# Patient Record
Sex: MALE | Race: BLACK OR AFRICAN AMERICAN | NOT HISPANIC OR LATINO | URBAN - METROPOLITAN AREA
[De-identification: names, ages, dates, MRNs, and addresses within clinical notes are randomized per-mention and may not be internally consistent; named-entity substitution may affect disease eponyms.]

---

## 2017-03-13 ENCOUNTER — EMERGENCY (EMERGENCY)
Facility: HOSPITAL | Age: 32
LOS: 1 days | Discharge: HOME | End: 2017-03-13
Attending: EMERGENCY MEDICINE | Admitting: EMERGENCY MEDICINE
Payer: MEDICARE

## 2017-03-13 VITALS
HEART RATE: 69 BPM | TEMPERATURE: 98 F | SYSTOLIC BLOOD PRESSURE: 134 MMHG | DIASTOLIC BLOOD PRESSURE: 83 MMHG | RESPIRATION RATE: 18 BRPM | OXYGEN SATURATION: 100 %

## 2017-03-13 DIAGNOSIS — I77.0 ARTERIOVENOUS FISTULA, ACQUIRED: Chronic | ICD-10-CM

## 2017-03-13 DIAGNOSIS — Z41.9 ENCOUNTER FOR PROCEDURE FOR PURPOSES OTHER THAN REMEDYING HEALTH STATE, UNSPECIFIED: Chronic | ICD-10-CM

## 2017-03-13 PROCEDURE — 99283 EMERGENCY DEPT VISIT LOW MDM: CPT | Mod: GC

## 2017-03-13 NOTE — ED PROVIDER NOTE - PMH
ESRD (end stage renal disease) on dialysis    ESRD on hemodialysis  Tue Thu Sat  HTN (hypertension)    HTN (hypertension)

## 2017-03-13 NOTE — ED PROVIDER NOTE - MEDICAL DECISION MAKING DETAILS
33 y/o male c/o left sided facial droop  -probable bells palsy  -steroids, artificial tears, outpt follow up

## 2017-03-13 NOTE — ED PROVIDER NOTE - CRANIAL NERVE AND PUPILLARY EXAM
+ left sided facial droop affecting mouht as well as forehead. unable to fully smile or wrinkle left forehead with eyeborw raising./extra-ocular movements intact

## 2017-03-13 NOTE — ED PROVIDER NOTE - ATTENDING CONTRIBUTION TO CARE
Avinash agree with PA note  32 yr old male with hx of HTN, ESRD presents with 3 days of left sided facial droop.  Denies focal weakness, sensory disturbance, gait disturbance, visual changes.    PE: well appearing; 7th nerve palsy indicated by forehead involvement on left side of face; Neuro: 5/5 motor UE and LE; sensation intact; gait intact    Imp: bells palsy steroids, eye drops, eye patch,

## 2017-03-13 NOTE — ED PROVIDER NOTE - OBJECTIVE STATEMENT
31 y/o male hx HTN, ESRD (on home dialysis daily) presents to ED c/o facial droop x 3 days. Pt. states Saturday morning he woke up and noticed left sided facial droop. Pt. states symptoms are slightly better today but still persistent - went to dentist today because he thought his symptoms were dental in origin and was sent to ED for further evaluation. Pt. denies any recent sickness or illness, denies cold sore or lesions anywehre. Denies fever chills weakness dizziness vision changes weakness dizziness nausea vomit.

## 2017-03-13 NOTE — ED ADULT TRIAGE NOTE - CHIEF COMPLAINT QUOTE
pt BIBA from dentist.  pt c/o facial droop x 3 days.  pt went to dentist today because he thought he had a facial droop due to tooth problem.

## 2017-06-16 ENCOUNTER — APPOINTMENT (OUTPATIENT)
Dept: NEUROLOGY | Facility: CLINIC | Age: 32
End: 2017-06-16

## 2017-08-02 ENCOUNTER — APPOINTMENT (OUTPATIENT)
Dept: NEUROLOGY | Facility: CLINIC | Age: 32
End: 2017-08-02

## 2017-08-22 ENCOUNTER — APPOINTMENT (OUTPATIENT)
Dept: NEUROLOGY | Facility: CLINIC | Age: 32
End: 2017-08-22

## 2017-09-22 ENCOUNTER — APPOINTMENT (OUTPATIENT)
Dept: PSYCHIATRY | Facility: CLINIC | Age: 32
End: 2017-09-22

## 2018-04-03 ENCOUNTER — APPOINTMENT (OUTPATIENT)
Dept: TRANSPLANT | Facility: CLINIC | Age: 33
End: 2018-04-03
Payer: COMMERCIAL

## 2018-04-03 VITALS
TEMPERATURE: 98.3 F | HEART RATE: 88 BPM | DIASTOLIC BLOOD PRESSURE: 91 MMHG | RESPIRATION RATE: 12 BRPM | BODY MASS INDEX: 30.35 KG/M2 | WEIGHT: 212 LBS | HEIGHT: 70.2 IN | SYSTOLIC BLOOD PRESSURE: 154 MMHG

## 2018-04-03 PROCEDURE — 99215 OFFICE O/P EST HI 40 MIN: CPT

## 2018-05-16 ENCOUNTER — APPOINTMENT (OUTPATIENT)
Dept: CV DIAGNOSITCS | Facility: HOSPITAL | Age: 33
End: 2018-05-16

## 2018-12-05 ENCOUNTER — APPOINTMENT (OUTPATIENT)
Dept: CARDIOLOGY | Facility: CLINIC | Age: 33
End: 2018-12-05

## 2019-01-23 ENCOUNTER — APPOINTMENT (OUTPATIENT)
Dept: CARDIOLOGY | Facility: CLINIC | Age: 34
End: 2019-01-23

## 2019-02-07 ENCOUNTER — APPOINTMENT (OUTPATIENT)
Dept: CARDIOLOGY | Facility: CLINIC | Age: 34
End: 2019-02-07

## 2019-07-24 ENCOUNTER — APPOINTMENT (OUTPATIENT)
Dept: NEPHROLOGY | Facility: CLINIC | Age: 34
End: 2019-07-24
Payer: COMMERCIAL

## 2019-07-24 VITALS
OXYGEN SATURATION: 95 % | RESPIRATION RATE: 12 BRPM | HEART RATE: 100 BPM | WEIGHT: 228 LBS | SYSTOLIC BLOOD PRESSURE: 156 MMHG | TEMPERATURE: 98.3 F | BODY MASS INDEX: 32.64 KG/M2 | HEIGHT: 70 IN | DIASTOLIC BLOOD PRESSURE: 93 MMHG

## 2019-07-24 PROCEDURE — 99215 OFFICE O/P EST HI 40 MIN: CPT

## 2019-07-24 RX ORDER — METHIMAZOLE 5 MG/1
5 TABLET ORAL
Refills: 0 | Status: DISCONTINUED | COMMUNITY
Start: 2018-04-03 | End: 2019-07-24

## 2019-07-24 NOTE — PHYSICAL EXAM
[General Appearance - Alert] : alert [General Appearance - In No Acute Distress] : in no acute distress [Extraocular Movements] : extraocular movements were intact [Sclera] : the sclera and conjunctiva were normal [Outer Ear] : the ears and nose were normal in appearance [Jugular Venous Distention Increased] : there was no jugular-venous distention [Auscultation Breath Sounds / Voice Sounds] : lungs were clear to auscultation bilaterally [Heart Sounds Gallop] : no gallops [Heart Sounds Pericardial Friction Rub] : no pericardial rub [Abdomen Soft] : soft [Edema] : there was no peripheral edema [Abdomen Tenderness] : non-tender [Abdomen Mass (___ Cm)] : no abdominal mass palpated [Cervical Lymph Nodes Enlarged Anterior Bilaterally] : anterior cervical [Cervical Lymph Nodes Enlarged Posterior Bilaterally] : posterior cervical [Supraclavicular Lymph Nodes Enlarged Bilaterally] : supraclavicular [Axillary Lymph Nodes Enlarged Bilaterally] : axillary [Abnormal Walk] : normal gait [___ (cm) Fistula] : [unfilled] (cm) fistula [] : no rash [No Focal Deficits] : no focal deficits [Oriented To Time, Place, And Person] : oriented to person, place, and time [Impaired Insight] : insight and judgment were intact [Affect] : the affect was normal [Mood] : the mood was normal [Full Pulse] : the pedal pulses are present

## 2019-07-24 NOTE — CONSULT LETTER
[Courtesy Letter:] : I had the pleasure of seeing your patient, [unfilled], in my office today. [Dear  ___] : Dear  [unfilled], [Please see my note below.] : Please see my note below. [Consult Closing:] : Thank you very much for allowing me to participate in the care of this patient.  If you have any questions, please do not hesitate to contact me. [FreeTextEntry3] : Maximo Colindres, DO [Sincerely,] : Sincerely,

## 2019-07-24 NOTE — CONSULT LETTER
[Courtesy Letter:] : I had the pleasure of seeing your patient, [unfilled], in my office today. [Dear  ___] : Dear  [unfilled], [Consult Closing:] : Thank you very much for allowing me to participate in the care of this patient.  If you have any questions, please do not hesitate to contact me. [Please see my note below.] : Please see my note below. [FreeTextEntry3] : Maximo Colindres, DO [Sincerely,] : Sincerely,

## 2019-07-24 NOTE — END OF VISIT
[Time Spent: ___ minutes] : I have spent [unfilled] minutes of face to face time with the patient [FreeTextEntry1] : JOYCELYN Krause

## 2019-07-24 NOTE — ASSESSMENT
[FreeTextEntry1] : 1.  ESRD on dialysis - Pt is an excellent candidate for kidney transplantation.  Unclear cause of ESRD.  Unfortunately he has no living donors.  Discussed benefit of living donation and strategies to find donors.  \par 2.  HTN - stable.  Unclear why on imdur\par 3.  CV - cardiac testing wnl in 2016.  Will repeat\par 4.  Screening - needs CXR and ab sonogram\par 5.  GI - will obtain records of colonoscopy and endoscopy.  Per pt occasional rectal bleeding and stomach upset on famotidine.  \par \par I have personally discussed the risks and benefits of transplantation where the following was disclosed:\par  \par Reviewed factors affecting survival and morbidity while on dialysis, the transplant wait list and reviewed sheyla-operative and long-term risk factors affecting outcome in kidney transplantation.  \par  \par One year SRTR outcomes for national and [facility] were discussed in regards to patient survival and graft survival after transplantation.  \par  \par Details of transplant surgery, including complications were discussed.\par Immunosuppression and complications including infection including life threatening sepsis and opportunistic infections, malignancy and new onset diabetes were discussed.  \par  \par Benefits of live donor transplantation as well as variability in wait times across regions and multiple listing were discussed. \par KDPI >85% and PHS high risk criteria donors were discussed. \par HCV kidney transplantation was discussed.\par  \par I spent > 50% of this 40 minute visit discussing the risks and benefits of kidney transplantation with the patient as outlined above.

## 2019-07-24 NOTE — HISTORY OF PRESENT ILLNESS
[FreeTextEntry1] : 34 years old AA male, ESRD 2015, diagnosed kidney disease prior to start of dialysis. He is followed by Dr. Betito Dodson, nephrologist. He has known HTN since age 17.   Once hospitalized in 2017 for anemia requiring blood transfusion.  \par \par Has had a slight rectal bleeding - has had 2 colonoscopies.  Pt also had an endoscopy. \par  \par Testing:\par EK16, normal sinus rhythm, nonspecific ST abnormality \par Stress Test: 16 (stress echo), 9 METS, 91% MPHR, 08:00 min, no evidence of inducible ischemia \par Echo: 16, mild MR, normal LA, mild concentric LVH, normal LV systolic function, normal RV size and function, RVSP 37 mmHg \par \par \par Past surgeries: Left forearm AVF.\par Former smoker, 2-3 cig /day for 1 year, quit 9 months ago.  No alcohol. Not working currently.  Stays with Mom and aunt.  \par Mother in 70 's both diabetic and hypertensive.  Father  from pneumonia and had DM and HTN.  \par He is single, no children. One sister in [facility] but not close\par No family h/o kidney disease.\par

## 2019-07-24 NOTE — PHYSICAL EXAM
[General Appearance - Alert] : alert [General Appearance - In No Acute Distress] : in no acute distress [Extraocular Movements] : extraocular movements were intact [Sclera] : the sclera and conjunctiva were normal [Outer Ear] : the ears and nose were normal in appearance [Jugular Venous Distention Increased] : there was no jugular-venous distention [Heart Sounds Gallop] : no gallops [Auscultation Breath Sounds / Voice Sounds] : lungs were clear to auscultation bilaterally [Heart Sounds Pericardial Friction Rub] : no pericardial rub [Edema] : there was no peripheral edema [Abdomen Soft] : soft [Abdomen Tenderness] : non-tender [Abdomen Mass (___ Cm)] : no abdominal mass palpated [Cervical Lymph Nodes Enlarged Anterior Bilaterally] : anterior cervical [Cervical Lymph Nodes Enlarged Posterior Bilaterally] : posterior cervical [Supraclavicular Lymph Nodes Enlarged Bilaterally] : supraclavicular [Axillary Lymph Nodes Enlarged Bilaterally] : axillary [Abnormal Walk] : normal gait [___ (cm) Fistula] : [unfilled] (cm) fistula [] : no rash [No Focal Deficits] : no focal deficits [Oriented To Time, Place, And Person] : oriented to person, place, and time [Impaired Insight] : insight and judgment were intact [Affect] : the affect was normal [Mood] : the mood was normal [Full Pulse] : the pedal pulses are present

## 2019-07-24 NOTE — HISTORY OF PRESENT ILLNESS
[FreeTextEntry1] : 34 years old AA male, ESRD 2015, diagnosed kidney disease prior to start of dialysis. He is followed by Dr. Betito Dodson, nephrologist. He has known HTN since age 17.   Once hospitalized in 2017 for anemia requiring blood transfusion.  \par \par Has had a slight rectal bleeding - has had 2 colonoscopies.  Pt also had an endoscopy. \par  \par Testing:\par EK16, normal sinus rhythm, nonspecific ST abnormality \par Stress Test: 16 (stress echo), 9 METS, 91% MPHR, 08:00 min, no evidence of inducible ischemia \par Echo: 16, mild MR, normal LA, mild concentric LVH, normal LV systolic function, normal RV size and function, RVSP 37 mmHg \par \par \par Past surgeries: Left forearm AVF.\par Former smoker, 2-3 cig /day for 1 year, quit 9 months ago.  No alcohol. Not working currently.  Stays with Mom and aunt.  \par Mother in 70 's both diabetic and hypertensive.  Father  from pneumonia and had DM and HTN.  \par He is single, no children. One sister in [facility] but not close\par No family h/o kidney disease.\par

## 2019-07-24 NOTE — ASSESSMENT
[FreeTextEntry1] : 1.  ESRD on dialysis - Pt is an excellent candidate for kidney transplantation.  Unclear cause of ESRD.  Unfortunately he has no living donors.  Discussed benefit of living donation and strategies to find donors.  \par 2.  HTN - stable.  Unclear why on imdur\par 3.  CV - cardiac testing wnl in 2016.  Will repeat\par 4.  Screening - needs CXR and ab sonogram\par 5.  GI - will obtain records of colonoscopy and endoscopy.  Per pt occasional rectal bleeding and stomach upset on famotidine.  \par \par I have personally discussed the risks and benefits of transplantation where the following was disclosed:\par  \par Reviewed factors affecting survival and morbidity while on dialysis, the transplant wait list and reviewed sheyla-operative and long-term risk factors affecting outcome in kidney transplantation.  \par  \par One year SRTR outcomes for national and [facility] were discussed in regards to patient survival and graft survival after transplantation.  \par  \par Details of transplant surgery, including complications were discussed.\par Immunosuppression and complications including infection including life threatening sepsis and opportunistic infections, malignancy and new onset diabetes were discussed.  \par  \par Benefits of live donor transplantation as well as variability in wait times across regions and multiple listing were discussed. \par KDPI >85% and PHS high risk criteria donors were discussed. \par HCV kidney transplantation was discussed.\par  \par I spent > 50% of this 40 minute visit discussing the risks and benefits of kidney transplantation with the patient as outlined above.

## 2019-07-25 LAB
ABO + RH PNL BLD: NORMAL
ALBUMIN SERPL ELPH-MCNC: 4.9 G/DL
ALP BLD-CCNC: 59 U/L
ALT SERPL-CCNC: 45 U/L
ANION GAP SERPL CALC-SCNC: 18 MMOL/L
APPEARANCE: CLEAR
AST SERPL-CCNC: 24 U/L
BACTERIA: NEGATIVE
BASOPHILS # BLD AUTO: 0.07 K/UL
BASOPHILS NFR BLD AUTO: 1 %
BILIRUB SERPL-MCNC: 0.2 MG/DL
BILIRUBIN URINE: NEGATIVE
BLOOD URINE: ABNORMAL
BUN SERPL-MCNC: 33 MG/DL
C PEPTIDE SERPL-MCNC: 9.3 NG/ML
CALCIUM SERPL-MCNC: 10.4 MG/DL
CHLORIDE SERPL-SCNC: 98 MMOL/L
CMV IGG SERPL QL: <0.2 U/ML
CMV IGG SERPL-IMP: NEGATIVE
CO2 SERPL-SCNC: 23 MMOL/L
COLOR: NORMAL
CREAT SERPL-MCNC: 14.67 MG/DL
CREAT SPEC-SCNC: 287 MG/DL
CREAT/PROT UR: 0.5 RATIO
EBV DNA SERPL NAA+PROBE-ACNC: NOT DETECTED IU/ML
EBV EA AB SER IA-ACNC: <5 U/ML
EBV EA AB TITR SER IF: POSITIVE
EBV EA IGG SER QL IA: >600 U/ML
EBV EA IGG SER-ACNC: NEGATIVE
EBV EA IGM SER IA-ACNC: NEGATIVE
EBV PATRN SPEC IB-IMP: NORMAL
EBV VCA IGG SER IA-ACNC: 62.5 U/ML
EBV VCA IGM SER QL IA: <10 U/ML
EBVPCR LOG: NOT DETECTED LOGIU/ML
EOSINOPHIL # BLD AUTO: 0.2 K/UL
EOSINOPHIL NFR BLD AUTO: 2.8 %
EPSTEIN-BARR VIRUS CAPSID ANTIGEN IGG: POSITIVE
ESTIMATED AVERAGE GLUCOSE: 97 MG/DL
GLUCOSE QUALITATIVE U: NEGATIVE
GLUCOSE SERPL-MCNC: 94 MG/DL
HAV IGM SER QL: NONREACTIVE
HBA1C MFR BLD HPLC: 5 %
HBV CORE IGG+IGM SER QL: NONREACTIVE
HBV SURFACE AB SER QL: REACTIVE
HBV SURFACE AG SER QL: NONREACTIVE
HCT VFR BLD CALC: 33.6 %
HCV AB SER QL: NONREACTIVE
HCV S/CO RATIO: 0.16 S/CO
HGB BLD-MCNC: 10 G/DL
HIV1+2 AB SPEC QL IA.RAPID: NONREACTIVE
HSV 1+2 IGG SER IA-IMP: NEGATIVE
HSV 1+2 IGG SER IA-IMP: POSITIVE
HSV1 IGG SER QL: 62.1 INDEX
HSV2 IGG SER QL: 0.16 INDEX
HYALINE CASTS: 2 /LPF
IMM GRANULOCYTES NFR BLD AUTO: 0.7 %
KETONES URINE: NORMAL
LEUKOCYTE ESTERASE URINE: ABNORMAL
LYMPHOCYTES # BLD AUTO: 1.73 K/UL
LYMPHOCYTES NFR BLD AUTO: 24 %
MAGNESIUM SERPL-MCNC: 2.6 MG/DL
MAN DIFF?: NORMAL
MCHC RBC-ENTMCNC: 26.4 PG
MCHC RBC-ENTMCNC: 29.8 GM/DL
MCV RBC AUTO: 88.7 FL
MICROSCOPIC-UA: NORMAL
MONOCYTES # BLD AUTO: 0.95 K/UL
MONOCYTES NFR BLD AUTO: 13.2 %
NEUTROPHILS # BLD AUTO: 4.22 K/UL
NEUTROPHILS NFR BLD AUTO: 58.3 %
NITRITE URINE: NEGATIVE
PH URINE: 6
PHOSPHATE SERPL-MCNC: 4 MG/DL
PLATELET # BLD AUTO: 291 K/UL
POTASSIUM SERPL-SCNC: 4.1 MMOL/L
PROT SERPL-MCNC: 7.4 G/DL
PROT UR-MCNC: 144 MG/DL
PROTEIN URINE: ABNORMAL
PSA SERPL-MCNC: 0.63 NG/ML
RBC # BLD: 3.79 M/UL
RBC # FLD: 17 %
RED BLOOD CELLS URINE: 10 /HPF
RUBV IGG FLD-ACNC: 3.9 INDEX
RUBV IGG SER-IMP: POSITIVE
SODIUM SERPL-SCNC: 139 MMOL/L
SPECIFIC GRAVITY URINE: 1.02
SQUAMOUS EPITHELIAL CELLS: 1 /HPF
T GONDII AB SER-IMP: NEGATIVE
T GONDII IGG SER QL: <3 IU/ML
T PALLIDUM AB SER QL IA: NEGATIVE
URATE SERPL-MCNC: 4.3 MG/DL
UROBILINOGEN URINE: NORMAL
VZV AB TITR SER: POSITIVE
VZV IGG SER IF-ACNC: 1890 INDEX
WBC # FLD AUTO: 7.22 K/UL
WHITE BLOOD CELLS URINE: 91 /HPF

## 2019-07-26 LAB
M TB IFN-G BLD-IMP: NEGATIVE
QUANTIFERON TB PLUS MITOGEN MINUS NIL: 9.61 IU/ML
QUANTIFERON TB PLUS NIL: 0.02 IU/ML
QUANTIFERON TB PLUS TB1 MINUS NIL: -0.01 IU/ML
QUANTIFERON TB PLUS TB2 MINUS NIL: 0 IU/ML

## 2019-09-04 ENCOUNTER — APPOINTMENT (OUTPATIENT)
Dept: RADIOLOGY | Facility: IMAGING CENTER | Age: 34
End: 2019-09-04

## 2019-09-04 ENCOUNTER — APPOINTMENT (OUTPATIENT)
Dept: ULTRASOUND IMAGING | Facility: IMAGING CENTER | Age: 34
End: 2019-09-04

## 2019-09-11 ENCOUNTER — APPOINTMENT (OUTPATIENT)
Dept: CARDIOLOGY | Facility: CLINIC | Age: 34
End: 2019-09-11

## 2019-10-25 ENCOUNTER — APPOINTMENT (OUTPATIENT)
Dept: ULTRASOUND IMAGING | Facility: IMAGING CENTER | Age: 34
End: 2019-10-25
Payer: COMMERCIAL

## 2019-10-25 ENCOUNTER — OUTPATIENT (OUTPATIENT)
Dept: OUTPATIENT SERVICES | Facility: HOSPITAL | Age: 34
LOS: 1 days | End: 2019-10-25
Payer: COMMERCIAL

## 2019-10-25 ENCOUNTER — APPOINTMENT (OUTPATIENT)
Dept: RADIOLOGY | Facility: IMAGING CENTER | Age: 34
End: 2019-10-25
Payer: COMMERCIAL

## 2019-10-25 DIAGNOSIS — I77.0 ARTERIOVENOUS FISTULA, ACQUIRED: Chronic | ICD-10-CM

## 2019-10-25 DIAGNOSIS — Z41.9 ENCOUNTER FOR PROCEDURE FOR PURPOSES OTHER THAN REMEDYING HEALTH STATE, UNSPECIFIED: Chronic | ICD-10-CM

## 2019-10-25 DIAGNOSIS — Z01.818 ENCOUNTER FOR OTHER PREPROCEDURAL EXAMINATION: ICD-10-CM

## 2019-10-25 PROCEDURE — 76700 US EXAM ABDOM COMPLETE: CPT | Mod: 26

## 2019-10-25 PROCEDURE — 71046 X-RAY EXAM CHEST 2 VIEWS: CPT | Mod: 26

## 2019-10-25 PROCEDURE — 71046 X-RAY EXAM CHEST 2 VIEWS: CPT

## 2019-10-25 PROCEDURE — 93979 VASCULAR STUDY: CPT

## 2019-10-25 PROCEDURE — 76700 US EXAM ABDOM COMPLETE: CPT

## 2019-11-06 ENCOUNTER — APPOINTMENT (OUTPATIENT)
Dept: CARDIOLOGY | Facility: CLINIC | Age: 34
End: 2019-11-06
Payer: MEDICARE

## 2019-11-06 ENCOUNTER — NON-APPOINTMENT (OUTPATIENT)
Age: 34
End: 2019-11-06

## 2019-11-06 VITALS
HEIGHT: 70 IN | RESPIRATION RATE: 14 BRPM | OXYGEN SATURATION: 97 % | SYSTOLIC BLOOD PRESSURE: 167 MMHG | DIASTOLIC BLOOD PRESSURE: 101 MMHG | BODY MASS INDEX: 31.5 KG/M2 | WEIGHT: 220 LBS | HEART RATE: 70 BPM

## 2019-11-06 PROCEDURE — 93000 ELECTROCARDIOGRAM COMPLETE: CPT

## 2019-11-06 PROCEDURE — 99203 OFFICE O/P NEW LOW 30 MIN: CPT

## 2019-11-13 NOTE — HISTORY OF PRESENT ILLNESS
[FreeTextEntry1] : Patient with HTN and ESRD. Conditions have been stable. Currently doing well. Denies chest pain, shortness of breath or palpitations.

## 2019-11-13 NOTE — PHYSICAL EXAM
[General Appearance - Well Developed] : well developed [Normal Appearance] : normal appearance [Well Groomed] : well groomed [General Appearance - Well Nourished] : well nourished [No Deformities] : no deformities [General Appearance - In No Acute Distress] : no acute distress [Normal Conjunctiva] : the conjunctiva exhibited no abnormalities [Eyelids - No Xanthelasma] : the eyelids demonstrated no xanthelasmas [Normal Oral Mucosa] : normal oral mucosa [No Oral Pallor] : no oral pallor [No Oral Cyanosis] : no oral cyanosis [Heart Rate And Rhythm] : heart rate and rhythm were normal [Heart Sounds] : normal S1 and S2 [Murmurs] : no murmurs present [Edema] : no peripheral edema present [Respiration, Rhythm And Depth] : normal respiratory rhythm and effort [Exaggerated Use Of Accessory Muscles For Inspiration] : no accessory muscle use [Auscultation Breath Sounds / Voice Sounds] : lungs were clear to auscultation bilaterally [Abdomen Soft] : soft [Abdomen Tenderness] : non-tender [Abnormal Walk] : normal gait [Gait - Sufficient For Exercise Testing] : the gait was sufficient for exercise testing [Cyanosis, Localized] : no localized cyanosis [Skin Color & Pigmentation] : normal skin color and pigmentation [] : no rash [No Venous Stasis] : no venous stasis [Oriented To Time, Place, And Person] : oriented to person, place, and time [Affect] : the affect was normal [Mood] : the mood was normal [No Anxiety] : not feeling anxious [FreeTextEntry1] : left forearm fistula

## 2019-11-13 NOTE — DISCUSSION/SUMMARY
[Hypertension] : hypertension [FreeTextEntry1] : \par Currently stable from a cardiovascular standpoint. Hypertensive today. Euvolemic. Asymptomatic. Continue current medications. ECG completed today and reviewed. Will schedule a stress echo to rule out any significant CAD and to assess his cardiac structures and function. Pending the test result, I will make further recommendations. At this time, patient is considered an acceptable risk from a cardiac standpoint for renal transplant. Follow up annually pre-transplant.

## 2019-12-01 ENCOUNTER — OUTPATIENT (OUTPATIENT)
Dept: OUTPATIENT SERVICES | Facility: HOSPITAL | Age: 34
LOS: 1 days | End: 2019-12-01
Payer: MEDICARE

## 2019-12-01 DIAGNOSIS — Z41.9 ENCOUNTER FOR PROCEDURE FOR PURPOSES OTHER THAN REMEDYING HEALTH STATE, UNSPECIFIED: Chronic | ICD-10-CM

## 2019-12-01 DIAGNOSIS — I77.0 ARTERIOVENOUS FISTULA, ACQUIRED: Chronic | ICD-10-CM

## 2019-12-01 PROCEDURE — G9001: CPT

## 2019-12-10 ENCOUNTER — OUTPATIENT (OUTPATIENT)
Dept: OUTPATIENT SERVICES | Facility: HOSPITAL | Age: 34
LOS: 1 days | End: 2019-12-10

## 2019-12-10 ENCOUNTER — APPOINTMENT (OUTPATIENT)
Dept: CV DIAGNOSITCS | Facility: HOSPITAL | Age: 34
End: 2019-12-10
Payer: COMMERCIAL

## 2019-12-10 DIAGNOSIS — Z41.9 ENCOUNTER FOR PROCEDURE FOR PURPOSES OTHER THAN REMEDYING HEALTH STATE, UNSPECIFIED: Chronic | ICD-10-CM

## 2019-12-10 DIAGNOSIS — N18.6 END STAGE RENAL DISEASE: ICD-10-CM

## 2019-12-10 DIAGNOSIS — I77.0 ARTERIOVENOUS FISTULA, ACQUIRED: Chronic | ICD-10-CM

## 2019-12-10 PROCEDURE — 93350 STRESS TTE ONLY: CPT | Mod: 26

## 2019-12-10 PROCEDURE — 93325 DOPPLER ECHO COLOR FLOW MAPG: CPT | Mod: 26,GC

## 2019-12-10 PROCEDURE — 93320 DOPPLER ECHO COMPLETE: CPT | Mod: 26,GC

## 2019-12-26 ENCOUNTER — EMERGENCY (EMERGENCY)
Facility: HOSPITAL | Age: 34
LOS: 1 days | Discharge: HOME | End: 2019-12-26
Attending: EMERGENCY MEDICINE | Admitting: EMERGENCY MEDICINE
Payer: MEDICARE

## 2019-12-26 VITALS
TEMPERATURE: 98 F | RESPIRATION RATE: 18 BRPM | HEART RATE: 75 BPM | OXYGEN SATURATION: 100 % | SYSTOLIC BLOOD PRESSURE: 173 MMHG | DIASTOLIC BLOOD PRESSURE: 112 MMHG

## 2019-12-26 VITALS
SYSTOLIC BLOOD PRESSURE: 168 MMHG | DIASTOLIC BLOOD PRESSURE: 100 MMHG | RESPIRATION RATE: 18 BRPM | TEMPERATURE: 98 F | HEART RATE: 76 BPM | OXYGEN SATURATION: 100 %

## 2019-12-26 DIAGNOSIS — I77.0 ARTERIOVENOUS FISTULA, ACQUIRED: Chronic | ICD-10-CM

## 2019-12-26 DIAGNOSIS — Z41.9 ENCOUNTER FOR PROCEDURE FOR PURPOSES OTHER THAN REMEDYING HEALTH STATE, UNSPECIFIED: Chronic | ICD-10-CM

## 2019-12-26 LAB
ALBUMIN SERPL ELPH-MCNC: 4.4 G/DL — SIGNIFICANT CHANGE UP (ref 3.3–5)
ALP SERPL-CCNC: 41 U/L — SIGNIFICANT CHANGE UP (ref 40–120)
ALT FLD-CCNC: SIGNIFICANT CHANGE UP U/L (ref 4–41)
ANION GAP SERPL CALC-SCNC: 15 MMO/L — HIGH (ref 7–14)
ANION GAP SERPL CALC-SCNC: 15 MMO/L — HIGH (ref 7–14)
APPEARANCE UR: CLEAR — SIGNIFICANT CHANGE UP
APTT BLD: 25.9 SEC — LOW (ref 27.5–36.3)
AST SERPL-CCNC: SIGNIFICANT CHANGE UP U/L (ref 4–40)
BACTERIA # UR AUTO: SIGNIFICANT CHANGE UP
BASOPHILS # BLD AUTO: 0.05 K/UL — SIGNIFICANT CHANGE UP (ref 0–0.2)
BASOPHILS NFR BLD AUTO: 1.2 % — SIGNIFICANT CHANGE UP (ref 0–2)
BILIRUB SERPL-MCNC: 0.4 MG/DL — SIGNIFICANT CHANGE UP (ref 0.2–1.2)
BILIRUB UR-MCNC: NEGATIVE — SIGNIFICANT CHANGE UP
BLD GP AB SCN SERPL QL: NEGATIVE — SIGNIFICANT CHANGE UP
BLOOD UR QL VISUAL: HIGH
BUN SERPL-MCNC: 55 MG/DL — HIGH (ref 7–23)
BUN SERPL-MCNC: 57 MG/DL — HIGH (ref 7–23)
CALCIUM SERPL-MCNC: 9.5 MG/DL — SIGNIFICANT CHANGE UP (ref 8.4–10.5)
CALCIUM SERPL-MCNC: 9.6 MG/DL — SIGNIFICANT CHANGE UP (ref 8.4–10.5)
CHLORIDE SERPL-SCNC: 94 MMOL/L — LOW (ref 98–107)
CHLORIDE SERPL-SCNC: 97 MMOL/L — LOW (ref 98–107)
CO2 SERPL-SCNC: 24 MMOL/L — SIGNIFICANT CHANGE UP (ref 22–31)
CO2 SERPL-SCNC: 25 MMOL/L — SIGNIFICANT CHANGE UP (ref 22–31)
COLOR SPEC: COLORLESS — SIGNIFICANT CHANGE UP
CREAT SERPL-MCNC: 19.69 MG/DL — HIGH (ref 0.5–1.3)
CREAT SERPL-MCNC: 20.2 MG/DL — HIGH (ref 0.5–1.3)
EOSINOPHIL # BLD AUTO: 0.26 K/UL — SIGNIFICANT CHANGE UP (ref 0–0.5)
EOSINOPHIL NFR BLD AUTO: 6.1 % — HIGH (ref 0–6)
GLUCOSE SERPL-MCNC: 80 MG/DL — SIGNIFICANT CHANGE UP (ref 70–99)
GLUCOSE SERPL-MCNC: 81 MG/DL — SIGNIFICANT CHANGE UP (ref 70–99)
GLUCOSE UR-MCNC: NEGATIVE — SIGNIFICANT CHANGE UP
HCT VFR BLD CALC: 29.4 % — LOW (ref 39–50)
HGB BLD-MCNC: 8.7 G/DL — LOW (ref 13–17)
HYALINE CASTS # UR AUTO: NEGATIVE — SIGNIFICANT CHANGE UP
IMM GRANULOCYTES NFR BLD AUTO: 0.2 % — SIGNIFICANT CHANGE UP (ref 0–1.5)
INR BLD: 0.9 — SIGNIFICANT CHANGE UP (ref 0.88–1.17)
KETONES UR-MCNC: NEGATIVE — SIGNIFICANT CHANGE UP
LEUKOCYTE ESTERASE UR-ACNC: SIGNIFICANT CHANGE UP
LYMPHOCYTES # BLD AUTO: 1.11 K/UL — SIGNIFICANT CHANGE UP (ref 1–3.3)
LYMPHOCYTES # BLD AUTO: 26.1 % — SIGNIFICANT CHANGE UP (ref 13–44)
MAGNESIUM SERPL-MCNC: 2.5 MG/DL — SIGNIFICANT CHANGE UP (ref 1.6–2.6)
MCHC RBC-ENTMCNC: 25.8 PG — LOW (ref 27–34)
MCHC RBC-ENTMCNC: 29.6 % — LOW (ref 32–36)
MCV RBC AUTO: 87.2 FL — SIGNIFICANT CHANGE UP (ref 80–100)
MONOCYTES # BLD AUTO: 0.69 K/UL — SIGNIFICANT CHANGE UP (ref 0–0.9)
MONOCYTES NFR BLD AUTO: 16.2 % — HIGH (ref 2–14)
NEUTROPHILS # BLD AUTO: 2.13 K/UL — SIGNIFICANT CHANGE UP (ref 1.8–7.4)
NEUTROPHILS NFR BLD AUTO: 50.2 % — SIGNIFICANT CHANGE UP (ref 43–77)
NITRITE UR-MCNC: NEGATIVE — SIGNIFICANT CHANGE UP
NRBC # FLD: 0 K/UL — SIGNIFICANT CHANGE UP (ref 0–0)
PH UR: 8.5 — HIGH (ref 5–8)
PHOSPHATE SERPL-MCNC: 5.9 MG/DL — HIGH (ref 2.5–4.5)
PLATELET # BLD AUTO: 251 K/UL — SIGNIFICANT CHANGE UP (ref 150–400)
PMV BLD: 10.1 FL — SIGNIFICANT CHANGE UP (ref 7–13)
POTASSIUM SERPL-MCNC: 5.4 MMOL/L — HIGH (ref 3.5–5.3)
POTASSIUM SERPL-MCNC: SIGNIFICANT CHANGE UP MMOL/L (ref 3.5–5.3)
POTASSIUM SERPL-SCNC: 5.4 MMOL/L — HIGH (ref 3.5–5.3)
POTASSIUM SERPL-SCNC: SIGNIFICANT CHANGE UP MMOL/L (ref 3.5–5.3)
PROT SERPL-MCNC: 7.3 G/DL — SIGNIFICANT CHANGE UP (ref 6–8.3)
PROT UR-MCNC: 100 — HIGH
PROTHROM AB SERPL-ACNC: 10.2 SEC — SIGNIFICANT CHANGE UP (ref 9.8–13.1)
RBC # BLD: 3.37 M/UL — LOW (ref 4.2–5.8)
RBC # FLD: 16.4 % — HIGH (ref 10.3–14.5)
RBC CASTS # UR COMP ASSIST: HIGH (ref 0–?)
RH IG SCN BLD-IMP: POSITIVE — SIGNIFICANT CHANGE UP
SODIUM SERPL-SCNC: 134 MMOL/L — LOW (ref 135–145)
SODIUM SERPL-SCNC: 136 MMOL/L — SIGNIFICANT CHANGE UP (ref 135–145)
SP GR SPEC: 1.01 — SIGNIFICANT CHANGE UP (ref 1–1.04)
SQUAMOUS # UR AUTO: SIGNIFICANT CHANGE UP
UROBILINOGEN FLD QL: NORMAL — SIGNIFICANT CHANGE UP
WBC # BLD: 4.25 K/UL — SIGNIFICANT CHANGE UP (ref 3.8–10.5)
WBC # FLD AUTO: 4.25 K/UL — SIGNIFICANT CHANGE UP (ref 3.8–10.5)
WBC UR QL: SIGNIFICANT CHANGE UP (ref 0–?)

## 2019-12-26 PROCEDURE — 99283 EMERGENCY DEPT VISIT LOW MDM: CPT | Mod: GC

## 2019-12-26 NOTE — ED PROVIDER NOTE - CLINICAL SUMMARY MEDICAL DECISION MAKING FREE TEXT BOX
34 year-old male with history of ESRD secondary to HTN on home HD 5/7 days (last 2 days ago), hemorrhoids presents to the Emergency Department for BIS; hematuria.  Plan for CBC to eval. crit; UA to rule-out UTI.  Reassess.

## 2019-12-26 NOTE — ED PROVIDER NOTE - PHYSICAL EXAMINATION
GEN - NAD; well appearing; A+O x3; non-toxic appearing  CARD -s1s2, RRR, no M,G,R;   PULM - CTA b/l, symmetric breath sounds;   ABD -  +BS, ND, NT, soft, no guarding, no rebound, no masses;   Rectal - no gross blood  : No discharge, no testicular pain or tenderness or masses  BACK - no CVA tenderness, Normal  spine;   EXT - symmetric pulses, 2+ dp, capillary refill < 2 seconds, no clubbing, no cyanosis, no edema; avm on L forearm.  NEURO - no focal neuro deficits, no slurred speech

## 2019-12-26 NOTE — ED ADULT NURSE REASSESSMENT NOTE - NS ED NURSE REASSESS COMMENT FT1
Pt resting comfortably in bed at this time. Breathing even and unlabored. Denies chest pain, SOB, headache, dizziness, or weakness at this time. VS as noted. Will continue to monitor.

## 2019-12-26 NOTE — ED PROVIDER NOTE - ATTENDING CONTRIBUTION TO CARE
Preethi: I have seen and examined the patient face to face, have reviewed and addended the HPI, PE and a/p as necessary.    35 yo M with HTN and ESRD on home HD daily for 5/7 days, arrived with hematuria and bright red blood per rectum xmonths.  HD RN told patient to follow up with pmd. Reports urine is blood tinged/orange.  Currently clear. Pt reported that he was taking heparin with dialysis and is not currently on AC 2/2 complaints.  Pt noted to have colonoscopy years ago noted here to have AVMs.  Discussed with Dr. Hernandez, of nephrology. Noted to have baseline Hgb 7.5.  Denies chest pain, shortness of breath, nausea and vomiting, dyspnea, dysuria.      GEN - NAD; well appearing; A+O x3; non-toxic appearing  CARD -s1s2, RRR, no M,G,R;   PULM - CTA b/l, symmetric breath sounds;   ABD -  +BS, ND, NT, soft, no guarding, no rebound, no masses;   BACK - no CVA tenderness, Normal  spine;   EXT - symmetric pulses, 2+ dp, capillary refill < 2 seconds, no clubbing, no cyanosis, no edema; avm on L forearm.  NEURO - no focal neuro deficits, no slurred speech    35 yo M HTN ESRD, concern for possible GI bleed, vs hematuria.  Check urinalysis, CBC Cmp, ekg wnl no st/t wave changes.  Reassess.  If hbg stable then will further evaluate as outpatient.

## 2019-12-26 NOTE — ED PROVIDER NOTE - PSH
AV fistula  L forearm  Elective surgical procedure  RACW permacath for HD  No significant past surgical history

## 2019-12-26 NOTE — ED PROVIDER NOTE - PROGRESS NOTE DETAILS
Preethi: Discussed with Nephrology Dr. Hernandez, baseline is 7.4 on 12/24.  Pending repeat K given hemolyzed.  If K and UA within normal limits will d/c home with GI and urology follow up

## 2019-12-26 NOTE — ED ADULT NURSE NOTE - OBJECTIVE STATEMENT
Pt is a 34yr old male, A&OX4 and ambulatory, complaining of rectal bleeding and hematuria for the last few weeks, however more constant this week. Last BM was yesterday, pt reports stool was dark. Pt reports urine is "light red". PMH of ESRD and HTN- left AV fistula noted, +thrill, "self-dialyzes 5 days a week", last dialyzed yesterday 12/25. Pt also reports right sided flank pain that radiates to the groin area x1 week. Abdomen soft, nontender, and nondistended. Pt denies chest pain, SOB, headache, dizziness, lightheaded, weakness, N/V, fever/chills, and cough. VS as noted. 20 gauge IV in the right hand. Labs sent. Will continue to monitor.

## 2019-12-26 NOTE — ED PROVIDER NOTE - OBJECTIVE STATEMENT
34 year-old male with history of ESRD secondary to HTN on home HD 5/7 days (last 2 days ago), hemorrhoids presents to the Emergency Department for BIS and hematuria ongoing for the past few months; worsening over the past week.  No fevers, chills, nausea, vomiting, chest pain, shortness of breath, dysuria.  Occasional left sided abdominal pain.  Patient's last colonoscopy was approx. a year ago which showed "scarring".  Reports being chronically constipated and frequently straining.  Patient on heparin with dialysis; but not on AC otherwise.

## 2019-12-26 NOTE — ED ADULT NURSE NOTE - NSIMPLEMENTINTERV_GEN_ALL_ED
Implemented All Universal Safety Interventions:  [place] to call system. Call bell, personal items and telephone within reach. Instruct patient to call for assistance. Room bathroom lighting operational. Non-slip footwear when patient is off stretcher. Physically safe environment: no spills, clutter or unnecessary equipment. Stretcher in lowest position, wheels locked, appropriate side rails in place.

## 2019-12-26 NOTE — ED PROVIDER NOTE - PATIENT PORTAL LINK FT
You can access the FollowMyHealth Patient Portal offered by [facility] by registering at the following website: [email or web address]. By joining [facility]’s FollowMyHealth portal, you will also be able to view your health information using other applications (apps) compatible with our system.

## 2020-01-10 ENCOUNTER — APPOINTMENT (OUTPATIENT)
Dept: UROLOGY | Facility: CLINIC | Age: 35
End: 2020-01-10

## 2020-01-10 ENCOUNTER — OUTPATIENT (OUTPATIENT)
Dept: OUTPATIENT SERVICES | Facility: HOSPITAL | Age: 35
LOS: 1 days | End: 2020-01-10
Payer: MEDICARE

## 2020-01-10 DIAGNOSIS — R31.29 OTHER MICROSCOPIC HEMATURIA: ICD-10-CM

## 2020-01-10 DIAGNOSIS — Z41.9 ENCOUNTER FOR PROCEDURE FOR PURPOSES OTHER THAN REMEDYING HEALTH STATE, UNSPECIFIED: Chronic | ICD-10-CM

## 2020-01-10 DIAGNOSIS — R39.15 URGENCY OF URINATION: ICD-10-CM

## 2020-01-10 DIAGNOSIS — R35.0 FREQUENCY OF MICTURITION: ICD-10-CM

## 2020-01-10 DIAGNOSIS — I77.0 ARTERIOVENOUS FISTULA, ACQUIRED: Chronic | ICD-10-CM

## 2020-01-10 PROCEDURE — G0463: CPT

## 2020-01-10 NOTE — PHYSICAL EXAM
[General Appearance - Well Developed] : well developed [Normal Appearance] : normal appearance [General Appearance - In No Acute Distress] : no acute distress [Well Groomed] : well groomed [Heart Rate And Rhythm] : Heart rate and rhythm were normal [] : no respiratory distress [Abdomen Soft] : soft [Abdomen Tenderness] : non-tender [Costovertebral Angle Tenderness] : no ~M costovertebral angle tenderness [Urethral Meatus] : meatus normal [Penis Abnormality] : normal circumcised penis [Testes Tenderness] : no tenderness of the testes [Scrotum] : the scrotum was normal [Skin Color & Pigmentation] : normal skin color and pigmentation [Testes Mass (___cm)] : there were no testicular masses [Normal Station and Gait] : the gait and station were normal for the patient's age [Mood] : the mood was normal [Affect] : the affect was normal

## 2020-01-13 NOTE — HISTORY OF PRESENT ILLNESS
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 Pt states over the past month he has been experiencing intermittent, dull, left flank pain with radiation to the groin duration of 30 min to 1 hour accompanied by hematuria that began 8 months ago. Pt states there is increase in intermittency, urgency, straining, and decrease in stream.  Pt visualizes stones and blood clots in his urine and experiences dysuria only with passage of stones. Over the past three months pt had 10 to 15 lb wt loss, increased fatigue and decrease in appetite.  Pt denies fever, nausea, vomiting, or diarrhea.  \par \par Of note, he is awaiting renal transplant and undergoing testing for pre-transplant eval.\par \par He has no history of  malignancy or kidney stones.\par

## 2020-01-13 NOTE — REVIEW OF SYSTEMS
[Feeling Tired] : feeling tired [Feeling Poorly] : feeling poorly [Recent Weight Loss (___ Lbs)] : recent [unfilled] ~Ulb weight loss [Eyesight Problems] : eyesight problems [Dysuria] : dysuria [Heartburn] : heartburn [Incontinence] : incontinence [Hesitancy] : urinary hesitancy [Nocturia] : nocturia [Anxiety] : anxiety [Muscle Weakness] : muscle weakness [Feelings Of Weakness] : feelings of weakness [Abdominal Pain] : abdominal pain [Fever] : no fever [Earache] : no earache [Chills] : no chills [Palpitations] : no palpitations [Sore Throat] : no sore throat [Chest Pain] : no chest pain [Shortness Of Breath] : no shortness of breath [Cough] : no cough [Wheezing] : no wheezing [Vomiting] : no vomiting [Diarrhea] : no diarrhea [Genital Lesion] : no genital lesions [Testicular Pain] : no testicular pain [Joint Pain] : no joint pain [Joint Swelling] : no joint swelling [Skin Lesions] : no skin lesions [Limb Swelling] : no limb swelling [Itching] : no itching [Skin Wound] : no skin wound [Depression] : no depression [Hot Flashes] : no hot flashes [Easy Bruising] : no tendency for easy bruising [Swollen Glands] : no swollen glands [Easy Bleeding] : no tendency for easy bleeding

## 2020-01-15 DIAGNOSIS — R30.0 DYSURIA: ICD-10-CM

## 2020-01-15 DIAGNOSIS — R31.9 HEMATURIA, UNSPECIFIED: ICD-10-CM

## 2020-01-15 DIAGNOSIS — N18.6 END STAGE RENAL DISEASE: ICD-10-CM

## 2020-01-15 DIAGNOSIS — Z87.442 PERSONAL HISTORY OF URINARY CALCULI: ICD-10-CM

## 2020-01-21 ENCOUNTER — APPOINTMENT (OUTPATIENT)
Dept: GASTROENTEROLOGY | Facility: CLINIC | Age: 35
End: 2020-01-21
Payer: MEDICARE

## 2020-01-21 VITALS
RESPIRATION RATE: 16 BRPM | HEIGHT: 71 IN | OXYGEN SATURATION: 100 % | TEMPERATURE: 98.2 F | SYSTOLIC BLOOD PRESSURE: 133 MMHG | DIASTOLIC BLOOD PRESSURE: 81 MMHG | WEIGHT: 228 LBS | HEART RATE: 82 BPM | BODY MASS INDEX: 31.92 KG/M2

## 2020-01-21 PROCEDURE — 99204 OFFICE O/P NEW MOD 45 MIN: CPT

## 2020-01-27 NOTE — HISTORY OF PRESENT ILLNESS
[FreeTextEntry1] : Patient referred for anemia.  \par Has ESRD, is on home dialysis which he generally uses 5 times per week.\par \par Has intermittent rectal bleeding, scant amount.  Per discharge summary in [employer], he had colonoscopy as inpatient in 2016 for workup of bleeding, hemorrhoids were found.  Does not recall an upper endoscopy.\par \par Also moderate abdominal pain, sharp and burning in quality, for the past 10 months, lasting up to 30 minutes at a time.  Moving around may worsen the pain.  Eating does not worsen the pain.\par Has constipation.\par \par No fevers, chills, sweats, heartburn, dysphagia, nausea, vomiting, diarrhea, melena, jaundice or weight loss.\par \par Most recent Hb 10 in [employer].\par

## 2020-01-27 NOTE — CONSULT LETTER
[Please see my note below.] : Please see my note below. [Dear  ___] : Dear  [unfilled], [Consult Closing:] : Thank you very much for allowing me to participate in the care of this patient.  If you have any questions, please do not hesitate to contact me. [Sincerely,] : Sincerely, [FreeTextEntry3] : Robert Mccann MD, MPH, FASGE\par Chief of Clinical Quality in Gastroenterology, [facility]\par Director of Endoscopic Ultrasound, [facility]\par [address], [address]\par [place], [address]\par Weekdays 8 AM-4PM [phone] - [place]\par 24 hours [phone]\par \par

## 2020-01-27 NOTE — ASSESSMENT
[FreeTextEntry1] : Impression:\par \par #1.  Intermittent scant rectal bleeding\par \par #2.  Anemia, most likely due to ESRD\par \par #3.  Abdominal pain, subacute\par \par #4.  ESRD on home dialysis, undergoing evaluation for renal transplant.\par \par Recommendations:\par \par #1.  EGD / colonoscopy for workup of GI bleed and anemia.\par \par #2.  Golytely ordered.\par \par #3. Presurgical testing appointment given ESRD.

## 2020-01-27 NOTE — PHYSICAL EXAM
[General Appearance - In No Acute Distress] : in no acute distress [Sclera] : the sclera and conjunctiva were normal [General Appearance - Alert] : alert [Oropharynx] : the oropharynx was normal [Auscultation Breath Sounds / Voice Sounds] : lungs were clear to auscultation bilaterally [Heart Rate And Rhythm] : heart rate was normal and rhythm regular [Bowel Sounds] : normal bowel sounds [Abdomen Soft] : soft [] : no hepato-splenomegaly [Abdomen Tenderness] : non-tender [Abdomen Mass (___ Cm)] : no abdominal mass palpated [Cervical Lymph Nodes Enlarged Anterior Bilaterally] : anterior cervical [Supraclavicular Lymph Nodes Enlarged Bilaterally] : supraclavicular [No CVA Tenderness] : no ~M costovertebral angle tenderness [Abnormal Walk] : normal gait [Affect] : the affect was normal [FreeTextEntry1] : No confusion

## 2020-02-05 ENCOUNTER — APPOINTMENT (OUTPATIENT)
Dept: ENDOCRINOLOGY | Facility: CLINIC | Age: 35
End: 2020-02-05

## 2020-02-20 ENCOUNTER — APPOINTMENT (OUTPATIENT)
Dept: GASTROENTEROLOGY | Facility: CLINIC | Age: 35
End: 2020-02-20

## 2020-03-04 LAB — BACTERIA UR CULT: NORMAL

## 2020-03-06 ENCOUNTER — APPOINTMENT (OUTPATIENT)
Dept: UROLOGY | Facility: CLINIC | Age: 35
End: 2020-03-06

## 2020-03-20 ENCOUNTER — APPOINTMENT (OUTPATIENT)
Dept: UROLOGY | Facility: CLINIC | Age: 35
End: 2020-03-20

## 2020-04-06 ENCOUNTER — APPOINTMENT (OUTPATIENT)
Dept: GASTROENTEROLOGY | Facility: HOSPITAL | Age: 35
End: 2020-04-06

## 2020-06-09 ENCOUNTER — APPOINTMENT (OUTPATIENT)
Dept: GASTROENTEROLOGY | Facility: CLINIC | Age: 35
End: 2020-06-09
Payer: MEDICARE

## 2020-06-09 DIAGNOSIS — Z86.2 PERSONAL HISTORY OF DISEASES OF THE BLOOD AND BLOOD-FORMING ORGANS AND CERTAIN DISORDERS INVOLVING THE IMMUNE MECHANISM: ICD-10-CM

## 2020-06-09 PROCEDURE — 99441: CPT | Mod: 95

## 2020-06-09 NOTE — PLAN
[FreeTextEntry1] : Impression:\par \par #1. Intermittent scant rectal bleeding\par \par #2. Anemia, most likely due to ESRD\par \par #3. Abdominal pain, subacute\par \par #4. ESRD on home dialysis, undergoing evaluation for renal transplant.\par \par Recommendations:\par \par #1. EGD / colonoscopy for workup of GI bleed and anemia.  Originally scheduled April 2020, postponed due to COVID pandemic, will reschedule within 1 month.\par \par #2. GoLYTELY ordered.\par \par #3. Medical clearance appointment given ESRD.

## 2020-06-09 NOTE — HISTORY OF PRESENT ILLNESS
[Home] : at home, [unfilled] , at the time of the visit. [Medical Office: ([facility])___] : at the medical office located in  [Verbal consent obtained from patient] : the patient, [unfilled] [Time Spent: ___ minutes] : I have spent [unfilled] minutes with the patient on the telephone [FreeTextEntry1] : Patient follows up for anemia.  \par Has ESRD, is on home dialysis which he generally uses 5 times per week.\par \par Has intermittent rectal bleeding, scant amount.  Per discharge summary in [employer], he had colonoscopy as inpatient in 2016 for workup of bleeding, hemorrhoids were found.  Does not recall an upper endoscopy.\par \par No abdominal pain currently..\par Has constipation, uses Miralax, Senna, prep H.\par \par No fevers, heartburn, nausea, vomiting, diarrhea, melena, or weight loss.\par \par Most recent Hb 8.7 in [place] (12/26/19)\par

## 2020-08-12 ENCOUNTER — APPOINTMENT (OUTPATIENT)
Dept: TRANSPLANT | Facility: CLINIC | Age: 35
End: 2020-08-12

## 2020-08-12 ENCOUNTER — APPOINTMENT (OUTPATIENT)
Dept: NEPHROLOGY | Facility: CLINIC | Age: 35
End: 2020-08-12
Payer: COMMERCIAL

## 2020-08-12 PROCEDURE — 99215 OFFICE O/P EST HI 40 MIN: CPT | Mod: 95

## 2020-08-12 NOTE — REASON FOR VISIT
[Initial Evaluation] : an initial evaluation [Follow-Up] : a follow-up visit for [Kidney Transplant Evaluation] : kidney transplant evaluation [Home] : at home, [unfilled] , at the time of the visit. [Medical Office: ([facility])___] : at the medical office located in  [Verbal consent obtained from patient] : the patient, [unfilled] [FreeTextEntry1] : pre kidney transplant evaluation

## 2020-08-12 NOTE — CONSULT LETTER
[Courtesy Letter:] : I had the pleasure of seeing your patient, [unfilled], in my office today. [Dear  ___] : Dear  [unfilled], [Please see my note below.] : Please see my note below. [Sincerely,] : Sincerely, [Consult Closing:] : Thank you very much for allowing me to participate in the care of this patient.  If you have any questions, please do not hesitate to contact me. [FreeTextEntry3] : Maximo Colindres, DO

## 2020-08-12 NOTE — HISTORY OF PRESENT ILLNESS
[FreeTextEntry1] : 35 years old AA male, ESRD 2015, diagnosed kidney disease prior to start of dialysis. He is followed by Dr. Betito Dodson, nephrologist. He has known HTN since age 17.   Once hospitalized in 2017 for anemia requiring blood transfusion.  \par \par has had no recent hospitalizations \par c/o occasional blood in stool and has been seen by GI and needs a colonoscopy. \par Past surgeries: Left forearm AVF.\par Former smoker, 2-3 cig /day for 1 year, quit 2 years ago  .  No alcohol. Not working currently.  Stays with Mom and aunt.  \par Mother in 70 's both diabetic and hypertensive.  Father  from pneumonia and had DM and HTN.  \par He is single, no children. One sister in [facility] but not close\par No family h/o kidney disease.\par \par Last Seen 19\par Listed 2016\par Dialysis 4/9/15\par ABO O\par PRA 0%\par \par Most recent testing\par Cardiac Dr Mccann Cleared 19\par EK19, normal sinus rhythm, nonspecific ST abnormality \par ECHO/Stress 12/10/19 EF 63%, 87% MPHR, 8 METS, moderate mitral regurg, normal LV systolic function, normal RV size and function, no evidence of inducible ischemia. \par \par Radiology\par Abd US Doppler 10/25/2019 Atrophic kidneys no hydro bilaterally. Vasculature patent. \par Chest Xray 10/25/2019 clear lungs.\par \par Cancer Screening\par PSA 2019 0.63\par Colonoscopy/Endoscopy - pt needs to reschedule as per Dr Robert Mccann for rectal bleeding.

## 2020-08-12 NOTE — ASSESSMENT
[FreeTextEntry1] : 1.  ESRD on dialysis - Pt is an excellent candidate for kidney transplantation.  Unclear cause of ESRD.  Unfortunately he has no living donors.  Discussed benefit of living donation and strategies to find donors.  \par 2.  HTN - stable.  Unclear why on imdur\par 3.  CV - cardiac testing wnl in 2019  Will refer to cardiology for updated clearance. \par 4.  Screening - needs CXR and abd sonogram\par 5.  GI - Pt will have to reschedule the Endo and colonoscopy in 30 days in order to remain on the transplant list. \par per patient , occasional rectal bleeding and has stomach pain for which he takes famotidine \par \par Reported off to: Jose Antonio NP and Starr ZACARIAS\par 	\par Patient must complete: cardiac clearance, Abd imaging, chest imaging, update serologies including COVID Ab here at the office, Complete COVID PCR testing, Send consents and COVID order with locations to the patient to sign and return. Pt must be scheduled for annual appointment. \par Donor coordinator contact information given to patient	\par KDPI >85%:NO\par CDC high risk: TBD\par Hep C Kidney: TBD\par A2B NA\par \par \par \par I have personally discussed the risks and benefits of transplantation where the following was disclosed:\par  \par Reviewed factors affecting survival and morbidity while on dialysis, the transplant wait list and reviewed sheyla-operative and long-term risk factors affecting outcome in kidney transplantation.  \par  \par One year SRTR outcomes for national and [facility] were discussed in regards to patient survival and graft survival after transplantation.  \par  \par Details of transplant surgery, including complications were discussed.\par Immunosuppression and complications including infection including life threatening sepsis and opportunistic infections, malignancy and new onset diabetes were discussed.  \par  \par Benefits of live donor transplantation as well as variability in wait times across regions and multiple listing were discussed. \par KDPI >85% and PHS high risk criteria donors were discussed. \par HCV kidney transplantation was discussed.\par  \par I spent > 50% of this 40 minute visit discussing the risks and benefits of kidney transplantation with the patient as outlined above.

## 2020-08-28 RX ORDER — SODIUM CHLORIDE 9 MG/ML
1000 INJECTION, SOLUTION INTRAVENOUS
Refills: 0 | Status: DISCONTINUED | OUTPATIENT
Start: 2020-09-23 | End: 2020-10-07

## 2020-08-30 ENCOUNTER — APPOINTMENT (OUTPATIENT)
Dept: DISASTER EMERGENCY | Facility: CLINIC | Age: 35
End: 2020-08-30

## 2020-08-30 DIAGNOSIS — Z01.818 ENCOUNTER FOR OTHER PREPROCEDURAL EXAMINATION: ICD-10-CM

## 2020-08-31 LAB — SARS-COV-2 N GENE NPH QL NAA+PROBE: NOT DETECTED

## 2020-09-06 ENCOUNTER — APPOINTMENT (OUTPATIENT)
Dept: DISASTER EMERGENCY | Facility: CLINIC | Age: 35
End: 2020-09-06

## 2020-09-09 ENCOUNTER — APPOINTMENT (OUTPATIENT)
Dept: DISASTER EMERGENCY | Facility: CLINIC | Age: 35
End: 2020-09-09

## 2020-09-21 ENCOUNTER — APPOINTMENT (OUTPATIENT)
Dept: DISASTER EMERGENCY | Facility: CLINIC | Age: 35
End: 2020-09-21

## 2020-09-22 LAB — SARS-COV-2 N GENE NPH QL NAA+PROBE: NOT DETECTED

## 2020-09-23 ENCOUNTER — APPOINTMENT (OUTPATIENT)
Dept: GASTROENTEROLOGY | Facility: HOSPITAL | Age: 35
End: 2020-09-23

## 2020-09-23 ENCOUNTER — OUTPATIENT (OUTPATIENT)
Dept: OUTPATIENT SERVICES | Facility: HOSPITAL | Age: 35
LOS: 1 days | End: 2020-09-23
Payer: MEDICARE

## 2020-09-23 ENCOUNTER — RESULT REVIEW (OUTPATIENT)
Age: 35
End: 2020-09-23

## 2020-09-23 VITALS
DIASTOLIC BLOOD PRESSURE: 96 MMHG | TEMPERATURE: 97 F | OXYGEN SATURATION: 100 % | HEART RATE: 88 BPM | RESPIRATION RATE: 20 BRPM | HEIGHT: 71 IN | SYSTOLIC BLOOD PRESSURE: 151 MMHG | WEIGHT: 225.09 LBS

## 2020-09-23 VITALS
RESPIRATION RATE: 16 BRPM | OXYGEN SATURATION: 100 % | SYSTOLIC BLOOD PRESSURE: 161 MMHG | DIASTOLIC BLOOD PRESSURE: 88 MMHG

## 2020-09-23 DIAGNOSIS — Z41.9 ENCOUNTER FOR PROCEDURE FOR PURPOSES OTHER THAN REMEDYING HEALTH STATE, UNSPECIFIED: Chronic | ICD-10-CM

## 2020-09-23 DIAGNOSIS — K92.2 GASTROINTESTINAL HEMORRHAGE, UNSPECIFIED: ICD-10-CM

## 2020-09-23 DIAGNOSIS — I77.0 ARTERIOVENOUS FISTULA, ACQUIRED: Chronic | ICD-10-CM

## 2020-09-23 PROCEDURE — 45385 COLONOSCOPY W/LESION REMOVAL: CPT

## 2020-09-23 PROCEDURE — C1889: CPT

## 2020-09-23 PROCEDURE — 88305 TISSUE EXAM BY PATHOLOGIST: CPT | Mod: 26

## 2020-09-23 PROCEDURE — 88312 SPECIAL STAINS GROUP 1: CPT | Mod: 26

## 2020-09-23 PROCEDURE — 88312 SPECIAL STAINS GROUP 1: CPT

## 2020-09-23 PROCEDURE — 88305 TISSUE EXAM BY PATHOLOGIST: CPT

## 2020-09-23 PROCEDURE — 43239 EGD BIOPSY SINGLE/MULTIPLE: CPT | Mod: GC,59

## 2020-09-23 PROCEDURE — 43239 EGD BIOPSY SINGLE/MULTIPLE: CPT

## 2020-09-23 PROCEDURE — 45385 COLONOSCOPY W/LESION REMOVAL: CPT | Mod: GC

## 2020-09-23 RX ORDER — SODIUM CHLORIDE 9 MG/ML
1000 INJECTION INTRAMUSCULAR; INTRAVENOUS; SUBCUTANEOUS
Refills: 0 | Status: DISCONTINUED | OUTPATIENT
Start: 2020-09-23 | End: 2020-10-07

## 2020-09-23 NOTE — PRE PROCEDURE NOTE - PRE PROCEDURE EVALUATION
Retinal tear and detachment warning symptoms reviewed and patient instructed to call immediately if increasing floaters, flashes, or decreasing peripheral vision. Attending Physician:            Ramy                Procedure: EGD/colon    Indication for Procedure: rectal bleeding  ________________________________________________________  PAST MEDICAL & SURGICAL HISTORY:  HTN (hypertension)    ESRD (end stage renal disease) on dialysis    HTN (hypertension)    ESRD on hemodialysis  Tue Thu Sat    No significant past surgical history    Elective surgical procedure  RACW permacath for HD    AV fistula  L forearm      ALLERGIES:  No Known Drug Allergies  topical cleanser for dialysis access.   Exsept (Rash)    HOME MEDICATIONS:  aspirin 81 mg oral tablet: 1 tab(s) orally once a day  hydrALAZINE 25 mg oral tablet: 1 tab(s) orally 3 times a day  Imdur 60 mg oral tablet, extended release: 1 tab(s) orally once a day (in the morning)  labetalol 300 mg oral tablet: 1 tab(s) orally 3 times a day  Lasix 80 mg oral tablet: 1 tab(s) orally once a day  Pepcid 20 mg oral tablet: 1 tab(s) OD  Renvela 800 mg oral tablet: 1 tab(s) orally 3 times a day  Triphrocaps oral capsule: 1 cap(s) orally once a day    AICD/PPM: [ ] yes   [ ] no    PERTINENT LAB DATA:                      PHYSICAL EXAMINATION:    Height (cm): 180.3  Weight (kg): 102.1  BMI (kg/m2): 31.4  BSA (m2): 2.22T(C): --  HR: --  BP: --  RR: --  SpO2: --    Constitutional: NAD  HEENT: PERRLA, EOMI,    Neck:  No JVD  Respiratory: CTAB/L  Cardiovascular: S1 and S2  Gastrointestinal: BS+, soft, NT/ND  Extremities: No peripheral edema  Neurological: A/O x 3, no focal deficits  Psychiatric: Normal mood, normal affect  Skin: No rashes    ASA Class: I [ ]  II [ ]  III [x ]  IV [ ]    COMMENTS:    The patient is a suitable candidate for the planned procedure unless box checked [ ]  No, explain:

## 2020-09-25 ENCOUNTER — APPOINTMENT (OUTPATIENT)
Dept: CT IMAGING | Facility: IMAGING CENTER | Age: 35
End: 2020-09-25
Payer: COMMERCIAL

## 2020-09-25 ENCOUNTER — RESULT REVIEW (OUTPATIENT)
Age: 35
End: 2020-09-25

## 2020-09-25 ENCOUNTER — OUTPATIENT (OUTPATIENT)
Dept: OUTPATIENT SERVICES | Facility: HOSPITAL | Age: 35
LOS: 1 days | End: 2020-09-25
Payer: COMMERCIAL

## 2020-09-25 DIAGNOSIS — Z00.8 ENCOUNTER FOR OTHER GENERAL EXAMINATION: ICD-10-CM

## 2020-09-25 DIAGNOSIS — Z41.9 ENCOUNTER FOR PROCEDURE FOR PURPOSES OTHER THAN REMEDYING HEALTH STATE, UNSPECIFIED: Chronic | ICD-10-CM

## 2020-09-25 DIAGNOSIS — R31.0 GROSS HEMATURIA: ICD-10-CM

## 2020-09-25 DIAGNOSIS — I77.0 ARTERIOVENOUS FISTULA, ACQUIRED: Chronic | ICD-10-CM

## 2020-09-25 PROCEDURE — 74176 CT ABD & PELVIS W/O CONTRAST: CPT | Mod: 26

## 2020-09-25 PROCEDURE — 74176 CT ABD & PELVIS W/O CONTRAST: CPT

## 2020-09-28 ENCOUNTER — LABORATORY RESULT (OUTPATIENT)
Age: 35
End: 2020-09-28

## 2020-09-28 ENCOUNTER — APPOINTMENT (OUTPATIENT)
Dept: TRANSPLANT | Facility: CLINIC | Age: 35
End: 2020-09-28

## 2020-09-28 LAB — SURGICAL PATHOLOGY STUDY: SIGNIFICANT CHANGE UP

## 2020-09-30 LAB
ABO + RH PNL BLD: NORMAL
ALBUMIN SERPL ELPH-MCNC: 4.5 G/DL
ALP BLD-CCNC: 69 U/L
ALT SERPL-CCNC: 8 U/L
ANION GAP SERPL CALC-SCNC: 20 MMOL/L
APPEARANCE: CLEAR
AST SERPL-CCNC: 16 U/L
BASOPHILS # BLD AUTO: 0.05 K/UL
BASOPHILS NFR BLD AUTO: 0.9 %
BILIRUB SERPL-MCNC: 0.3 MG/DL
BILIRUBIN URINE: NEGATIVE
BLOOD URINE: ABNORMAL
BUN SERPL-MCNC: 40 MG/DL
C PEPTIDE SERPL-MCNC: 4.8 NG/ML
CALCIUM SERPL-MCNC: 9 MG/DL
CHLORIDE SERPL-SCNC: 99 MMOL/L
CHOLEST SERPL-MCNC: 139 MG/DL
CHOLEST/HDLC SERPL: 1.9 RATIO
CK SERPL-CCNC: 475 U/L
CMV DNA SPEC QL NAA+PROBE: NOT DETECTED IU/ML
CMV IGG SERPL QL: <0.2 U/ML
CMV IGG SERPL-IMP: NEGATIVE
CMV IGM SERPL QL: <8 AU/ML
CMV IGM SERPL QL: NEGATIVE
CO2 SERPL-SCNC: 23 MMOL/L
COLOR: NORMAL
CREAT SERPL-MCNC: 14.33 MG/DL
CREAT SPEC-SCNC: 146 MG/DL
CREAT/PROT UR: 1.3 RATIO
CRP SERPL-MCNC: <0.1 MG/DL
EBV EA AB SER IA-ACNC: <5 U/ML
EBV EA AB TITR SER IF: POSITIVE
EBV EA IGG SER QL IA: >600 U/ML
EBV EA IGG SER-ACNC: NEGATIVE
EBV EA IGM SER IA-ACNC: NEGATIVE
EBV PATRN SPEC IB-IMP: NORMAL
EBV VCA IGG SER IA-ACNC: 65.7 U/ML
EBV VCA IGM SER QL IA: <10 U/ML
EOSINOPHIL # BLD AUTO: 0.14 K/UL
EOSINOPHIL NFR BLD AUTO: 2.6 %
EPSTEIN-BARR VIRUS CAPSID ANTIGEN IGG: POSITIVE
ERYTHROCYTE [SEDIMENTATION RATE] IN BLOOD BY WESTERGREN METHOD: 12 MM/HR
ESTIMATED AVERAGE GLUCOSE: 91 MG/DL
GLUCOSE QUALITATIVE U: NEGATIVE
GLUCOSE SERPL-MCNC: 103 MG/DL
HAV IGM SER QL: NONREACTIVE
HBA1C MFR BLD HPLC: 4.8 %
HBV CORE IGG+IGM SER QL: NONREACTIVE
HBV SURFACE AB SER QL: REACTIVE
HBV SURFACE AB SERPL IA-ACNC: >1000 MIU/ML
HBV SURFACE AG SER QL: NONREACTIVE
HCT VFR BLD CALC: 32 %
HCV AB SER QL: NONREACTIVE
HCV S/CO RATIO: 0.14 S/CO
HDLC SERPL-MCNC: 74 MG/DL
HEPATITIS A IGG ANTIBODY: NONREACTIVE
HGB BLD-MCNC: 9.7 G/DL
HIV1+2 AB SPEC QL IA.RAPID: NONREACTIVE
HSV 1+2 IGG SER IA-IMP: NEGATIVE
HSV 1+2 IGG SER IA-IMP: POSITIVE
HSV 1+2 IGG SER IA-IMP: POSITIVE
HSV1 IGG SER QL: 60.3 INDEX
HSV1 IGG SER QL: 60.3 INDEX
HSV1 IGM SER QL: NORMAL TITER
HSV2 AB FLD-ACNC: NORMAL TITER
HSV2 IGG SER QL: 0.18 INDEX
IMM GRANULOCYTES NFR BLD AUTO: 0.2 %
KETONES URINE: NEGATIVE
LDLC SERPL CALC-MCNC: 52 MG/DL
LEUKOCYTE ESTERASE URINE: NEGATIVE
LYMPHOCYTES # BLD AUTO: 0.9 K/UL
LYMPHOCYTES NFR BLD AUTO: 16.6 %
M TB IFN-G BLD-IMP: NEGATIVE
MAGNESIUM SERPL-MCNC: 2.1 MG/DL
MAN DIFF?: NORMAL
MCHC RBC-ENTMCNC: 26.8 PG
MCHC RBC-ENTMCNC: 30.3 GM/DL
MCV RBC AUTO: 88.4 FL
MONOCYTES # BLD AUTO: 0.68 K/UL
MONOCYTES NFR BLD AUTO: 12.5 %
NEUTROPHILS # BLD AUTO: 3.64 K/UL
NEUTROPHILS NFR BLD AUTO: 67.2 %
NITRITE URINE: NEGATIVE
PH URINE: 7
PHOSPHATE SERPL-MCNC: 6.5 MG/DL
PLATELET # BLD AUTO: 220 K/UL
POTASSIUM SERPL-SCNC: 4.1 MMOL/L
PROT SERPL-MCNC: 6.5 G/DL
PROT UR-MCNC: 185 MG/DL
PROTEIN URINE: ABNORMAL
PSA SERPL-MCNC: 0.49 NG/ML
QUANTIFERON TB PLUS MITOGEN MINUS NIL: 8.22 IU/ML
QUANTIFERON TB PLUS NIL: 0.01 IU/ML
QUANTIFERON TB PLUS TB1 MINUS NIL: 0 IU/ML
QUANTIFERON TB PLUS TB2 MINUS NIL: 0 IU/ML
RBC # BLD: 3.62 M/UL
RBC # FLD: 16.4 %
RUBV IGG FLD-ACNC: 4.1 INDEX
RUBV IGG SER-IMP: POSITIVE
SARS-COV-2 IGG SERPL IA-ACNC: 0.11 INDEX
SARS-COV-2 IGG SERPL QL IA: NEGATIVE
SODIUM SERPL-SCNC: 141 MMOL/L
SPECIFIC GRAVITY URINE: 1.02
T GONDII AB SER-IMP: NEGATIVE
T GONDII IGG SER QL: <3 IU/ML
T PALLIDUM AB SER QL IA: NEGATIVE
T3 SERPL-MCNC: 70 NG/DL
T4 FREE SERPL-MCNC: 1 NG/DL
TRIGL SERPL-MCNC: 69 MG/DL
TSH SERPL-ACNC: 1.05 UIU/ML
URATE SERPL-MCNC: 4.3 MG/DL
UROBILINOGEN URINE: NORMAL
VZV AB TITR SER: POSITIVE
VZV IGG SER IF-ACNC: 1698 INDEX
WBC # FLD AUTO: 5.42 K/UL

## 2020-10-21 ENCOUNTER — APPOINTMENT (OUTPATIENT)
Dept: CARDIOLOGY | Facility: CLINIC | Age: 35
End: 2020-10-21

## 2020-12-16 ENCOUNTER — NON-APPOINTMENT (OUTPATIENT)
Age: 35
End: 2020-12-16

## 2020-12-16 ENCOUNTER — APPOINTMENT (OUTPATIENT)
Dept: CARDIOLOGY | Facility: CLINIC | Age: 35
End: 2020-12-16
Payer: COMMERCIAL

## 2020-12-16 VITALS
OXYGEN SATURATION: 100 % | HEART RATE: 79 BPM | HEIGHT: 71 IN | TEMPERATURE: 98.2 F | SYSTOLIC BLOOD PRESSURE: 151 MMHG | DIASTOLIC BLOOD PRESSURE: 92 MMHG | BODY MASS INDEX: 28.7 KG/M2 | WEIGHT: 205 LBS

## 2020-12-16 DIAGNOSIS — Z87.19 PERSONAL HISTORY OF OTHER DISEASES OF THE DIGESTIVE SYSTEM: ICD-10-CM

## 2020-12-16 DIAGNOSIS — Z87.898 PERSONAL HISTORY OF OTHER SPECIFIED CONDITIONS: ICD-10-CM

## 2020-12-16 DIAGNOSIS — N18.6 END STAGE RENAL DISEASE: ICD-10-CM

## 2020-12-16 DIAGNOSIS — Z87.448 PERSONAL HISTORY OF OTHER DISEASES OF URINARY SYSTEM: ICD-10-CM

## 2020-12-16 PROCEDURE — 99213 OFFICE O/P EST LOW 20 MIN: CPT

## 2020-12-16 PROCEDURE — 99072 ADDL SUPL MATRL&STAF TM PHE: CPT

## 2020-12-16 PROCEDURE — 93000 ELECTROCARDIOGRAM COMPLETE: CPT

## 2020-12-18 ENCOUNTER — APPOINTMENT (OUTPATIENT)
Dept: UROLOGY | Facility: CLINIC | Age: 35
End: 2020-12-18

## 2020-12-18 ENCOUNTER — OUTPATIENT (OUTPATIENT)
Dept: OUTPATIENT SERVICES | Facility: HOSPITAL | Age: 35
LOS: 1 days | End: 2020-12-18
Payer: MEDICARE

## 2020-12-18 VITALS — SYSTOLIC BLOOD PRESSURE: 139 MMHG | HEART RATE: 80 BPM | DIASTOLIC BLOOD PRESSURE: 91 MMHG

## 2020-12-18 VITALS — DIASTOLIC BLOOD PRESSURE: 91 MMHG | SYSTOLIC BLOOD PRESSURE: 139 MMHG | HEART RATE: 80 BPM

## 2020-12-18 DIAGNOSIS — R35.0 FREQUENCY OF MICTURITION: ICD-10-CM

## 2020-12-18 DIAGNOSIS — I77.0 ARTERIOVENOUS FISTULA, ACQUIRED: Chronic | ICD-10-CM

## 2020-12-18 DIAGNOSIS — Z41.9 ENCOUNTER FOR PROCEDURE FOR PURPOSES OTHER THAN REMEDYING HEALTH STATE, UNSPECIFIED: Chronic | ICD-10-CM

## 2020-12-18 PROCEDURE — 52000 CYSTOURETHROSCOPY: CPT

## 2020-12-21 NOTE — PHYSICAL EXAM
[General Appearance - Well Developed] : well developed [Normal Appearance] : normal appearance [Well Groomed] : well groomed [General Appearance - Well Nourished] : well nourished [No Deformities] : no deformities [General Appearance - In No Acute Distress] : no acute distress [Normal Conjunctiva] : the conjunctiva exhibited no abnormalities [Eyelids - No Xanthelasma] : the eyelids demonstrated no xanthelasmas [Normal Oral Mucosa] : normal oral mucosa [No Oral Pallor] : no oral pallor [No Oral Cyanosis] : no oral cyanosis [Respiration, Rhythm And Depth] : normal respiratory rhythm and effort [Exaggerated Use Of Accessory Muscles For Inspiration] : no accessory muscle use [Auscultation Breath Sounds / Voice Sounds] : lungs were clear to auscultation bilaterally [Heart Rate And Rhythm] : heart rate and rhythm were normal [Heart Sounds] : normal S1 and S2 [Murmurs] : no murmurs present [Edema] : no peripheral edema present [Abdomen Soft] : soft [Abdomen Tenderness] : non-tender [Abnormal Walk] : normal gait [Gait - Sufficient For Exercise Testing] : the gait was sufficient for exercise testing [Cyanosis, Localized] : no localized cyanosis [Skin Color & Pigmentation] : normal skin color and pigmentation [] : no rash [No Venous Stasis] : no venous stasis [Oriented To Time, Place, And Person] : oriented to person, place, and time [Affect] : the affect was normal [Mood] : the mood was normal [No Anxiety] : not feeling anxious [FreeTextEntry1] : left forearm fistula

## 2020-12-21 NOTE — DISCUSSION/SUMMARY
[Hypertension] : hypertension [FreeTextEntry1] : \par Currently stable from a cardiovascular standpoint. Elevated diastolic blood pressure. Euvolemic. Asymptomatic. Continue current medications. ECG completed today and reviewed. At this time, patient is considered an acceptable risk from a cardiac standpoint for renal transplant. No cardiac testing necessary at this time. Follow up annually pre-transplant.

## 2020-12-28 DIAGNOSIS — R31.9 HEMATURIA, UNSPECIFIED: ICD-10-CM

## 2021-01-08 ENCOUNTER — APPOINTMENT (OUTPATIENT)
Dept: UROLOGY | Facility: CLINIC | Age: 36
End: 2021-01-08

## 2021-05-04 ENCOUNTER — APPOINTMENT (OUTPATIENT)
Dept: COLORECTAL SURGERY | Facility: CLINIC | Age: 36
End: 2021-05-04
Payer: MEDICARE

## 2021-05-04 VITALS
WEIGHT: 228 LBS | HEART RATE: 77 BPM | DIASTOLIC BLOOD PRESSURE: 93 MMHG | HEIGHT: 71 IN | OXYGEN SATURATION: 98 % | TEMPERATURE: 98.6 F | BODY MASS INDEX: 31.92 KG/M2 | RESPIRATION RATE: 15 BRPM | SYSTOLIC BLOOD PRESSURE: 151 MMHG

## 2021-05-04 DIAGNOSIS — Z78.9 OTHER SPECIFIED HEALTH STATUS: ICD-10-CM

## 2021-05-04 PROCEDURE — 46600 DIAGNOSTIC ANOSCOPY SPX: CPT

## 2021-05-04 PROCEDURE — 99204 OFFICE O/P NEW MOD 45 MIN: CPT | Mod: 25

## 2021-05-04 RX ORDER — POLYETHYLENE GLYCOL 3350 AND ELECTROLYTES WITH LEMON FLAVOR 236; 22.74; 6.74; 5.86; 2.97 G/4L; G/4L; G/4L; G/4L; G/4L
236 POWDER, FOR SOLUTION ORAL
Qty: 1 | Refills: 0 | Status: DISCONTINUED | COMMUNITY
Start: 2020-08-19 | End: 2021-05-04

## 2021-05-04 RX ORDER — POLYETHYLENE GLYCOL 3350 AND ELECTROLYTES WITH LEMON FLAVOR 236; 22.74; 6.74; 5.86; 2.97 G/4L; G/4L; G/4L; G/4L; G/4L
236 POWDER, FOR SOLUTION ORAL
Qty: 1 | Refills: 0 | Status: DISCONTINUED | COMMUNITY
Start: 2020-01-21 | End: 2021-05-04

## 2021-05-04 NOTE — PHYSICAL EXAM
[Normal Breath Sounds] : Normal breath sounds [Normal Heart Sounds] : normal heart sounds [No Rash or Lesion] : No rash or lesion [Alert] : alert [Oriented to Person] : oriented to person [Oriented to Place] : oriented to place [Oriented to Time] : oriented to time [Calm] : calm [de-identified] : round, NT/ND, +BS [de-identified] : Normal male [de-identified] : NC/AT [de-identified] : LINO/+ROM [de-identified] : Intact

## 2021-05-04 NOTE — HISTORY OF PRESENT ILLNESS
[FreeTextEntry1] : Patient is a 35 yo male ESRD on dialysis awaiting transplant here with rectal bleeding and hemorrhoids.  He states that he passes stool daily but with difficulty.  The bleeding is intermittent. Patient reports self reduction of hemorrhoids. Daily bowel movements note, pain no fevers or chills no nausea or vomiting no history of colonoscopy no family history of colon cancer or inflammatory bowel disease

## 2021-05-04 NOTE — REVIEW OF SYSTEMS
[Constipation] : constipation [Joint Pain] : joint pain [Anxiety] : anxiety [Depression] : depression [Negative] : Heme/Lymph [FreeTextEntry5] : HTN [FreeTextEntry7] : Daily BM [FreeTextEntry8] : Dialysis [FreeTextEntry9] : neck and back pain

## 2021-05-04 NOTE — CONSULT LETTER
[Dear  ___] : Dear  [unfilled], [Consult Letter:] : I had the pleasure of evaluating your patient, [unfilled]. [Please see my note below.] : Please see my note below. [Consult Closing:] : Thank you very much for allowing me to participate in the care of this patient.  If you have any questions, please do not hesitate to contact me. [Sincerely,] : Sincerely, [FreeTextEntry2] : Oliver Pate [FreeTextEntry3] : Ad Ontiveros MD FACS\par Chief Colon and Rectal Surgery\par [facility]

## 2021-05-04 NOTE — ASSESSMENT
[FreeTextEntry1] : Large bleeding internal hemorrhoids and large external component\par -Given the Size of the patient's internal hemorrhoids with signs of chronic prolapse in addition to his large external hemorrhoids, I recommended he undergo excisional hemorrhoidectomy. Risks and benefits of the procedure were discussed at length including bleeding, infection, risk of injury to the sphincter complex and most importantly, postoperative pain.\par -All questions were answered\par -Patient wishes to proceed and we'll schedule at his earliest convenience

## 2021-06-03 ENCOUNTER — APPOINTMENT (OUTPATIENT)
Dept: GASTROENTEROLOGY | Facility: CLINIC | Age: 36
End: 2021-06-03

## 2021-06-18 ENCOUNTER — OUTPATIENT (OUTPATIENT)
Dept: OUTPATIENT SERVICES | Facility: HOSPITAL | Age: 36
LOS: 1 days | End: 2021-06-18
Payer: MEDICARE

## 2021-06-18 VITALS
HEART RATE: 82 BPM | RESPIRATION RATE: 18 BRPM | HEIGHT: 71 IN | SYSTOLIC BLOOD PRESSURE: 160 MMHG | OXYGEN SATURATION: 99 % | WEIGHT: 201.94 LBS | DIASTOLIC BLOOD PRESSURE: 90 MMHG | TEMPERATURE: 97 F

## 2021-06-18 DIAGNOSIS — K64.9 UNSPECIFIED HEMORRHOIDS: ICD-10-CM

## 2021-06-18 DIAGNOSIS — I77.0 ARTERIOVENOUS FISTULA, ACQUIRED: Chronic | ICD-10-CM

## 2021-06-18 DIAGNOSIS — Z41.9 ENCOUNTER FOR PROCEDURE FOR PURPOSES OTHER THAN REMEDYING HEALTH STATE, UNSPECIFIED: Chronic | ICD-10-CM

## 2021-06-18 LAB
ANION GAP SERPL CALC-SCNC: 15 MMOL/L — HIGH (ref 7–14)
BUN SERPL-MCNC: 38 MG/DL — HIGH (ref 7–23)
CALCIUM SERPL-MCNC: 9.1 MG/DL — SIGNIFICANT CHANGE UP (ref 8.4–10.5)
CHLORIDE SERPL-SCNC: 98 MMOL/L — SIGNIFICANT CHANGE UP (ref 98–107)
CO2 SERPL-SCNC: 25 MMOL/L — SIGNIFICANT CHANGE UP (ref 22–31)
CREAT SERPL-MCNC: 15.39 MG/DL — HIGH (ref 0.5–1.3)
GLUCOSE SERPL-MCNC: 81 MG/DL — SIGNIFICANT CHANGE UP (ref 70–99)
HCT VFR BLD CALC: 25.2 % — LOW (ref 39–50)
HGB BLD-MCNC: 7.8 G/DL — LOW (ref 13–17)
MCHC RBC-ENTMCNC: 25.8 PG — LOW (ref 27–34)
MCHC RBC-ENTMCNC: 31 GM/DL — LOW (ref 32–36)
MCV RBC AUTO: 83.4 FL — SIGNIFICANT CHANGE UP (ref 80–100)
NRBC # BLD: 0 /100 WBCS — SIGNIFICANT CHANGE UP
NRBC # FLD: 0 K/UL — SIGNIFICANT CHANGE UP
PLATELET # BLD AUTO: 213 K/UL — SIGNIFICANT CHANGE UP (ref 150–400)
POTASSIUM SERPL-MCNC: 4.5 MMOL/L — SIGNIFICANT CHANGE UP (ref 3.5–5.3)
POTASSIUM SERPL-SCNC: 4.5 MMOL/L — SIGNIFICANT CHANGE UP (ref 3.5–5.3)
RBC # BLD: 3.02 M/UL — LOW (ref 4.2–5.8)
RBC # FLD: 15.9 % — HIGH (ref 10.3–14.5)
SODIUM SERPL-SCNC: 138 MMOL/L — SIGNIFICANT CHANGE UP (ref 135–145)
WBC # BLD: 4.21 K/UL — SIGNIFICANT CHANGE UP (ref 3.8–10.5)
WBC # FLD AUTO: 4.21 K/UL — SIGNIFICANT CHANGE UP (ref 3.8–10.5)

## 2021-06-18 PROCEDURE — 93010 ELECTROCARDIOGRAM REPORT: CPT

## 2021-06-18 NOTE — H&P PST ADULT - NEGATIVE GASTROINTESTINAL SYMPTOMS
no nausea/no vomiting/no diarrhea/no constipation/no melena/no hematochezia/no jaundice/no hiccoughs

## 2021-06-18 NOTE — H&P PST ADULT - MS EXT PE MLT D E PC
no clubbing/no cyanosis/no pedal edema left arm AV fistula + bruit, + thrill/no clubbing/no cyanosis/no pedal edema

## 2021-06-18 NOTE — H&P PST ADULT - RS GEN PE MLT RESP DETAILS PC
airway patent/breath sounds equal/good air movement/respirations non-labored/clear to auscultation bilaterally/no chest wall tenderness/no rales/no rhonchi/no wheezes

## 2021-06-18 NOTE — H&P PST ADULT - HISTORY OF PRESENT ILLNESS
37 y/o M pt w/ PMHx of HTN and renal disease on at home dialysis 5 days a week via left AVF. For past two and a half years, patient received dialysis at dialysis center 3x/week. In the past month, patient has switched to at home dialysis 5x/week.     intermittent bleeding x 2 years  37 y/o M pt w/ PMHx of HTN, Bell's palsy, Hyperthyroidism (currently on no meds), ESRD on at home dialysis 5 days a week via left AVF, pt presents to PST for pre op evaluation with h/o intermittent bleeding x 2 years. Pre op diagnosis: unspecified hemorrhoids. Now scheduled for hemorrhoidectomy on 06/25/2021

## 2021-06-18 NOTE — H&P PST ADULT - NSICDXPASTSURGICALHX_GEN_ALL_CORE_FT
PAST SURGICAL HISTORY:  AV fistula L forearm    Elective surgical procedure RACW permacath for HD, removed 5 years ago     What Type Of Note Output Would You Prefer (Optional)?: Bullet Format How Severe Are Your Spot(S)?: moderate Have Your Spot(S) Been Treated In The Past?: has not been treated Hpi Title: Evaluation of Skin Lesions

## 2021-06-18 NOTE — H&P PST ADULT - NSICDXPASTMEDICALHX_GEN_ALL_CORE_FT
PAST MEDICAL HISTORY:  ESRD (end stage renal disease) on dialysis     ESRD on hemodialysis at home 5 x week    HTN (hypertension)     HTN (hypertension)

## 2021-06-18 NOTE — H&P PST ADULT - NSICDXPROBLEM_GEN_ALL_CORE_FT
PROBLEM DIAGNOSES  Problem: Unspecified hemorrhoids  Assessment and Plan: Scheduled for hemorrhoidectomy on 06/25/2021. Pre op instructions given and explained. Pt verbalized understanding.

## 2021-06-18 NOTE — H&P PST ADULT - NEGATIVE ENMT SYMPTOMS
no hearing difficulty/no ear pain/no tinnitus/no vertigo/no sinus symptoms/no nasal congestion/no nasal obstruction/no post-nasal discharge/no nose bleeds/no gum bleeding/no dry mouth/no throat pain/no dysphagia

## 2021-06-18 NOTE — H&P PST ADULT - NEGATIVE GENERAL GENITOURINARY SYMPTOMS
no hematuria/no renal colic/no flank pain L/no flank pain R/no bladder infections/no incontinence/no dysuria/no nocturia

## 2021-06-18 NOTE — H&P PST ADULT - MUSCULOSKELETAL
details… No joint pain, swelling or deformity; no limitation of movement no joint swelling/no joint erythema/no joint warmth/no calf tenderness detailed exam

## 2021-06-21 ENCOUNTER — APPOINTMENT (OUTPATIENT)
Dept: TRANSPLANT | Facility: CLINIC | Age: 36
End: 2021-06-21
Payer: MEDICARE

## 2021-06-21 PROCEDURE — 86832 HLA CLASS I HIGH DEFIN QUAL: CPT

## 2021-06-21 PROCEDURE — 86833 HLA CLASS II HIGH DEFIN QUAL: CPT

## 2021-06-21 PROCEDURE — 99001T: CUSTOM

## 2021-06-22 ENCOUNTER — APPOINTMENT (OUTPATIENT)
Dept: DISASTER EMERGENCY | Facility: CLINIC | Age: 36
End: 2021-06-22

## 2021-06-24 ENCOUNTER — TRANSCRIPTION ENCOUNTER (OUTPATIENT)
Age: 36
End: 2021-06-24

## 2021-06-25 ENCOUNTER — APPOINTMENT (OUTPATIENT)
Dept: COLORECTAL SURGERY | Facility: HOSPITAL | Age: 36
End: 2021-06-25
Payer: MEDICARE

## 2021-06-25 ENCOUNTER — OUTPATIENT (OUTPATIENT)
Dept: OUTPATIENT SERVICES | Facility: HOSPITAL | Age: 36
LOS: 1 days | Discharge: HOME | End: 2021-06-25
Payer: MEDICARE

## 2021-06-25 VITALS
SYSTOLIC BLOOD PRESSURE: 140 MMHG | OXYGEN SATURATION: 99 % | RESPIRATION RATE: 20 BRPM | TEMPERATURE: 98 F | HEART RATE: 78 BPM | DIASTOLIC BLOOD PRESSURE: 83 MMHG

## 2021-06-25 VITALS
WEIGHT: 201.94 LBS | DIASTOLIC BLOOD PRESSURE: 104 MMHG | RESPIRATION RATE: 18 BRPM | HEIGHT: 71 IN | OXYGEN SATURATION: 100 % | SYSTOLIC BLOOD PRESSURE: 179 MMHG | HEART RATE: 82 BPM | TEMPERATURE: 98 F

## 2021-06-25 DIAGNOSIS — I77.0 ARTERIOVENOUS FISTULA, ACQUIRED: Chronic | ICD-10-CM

## 2021-06-25 DIAGNOSIS — Z41.9 ENCOUNTER FOR PROCEDURE FOR PURPOSES OTHER THAN REMEDYING HEALTH STATE, UNSPECIFIED: Chronic | ICD-10-CM

## 2021-06-25 DIAGNOSIS — K64.9 UNSPECIFIED HEMORRHOIDS: ICD-10-CM

## 2021-06-25 LAB
POTASSIUM SERPL-MCNC: 4.1 MMOL/L — SIGNIFICANT CHANGE UP (ref 3.5–5.3)
POTASSIUM SERPL-SCNC: 4.1 MMOL/L — SIGNIFICANT CHANGE UP (ref 3.5–5.3)

## 2021-06-25 PROCEDURE — 46260 REMOVE IN/EX HEM GROUPS 2+: CPT

## 2021-06-25 RX ORDER — OXYCODONE HYDROCHLORIDE 5 MG/1
1 TABLET ORAL
Qty: 20 | Refills: 0
Start: 2021-06-25

## 2021-06-25 RX ORDER — SODIUM CHLORIDE 9 MG/ML
1000 INJECTION, SOLUTION INTRAVENOUS
Refills: 0 | Status: DISCONTINUED | OUTPATIENT
Start: 2021-06-25 | End: 2021-07-09

## 2021-06-25 NOTE — CHART NOTE - NSCHARTNOTEFT_GEN_A_CORE
Per Dr. Ontiveros and Anesthesia, Dr. Tesfaye - send Clonidine 0.1mg PO now to [facility] for patient who is currently hypertensive in [facility] PACU.

## 2021-06-25 NOTE — ASU DISCHARGE PLAN (ADULT/PEDIATRIC) - NURSING INSTRUCTIONS
No fried, spicy or greasy foods. Increase fluids as tolerated  If your doctor prescribed narcotic pain medications after your procedure to help prevent and reduce constipation, increase fluid intake and fiber intake in your diet. You may take OTC Stool Softeners such as Colace to help reduce constipation.  Narcotic pain medication may cause nausea or constipation. Take medication with food. Increase fluids and fiber intake.

## 2021-06-25 NOTE — PACU DISCHARGE NOTE - COMMENTS
pt hypertensive, nephrology consulted on management,subs.   ABLE TO CONTROL BP WITH HYDRALAZINE, PATIENT WILL continue to take his BP meds and will monitor his BP over the next few days, recommend follow up with his nephrologist next week to adjust /increase ? bp medication

## 2021-06-25 NOTE — ASU DISCHARGE PLAN (ADULT/PEDIATRIC) - CALL YOUR DOCTOR IF YOU HAVE ANY OF THE FOLLOWING:
Bleeding that does not stop/Swelling that gets worse Bleeding that does not stop/Swelling that gets worse/Pain not relieved by Medications/Fever greater than (need to indicate Fahrenheit or Celsius)/Wound/Surgical Site with redness, or foul smelling discharge or pus/Numbness, tingling, color or temperature change to extremity/Nausea and vomiting that does not stop/Increased irritability or sluggishness

## 2021-06-25 NOTE — CHART NOTE - NSCHARTNOTEFT_GEN_A_CORE
Clonidine 0.1mg PO 1 tablet was not given to the patient. Hydralazine was given prior to prescribing Clonidine. Once the Clonidine was at [facility] from the main [facility] pharmacy, his blood pressure was stable at 146/83mmHg. The Clonidine will be returned to pharmacy / wasted. Clonidine 0.1mg PO 1 tablet was not given to the patient. Hydralazine was given prior to prescribing Clonidine. Once the Clonidine was at [facility] from the main [facility] pharmacy, his blood pressure was stable at 146/83mmHg and discharged. Clonidine 0.1mg 1 tablet was wasted back into the [facility] at [facility] and witnessed by Nurse Courtney Davis.

## 2021-06-25 NOTE — ASU DISCHARGE PLAN (ADULT/PEDIATRIC) - ASU DC SPECIAL INSTRUCTIONSFT
Please take tylenol every 6 hours, and stagger with ibuprofen every 6 hours. This will allow you to alternate medications for more consistent pain control. For example: take a dose of tylenol at 8 am, then take a dose of ibuprofen at 11 am, then take a dose of tylenol at 2pm, and continue as needed.     Please check with your nephrologist about the safety of using ibuprofen short term for perioperative pain control.

## 2021-06-25 NOTE — BRIEF OPERATIVE NOTE - NSICDXBRIEFPROCEDURE_GEN_ALL_CORE_FT
PROCEDURES:  Hemorrhoidectomy, internal and external, involving 2 or more anal columns 25-Jun-2021 13:47:09  Angella Winkler

## 2021-06-25 NOTE — BRIEF OPERATIVE NOTE - OPERATION/FINDINGS
Enlarged and engorged hemorrhoids with significant internal component, all three columns excised and ligated with vicryl sutures.

## 2021-06-26 ENCOUNTER — RESULT REVIEW (OUTPATIENT)
Age: 36
End: 2021-06-26

## 2021-06-26 PROCEDURE — 88304 TISSUE EXAM BY PATHOLOGIST: CPT | Mod: 26

## 2021-06-30 LAB — SURGICAL PATHOLOGY STUDY: SIGNIFICANT CHANGE UP

## 2021-07-01 ENCOUNTER — INPATIENT (INPATIENT)
Facility: HOSPITAL | Age: 36
LOS: 1 days | Discharge: HOME | End: 2021-07-03
Attending: STUDENT IN AN ORGANIZED HEALTH CARE EDUCATION/TRAINING PROGRAM | Admitting: STUDENT IN AN ORGANIZED HEALTH CARE EDUCATION/TRAINING PROGRAM
Payer: MEDICARE

## 2021-07-01 VITALS
OXYGEN SATURATION: 96 % | RESPIRATION RATE: 20 BRPM | SYSTOLIC BLOOD PRESSURE: 171 MMHG | HEIGHT: 71 IN | TEMPERATURE: 98 F | DIASTOLIC BLOOD PRESSURE: 121 MMHG | HEART RATE: 94 BPM

## 2021-07-01 DIAGNOSIS — N18.6 END STAGE RENAL DISEASE: ICD-10-CM

## 2021-07-01 DIAGNOSIS — Z41.9 ENCOUNTER FOR PROCEDURE FOR PURPOSES OTHER THAN REMEDYING HEALTH STATE, UNSPECIFIED: Chronic | ICD-10-CM

## 2021-07-01 DIAGNOSIS — D64.9 ANEMIA, UNSPECIFIED: ICD-10-CM

## 2021-07-01 DIAGNOSIS — K92.2 GASTROINTESTINAL HEMORRHAGE, UNSPECIFIED: ICD-10-CM

## 2021-07-01 DIAGNOSIS — I10 ESSENTIAL (PRIMARY) HYPERTENSION: ICD-10-CM

## 2021-07-01 DIAGNOSIS — Z29.9 ENCOUNTER FOR PROPHYLACTIC MEASURES, UNSPECIFIED: ICD-10-CM

## 2021-07-01 DIAGNOSIS — K62.5 HEMORRHAGE OF ANUS AND RECTUM: ICD-10-CM

## 2021-07-01 DIAGNOSIS — R63.8 OTHER SYMPTOMS AND SIGNS CONCERNING FOOD AND FLUID INTAKE: ICD-10-CM

## 2021-07-01 DIAGNOSIS — I77.0 ARTERIOVENOUS FISTULA, ACQUIRED: Chronic | ICD-10-CM

## 2021-07-01 DIAGNOSIS — E05.90 THYROTOXICOSIS, UNSPECIFIED WITHOUT THYROTOXIC CRISIS OR STORM: ICD-10-CM

## 2021-07-01 PROBLEM — G51.0 BELL'S PALSY: Chronic | Status: ACTIVE | Noted: 2021-06-18

## 2021-07-01 LAB
ALBUMIN SERPL ELPH-MCNC: 3.8 G/DL — SIGNIFICANT CHANGE UP (ref 3.3–5)
ALP SERPL-CCNC: 59 U/L — SIGNIFICANT CHANGE UP (ref 40–120)
ALT FLD-CCNC: 14 U/L — SIGNIFICANT CHANGE UP (ref 4–41)
ANION GAP SERPL CALC-SCNC: 23 MMOL/L — HIGH (ref 7–14)
APTT BLD: 34.8 SEC — SIGNIFICANT CHANGE UP (ref 27–36.3)
AST SERPL-CCNC: 17 U/L — SIGNIFICANT CHANGE UP (ref 4–40)
B PERT DNA SPEC QL NAA+PROBE: SIGNIFICANT CHANGE UP
BASOPHILS # BLD AUTO: 0.04 K/UL — SIGNIFICANT CHANGE UP (ref 0–0.2)
BASOPHILS # BLD AUTO: 0.06 K/UL — SIGNIFICANT CHANGE UP (ref 0–0.2)
BASOPHILS NFR BLD AUTO: 0.5 % — SIGNIFICANT CHANGE UP (ref 0–2)
BASOPHILS NFR BLD AUTO: 0.7 % — SIGNIFICANT CHANGE UP (ref 0–2)
BILIRUB SERPL-MCNC: 0.2 MG/DL — SIGNIFICANT CHANGE UP (ref 0.2–1.2)
BLD GP AB SCN SERPL QL: NEGATIVE — SIGNIFICANT CHANGE UP
BLD GP AB SCN SERPL QL: NEGATIVE — SIGNIFICANT CHANGE UP
BLOOD GAS VENOUS COMPREHENSIVE RESULT: SIGNIFICANT CHANGE UP
BUN SERPL-MCNC: 63 MG/DL — HIGH (ref 7–23)
C PNEUM DNA SPEC QL NAA+PROBE: SIGNIFICANT CHANGE UP
CALCIUM SERPL-MCNC: 9.2 MG/DL — SIGNIFICANT CHANGE UP (ref 8.4–10.5)
CHLORIDE SERPL-SCNC: 97 MMOL/L — LOW (ref 98–107)
CO2 SERPL-SCNC: 18 MMOL/L — LOW (ref 22–31)
CREAT SERPL-MCNC: 22.47 MG/DL — HIGH (ref 0.5–1.3)
EOSINOPHIL # BLD AUTO: 0.07 K/UL — SIGNIFICANT CHANGE UP (ref 0–0.5)
EOSINOPHIL # BLD AUTO: 0.26 K/UL — SIGNIFICANT CHANGE UP (ref 0–0.5)
EOSINOPHIL NFR BLD AUTO: 0.8 % — SIGNIFICANT CHANGE UP (ref 0–6)
EOSINOPHIL NFR BLD AUTO: 3.3 % — SIGNIFICANT CHANGE UP (ref 0–6)
FLUAV SUBTYP SPEC NAA+PROBE: SIGNIFICANT CHANGE UP
FLUBV RNA SPEC QL NAA+PROBE: SIGNIFICANT CHANGE UP
GLUCOSE SERPL-MCNC: 83 MG/DL — SIGNIFICANT CHANGE UP (ref 70–99)
HADV DNA SPEC QL NAA+PROBE: SIGNIFICANT CHANGE UP
HCOV 229E RNA SPEC QL NAA+PROBE: SIGNIFICANT CHANGE UP
HCOV HKU1 RNA SPEC QL NAA+PROBE: SIGNIFICANT CHANGE UP
HCOV NL63 RNA SPEC QL NAA+PROBE: SIGNIFICANT CHANGE UP
HCOV OC43 RNA SPEC QL NAA+PROBE: SIGNIFICANT CHANGE UP
HCT VFR BLD CALC: 19.3 % — CRITICAL LOW (ref 39–50)
HCT VFR BLD CALC: 20.8 % — CRITICAL LOW (ref 39–50)
HCT VFR BLD CALC: 25.6 % — LOW (ref 39–50)
HGB BLD-MCNC: 6.2 G/DL — CRITICAL LOW (ref 13–17)
HGB BLD-MCNC: 6.6 G/DL — CRITICAL LOW (ref 13–17)
HGB BLD-MCNC: 8.5 G/DL — LOW (ref 13–17)
HMPV RNA SPEC QL NAA+PROBE: SIGNIFICANT CHANGE UP
HPIV1 RNA SPEC QL NAA+PROBE: SIGNIFICANT CHANGE UP
HPIV2 RNA SPEC QL NAA+PROBE: SIGNIFICANT CHANGE UP
HPIV3 RNA SPEC QL NAA+PROBE: SIGNIFICANT CHANGE UP
HPIV4 RNA SPEC QL NAA+PROBE: SIGNIFICANT CHANGE UP
IANC: 5.73 K/UL — SIGNIFICANT CHANGE UP (ref 1.5–8.5)
IANC: 7.01 K/UL — SIGNIFICANT CHANGE UP (ref 1.5–8.5)
IMM GRANULOCYTES NFR BLD AUTO: 0.8 % — SIGNIFICANT CHANGE UP (ref 0–1.5)
IMM GRANULOCYTES NFR BLD AUTO: 0.9 % — SIGNIFICANT CHANGE UP (ref 0–1.5)
INR BLD: 1.13 RATIO — SIGNIFICANT CHANGE UP (ref 0.88–1.16)
LYMPHOCYTES # BLD AUTO: 0.7 K/UL — LOW (ref 1–3.3)
LYMPHOCYTES # BLD AUTO: 0.77 K/UL — LOW (ref 1–3.3)
LYMPHOCYTES # BLD AUTO: 7.8 % — LOW (ref 13–44)
LYMPHOCYTES # BLD AUTO: 9.7 % — LOW (ref 13–44)
MCHC RBC-ENTMCNC: 25 PG — LOW (ref 27–34)
MCHC RBC-ENTMCNC: 25.9 PG — LOW (ref 27–34)
MCHC RBC-ENTMCNC: 27.1 PG — SIGNIFICANT CHANGE UP (ref 27–34)
MCHC RBC-ENTMCNC: 31.7 GM/DL — LOW (ref 32–36)
MCHC RBC-ENTMCNC: 32.1 GM/DL — SIGNIFICANT CHANGE UP (ref 32–36)
MCHC RBC-ENTMCNC: 33.2 GM/DL — SIGNIFICANT CHANGE UP (ref 32–36)
MCV RBC AUTO: 78.8 FL — LOW (ref 80–100)
MCV RBC AUTO: 80.8 FL — SIGNIFICANT CHANGE UP (ref 80–100)
MCV RBC AUTO: 81.5 FL — SIGNIFICANT CHANGE UP (ref 80–100)
MONOCYTES # BLD AUTO: 1 K/UL — HIGH (ref 0–0.9)
MONOCYTES # BLD AUTO: 1.06 K/UL — HIGH (ref 0–0.9)
MONOCYTES NFR BLD AUTO: 11.2 % — SIGNIFICANT CHANGE UP (ref 2–14)
MONOCYTES NFR BLD AUTO: 13.4 % — SIGNIFICANT CHANGE UP (ref 2–14)
NEUTROPHILS # BLD AUTO: 5.73 K/UL — SIGNIFICANT CHANGE UP (ref 1.8–7.4)
NEUTROPHILS # BLD AUTO: 7.01 K/UL — SIGNIFICANT CHANGE UP (ref 1.8–7.4)
NEUTROPHILS NFR BLD AUTO: 72.3 % — SIGNIFICANT CHANGE UP (ref 43–77)
NEUTROPHILS NFR BLD AUTO: 78.6 % — HIGH (ref 43–77)
NRBC # BLD: 0 /100 WBCS — SIGNIFICANT CHANGE UP
NRBC # FLD: 0 K/UL — SIGNIFICANT CHANGE UP
NRBC # FLD: 0.03 K/UL — HIGH
NRBC # FLD: 0.03 K/UL — HIGH
PLATELET # BLD AUTO: 242 K/UL — SIGNIFICANT CHANGE UP (ref 150–400)
PLATELET # BLD AUTO: 289 K/UL — SIGNIFICANT CHANGE UP (ref 150–400)
PLATELET # BLD AUTO: 326 K/UL — SIGNIFICANT CHANGE UP (ref 150–400)
POTASSIUM SERPL-MCNC: 4.7 MMOL/L — SIGNIFICANT CHANGE UP (ref 3.5–5.3)
POTASSIUM SERPL-SCNC: 4.7 MMOL/L — SIGNIFICANT CHANGE UP (ref 3.5–5.3)
PROT SERPL-MCNC: 6.3 G/DL — SIGNIFICANT CHANGE UP (ref 6–8.3)
PROTHROM AB SERPL-ACNC: 12.8 SEC — SIGNIFICANT CHANGE UP (ref 10.6–13.6)
RAPID RVP RESULT: SIGNIFICANT CHANGE UP
RBC # BLD: 2.39 M/UL — LOW (ref 4.2–5.8)
RBC # BLD: 2.64 M/UL — LOW (ref 4.2–5.8)
RBC # BLD: 3.14 M/UL — LOW (ref 4.2–5.8)
RBC # FLD: 15.2 % — HIGH (ref 10.3–14.5)
RBC # FLD: 16.6 % — HIGH (ref 10.3–14.5)
RBC # FLD: 16.8 % — HIGH (ref 10.3–14.5)
RH IG SCN BLD-IMP: POSITIVE — SIGNIFICANT CHANGE UP
RH IG SCN BLD-IMP: POSITIVE — SIGNIFICANT CHANGE UP
RSV RNA SPEC QL NAA+PROBE: SIGNIFICANT CHANGE UP
RV+EV RNA SPEC QL NAA+PROBE: SIGNIFICANT CHANGE UP
SARS-COV-2 RNA SPEC QL NAA+PROBE: SIGNIFICANT CHANGE UP
SODIUM SERPL-SCNC: 138 MMOL/L — SIGNIFICANT CHANGE UP (ref 135–145)
TSH SERPL-MCNC: 2.23 UIU/ML — SIGNIFICANT CHANGE UP (ref 0.27–4.2)
WBC # BLD: 16.29 K/UL — HIGH (ref 3.8–10.5)
WBC # BLD: 7.92 K/UL — SIGNIFICANT CHANGE UP (ref 3.8–10.5)
WBC # BLD: 8.92 K/UL — SIGNIFICANT CHANGE UP (ref 3.8–10.5)
WBC # FLD AUTO: 16.29 K/UL — HIGH (ref 3.8–10.5)
WBC # FLD AUTO: 7.92 K/UL — SIGNIFICANT CHANGE UP (ref 3.8–10.5)
WBC # FLD AUTO: 8.92 K/UL — SIGNIFICANT CHANGE UP (ref 3.8–10.5)

## 2021-07-01 PROCEDURE — 93010 ELECTROCARDIOGRAM REPORT: CPT

## 2021-07-01 PROCEDURE — 99291 CRITICAL CARE FIRST HOUR: CPT | Mod: 25

## 2021-07-01 PROCEDURE — 99223 1ST HOSP IP/OBS HIGH 75: CPT

## 2021-07-01 PROCEDURE — 99053 MED SERV 10PM-8AM 24 HR FAC: CPT

## 2021-07-01 RX ORDER — LABETALOL HCL 100 MG
450 TABLET ORAL EVERY 12 HOURS
Refills: 0 | Status: DISCONTINUED | OUTPATIENT
Start: 2021-07-01 | End: 2021-07-03

## 2021-07-01 RX ORDER — ISOSORBIDE MONONITRATE 60 MG/1
60 TABLET, EXTENDED RELEASE ORAL DAILY
Refills: 0 | Status: DISCONTINUED | OUTPATIENT
Start: 2021-07-01 | End: 2021-07-03

## 2021-07-01 RX ORDER — HYDROMORPHONE HYDROCHLORIDE 2 MG/ML
1 INJECTION INTRAMUSCULAR; INTRAVENOUS; SUBCUTANEOUS ONCE
Refills: 0 | Status: DISCONTINUED | OUTPATIENT
Start: 2021-07-01 | End: 2021-07-01

## 2021-07-01 RX ORDER — ACETAMINOPHEN 500 MG
1000 TABLET ORAL ONCE
Refills: 0 | Status: COMPLETED | OUTPATIENT
Start: 2021-07-01 | End: 2021-07-01

## 2021-07-01 RX ORDER — HYDRALAZINE HCL 50 MG
10 TABLET ORAL ONCE
Refills: 0 | Status: COMPLETED | OUTPATIENT
Start: 2021-07-01 | End: 2021-07-01

## 2021-07-01 RX ORDER — SEVELAMER CARBONATE 2400 MG/1
3200 POWDER, FOR SUSPENSION ORAL
Refills: 0 | Status: DISCONTINUED | OUTPATIENT
Start: 2021-07-01 | End: 2021-07-03

## 2021-07-01 RX ORDER — CALCITRIOL 0.5 UG/1
0.25 CAPSULE ORAL DAILY
Refills: 0 | Status: DISCONTINUED | OUTPATIENT
Start: 2021-07-01 | End: 2021-07-03

## 2021-07-01 RX ORDER — CHLORHEXIDINE GLUCONATE 213 G/1000ML
1 SOLUTION TOPICAL DAILY
Refills: 0 | Status: DISCONTINUED | OUTPATIENT
Start: 2021-07-01 | End: 2021-07-03

## 2021-07-01 RX ORDER — ERYTHROPOIETIN 10000 [IU]/ML
10000 INJECTION, SOLUTION INTRAVENOUS; SUBCUTANEOUS
Refills: 0 | Status: DISCONTINUED | OUTPATIENT
Start: 2021-07-01 | End: 2021-07-02

## 2021-07-01 RX ORDER — CINACALCET 30 MG/1
30 TABLET, FILM COATED ORAL DAILY
Refills: 0 | Status: DISCONTINUED | OUTPATIENT
Start: 2021-07-01 | End: 2021-07-03

## 2021-07-01 RX ADMIN — Medication 10 MILLIGRAM(S): at 18:31

## 2021-07-01 RX ADMIN — HYDROMORPHONE HYDROCHLORIDE 1 MILLIGRAM(S): 2 INJECTION INTRAMUSCULAR; INTRAVENOUS; SUBCUTANEOUS at 19:24

## 2021-07-01 RX ADMIN — Medication 400 MILLIGRAM(S): at 08:55

## 2021-07-01 RX ADMIN — Medication 1000 MILLIGRAM(S): at 09:42

## 2021-07-01 NOTE — ED PROVIDER NOTE - CRITICAL CARE ATTENDING CONTRIBUTION TO CARE
Upon my evaluation, this patient had a high probability of imminent or life-threatening deterioration due to Massive Gastrointestinal hemorrhage which required my direct attention, intervention, and personal management.  The patient has a  medical condition that impairs one or more vital organ systems.  Frequent personal assessment and adjustment of medical interventions was performed.      I have personally provided 36 minutes of critical care time exclusive of time spent on separately billable procedures. Time includes review of laboratory data, radiology results, discussion with consultants, patient and family; monitoring for potential decompensation, as well as time spent retrieving data and reviewing the chart and documenting the visit. Interventions were performed as documented above.     ***I, Dr Andrea Camarillo wrote the initial note in its entirety and the PA only contributed to the progress note and disposition with which I agree.

## 2021-07-01 NOTE — H&P ADULT - HISTORY OF PRESENT ILLNESS
35 y/o M with PMH HTN, ESRD on dialysis, hyperthyroidism (no meds), recent hemorrhoidectomy who presents for BRBPR. He reports that he woke up at 1am to urinate and was straining and did notice some blood come out of his rectum. He went to sleep and then woke up again around 3am and felt some fluid but did not think anything of it. He then woke up in the morning with blood soaking his bed. He says that he felt lightheaded and dizzy but he denies chest pain, SOB, HA. He has pain in his rectum.  35 y/o M with PMH HTN, ESRD on dialysis, hyperthyroidism (no meds), recent hemorrhoidectomy who presents for BRBPR. He reports that he woke up at 1am to urinate and was straining and did notice some blood come out of his rectum. He went to sleep and then woke up again around 3am and felt some fluid but did not think anything of it. He then woke up in the morning with blood soaking his bed. He says that he felt lightheaded and dizzy but he denies chest pain, SOB, HA. He has pain in his rectum. No abdominal pain. All other ROS negative.

## 2021-07-01 NOTE — ED ADULT NURSE NOTE - NSIMPLEMENTINTERV_GEN_ALL_ED
Implemented All Fall with Harm Risk Interventions:  [place] to call system. Call bell, personal items and telephone within reach. Instruct patient to call for assistance. Room bathroom lighting operational. Non-slip footwear when patient is off stretcher. Physically safe environment: no spills, clutter or unnecessary equipment. Stretcher in lowest position, wheels locked, appropriate side rails in place. Provide visual cue, wrist band, yellow gown, etc. Monitor gait and stability. Monitor for mental status changes and reorient to person, place, and time. Review medications for side effects contributing to fall risk. Reinforce activity limits and safety measures with patient and family. Provide visual clues: red socks.

## 2021-07-01 NOTE — ED ADULT NURSE REASSESSMENT NOTE - NS ED NURSE REASSESS COMMENT FT1
Assumed care. Bedside report received. Pt actively bleeding. Surgery team at bedside to control bleeding. Critical care RN Sean at bedside. pRBC infusing. No signs/symptoms of transfusion rxn noted or reported.

## 2021-07-01 NOTE — CONSULT NOTE ADULT - ATTENDING COMMENTS
episode of BRBPR, hemorrhoidectomy 1 week ago.  -No active bleeding noted on exam  -Hg decreased, persistent significantly elevated BP without tachycardia  -Transfuse  -monitor for active bleeding  -pt to likely need HD and evaluation of HTN

## 2021-07-01 NOTE — ED ADULT NURSE REASSESSMENT NOTE - NS ED NURSE REASSESS COMMENT FT1
Patient appears comfortable. Bleeding controlled. Reports weakness. MD Florian ([id number]) to  process admission to surgery dept. Cardiac monitor in progress. Will monitor

## 2021-07-01 NOTE — ED PROVIDER NOTE - OBJECTIVE STATEMENT
35 y/o M with Past Medical History of HTN, Bell's palsy, Hyperthyroidism (currently on no meds), ESRD on at home dialysis 5 days a week via left AVF that is brought in by EMS for GIB. Per pt and EMS, pt had a hemorrhoidectomy on June 25 here at [facility] and had some small GIB since but this morning woke up and had soaked bed with Bright red blood, feels lightheaded and SOB as well as minimal lower abd pain.

## 2021-07-01 NOTE — H&P ADULT - PROBLEM SELECTOR PLAN 4
Hx of HTN. BPs elevated to 170-180s. ED ordered hydralazine 10mg IVPx1. On labetalol and imdur at home.   - Recheck BP after hydralazine IV is administered.   - Plan to restart home medications - [id number] BID and imdur 60mg qd.   - F/u renal recommendations.

## 2021-07-01 NOTE — H&P ADULT - NSHPPHYSICALEXAM_GEN_ALL_CORE
.  VITAL SIGNS:  T(C): 37 (07-01-21 @ 17:18), Max: 37 (07-01-21 @ 17:18)  T(F): 98.6 (07-01-21 @ 17:18), Max: 98.6 (07-01-21 @ 17:18)  HR: 77 (07-01-21 @ 17:18) (77 - 94)  BP: 178/110 (07-01-21 @ 17:18) (148/106 - 180/108)  BP(mean): --  RR: 19 (07-01-21 @ 17:18) (14 - 20)  SpO2: 100% (07-01-21 @ 17:18) (96% - 100%)  Wt(kg): --    PHYSICAL EXAM:    Constitutional: WDWN resting comfortably in bed; NAD  Head: NC/AT  Eyes: PERRL, EOMI, anicteric sclera  ENT: no nasal discharge; uvula midline, no oropharyngeal erythema or exudates; MMM  Neck: supple; no JVD or thyromegaly  Respiratory: CTA B/L; no W/R/R, no retractions  Cardiac: +S1/S2; RRR; no M/R/G; PMI non-displaced  Gastrointestinal: abdomen soft, NT/ND; no rebound or guarding; +BSx4  Back: spine midline, no bony tenderness or step-offs; no CVAT B/L  Extremities: WWP, no clubbing or cyanosis; no peripheral edema  Musculoskeletal: NROM x4; no joint swelling, tenderness or erythema  Vascular: 2+ radial, femoral, DP/PT pulses B/L  Dermatologic: skin warm, dry and intact; no rashes, wounds, or scars  Lymphatic: no submandibular or cervical LAD  Neurologic: AAOx3; CNII-XII grossly intact; no focal deficits  Psychiatric: affect and characteristics of appearance, verbalizations, behaviors are appropriate

## 2021-07-01 NOTE — ED PROVIDER NOTE - PMH
Bell's palsy    ESRD (end stage renal disease) on dialysis    ESRD on hemodialysis  at home 5 x week  HTN (hypertension)    HTN (hypertension)    Hyperthyroidism

## 2021-07-01 NOTE — CHART NOTE - NSCHARTNOTEFT_GEN_A_CORE
37 yo M POD#6 s/p three column excisional hemorrhoidectomy, presenting with bleeding per rectum. Patient's bleeding improved after packing, but had repeat episode of bleeding this evening. Being transfused by the ED.    Called to evaluate the patient and attempt bedside exam. Patient willing to undergo exam, and possible suture placement. Able to visualize suture lines, with no overt evidence of bleeding. Patient passed one loose stool with mixed blood. Observed for one hour with no further bleeding. Patient unwilling to attempt repeat examination, but agreed to repacking the rectum, with a mix of surgicel and kerlix gauze.    /84  HR 86  RR 21  O2 100% on RA  Gen uncomfortable, weary, alert  Resp unlabored  HR regular  Abdomen soft, nontender  Rectal packed, without active bleeding    Hct 20.8 --> transfused 2u PRBC --> 19.3 --> 1u PRBC transfusing now.    Will continue to monitor, may require exam under anesthesia if bleeding continues.  --ANY Winkler, colorectal surgery fellow

## 2021-07-01 NOTE — H&P ADULT - PROBLEM SELECTOR PLAN 2
Found to be anemic to 6.6 --> 6.2 after 2U PRBC 2/2 GIB. Now s/p packing and bleeding has stopped.   - Plan to give an additional 1U PRBC now and then another 1U PRBC with dialysis later this evening, plan discussed with nephrologist Dr. Hernandez.   - F/u repeat CBC after administration

## 2021-07-01 NOTE — CONSULT NOTE ADULT - SUBJECTIVE AND OBJECTIVE BOX
[facility] NEPHROLOGY- CONSULTATION NOTE    36y Male with history of below presents with BRBPR. Nephrology consulted for ESRD status.    Patient well known to our practice as follows with Dr. Dodson as an outpatient at [facility] for h/o ESRD on home HD 5X/week via LUE AVF. ESRD etiology thought to be due to HTN. Patient presents with anemia s/p PRBC transfusion.    REVIEW OF SYSTEMS:  Gen: no changes in weight  HEENT: no rhinorrhea  Neck: no sore throat  Cards: no chest pain  Resp: no dyspnea  GI: no nausea or vomiting or diarrhea  : no dysuria or hematuria  Vascular: no LE edema  Derm: no rashes  Neuro: no numbness/tingling    No Known Drug Allergies  topical cleanser for dialysis access.   Exsept (Rash)      Home Medications Reviewed  Hospital Medications:   MEDICATIONS  (STANDING):      PAST MEDICAL & SURGICAL HISTORY:  ESRD on hemodialysis  at home 5 x week    HTN (hypertension)    ESRD (end stage renal disease) on dialysis    HTN (hypertension)    Bell&#x27;s palsy    Hyperthyroidism    AV fistula  L forearm    Elective surgical procedure  RACW permacath for HD, removed 5 years ago        FAMILY HISTORY:  Family history of hypertension (Father, Mother)  Father and mother        SOCIAL HISTORY:  Denies toxic substance use     VITALS:  T(F): 98.2 (07-01-21 @ 14:55), Max: 98.3 (07-01-21 @ 06:30)  HR: 78 (07-01-21 @ 14:55)  BP: 174/111 (07-01-21 @ 14:55)  RR: 18 (07-01-21 @ 14:55)  SpO2: 99% (07-01-21 @ 14:55)  Wt(kg): --    Height (cm): 180.3 (07-01 @ 05:43)    PHYSICAL EXAM:  Gen: NAD, calm  HEENT: MMM  Neck: no JVD  Cards: RRR, +S1/S2, no M/G/R  Resp: CTA B/L  GI: soft, NT/ND, NABS  : no CVA tenderness  Vascular: no LE edema B/L, LUE AVF + bruit/thrill with buttonholes   Derm: no rashes  Neuro: non-focal    LABS:  07-01    138  |  97<L>  |  63<H>  ----------------------------<  83  4.7   |  18<L>  |  22.47<H>    Ca    9.2      01 Jul 2021 06:48    TPro  6.3  /  Alb  3.8  /  TBili  0.2  /  DBili      /  AST  17  /  ALT  14  /  AlkPhos  59  07-01    Creatinine Trend: 22.47 <--                        6.2    8.92  )-----------( 242      ( 01 Jul 2021 14:00 )             19.3     Urine Studies:        RADIOLOGY & ADDITIONAL STUDIES:    N/A

## 2021-07-01 NOTE — H&P ADULT - PROBLEM SELECTOR PLAN 1
Pt reports BRBPR soaking the bed this morning. Found to be anemic to 6.6 --> 6.2 after 2U PRBC. Recent hemorrhoidectomy on 6/25/21. Colorectal surgery consulted. Now s/p packing and bleeding has stopped.  - F/u colorectal surgery recommendations, keep packing in place.   - Trend H&H  - Maintain active T&S.

## 2021-07-01 NOTE — H&P ADULT - NSICDXPASTMEDICALHX_GEN_ALL_CORE_FT
PAST MEDICAL HISTORY:  Bell's palsy     ESRD (end stage renal disease) on dialysis     ESRD on hemodialysis at home 5 x week    HTN (hypertension)     HTN (hypertension)     Hyperthyroidism

## 2021-07-01 NOTE — ED ADULT TRIAGE NOTE - CHIEF COMPLAINT QUOTE
pt c/o rectal bleeding s/p hemorrhoidectomy done on Friday. EMS stretcher note to be saturated with bright red blood, pt endorses weakness denies pain or any other complaints. L AV fistula, states does own dialysis at home 5x a week. Brought to room 15

## 2021-07-01 NOTE — ED ADULT NURSE REASSESSMENT NOTE - NS ED NURSE REASSESS COMMENT FT1
Pt c/o "something coming out" of his rectum. CATHY Souza at bedside. Active bright red bleeding form rectum noted. Surgery team called to bedside. Transfusion of PRBCs started at this time. VS as charted. Pt c/o "something coming out" of his rectum. CATHY Souza at bedside. Active bright red bleeding form rectum noted. Surgery team called to bedside. As per Surgey team, blood to be transfused as fast as possible. Transfusion of PRBCs started at this time. VS as charted.

## 2021-07-01 NOTE — H&P ADULT - NSHPLABSRESULTS_GEN_ALL_CORE
.  LABS:                         6.2    8.92  )-----------( 242      ( 01 Jul 2021 14:00 )             19.3     07-01    138  |  97<L>  |  63<H>  ----------------------------<  83  4.7   |  18<L>  |  22.47<H>    Ca    9.2      01 Jul 2021 06:48    TPro  6.3  /  Alb  3.8  /  TBili  0.2  /  DBili  x   /  AST  17  /  ALT  14  /  AlkPhos  59  07-01    PT/INR - ( 01 Jul 2021 06:48 )   PT: 12.8 sec;   INR: 1.13 ratio         PTT - ( 01 Jul 2021 06:48 )  PTT:34.8 sec              RADIOLOGY, EKG & ADDITIONAL TESTS: Reviewed.

## 2021-07-01 NOTE — CONSULT NOTE ADULT - SUBJECTIVE AND OBJECTIVE BOX
CC: Patient is a 36y old  Male who presents with a chief complaint of BRBPR      HPI: Patient is a 36y year old male with PMHx of HTN, Hyperthyroidism (currently on no meds), ESRD on at home dialysis 5 days a week (last dialyzed yesterday) via left AVF that is brought in by EMS for GIB. Per pt and EMS, pt had a hemorrhoidectomy on June 25 here at [facility] and had some small GIB since but this morning woke up and had soaked bed with Bright red blood, feels lightheaded and SOB as well as minimal lower abd pain        PMH  ESRD on hemodialysis    HTN (hypertension)    No pertinent past medical history    ESRD (end stage renal disease) on dialysis    HTN (hypertension)    [place] palsy    Hyperthyroidism      PSH  AV fistula    Elective surgical procedure    No significant past surgical history      MEDS    Allergies    No Known Drug Allergies  topical cleanser for dialysis access.   Exsept (Rash)    Intolerances        Social        Physical Exam  T(C): 36.8 (07-01-21 @ 06:30), Max: 36.8 (07-01-21 @ 06:30)  HR: 80 (07-01-21 @ 09:10) (80 - 94)  BP: 148/106 (07-01-21 @ 09:10) (148/106 - 180/108)  RR: 16 (07-01-21 @ 09:10) (16 - 20)  SpO2: 99% (07-01-21 @ 09:10) (96% - 100%)  Wt(kg): --  Tmax: T(C): , Max: 36.8 (07-01-21 @ 06:30)  Wt(kg): --    Gen: NAD  HEENT: normocephalic, atraumatic, no scleral icterus  CV: S1, S2, RRR  Pulm: CTA B/L, equal chest rise  Abd: Soft, ND, NTP, no rebound, no guarding, no palpable organomegaly/masses  Rectal: Active bleeding, not pulsatile, good rectal tone  Ext: warm, no edema, palp pulses b/l, fistula with good thrill      Labs:                        6.6    7.92  )-----------( 326      ( 01 Jul 2021 06:48 )             20.8     07-01    138  |  97<L>  |  63<H>  ----------------------------<  83  4.7   |  18<L>  |  22.47<H>    Ca    9.2      01 Jul 2021 06:48    TPro  6.3  /  Alb  3.8  /  TBili  0.2  /  DBili  x   /  AST  17  /  ALT  14  /  AlkPhos  59  07-01    PT/INR - ( 01 Jul 2021 06:48 )   PT: 12.8 sec;   INR: 1.13 ratio         PTT - ( 01 Jul 2021 06:48 )  PTT:34.8 sec

## 2021-07-01 NOTE — ED ADULT NURSE NOTE - NSFALLRSKINDICATORS_ED_ALL_ED
Post-Op Assessment Note    CV Status:  Stable       Mental Status:  Somnolent   PONV Controlled:  None   Airway Patency:  Patent   Post Op Vitals Reviewed: Yes      Staff: CRNA           BP  115/65   Temp     Pulse  75   Resp   15   SpO2   98
yes

## 2021-07-01 NOTE — ED PROVIDER NOTE - CLINICAL SUMMARY MEDICAL DECISION MAKING FREE TEXT BOX
37 y/o M with multiple medical problems including requirement of HD at home that had a hemorrhoidectomy June 25 that not presents with GIB. Massive GIB on exam with bedding soaked with blood, Rectal exam shows bleeding from rectum. Will consult Surgery as pt had recent surgery prior to bleeding and will obtain labs, provide PRBC immediately as pt cannot take much volume due to his HD status. Will likely admit. 35 y/o M with multiple medical problems including requirement of HD at home that had a hemorrhoidectomy June 25 that not presents with GIB. Massive GIB on exam with bedding soaked with blood, Rectal exam shows bleeding from rectum. Will consult Surgery as pt had recent surgery prior to bleeding and will obtain labs, provide PRBC immediately as pt cannot take much volume due to his HD status and will closely monitor for transfusion reaction and continued hemorrhage.

## 2021-07-01 NOTE — ED ADULT NURSE REASSESSMENT NOTE - NS ED NURSE REASSESS COMMENT FT1
Received report from ARIC Coreas. Pt A&OX4, appears in NAD. NSR on monitor. Resp even and unlabored, O2 sat 100% on 2L NC. Pt denies CP, SOB. Endorsing pain to rectum. No active bleeding noted. Awaiting blood from blood bank. Will continue to monitor.

## 2021-07-01 NOTE — ED ADULT NURSE NOTE - OBJECTIVE STATEMENT
Pt received directly in spot 15, c/o bleeding from his rectum, bright red blood, large copious amounts.  Pt is s/p hemorrhoidectomy a few days ago.  Pt endorses dizziness/lightheadedness/SOB.  Pt denies any abdominal pain/nausea/vomiting.  NSR to sinus tach on CM.  Pt with dialysis fistula to L arm, does self hemodialysis at home.  Pt arrives with 18g to R AC from EMS, 20g IVL placed R hand.  Labs sent.  Will continue to monitor.

## 2021-07-01 NOTE — ED PROVIDER NOTE - PROGRESS NOTE DETAILS
PA Grancaric: Pt appears in no distress. No active bleeding. CATHY Cleveland: Pt rejected by CDU PA Grancaric: Pt resting comfortably. Actively being transfused CATHY Cleveland: Pt's family member notified me to pt, pt states he has lower abd pain and feels wet. Pt examined, blood on sheet noted coming from rectum. Surgery notified and currently at bedside. CATHY Cleveland: called admitting office to change disposition, pt admitted to surgery under Dr. Ontiveros

## 2021-07-01 NOTE — H&P ADULT - ASSESSMENT
37 y/o M with PMH HTN, ESRD on dialysis, hyperthyroidism (no meds), recent hemorrhoidectomy who presents for BRBPR in the setting of recent hemorrhoidectomy.

## 2021-07-02 LAB
ANION GAP SERPL CALC-SCNC: 20 MMOL/L — HIGH (ref 7–14)
BUN SERPL-MCNC: 81 MG/DL — HIGH (ref 7–23)
CALCIUM SERPL-MCNC: 8.6 MG/DL — SIGNIFICANT CHANGE UP (ref 8.4–10.5)
CHLORIDE SERPL-SCNC: 97 MMOL/L — LOW (ref 98–107)
CO2 SERPL-SCNC: 18 MMOL/L — LOW (ref 22–31)
CREAT SERPL-MCNC: 24.67 MG/DL — HIGH (ref 0.5–1.3)
DIALYSIS INSTRUMENT RESULT - HEPATITIS B SURFACE ANTIGEN: NEGATIVE — SIGNIFICANT CHANGE UP
GLUCOSE SERPL-MCNC: 90 MG/DL — SIGNIFICANT CHANGE UP (ref 70–99)
HBV CORE AB SER-ACNC: SIGNIFICANT CHANGE UP
HBV SURFACE AB SER-ACNC: 910.1 MIU/ML — SIGNIFICANT CHANGE UP
HBV SURFACE AG SER-ACNC: SIGNIFICANT CHANGE UP
HCT VFR BLD CALC: 21.7 % — LOW (ref 39–50)
HCT VFR BLD CALC: 24 % — LOW (ref 39–50)
HCV AB S/CO SERPL IA: 0.11 S/CO — SIGNIFICANT CHANGE UP (ref 0–0.99)
HCV AB SERPL-IMP: SIGNIFICANT CHANGE UP
HGB BLD-MCNC: 7.3 G/DL — LOW (ref 13–17)
HGB BLD-MCNC: 8.3 G/DL — LOW (ref 13–17)
MAGNESIUM SERPL-MCNC: 2.4 MG/DL — SIGNIFICANT CHANGE UP (ref 1.6–2.6)
MCHC RBC-ENTMCNC: 27.1 PG — SIGNIFICANT CHANGE UP (ref 27–34)
MCHC RBC-ENTMCNC: 27.9 PG — SIGNIFICANT CHANGE UP (ref 27–34)
MCHC RBC-ENTMCNC: 33.6 GM/DL — SIGNIFICANT CHANGE UP (ref 32–36)
MCHC RBC-ENTMCNC: 34.6 GM/DL — SIGNIFICANT CHANGE UP (ref 32–36)
MCV RBC AUTO: 80.5 FL — SIGNIFICANT CHANGE UP (ref 80–100)
MCV RBC AUTO: 80.7 FL — SIGNIFICANT CHANGE UP (ref 80–100)
NRBC # BLD: 0 /100 WBCS — SIGNIFICANT CHANGE UP
NRBC # BLD: 0 /100 WBCS — SIGNIFICANT CHANGE UP
NRBC # FLD: 0.05 K/UL — HIGH
NRBC # FLD: 0.07 K/UL — HIGH
PHOSPHATE SERPL-MCNC: 5.9 MG/DL — HIGH (ref 2.5–4.5)
PLATELET # BLD AUTO: 275 K/UL — SIGNIFICANT CHANGE UP (ref 150–400)
PLATELET # BLD AUTO: 296 K/UL — SIGNIFICANT CHANGE UP (ref 150–400)
POTASSIUM SERPL-MCNC: 5.4 MMOL/L — HIGH (ref 3.5–5.3)
POTASSIUM SERPL-SCNC: 5.4 MMOL/L — HIGH (ref 3.5–5.3)
RBC # BLD: 2.69 M/UL — LOW (ref 4.2–5.8)
RBC # BLD: 2.98 M/UL — LOW (ref 4.2–5.8)
RBC # FLD: 15.2 % — HIGH (ref 10.3–14.5)
RBC # FLD: 16.2 % — HIGH (ref 10.3–14.5)
SODIUM SERPL-SCNC: 135 MMOL/L — SIGNIFICANT CHANGE UP (ref 135–145)
WBC # BLD: 13.77 K/UL — HIGH (ref 3.8–10.5)
WBC # BLD: 15.24 K/UL — HIGH (ref 3.8–10.5)
WBC # FLD AUTO: 13.77 K/UL — HIGH (ref 3.8–10.5)
WBC # FLD AUTO: 15.24 K/UL — HIGH (ref 3.8–10.5)

## 2021-07-02 PROCEDURE — 99024 POSTOP FOLLOW-UP VISIT: CPT

## 2021-07-02 PROCEDURE — 99223 1ST HOSP IP/OBS HIGH 75: CPT | Mod: GC

## 2021-07-02 RX ORDER — ACETAMINOPHEN 500 MG
975 TABLET ORAL ONCE
Refills: 0 | Status: COMPLETED | OUTPATIENT
Start: 2021-07-02 | End: 2021-07-02

## 2021-07-02 RX ORDER — ACETAMINOPHEN 500 MG
650 TABLET ORAL ONCE
Refills: 0 | Status: COMPLETED | OUTPATIENT
Start: 2021-07-02 | End: 2021-07-02

## 2021-07-02 RX ORDER — ERYTHROPOIETIN 10000 [IU]/ML
10000 INJECTION, SOLUTION INTRAVENOUS; SUBCUTANEOUS
Refills: 0 | Status: DISCONTINUED | OUTPATIENT
Start: 2021-07-02 | End: 2021-07-03

## 2021-07-02 RX ADMIN — ISOSORBIDE MONONITRATE 60 MILLIGRAM(S): 60 TABLET, EXTENDED RELEASE ORAL at 11:54

## 2021-07-02 RX ADMIN — ERYTHROPOIETIN 10000 UNIT(S): 10000 INJECTION, SOLUTION INTRAVENOUS; SUBCUTANEOUS at 16:01

## 2021-07-02 RX ADMIN — Medication 650 MILLIGRAM(S): at 17:51

## 2021-07-02 RX ADMIN — CHLORHEXIDINE GLUCONATE 1 APPLICATION(S): 213 SOLUTION TOPICAL at 12:29

## 2021-07-02 RX ADMIN — CALCITRIOL 0.25 MICROGRAM(S): 0.5 CAPSULE ORAL at 11:55

## 2021-07-02 RX ADMIN — Medication 975 MILLIGRAM(S): at 07:57

## 2021-07-02 RX ADMIN — Medication 450 MILLIGRAM(S): at 06:58

## 2021-07-02 RX ADMIN — Medication 975 MILLIGRAM(S): at 07:27

## 2021-07-02 RX ADMIN — Medication 400 MILLIGRAM(S): at 00:33

## 2021-07-02 RX ADMIN — Medication 450 MILLIGRAM(S): at 21:30

## 2021-07-02 RX ADMIN — CINACALCET 30 MILLIGRAM(S): 30 TABLET, FILM COATED ORAL at 11:54

## 2021-07-02 NOTE — PROGRESS NOTE ADULT - ASSESSMENT
36y Male with history of ESRD presents with BRBPR. Nephrology consulted for ESRD status.    1) ESRD: Last HD on 6/28/21. HD cancelled on 7/1 due to active bleeding which has now resolved. Plan for HD this afternoon. Monitor electrolytes.    2) HTN with ESRD: BP improving after home medications resumed. Monitor BP.    3) Anemia of renal disease: With component of GIB. S/P PRBC. Continue with Epo 10K with HD with plans to transfuse PRBC with HD. Monitor Hb.    4) Secondary HPT of renal origin: Phosphorus uncontrolled. Can resume renvela 4 tabs with meals once diet resumed. Continue with sensipar 30 mg daily and calcitriol 0.25 mcg daily. Monitor serum calcium and phosphorus.    5) Hyperkalemia: Mild. For HD today. Renal diet once diet started. Monitor serum K.      [facility] NEPHROLOGY  Betito Dodson M.D.  Octavio Hernandez D.O.  Lynne Biswas M.D.  Mallory Hook, RONALDO, ANP-C    Telephone: [phone]  Facsimile: [phone]    [address] [place] [address]

## 2021-07-02 NOTE — CONSULT NOTE ADULT - SUBJECTIVE AND OBJECTIVE BOX
CHIEF COMPLAINT:    HPI:  37 y/o M with PMH HTN, ESRD on dialysis, hyperthyroidism (no meds), recent hemorrhoidectomy who presents for BRBPR. He reports that he woke up at 1am to urinate and was straining and did notice some blood come out of his rectum. He went to sleep and then woke up again around 3am and felt some fluid but did not think anything of it. He then woke up in the morning with blood soaking his bed. He says that he felt lightheaded and dizzy but he denies chest pain, SOB, HA. He has pain in his rectum. No abdominal pain. All other ROS negative.       PAST MEDICAL & SURGICAL HISTORY:  ESRD on hemodialysis  at home 5 x week    HTN (hypertension)    ESRD (end stage renal disease) on dialysis    HTN (hypertension)    Bell&#x27;s palsy    Hyperthyroidism    AV fistula  L forearm    Elective surgical procedure  RACW permacath for HD, removed 5 years ago            PREVIOUS DIAGNOSTIC TESTING:    [ ] Echocardiogram:  [ ]  Catheterization:  [ ] Stress Test:  	    MEDICATIONS:  MEDICATIONS  (STANDING):  calcitriol   Capsule 0.25 MICROGram(s) Oral daily  chlorhexidine 2% Cloths 1 Application(s) Topical daily  cinacalcet 30 milliGRAM(s) Oral daily  epoetin elizabeth-epbx (RETACRIT) Injectable 88160 Unit(s) IV Push <User Schedule>  isosorbide   mononitrate ER Tablet (IMDUR) 60 milliGRAM(s) Oral daily  labetalol 450 milliGRAM(s) Oral every 12 hours  sevelamer carbonate 3200 milliGRAM(s) Oral three times a day with meals      FAMILY HISTORY:  Family history of hypertension (Father, Mother)  Father and mother        SOCIAL HISTORY:    [ ] Non-smoker  [ ] Smoker  [ ] Alcohol    Allergies    No Known Drug Allergies  topical cleanser for dialysis access.   Exsept (Rash)    Intolerances    	    REVIEW OF SYSTEMS:  CONSTITUTIONAL: No fever, weight loss, or fatigue  EYES: No eye pain, visual disturbances, or discharge  ENMT:  No difficulty hearing, tinnitus, vertigo; No sinus or throat pain  NECK: No pain or stiffness  RESPIRATORY: No cough, wheezing, chills or hemoptysis; No Shortness of Breath  CARDIOVASCULAR: No chest pain, palpitations, passing out, dizziness, or leg swelling  GASTROINTESTINAL: see hpi.  GENITOURINARY: No dysuria, frequency, hematuria, or incontinence  NEUROLOGICAL: No headaches, memory loss, loss of strength, numbness, or tremors  SKIN: No itching, burning, rashes, or lesions   	    [ ] All others negative	  [ ] Unable to obtain    PHYSICAL EXAM:  T(C): 37.1 (07-02-21 @ 09:36), Max: 37.1 (07-02-21 @ 09:36)  HR: 80 (07-02-21 @ 09:36) (73 - 112)  BP: 129/82 (07-02-21 @ 09:36) (112/81 - 178/110)  RR: 17 (07-02-21 @ 09:36) (14 - 23)  SpO2: 100% (07-02-21 @ 09:36) (99% - 100%)  Wt(kg): --  I&O's Summary    01 Jul 2021 07:01  -  02 Jul 2021 07:00  --------------------------------------------------------  IN: 0 mL / OUT: 0 mL / NET: 0 mL    02 Jul 2021 07:01  -  02 Jul 2021 11:11  --------------------------------------------------------  IN: 0 mL / OUT: 0 mL / NET: 0 mL        Appearance: Normal	  Psychiatry: A & O x 3, Mood & affect appropriate  HEENT:   Normal oral mucosa, PERRL, EOMI	  Lymphatic: No lymphadenopathy  Cardiovascular: Normal S1 S2,RRR, No JVD, No murmurs  Respiratory: Lungs clear to auscultation	  Gastrointestinal:  Soft, Non-tender, + BS	  Skin: No rashes, No ecchymoses, No cyanosis	  Neurologic: Non-focal  Extremities: Normal range of motion, No clubbing, cyanosis or edema  Vascular: Peripheral pulses palpable 2+ bilaterally    TELEMETRY: 	    ECG:  	  RADIOLOGY:  OTHER: 	  	  LABS:	 	    CARDIAC MARKERS:                                  7.3    13.77 )-----------( 275      ( 02 Jul 2021 08:12 )             21.7     07-02    135  |  97<L>  |  81<H>  ----------------------------<  90  5.4<H>   |  18<L>  |  24.67<H>    Ca    8.6      02 Jul 2021 08:12  Phos  5.9     07-02  Mg     2.40     07-02    TPro  6.3  /  Alb  3.8  /  TBili  0.2  /  DBili  x   /  AST  17  /  ALT  14  /  AlkPhos  59  07-01    PT/INR - ( 01 Jul 2021 06:48 )   PT: 12.8 sec;   INR: 1.13 ratio         PTT - ( 01 Jul 2021 06:48 )  PTT:34.8 sec  proBNP:   Lipid Profile:   HgA1c:   TSH:     ASSESSMENT/PLAN:

## 2021-07-02 NOTE — PROGRESS NOTE ADULT - ASSESSMENT
37 yo M POD# 7 s/p three column excisional hemorrhoidectomy, presenting with bleeding per rectum. Patient's bleeding improved after packing, but had repeat episode of bleeding s/p transfusion. 35 yo M POD# 7 s/p three column excisional hemorrhoidectomy, presenting with bleeding per rectum. Patient's bleeding improved after packing, but had repeat episode of bleeding s/p transfusion of 4 U PRBC  - Renal diet  - Contact nephrology to placed HD orders for today  - DVT ppx  - f/u with A team attending for d/c plan

## 2021-07-02 NOTE — PROGRESS NOTE ADULT - SUBJECTIVE AND OBJECTIVE BOX
Morning Surgical Progress Note  Patient is a 36y old  Male who presents with a chief complaint of BRBPR (01 Jul 2021 17:10)    SUBJECTIVE: Patient seen and examined at bedside with surgical team, patient without complaints.     Vital Signs Last 24 Hrs  T(C): 36.8 (02 Jul 2021 02:28), Max: 37 (01 Jul 2021 17:18)  T(F): 98.2 (02 Jul 2021 02:28), Max: 98.6 (01 Jul 2021 17:18)  HR: 73 (02 Jul 2021 02:28) (73 - 112)  BP: 166/112 (02 Jul 2021 02:28) (112/81 - 180/108)  BP(mean): 127 (01 Jul 2021 19:57) (127 - 127)  RR: 17 (02 Jul 2021 02:28) (14 - 23)  SpO2: 100% (02 Jul 2021 02:28) (97% - 100%)I&O's Detail    Medications  MEDICATIONS  (STANDING):  calcitriol   Capsule 0.25 MICROGram(s) Oral daily  chlorhexidine 2% Cloths 1 Application(s) Topical daily  cinacalcet 30 milliGRAM(s) Oral daily  epoetin elizabeth-epbx (RETACRIT) Injectable 89118 Unit(s) IV Push <User Schedule>  isosorbide   mononitrate ER Tablet (IMDUR) 60 milliGRAM(s) Oral daily  labetalol 450 milliGRAM(s) Oral every 12 hours  sevelamer carbonate 3200 milliGRAM(s) Oral three times a day with meals    MEDICATIONS  (PRN):    Physical Exam  Constitutional: A&Ox3, NAD  Gastrointestinal: Soft nontender, nondistended  Extremities: Moving all extremities, no edema  Skin: No Rashes, Hematoma, Ecchymosis  LABS:                        8.5    16.29 )-----------( 289      ( 01 Jul 2021 23:30 )             25.6     07-01    138  |  97<L>  |  63<H>  ----------------------------<  83  4.7   |  18<L>  |  22.47<H>    Ca    9.2      01 Jul 2021 06:48    TPro  6.3  /  Alb  3.8  /  TBili  0.2  /  DBili  x   /  AST  17  /  ALT  14  /  AlkPhos  59  07-01    PT/INR - ( 01 Jul 2021 06:48 )   PT: 12.8 sec;   INR: 1.13 ratio         PTT - ( 01 Jul 2021 06:48 )  PTT:34.8 sec  LIVER FUNCTIONS - ( 01 Jul 2021 06:48 )  Alb: 3.8 g/dL / Pro: 6.3 g/dL / ALK PHOS: 59 U/L / ALT: 14 U/L / AST: 17 U/L / GGT: x             ABO Interpretation: O (07-01-21 @ 07:29)  ABO Interpretation: O (07-01-21 @ 06:46)   Morning Surgical Progress Note  Patient is a 36y old  Male who presents with a chief complaint of BRBPR (01 Jul 2021 17:10)    SUBJECTIVE: Patient seen and examined at bedside with surgical team, patient without complaints. Denies BM overnight denies rectal bleeding overnight. Patient states he would like to go home today    Vital Signs Last 24 Hrs  T(C): 36.8 (02 Jul 2021 02:28), Max: 37 (01 Jul 2021 17:18)  T(F): 98.2 (02 Jul 2021 02:28), Max: 98.6 (01 Jul 2021 17:18)  HR: 73 (02 Jul 2021 02:28) (73 - 112)  BP: 166/112 (02 Jul 2021 02:28) (112/81 - 180/108)  BP(mean): 127 (01 Jul 2021 19:57) (127 - 127)  RR: 17 (02 Jul 2021 02:28) (14 - 23)  SpO2: 100% (02 Jul 2021 02:28) (97% - 100%)I&O's Detail    Medications  MEDICATIONS  (STANDING):  calcitriol   Capsule 0.25 MICROGram(s) Oral daily  chlorhexidine 2% Cloths 1 Application(s) Topical daily  cinacalcet 30 milliGRAM(s) Oral daily  epoetin elizabeth-epbx (RETACRIT) Injectable 50496 Unit(s) IV Push <User Schedule>  isosorbide   mononitrate ER Tablet (IMDUR) 60 milliGRAM(s) Oral daily  labetalol 450 milliGRAM(s) Oral every 12 hours  sevelamer carbonate 3200 milliGRAM(s) Oral three times a day with meals    Physical Exam  Constitutional: A&Ox3, NAD  Gastrointestinal: Soft nontender, nondistended, rectum without bleeding  Extremities: Moving all extremities, no edema  Skin: No Rashes, Hematoma, Ecchymosis  LABS:                        8.5    16.29 )-----------( 289      ( 01 Jul 2021 23:30 )             25.6     07-01    138  |  97<L>  |  63<H>  ----------------------------<  83  4.7   |  18<L>  |  22.47<H>    Ca    9.2      01 Jul 2021 06:48    TPro  6.3  /  Alb  3.8  /  TBili  0.2  /  DBili  x   /  AST  17  /  ALT  14  /  AlkPhos  59  07-01    PT/INR - ( 01 Jul 2021 06:48 )   PT: 12.8 sec;   INR: 1.13 ratio         PTT - ( 01 Jul 2021 06:48 )  PTT:34.8 sec  LIVER FUNCTIONS - ( 01 Jul 2021 06:48 )  Alb: 3.8 g/dL / Pro: 6.3 g/dL / ALK PHOS: 59 U/L / ALT: 14 U/L / AST: 17 U/L / GGT: x             ABO Interpretation: O (07-01-21 @ 07:29)  ABO Interpretation: O (07-01-21 @ 06:46)

## 2021-07-02 NOTE — CONSULT NOTE ADULT - SUBJECTIVE AND OBJECTIVE BOX
HPI:  35 y/o M with PMH HTN, ESRD on dialysis, hyperthyroidism (no meds), recent hemorrhoidectomy who presents for BRBPR in the setting of recent hemorrhoidectomy.    Patient was initially admitted to medicine service, and was transferred to surgery team due to recurrent rectal bleeding requiring extensive packing and intervention.   S/p 3U PRBC on the day of admission, and bleeding was controlled after repeat packing by surgery team.   HD was cancelled on 7/1 due to acute bleeding.     Patient was seen today during HD, with plan to receive 1U PRBC during HD. Reports mild muscle     PAST MEDICAL & SURGICAL HISTORY:  ESRD on hemodialysis  at home 5 x week    HTN (hypertension)    ESRD (end stage renal disease) on dialysis    HTN (hypertension)    Bell&#x27;s palsy    Hyperthyroidism    AV fistula  L forearm    Elective surgical procedure  RACW permacath for HD, removed 5 years ago        FAMILY HISTORY:  Family history of hypertension (Father, Mother)  Father and mother        SOCIAL HISTORY:  Smoking: __ packs x ___ years  EtOH Use:  Marital Status:  Occupation:  Recent Travel:  Country of Birth:  Advance Directives:    Allergies    No Known Drug Allergies  topical cleanser for dialysis access.   Exsept (Rash)    Intolerances        HOME MEDICATIONS:    REVIEW OF SYSTEMS:  Constitutional:   Eyes:  ENT:  CV:  Resp:  GI:  :  MSK:  Integumentary:  Neurological:  Psychiatric:  Endocrine:  Hematologic/Lymphatic:  Allergic/Immunologic:  [ ] All other systems negative  [ ] Unable to assess ROS because ________    OBJECTIVE:  ICU Vital Signs Last 24 Hrs  T(C): 36.9 (02 Jul 2021 15:40), Max: 37.2 (02 Jul 2021 13:33)  T(F): 98.4 (02 Jul 2021 15:40), Max: 99 (02 Jul 2021 13:33)  HR: 78 (02 Jul 2021 15:40) (73 - 112)  BP: 155/83 (02 Jul 2021 15:40) (112/81 - 178/95)  BP(mean): 127 (01 Jul 2021 19:57) (127 - 127)  ABP: --  ABP(mean): --  RR: 14 (02 Jul 2021 15:40) (14 - 23)  SpO2: 100% (02 Jul 2021 13:33) (100% - 100%)        07-01 @ 07:01  -  07-02 @ 07:00  --------------------------------------------------------  IN: 0 mL / OUT: 0 mL / NET: 0 mL    07-02 @ 07:01  - 07-02 @ 17:35  --------------------------------------------------------  IN: 0 mL / OUT: 0 mL / NET: 0 mL      CAPILLARY BLOOD GLUCOSE          PHYSICAL EXAM:  General:   HEENT:   Lymph Nodes:  Neck:   Respiratory:   Cardiovascular:   Abdomen:   Extremities:   Skin:   Neurological:  Psychiatry:    HOSPITAL MEDICATIONS:  MEDICATIONS  (STANDING):  acetaminophen   Tablet .. 650 milliGRAM(s) Oral once  calcitriol   Capsule 0.25 MICROGram(s) Oral daily  chlorhexidine 2% Cloths 1 Application(s) Topical daily  cinacalcet 30 milliGRAM(s) Oral daily  epoetin elizabeth-epbx (RETACRIT) Injectable 26649 Unit(s) IV Push <User Schedule>  isosorbide   mononitrate ER Tablet (IMDUR) 60 milliGRAM(s) Oral daily  labetalol 450 milliGRAM(s) Oral every 12 hours  sevelamer carbonate 3200 milliGRAM(s) Oral three times a day with meals    MEDICATIONS  (PRN):      LABS:                        7.3    13.77 )-----------( 275      ( 02 Jul 2021 08:12 )             21.7     07-02    135  |  97<L>  |  81<H>  ----------------------------<  90  5.4<H>   |  18<L>  |  24.67<H>    Ca    8.6      02 Jul 2021 08:12  Phos  5.9     07-02  Mg     2.40     07-02    TPro  6.3  /  Alb  3.8  /  TBili  0.2  /  DBili  x   /  AST  17  /  ALT  14  /  AlkPhos  59  07-01    PT/INR - ( 01 Jul 2021 06:48 )   PT: 12.8 sec;   INR: 1.13 ratio         PTT - ( 01 Jul 2021 06:48 )  PTT:34.8 sec      Venous Blood Gas:  07-01 @ 07:20  7.43/32/<24/22/24.5  VBG Lactate: 3.1      MICROBIOLOGY:     RADIOLOGY:  [ ] Reviewed and interpreted by me    EKG: HPI:  35 y/o M with PMH HTN, ESRD on dialysis, hyperthyroidism (no meds), recent hemorrhoidectomy who presents for BRBPR in the setting of recent hemorrhoidectomy.    Patient was initially admitted to medicine service, and was transferred to surgery team due to recurrent rectal bleeding requiring extensive packing and intervention.   S/p 3U PRBC on the day of admission, and bleeding was controlled after repeat packing by surgery team.   HD was cancelled on 7/1 due to acute bleeding.     Patient was seen today during HD, with plan to receive 1U PRBC during HD. Reports mild muscle ache on R flank. ROS otherwise negative.     PAST MEDICAL & SURGICAL HISTORY:  ESRD on hemodialysis  at home 5 x week    HTN (hypertension)    ESRD (end stage renal disease) on dialysis    HTN (hypertension)    Bell&#x27;s palsy    Hyperthyroidism    AV fistula  L forearm    Elective surgical procedure  RACW permacath for HD, removed 5 years ago        FAMILY HISTORY:  Family history of hypertension (Father, Mother)  Father and mother        SOCIAL HISTORY:  See HPI    Allergies    No Known Drug Allergies  topical cleanser for dialysis access.   Exsept (Rash)    Intolerances    HOME MEDICATIONS:  See med rec      OBJECTIVE:  ICU Vital Signs Last 24 Hrs  T(C): 36.9 (02 Jul 2021 15:40), Max: 37.2 (02 Jul 2021 13:33)  T(F): 98.4 (02 Jul 2021 15:40), Max: 99 (02 Jul 2021 13:33)  HR: 78 (02 Jul 2021 15:40) (73 - 112)  BP: 155/83 (02 Jul 2021 15:40) (112/81 - 178/95)  BP(mean): 127 (01 Jul 2021 19:57) (127 - 127)  ABP: --  ABP(mean): --  RR: 14 (02 Jul 2021 15:40) (14 - 23)  SpO2: 100% (02 Jul 2021 13:33) (100% - 100%)        07-01 @ 07:01  -  07-02 @ 07:00  --------------------------------------------------------  IN: 0 mL / OUT: 0 mL / NET: 0 mL    07-02 @ 07:01  -  07-02 @ 17:35  --------------------------------------------------------  IN: 0 mL / OUT: 0 mL / NET: 0 mL      CAPILLARY BLOOD GLUCOSE      PHYSICAL EXAM:  GENERAL: NAD, Resting in bed with HD ongoing  Eyes: EOMI, PERRLA, conjunctiva and sclera clear  HENT:  Head atraumatic; Moist mucous membranes  NECK: Supple  CHEST/LUNG: Clear to auscultation bilaterally; No rales, rhonchi, wheezing, or rubs. Unlabored respirations on room air  HEART: Regular rate and rhythm; No murmurs, rubs, or gallops  ABDOMEN: Bowel sounds present; Soft, Nontender, Nondistended  EXTREMITIES:  2+ Peripheral Pulses, brisk capillary refill. No clubbing, cyanosis, or edema  NERVOUS SYSTEM:  Alert & Oriented X 3, non-focal and spontaneous movements of all extremities  SKIN: No rashes or lesions  Psych: Normal behavior, normal affect, normal speech    HOSPITAL MEDICATIONS:  MEDICATIONS  (STANDING):  acetaminophen   Tablet .. 650 milliGRAM(s) Oral once  calcitriol   Capsule 0.25 MICROGram(s) Oral daily  chlorhexidine 2% Cloths 1 Application(s) Topical daily  cinacalcet 30 milliGRAM(s) Oral daily  epoetin elizabeth-epbx (RETACRIT) Injectable 93276 Unit(s) IV Push <User Schedule>  isosorbide   mononitrate ER Tablet (IMDUR) 60 milliGRAM(s) Oral daily  labetalol 450 milliGRAM(s) Oral every 12 hours  sevelamer carbonate 3200 milliGRAM(s) Oral three times a day with meals    MEDICATIONS  (PRN):      LABS:                        7.3    13.77 )-----------( 275      ( 02 Jul 2021 08:12 )             21.7     07-02    135  |  97<L>  |  81<H>  ----------------------------<  90  5.4<H>   |  18<L>  |  24.67<H>    Ca    8.6      02 Jul 2021 08:12  Phos  5.9     07-02  Mg     2.40     07-02    TPro  6.3  /  Alb  3.8  /  TBili  0.2  /  DBili  x   /  AST  17  /  ALT  14  /  AlkPhos  59  07-01    PT/INR - ( 01 Jul 2021 06:48 )   PT: 12.8 sec;   INR: 1.13 ratio         PTT - ( 01 Jul 2021 06:48 )  PTT:34.8 sec      Venous Blood Gas:  07-01 @ 07:20  7.43/32/<24/22/24.5  VBG Lactate: 3.1

## 2021-07-02 NOTE — CONSULT NOTE ADULT - ASSESSMENT
37 y/o M with PMH HTN, ESRD on dialysis, hyperthyroidism (no meds), recent hemorrhoidectomy who presents for BRBPR.     #. BRBPR  -mgmt per surgery  -s/p packing of wound  -pain control    #. ESRD  HD per renal    # Hypertension    BP stable  cont current mgmt    DVT ppx
This is a 36M with PMHx of HTN, Hyperthyroid and ESRD on dialysis s/p a Hemorrhoidectomy on 6/25/2021 coming into ED with BRBPR.  Patient no longer actively bleeding, rectum packed with gel foam and surgicel, Hbg of 6.8.      Transfuse 2u prbc  Maintain rectal packing in place  Admit to CDU  Repeat HH after transfusion  Reassess for any continued bleeding  BP control    Colorectal Surgery  [id number]
36y Male with history of ESRD presents with BRBPR. Nephrology consulted for ESRD status.    1) ESRD: Last HD on 6/28/21. If patient discharged home today, advised to resume home HD this evening. If patient admitted, will arrange for in center dialysis. Monitor electrolytes.    2) HTN with ESRD: BP uncontrolled. Would resume home medications. Monitor BP.    3) Anemia of renal disease: With component of GIB. S/P 2 units PRBC. Repeat CBC pending. Will resume outpatient ASHLEY dose. Monitor Hb.    4) Hyperphosphatemia: Resume renvela 4 tabs with meals and continue with renal diet. Monitor serum calcium and phosphorus.      [facility] NEPHROLOGY  Betito Dodson M.D.  Octavio Hernandez D.O.  Lynne Biswas M.D.  Mallory Hook, MSN, ANP-C    Telephone: [phone]  Facsimile: [phone]    [address] [place] [address]  
37 y/o M with PMH HTN, ESRD on dialysis, hyperthyroidism (no meds), recent hemorrhoidectomy who presents for BRBPR in the setting of recent hemorrhoidectomy. Medicine team was consulted for co-management for HTN.    # HTN  - BP in acceptable range today with current regimen and HD  - C/w Imdur 60 qd and labetalol 450 BID    # Rectal bleeding  - Appreciate management per surgery team  - Will receive 1U PRBC with HD for hgb 7.3 (from 8.5)  - Keep active T&S every 72 hours    # ESRD  - Nephro following for HD  - C/w Epo for ACD and ESRD  - C/w Revenla for hyperphosphoremia  - C/w Sensipar and Calcitriol for renal bone disease    # Prophylactic measures  - DVT ppx: SCD 2/2 bleeding risk  - Renal diet  - Activity as tolerated    Dispo planning per surgery team

## 2021-07-02 NOTE — CONSULT NOTE ADULT - ATTENDING COMMENTS
37 y/o M with PMH HTN, ESRD on dialysis, hyperthyroidism (no meds), recent hemorrhoidectomy who presents for BRBPR in the setting of recent hemorrhoidectomy. Medicine team was consulted for co-management for HTN. BPs now stable on Imdur and labetalol. Will sign off. If further issues feel free to reconsult. 35 y/o M with PMH HTN, ESRD on dialysis, hyperthyroidism (no meds), recent hemorrhoidectomy who presents for BRBPR in the setting of recent hemorrhoidectomy. Medicine team was consulted for co-management for uncontrolled HTN. BPs now stable on Imdur and labetalol. acute blood loss anemia due to rectal bleeding also improving after PRBCs. Will sign off. If further issues feel free to reconsult.

## 2021-07-02 NOTE — PROGRESS NOTE ADULT - SUBJECTIVE AND OBJECTIVE BOX
[facility] NEPHROLOGY- PROGRESS NOTE    36y Male with history of ESRD presents with BRBPR. Nephrology consulted for ESRD status.    REVIEW OF SYSTEMS:  Gen: no changes in weight  Cards: no chest pain  Resp: no dyspnea  GI: no nausea or vomiting or diarrhea, no further BRBPR  Vascular: no LE edema    No Known Drug Allergies  topical cleanser for dialysis access.   Exsept (Rash)      Hospital Medications: Medications reviewed    VITALS:  T(F): 98.8 (07-02-21 @ 09:36), Max: 98.8 (07-02-21 @ 09:36)  HR: 80 (07-02-21 @ 09:36)  BP: 129/82 (07-02-21 @ 09:36)  RR: 17 (07-02-21 @ 09:36)  SpO2: 100% (07-02-21 @ 09:36)  Wt(kg): --  Height (cm): 180.3 (07-01 @ 05:43), 180.3 (06-25 @ 11:16), 180.3 (06-18 @ 11:18)  Weight (kg): 84.3 (07-02 @ 02:28), 91.6 (06-25 @ 11:16), 91.6 (06-18 @ 11:18)  BMI (kg/m2): 25.9 (07-02 @ 02:28), 28.2 (07-01 @ 05:43), 28.2 (06-25 @ 11:16), 28.2 (06-18 @ 11:18)  BSA (m2): 2.04 (07-02 @ 02:28), 2.12 (07-01 @ 05:43), 2.12 (06-25 @ 11:16), 2.12 (06-18 @ 11:18)    07-01 @ 07:01  -  07-02 @ 07:00  --------------------------------------------------------  IN: 0 mL / OUT: 0 mL / NET: 0 mL    07-02 @ 07:01  -  07-02 @ 11:11  --------------------------------------------------------  IN: 0 mL / OUT: 0 mL / NET: 0 mL        PHYSICAL EXAM:    Gen: NAD, calm  Cards: RRR, +S1/S2, no M/G/R  Resp: CTA B/L  GI: soft, NT/ND, NABS  Vascular: no LE edema B/L, LUE AVF + bruit/thrill with buttonholes intact      LABS:  07-02    135  |  97<L>  |  81<H>  ----------------------------<  90  5.4<H>   |  18<L>  |  24.67<H>    Ca    8.6      02 Jul 2021 08:12  Phos  5.9     07-02  Mg     2.40     07-02    TPro  6.3  /  Alb  3.8  /  TBili  0.2  /  DBili      /  AST  17  /  ALT  14  /  AlkPhos  59  07-01    Creatinine Trend: 24.67 <--, 22.47 <--                        7.3    13.77 )-----------( 275      ( 02 Jul 2021 08:12 )             21.7     Urine Studies:        RADIOLOGY & ADDITIONAL STUDIES:

## 2021-07-02 NOTE — ED ADULT NURSE REASSESSMENT NOTE - NS ED NURSE REASSESS COMMENT FT1
Transfusion of pRBC completed. No signs and symptoms of transfusion reaction noted or reported. Pt stable. Cardiac monitor in progress.

## 2021-07-03 ENCOUNTER — TRANSCRIPTION ENCOUNTER (OUTPATIENT)
Age: 36
End: 2021-07-03

## 2021-07-03 VITALS
SYSTOLIC BLOOD PRESSURE: 135 MMHG | HEART RATE: 79 BPM | TEMPERATURE: 98 F | OXYGEN SATURATION: 100 % | DIASTOLIC BLOOD PRESSURE: 70 MMHG | RESPIRATION RATE: 16 BRPM

## 2021-07-03 LAB
ANION GAP SERPL CALC-SCNC: 19 MMOL/L — HIGH (ref 7–14)
BUN SERPL-MCNC: 52 MG/DL — HIGH (ref 7–23)
CALCIUM SERPL-MCNC: 8.1 MG/DL — LOW (ref 8.4–10.5)
CHLORIDE SERPL-SCNC: 93 MMOL/L — LOW (ref 98–107)
CO2 SERPL-SCNC: 23 MMOL/L — SIGNIFICANT CHANGE UP (ref 22–31)
CREAT SERPL-MCNC: 16.03 MG/DL — HIGH (ref 0.5–1.3)
GLUCOSE SERPL-MCNC: 90 MG/DL — SIGNIFICANT CHANGE UP (ref 70–99)
HCT VFR BLD CALC: 21.2 % — LOW (ref 39–50)
HGB BLD-MCNC: 7.1 G/DL — LOW (ref 13–17)
MAGNESIUM SERPL-MCNC: 2.3 MG/DL — SIGNIFICANT CHANGE UP (ref 1.6–2.6)
MCHC RBC-ENTMCNC: 27.7 PG — SIGNIFICANT CHANGE UP (ref 27–34)
MCHC RBC-ENTMCNC: 33.5 GM/DL — SIGNIFICANT CHANGE UP (ref 32–36)
MCV RBC AUTO: 82.8 FL — SIGNIFICANT CHANGE UP (ref 80–100)
MRSA PCR RESULT.: SIGNIFICANT CHANGE UP
NRBC # BLD: 0 /100 WBCS — SIGNIFICANT CHANGE UP
NRBC # FLD: 0.03 K/UL — HIGH
PHOSPHATE SERPL-MCNC: 6.2 MG/DL — HIGH (ref 2.5–4.5)
PLATELET # BLD AUTO: 286 K/UL — SIGNIFICANT CHANGE UP (ref 150–400)
POTASSIUM SERPL-MCNC: 4 MMOL/L — SIGNIFICANT CHANGE UP (ref 3.5–5.3)
POTASSIUM SERPL-SCNC: 4 MMOL/L — SIGNIFICANT CHANGE UP (ref 3.5–5.3)
RBC # BLD: 2.56 M/UL — LOW (ref 4.2–5.8)
RBC # FLD: 15.7 % — HIGH (ref 10.3–14.5)
S AUREUS DNA NOSE QL NAA+PROBE: SIGNIFICANT CHANGE UP
SODIUM SERPL-SCNC: 135 MMOL/L — SIGNIFICANT CHANGE UP (ref 135–145)
WBC # BLD: 10.44 K/UL — SIGNIFICANT CHANGE UP (ref 3.8–10.5)
WBC # FLD AUTO: 10.44 K/UL — SIGNIFICANT CHANGE UP (ref 3.8–10.5)

## 2021-07-03 PROCEDURE — 99231 SBSQ HOSP IP/OBS SF/LOW 25: CPT | Mod: GC

## 2021-07-03 RX ORDER — ACETAMINOPHEN 500 MG
650 TABLET ORAL ONCE
Refills: 0 | Status: COMPLETED | OUTPATIENT
Start: 2021-07-03 | End: 2021-07-03

## 2021-07-03 RX ADMIN — CHLORHEXIDINE GLUCONATE 1 APPLICATION(S): 213 SOLUTION TOPICAL at 12:04

## 2021-07-03 RX ADMIN — CINACALCET 30 MILLIGRAM(S): 30 TABLET, FILM COATED ORAL at 12:01

## 2021-07-03 RX ADMIN — Medication 650 MILLIGRAM(S): at 19:08

## 2021-07-03 RX ADMIN — Medication 450 MILLIGRAM(S): at 07:12

## 2021-07-03 RX ADMIN — ISOSORBIDE MONONITRATE 60 MILLIGRAM(S): 60 TABLET, EXTENDED RELEASE ORAL at 12:01

## 2021-07-03 RX ADMIN — CALCITRIOL 0.25 MICROGRAM(S): 0.5 CAPSULE ORAL at 12:02

## 2021-07-03 RX ADMIN — Medication 650 MILLIGRAM(S): at 10:18

## 2021-07-03 RX ADMIN — SEVELAMER CARBONATE 3200 MILLIGRAM(S): 2400 POWDER, FOR SUSPENSION ORAL at 12:02

## 2021-07-03 RX ADMIN — Medication 650 MILLIGRAM(S): at 10:48

## 2021-07-03 RX ADMIN — Medication 650 MILLIGRAM(S): at 19:45

## 2021-07-03 RX ADMIN — SEVELAMER CARBONATE 3200 MILLIGRAM(S): 2400 POWDER, FOR SUSPENSION ORAL at 10:17

## 2021-07-03 NOTE — DISCHARGE NOTE PROVIDER - CARE PROVIDER_API CALL
Ad Ontiveros)  ColonRectal Surgery; Surgery  [facility], [address] [place] [address]  Phone: [phone]  Fax: [phone]  Follow Up Time:

## 2021-07-03 NOTE — DISCHARGE NOTE PROVIDER - NSDCCPCAREPLAN_GEN_ALL_CORE_FT
PRINCIPAL DISCHARGE DIAGNOSIS  Diagnosis: Gastrointestinal hemorrhage associated with anorectal source  Assessment and Plan of Treatment:

## 2021-07-03 NOTE — PROGRESS NOTE ADULT - ASSESSMENT
36y Male with history of ESRD presents with BRBPR. Nephrology consulted for ESRD status.    1) ESRD: Last HD on 7/2. Given symptomatic anemia and decrease in Hb today, will transfuse 2 units PRBC.  Will dialyze today and remove volume from PRBCs + 1L. Bleeding has now resolved per pt and sx, though Hb fell today. Monitor  closely. Monitor electrolytes.    2) HTN with ESRD: BP improving after home medications resumed. Monitor BP.    3) Anemia of renal disease: With component of GIB. Rx as above.  Continue with Epo 10K with HD with plans to transfuse PRBC with HD today. Monitor Hb.    4) Secondary HPT of renal origin: Phosphorus uncontrolled. Continue renvela 4 tabs with meals now that diet has been resumed. Continue with sensipar 30 mg daily and calcitriol 0.25 mcg daily. Monitor serum calcium and phosphorus.    5) Hyperkalemia: Resolved. For HD today given PRBCs K+ content. Renal diet with fluid restriction (I changed diet order to match). Monitor serum K.      [facility] NEPHROLOGY  Betito Dodson M.D.  Octavio Hernandez D.O.  Lynne Biswas M.D.  Mallory Hook, MSN, ANP-C    Telephone: [phone]  Facsimile: [phone]    [address] [place] [address]

## 2021-07-03 NOTE — DISCHARGE NOTE PROVIDER - NSDCDCMDCOMP_GEN_ALL_CORE
[address] [place] [address], [address] [facility] 
[phone] Patient: Charlee Lofton MRN: [id number] :2017 Visit Information Date & Time Provider Department Dept. Phone Encounter #  
 2018 10:15 AM MD Tania Varmapita [id number] [phone] [id number] Follow-up Instructions Return in about 1 year (around 2019) for next [address] [place] [address],[address] or earlier as needed. Upcoming Health Maintenance Date Due  
 Varicella Peds Age 1-18 (2 of 2 - 2 Dose Childhood Series) 2019 IPV Peds Age 0-18 (4 of 4 - All-IPV Series) 2019 MMR Peds Age 1-18 (2 of 2) 2019 DTaP/Tdap/Td series (5 - DTaP) 2019 MCV through Age 25 (1 of 2) 2026 Allergies as of 2018  Review Complete On: 2018 By: Topher Chapman MD  
 No Known Allergies Current Immunizations  Reviewed on 2018 Name Date DTaP 2016 [id number] 2015 11:28 AM, 2015, 2015  8:26 AM  
 Hep A Vaccine 2 Dose Schedule (Ped/Adol) 2016, 5/3/2016 Hep B, Adol/Ped 2015 11:30 AM, 2015  8:26 AM, 2015  3:08 AM  
 Hib (PRP-T) 2016 Influenza Vaccine (Quad) PF 10/27/2017 Influenza Vaccine (Quad) Ped PF 2016, 2015 11:43 AM, 2015 11:29 AM  
 MMR 5/3/2016 Pneumococcal Conjugate (PCV-13) 2016, 2015 11:32 AM, 2015, 2015  8:27 AM  
 Rotavirus, Live, Monovalent Vaccine 2015 11:31 AM  
 Rotavirus, Live, Pentavalent Vaccine 2015, 2015  8:27 AM  
 Varicella Virus Vaccine 5/3/2016 Reviewed by Topher Chapman MD on 2018 at 10:43 AM  
You Were Diagnosed With   
  
 Codes Comments Encounter for routine child health examination with abnormal findings    -  Primary ICD-10-CM: Z00.121 ICD-9-CM: V20.2 Heart murmur     ICD-10-CM: R01.1 ICD-9-CM: 785.2 BMI (body mass index), pediatric, 85% to less than 95% for age     ICD-10-CM: Z74.48 
ICD-9-CM: V85.53 Screening for iron deficiency anemia     ICD-10-CM: Z13.0 ICD-9-CM: V78.0 Screening for lead exposure     ICD-10-CM: Z13.88 ICD-9-CM: V82.5 Vitals BP Pulse Temp Resp Height(growth percentile) Weight(growth percentile) 84/54 (28 %/ 67 %)* 102 97.8 °F (36.6 °C) (Axillary) 22 (!) 3' 1\" (0.94 m) (50 %, Z= -0.01) 34 lb 6.4 oz (15.6 kg) (82 %, Z= 0.92) SpO2 BMI Smoking Status 98% 17.67 kg/m2 (91 %, Z= 1.32) Never Smoker *BP percentiles are based on NHBPEP's 4th Report Growth percentiles are based on CDC 2-20 Years data. BMI and BSA Data Body Mass Index Body Surface Area  
 17.67 kg/m 2 0.64 m 2 Preferred Pharmacy Pharmacy Name Phone [address] [phone] Your Updated Medication List  
  
   
This list is accurate as of 5/1/18 11:07 AM.  Always use your most recent med list.  
  
  
  
  
 albuterol 2.5 mg /3 mL (0.083 %) nebulizer solution Commonly known as:  PROVENTIL VENTOLIN  
3 mL by Nebulization route every four (4) hours as needed for Wheezing or Shortness of Breath. cetirizine 1 mg/mL solution Commonly known as:  CHILDREN'S CETIRIZINE Take 2.5 mL by mouth daily as needed. We Performed the Following AMB POC HEMOGLOBIN (HGB) [90309 CPT(R)] AMB POC LEAD [98303 CPT(R)] REFERRAL TO PEDIATRIC CARDIOLOGY [UAG36 Custom] Follow-up Instructions Return in about 1 year (around 5/1/2019) for [address] or earlier as needed. Referral Information Referral ID Referred By Referred To  
  
 [id number] [facility] [address], [facility] Visits Status Start Date End Date 1 New Request 5/1/18 5/1/19 If your referral has a status of pending review or denied, additional information will be sent to support the outcome of this decision. Patient Instructions Child's Well Visit, 3 Years: Care Instructions Your Care Instructions Three-year-olds can have a range of feelings, such as being excited one minute to having a temper tantrum the next. Your child may try to push, hit, or bite other children. It may be hard for your child to understand how he or she feels and to listen to you. At this age, your child may be ready to jump, hop, or ride a tricycle. Your child likely knows his or her name, age, and whether he or she is a boy or girl. He or she can copy easy shapes, like circles and crosses. Your child probably likes to dress and feed himself or herself. Follow-up care is a key part of your child's treatment and safety. Be sure to make and go to all appointments, and call your doctor if your child is having problems. It's also a good idea to know your child's test results and keep a list of the medicines your child takes. How can you care for your child at home? Eating · Make meals a family time. Have nice conversations at mealtime and turn the TV off. · Do not give your child foods that may cause choking, such as nuts, whole grapes, hard or sticky candy, or popcorn. · Give your child healthy foods. Even if your child does not seem to like them at first, keep trying. Buy snack foods made from wheat, corn, rice, oats, or other grains, such as breads, cereals, tortillas, noodles, crackers, and muffins. · Give your child fruits and vegetables every day. Try to give him or her five servings or more. · Give your child at least two servings a day of nonfat or low-fat dairy foods and protein foods. Dairy foods include milk, yogurt, and cheese. Protein foods include lean meat, poultry, fish, eggs, dried beans, peas, lentils, and soybeans. · Do not eat much fast food. Choose healthy snacks that are low in sugar, fat, and salt instead of candy, chips, and other junk foods. · Offer water when your child is thirsty. Do not give your child juice drinks more than once a day. Juice does not have the valuable fiber that whole fruit has. Do not give your child soda pop. · Do not use food as a reward or punishment for your child's behavior. Healthy habits · Help your child brush his or her teeth every day using a \"pea-size\" amount of toothpaste with fluoride. · Limit your child's TV or video time to 1 to 2 hours per day. Check for TV programs that are good for 1year olds. · Do not smoke or allow others to smoke around your child. Smoking around your child increases the child's risk for ear infections, asthma, colds, and pneumonia. If you need help quitting, talk to your doctor about stop-smoking programs and medicines. These can increase your chances of quitting for good. Safety · For every ride in a car, secure your child into a properly installed car seat that meets all current safety standards. For questions about car seats and booster seats, call the [facility] at [phone]. · Keep cleaning products and medicines in locked cabinets out of your child's reach. Keep the number for Poison Control (7-442.294.8457) in or near your phone. · Put locks or guards on all windows above the first floor. Watch your child at all times near play equipment and stairs. · Watch your child at all times when he or she is near water, including pools, hot tubs, and bathtubs. Parenting · Read stories to your child every day. One way children learn to read is by hearing the same story over and over. · Play games, talk, and sing to your child every day. Give them love and attention. · Give your child simple chores to do. Children usually like to help. Potty training · Let your child decide when to potty train. Your child will decide to use the potty when there is no reason to resist. Tell your child that the body makes \"pee\" and \"poop\" every day, and that those things want to go in the toilet. Ask your child to \"help the poop get into the toilet. \" Then help your child use the potty as much as he or she needs help. · Give praise and rewards. Give praise, smiles, hugs, and kisses for any success. Rewards can include toys, stickers, or a trip to the park. Sometimes it helps to have one big reward, such as a doll or a fire truck, that must be earned by using the toilet every day. Keep this toy in a place that can be easily seen. Try sticking stars on a calendar to keep track of your child's success. When should you call for help? Watch closely for changes in your child's health, and be sure to contact your doctor if: 
? · You are concerned that your child is not growing or developing normally. ? · You are worried about your child's behavior. ? · You need more information about how to care for your child, or you have questions or concerns. Where can you learn more? Go to http://tiffany-patricia.info/. Enter K913 in the search box to learn more about \"Child's Well Visit, 3 Years: Care Instructions. \" Current as of: May 12, 2017 Content Version: 11.4 © 2421-8889 Healthwise, Incorporated. Care instructions adapted under license by [employer] (which disclaims liability or warranty for this information). If you have questions about a medical condition or this instruction, always ask your healthcare professional. [address] any warranty or liability for your use of this information. Heart Murmur in Children: Care Instructions Your Care Instructions A heart murmur is a blowing, whooshing, or rasping sound.  The sound is made by blood moving through the heart or the blood vessels near the heart. Murmurs can be heard through a stethoscope. Children often have murmurs that are a normal part of development and do not require treatment. Heart murmurs can also occur during an illness, especially if there is a fever. These murmurs usually are not a problem and go away on their own. But sometimes a heart murmur is a sign of a serious problem, such as congenital heart disease or heart valve problems, that may need treatment. Your child may need more tests to check his or her heart. The treatment depends on the specific heart problem causing the murmur. Follow-up care is a key part of your child's treatment and safety. Be sure to make and go to all appointments, and call your doctor if your child is having problems. It's also a good idea to know your child's test results and keep a list of the medicines your child takes. How can you care for your child at home? · Be safe with medicines. Have your child take medicines exactly as prescribed. Call your doctor if you think your child is having a problem with his or her medicine. You will get more details on the specific medicines your doctor prescribes. · Encourage your to child to have active playtime, unless the doctor says not to. · If your doctor tells you to, help your child limit or avoid over-the-counter medicines that contain stimulants. These include decongestants and cold and flu medicines. · Keep your child away from smoke. Do not smoke or let anyone else smoke around your child or in your house. Smoke harms a child's lungs and leads to an unhealthy heart. When should you call for help? Watch closely for changes in your child's health, and be sure to contact your doctor if your child has any problems. Where can you learn more? Go to http://tiffany-patricia.info/. Enter P575 in the search box to learn more about \"Heart Murmur in Children: Care Instructions. \" Current as of: September 21, 2016 Content Version: 11.4 © 4710-4239 Healthwise, Incorporated. Care instructions adapted under license by [employer] (which disclaims liability or warranty for this information). If you have questions about a medical condition or this instruction, always ask your healthcare professional. [address] any warranty or liability for your use of this information. Learning About Discipline for Children What is discipline for children? When you discipline your child, you reward the behavior you want to see. You don't reward behavior you don't like. This allows your child to understand the difference between desired and undesired behavior. The way you discipline must be right for your child's age. Children can have many strong emotions. They don't always fully understand or control them. So they don't always listen to you or behave in correct ways. Children need guidance, clear limits, and patient parents during their struggles with behavior and emotions. Why is using good discipline important? Effective discipline can help teach your child responsibility. It can also build self-esteem and strengthen your relationship with your child. It is one way to show that you love and care about your child. What techniques should you use to discipline children? No single technique works for all situations. It is best to try a variety of skills and approaches. Whatever you do, be patient and do your best to be consistent about the limits you set. For younger children: · Ignore annoying behavior when possible. Ignore behavior that will not harm your child, such as whining and temper tantrums. When you ignore these things, you take away the attention your child is seeking. This only works if you praise your child's positive actions. Never ignore behavior that could be dangerous. · Redirect behavior. Try to distract a child who is starting to misbehave. For example, if your child has trouble sharing a toy, show him or her another toy. For children of any age: · Use facial expressions and body language to show how you feel about your child's actions. Older children can also be told that their actions have made you feel upset, sad, or angry. · Use logical consequences. Be sure what you do to discipline your child fits the action. For example, if your child writes on the wall with crayons, have the child help you wash it. Take away the crayons for a short time. · Use natural consequences. These are results that happen naturally. For example, if your child throws ice cream on the floor, it can't be eaten. Don't get him or her more ice cream. Only use natural consequences that are safe for your child. · Reward positive actions. Tell your child what you expect and what the rules are. Reward your child when those rules are followed. A reward can be as simple as giving praise and hugs. · Make it easy to succeed. Help your child meet your expectations by providing the right tools. For example, put baskets in the bedroom to make cleaning up easier. · Model correct behavior. Patiently show your child the right way to behave or do a chore. · Try using \"time-out\" to stop aggressive behavior. Time-out means that you remove your child from a stressful situation for a short period of time. · Do not spank or use other corporal punishment. Corporal punishment includes hitting or slapping your child, or denying bathroom privileges. It is not a useful way to manage behavior. It teaches a child that physical force is the way to resolve conflict. It can embarrass and humiliate your child. And it can make your child resent and not trust you. · Ask your doctor or call a local hospital for information on parenting classes. These are offered in most communities. Where can you learn more? Go to http://tiffany-patricia.info/. Enter Y252 in the search box to learn more about \"Learning About Discipline for Children. \" Current as of: May 12, 2017 Content Version: 11.4 © 4992-8807 Healthwise, [employer]. Care instructions adapted under license by [employer] (which disclaims liability or warranty for this information). If you have questions about a medical condition or this instruction, always ask your healthcare professional. [address] any warranty or liability for your use of this information. Parents: A Guide to 9-5-2-1-0 -- Your Winning Numbers for Health! What is 9-5-2-1-0 for Health®?  
9-5-2-1-0 for Health is an easy-to-remember formula to help you live a healthy lifestyle. The 9-5-2-1-0 for Health® habits include:  
??9 hours of sleep per day  
??5 servings of fruits and vegetables per day  
??2 hour limit on screen time per day  
??1 hour of physical activity per day ??0 sugar-added beverages per day What can you do to start using 9-5-2-1-0 for Health®? Here are 10 things parents can do to improve childrens health and promote life-long healthy habits. ?? 
  
9 Hours of Sleep Tg Loose 1. Know how much sleep your child needs:  
? Preschoolers  11 to 13 hours/night ? Ages 9-16  5 to 6 hours/night ? Adolescents  8 ½ to 9 ½ hours/night 2. Help your children develop regular evening bedtime routines to aid them in falling asleep. 5 Fruits/Vegetables 3. Offer fruits and vegetables at every meal and for snacks. 4. Be a good role model  eat fruits and vegetables at your meals and try to eat one meal a day with your kids. 2 Hour Limit on Screen-Time 5. Give your kids a screen time allowance to help them choose which shows or games they really want to see or play. 6. Encourage your children to read or play games  have books, magazines, and board games available. 7. Turn off the T.V. during meal times. 1 Hour of Physical Activity 8. Set a positive example for your children by making physical activity part of your lifestyle. 9. Make physical activity a fun part of your familys day through taking walks, playing acive games, or organized sports together.  
  
0 Sugar-Added Beverages 10. Serve water, low-fat milk, or 100% juice with your childs meals and snacks. Learn more! Go to www.[employer]iall. [employer] to learn more about 9-5-2-1-0 for Health. Copyright @1224, [facility]! Dear Parent or Guardian, Thank you for requesting a TwentyPeople account for your child. With TwentyPeople, you can view your childs hospital or ER discharge instructions, current allergies, immunizations and much more. In order to access your childs information, we require a signed consent on file. Please see the [employer] department or call [phone] for instructions on completing a TwentyPeople Proxy request.   
Additional Information If you have questions, please visit the Frequently Asked Questions section of the TwentyPeople website at [email or web address] Remember, TwentyPeople is NOT to be used for urgent needs. For medical emergencies, dial 911. Now available from your iPhone and Android! Please provide this summary of care documentation to your next provider. Your primary care clinician is listed as Vijay Garcia. If you have any questions after today's visit, please call [phone]. This document is complete and the patient is ready for discharge.

## 2021-07-03 NOTE — DISCHARGE NOTE PROVIDER - NSDCMRMEDTOKEN_GEN_ALL_CORE_FT
calcitriol 0.25 mcg oral capsule: 1 cap(s) orally once a day  cloNIDine 0.1 mg oral tablet: 1 tab(s) orally now   isosorbide mononitrate 60 mg oral tablet, extended release: 1 tab(s) orally once a day (in the morning)  labetalol 300 mg oral tablet: 1.5 tab(s) orally 2 times a day  lanthanum 1000 mg oral tablet, chewable: 1 tab(s) orally 4 times a day  MiraLax oral powder for reconstitution: 1 scoop orally daily  oxyCODONE 5 mg oral tablet: 1 tab(s) orally every 12 hours, As Needed MDD:4   Sensipar 30 mg oral tablet: 1 tab(s) orally once a day  sevelamer hydrochloride 800 mg oral tablet: 3 tab(s) orally 3 times a day   calcitriol 0.25 mcg oral capsule: 1 cap(s) orally once a day  cloNIDine 0.1 mg oral tablet: 1 tab(s) orally now   isosorbide mononitrate 60 mg oral tablet, extended release: 1 tab(s) orally once a day (in the morning)  labetalol 300 mg oral tablet: 1.5 tab(s) orally 2 times a day  lanthanum 1000 mg oral tablet, chewable: 1 tab(s) orally 4 times a day  MiraLax oral powder for reconstitution: 1 scoop orally daily  Sensipar 30 mg oral tablet: 1 tab(s) orally once a day  sevelamer hydrochloride 800 mg oral tablet: 3 tab(s) orally 3 times a day

## 2021-07-03 NOTE — PROGRESS NOTE ADULT - SUBJECTIVE AND OBJECTIVE BOX
[facility] NEPHROLOGY- PROGRESS NOTE    36y Male with history of ESRD presents with BRBPR. Nephrology consulted for ESRD status.    Pt reports feeling very tired still.  No SOB. not actively bleeding, he thinks.    REVIEW OF SYSTEMS:  Gen:+weakness  Cards: no chest pain  Resp: no dyspnea  GI: no nausea or vomiting or diarrhea, no further BRBPR  Vascular: no LE edema    No Known Drug Allergies  topical cleanser for dialysis access.   Exsept (Rash)      Hospital Medications: Medications reviewed    VITALS:  T(F): 98.4 (07-03-21 @ 09:35), Max: 99.1 (07-03-21 @ 02:00)  HR: 86 (07-03-21 @ 09:35)  BP: 127/82 (07-03-21 @ 09:35)  RR: 18 (07-03-21 @ 09:35)  SpO2: 100% (07-03-21 @ 09:35)  Wt(kg): --    07-02 @ 07:01  -  07-03 @ 07:00  --------------------------------------------------------  IN: 1280 mL / OUT: 2800 mL / NET: -1520 mL    07-03 @ 07:01  -  07-03 @ 11:19  --------------------------------------------------------  IN: 0 mL / OUT: 0 mL / NET: 0 mL        PHYSICAL EXAM:  Gen: NAD, calm  Cards: RRR, +S1/S2, no M/G/R  Resp: CTA B/L  GI: soft, NT/ND, NABS  Vascular: no LE edema B/L, LUE AVF + bruit/thrill with buttonholes intact      LABS:  07-03    135  |  93<L>  |  52<H>  ----------------------------<  90  4.0   |  23  |  16.03<H>    Ca    8.1<L>      03 Jul 2021 07:38  Phos  6.2     07-03  Mg     2.30     07-03      Creatinine Trend: 16.03 <--, 24.67 <--, 22.47 <--             Hgb Trend: 7.1<--, 8.3<--, 7.3<--, 8.5<--, 6.2<--, 6.6<--             7.1    10.44 )-----------( 286      ( 03 Jul 2021 07:38 )             21.2

## 2021-07-03 NOTE — DISCHARGE NOTE NURSING/CASE MANAGEMENT/SOCIAL WORK - PATIENT PORTAL LINK FT
You can access the FollowMyHealth Patient Portal offered by [facility] by registering at the following website: [email or web address]. By joining [facility]’s FollowMyHealth portal, you will also be able to view your health information using other applications (apps) compatible with our system.

## 2021-07-03 NOTE — DISCHARGE NOTE PROVIDER - HOSPITAL COURSE
36M s/p three column excisional hemorrhoidectomy, presenting with bleeding per rectum. Patient's bleeding improved after packing, but had repeat episode of bleeding s/p transfusion of 4 U PRBC 36M s/p three column excisional hemorrhoidectomy, presented with bleeding per rectum. No overt evidence of bleeding was appreciated from suture lines. Patient's bleeding improved after packing, but had repeat  episode of bleeding. On 7/1 he was given 3u pRBC, on 7/2 he was given another with dialysis. On 7/3, no bleeding was seen on exam. Patient 36M s/p three column excisional hemorrhoidectomy, presented with bleeding per rectum. No overt evidence of bleeding was appreciated from suture lines. Patient's bleeding improved after packing with surgicel and kerlix gauze, but had repeat  episode of bleeding. On 7/1 he was given 3u pRBC, on 7/2 he was given another with dialysis. On 7/3, no bleeding was seen on exam. Patient is not reporting any pain, able to ambulate, tolerating diet, and stable for discharge.

## 2021-07-03 NOTE — PROGRESS NOTE ADULT - ASSESSMENT
37 yo M POD# 7 s/p three column excisional hemorrhoidectomy, presenting with bleeding per rectum. Patient's bleeding improved after packing, but had repeat episode of bleeding s/p transfusion of 4 U PRBC    Plan:  -F/u with CBC results after dialysis. Trending in the right direction. 35 yo M POD# 7 s/p three column excisional hemorrhoidectomy, presenting with bleeding per rectum. Patient's bleeding improved after packing, but had repeat episode of bleeding s/p transfusion of 4 U PRBC    Plan:  -F/u with CBC results after dialysis. 37 yo M POD# 7 s/p three column excisional hemorrhoidectomy, presenting with bleeding per rectum. Patient's bleeding improved after packing, but had repeat episode of bleeding s/p transfusion of 4 U PRBC    PLAN:  - C/w packing and observation   - Trend H/H  - Renal diet  - DVT ppx  - f/u with A team attending for d/c plan    A Team  [id number] 35 yo M POD# 7 s/p three column excisional hemorrhoidectomy, presenting with bleeding per rectum. Patient's bleeding improved after packing, but had repeat episode of bleeding s/p transfusion of 4 U PRBC    PLAN:  - C/w packing and observation   - Trend H/H: response to 1U transfusion with dialysis, H/H 7.1/21.2 this AM  - Renal diet  - DVT ppx  - f/u with A team attending for d/c plan    A Team  [id number] 37 yo M POD# 7 s/p three column excisional hemorrhoidectomy, presenting with bleeding per rectum. Patient's bleeding improved after packing, but had repeat episode of bleeding s/p transfusion of 4 U PRBC    PLAN:  - C/w packing and observation; packing can be removed with next BM   - Trend H/H: response to 1U transfusion with dialysis, H/H 7.1/21.2 this AM  - Renal diet  - DVT ppx  - D/c planning today     A Team  [id number]

## 2021-07-03 NOTE — PROGRESS NOTE ADULT - SUBJECTIVE AND OBJECTIVE BOX
CC: no cp/sob    TELEMETRY:     PHYSICAL EXAM:    T(C): 36.9 (07-03-21 @ 09:35), Max: 37.3 (07-03-21 @ 02:00)  HR: 86 (07-03-21 @ 09:35) (64 - 87)  BP: 127/82 (07-03-21 @ 09:35) (118/68 - 155/83)  RR: 18 (07-03-21 @ 09:35) (14 - 18)  SpO2: 100% (07-03-21 @ 09:35) (100% - 100%)  Wt(kg): --  I&O's Summary    02 Jul 2021 07:01  -  03 Jul 2021 07:00  --------------------------------------------------------  IN: 1280 mL / OUT: 2800 mL / NET: -1520 mL    03 Jul 2021 07:01  -  03 Jul 2021 11:04  --------------------------------------------------------  IN: 0 mL / OUT: 0 mL / NET: 0 mL        Appearance: Normal	  Cardiovascular: Normal S1 S2,RRR, No JVD, No murmurs  Respiratory: Lungs clear to auscultation	  Gastrointestinal:  Soft, Non-tender, + BS	  Extremities: Normal range of motion, No clubbing, cyanosis or edema  Vascular: Peripheral pulses palpable 2+ bilaterally     LABS:	 	                          7.1    10.44 )-----------( 286      ( 03 Jul 2021 07:38 )             21.2     07-03    135  |  93<L>  |  52<H>  ----------------------------<  90  4.0   |  23  |  16.03<H>    Ca    8.1<L>      03 Jul 2021 07:38  Phos  6.2     07-03  Mg     2.30     07-03            CARDIAC MARKERS:

## 2021-07-03 NOTE — PROGRESS NOTE ADULT - SUBJECTIVE AND OBJECTIVE BOX
TEAM Surgery Progress Note  Patient is a 36y old  Male who presents with a chief complaint of BRBPR (02 Jul 2021 17:33)      INTERVAL EVENTS: Patient is POD8.  No acute events overnight.  SUBJECTIVE: Patient seen and examined at bedside with surgical team, patient without complaints. Denies fever, chills, CP, SOB nausea, vomiting, abdominal pain.    REVIEW OF SYSTEMS:  Constitutional: No fevers or chills. No malaise or weakness.  EENT: No vision changes. No ear pain. No nasal congestion or rhinitis. No throat pain or dysphagia.  Respiratory: No cough, wheezing, or SOB. No hemoptysis.  Cardiovascular: No chest pain or palpitations.  Gastrointestinal: No abdominal pain. No nausea, vomiting, diarrhea or constipation. No hematochezia. No melena.  Genitourinary: No dysuria, hematuria, or frequency.  Neurologic: No numbness or tingling. No weakness.  Skin: No rashes or pruritus.     OBJECTIVE:    Vital Signs Last 24 Hrs  T(C): 37.3 (03 Jul 2021 02:00), Max: 37.3 (03 Jul 2021 02:00)  T(F): 99.1 (03 Jul 2021 02:00), Max: 99.1 (03 Jul 2021 02:00)  HR: 87 (03 Jul 2021 02:00) (64 - 87)  BP: 118/68 (03 Jul 2021 02:00) (118/68 - 155/83)  BP(mean): --  RR: 16 (03 Jul 2021 02:00) (14 - 17)  SpO2: 100% (03 Jul 2021 02:00) (100% - 100%)I&O's Detail    01 Jul 2021 07:01  -  02 Jul 2021 07:00  --------------------------------------------------------  IN:  Total IN: 0 mL    OUT:    Voided (mL): 0 mL  Total OUT: 0 mL    Total NET: 0 mL      02 Jul 2021 07:01  -  03 Jul 2021 05:00  --------------------------------------------------------  IN:    Other (mL): 800 mL  Total IN: 800 mL    OUT:    Other (mL): 2800 mL    Voided (mL): 0 mL  Total OUT: 2800 mL    Total NET: -2000 mL      MEDICATIONS  (STANDING):  calcitriol   Capsule 0.25 MICROGram(s) Oral daily  chlorhexidine 2% Cloths 1 Application(s) Topical daily  cinacalcet 30 milliGRAM(s) Oral daily  epoetin elizabeth-epbx (RETACRIT) Injectable 69307 Unit(s) IV Push <User Schedule>  isosorbide   mononitrate ER Tablet (IMDUR) 60 milliGRAM(s) Oral daily  labetalol 450 milliGRAM(s) Oral every 12 hours  sevelamer carbonate 3200 milliGRAM(s) Oral three times a day with meals    MEDICATIONS  (PRN):      PHYSICAL EXAM:  Constitutional: A&Ox3, NAD  Respiratory: Unlabored breathing  Abdomen: Soft, nondistended, NTTP. No rebound or guarding.  Extremities: WWP, ABREU spontaneously    LABS:                        8.3    15.24 )-----------( 296      ( 02 Jul 2021 20:04 )             24.0     07-02    135  |  97<L>  |  81<H>  ----------------------------<  90  5.4<H>   |  18<L>  |  24.67<H>    Ca    8.6      02 Jul 2021 08:12  Phos  5.9     07-02  Mg     2.40     07-02    TPro  6.3  /  Alb  3.8  /  TBili  0.2  /  DBili  x   /  AST  17  /  ALT  14  /  AlkPhos  59  07-01    PT/INR - ( 01 Jul 2021 06:48 )   PT: 12.8 sec;   INR: 1.13 ratio         PTT - ( 01 Jul 2021 06:48 )  PTT:34.8 sec  LIVER FUNCTIONS - ( 01 Jul 2021 06:48 )  Alb: 3.8 g/dL / Pro: 6.3 g/dL / ALK PHOS: 59 U/L / ALT: 14 U/L / AST: 17 U/L / GGT: x                 IMAGING:

## 2021-07-03 NOTE — PROGRESS NOTE ADULT - ATTENDING COMMENTS
Bleeding per rectum, 1 week s/p 3 column hemorrhoidectomy  -pt with initial bleed on presentation and second bleed yesterday evening.  3 units of prbc were given w/ partial response.  difficult to  response given HD dependency  -exam this morning showed packing from overnight in place w/ no active bleeding.  The area was dry  -anoscopy performed in 3 columns.  minimal contact suture line edge bleeding in right posterior column.  The suture line appeared intact.  The contact bleeding stopped quickly w/ placement of a 2 x 2 cm piece of surgicel.  Right anterior and left lateral suture lines also appeared intact without active bleeding.  small clot was noted which was irrigated with clear return.  The anal canal was packed w/ a gelfoam/surgicel packing  -HD today w/ additional unit of PRBC  -continue observation and packing as needed
no bleeding > 24 h  1uprbcs with HD    aaox3  rectal: packing in place, no blood    d/c home

## 2021-07-09 ENCOUNTER — INPATIENT (INPATIENT)
Facility: HOSPITAL | Age: 36
LOS: 5 days | Discharge: HOME | End: 2021-07-15
Attending: STUDENT IN AN ORGANIZED HEALTH CARE EDUCATION/TRAINING PROGRAM | Admitting: STUDENT IN AN ORGANIZED HEALTH CARE EDUCATION/TRAINING PROGRAM
Payer: MEDICARE

## 2021-07-09 VITALS
OXYGEN SATURATION: 100 % | TEMPERATURE: 99 F | HEIGHT: 71 IN | SYSTOLIC BLOOD PRESSURE: 135 MMHG | DIASTOLIC BLOOD PRESSURE: 72 MMHG | HEART RATE: 86 BPM | RESPIRATION RATE: 18 BRPM

## 2021-07-09 DIAGNOSIS — I77.0 ARTERIOVENOUS FISTULA, ACQUIRED: Chronic | ICD-10-CM

## 2021-07-09 DIAGNOSIS — Z41.9 ENCOUNTER FOR PROCEDURE FOR PURPOSES OTHER THAN REMEDYING HEALTH STATE, UNSPECIFIED: Chronic | ICD-10-CM

## 2021-07-09 LAB
ALBUMIN SERPL ELPH-MCNC: 3.4 G/DL — SIGNIFICANT CHANGE UP (ref 3.3–5)
ALP SERPL-CCNC: 46 U/L — SIGNIFICANT CHANGE UP (ref 40–120)
ALT FLD-CCNC: 10 U/L — SIGNIFICANT CHANGE UP (ref 4–41)
ANION GAP SERPL CALC-SCNC: 14 MMOL/L — SIGNIFICANT CHANGE UP (ref 7–14)
APTT BLD: 32.5 SEC — SIGNIFICANT CHANGE UP (ref 27–36.3)
AST SERPL-CCNC: 12 U/L — SIGNIFICANT CHANGE UP (ref 4–40)
BASOPHILS # BLD AUTO: 0.07 K/UL — SIGNIFICANT CHANGE UP (ref 0–0.2)
BASOPHILS NFR BLD AUTO: 0.6 % — SIGNIFICANT CHANGE UP (ref 0–2)
BILIRUB SERPL-MCNC: 0.2 MG/DL — SIGNIFICANT CHANGE UP (ref 0.2–1.2)
BLD GP AB SCN SERPL QL: NEGATIVE — SIGNIFICANT CHANGE UP
BUN SERPL-MCNC: 43 MG/DL — HIGH (ref 7–23)
CALCIUM SERPL-MCNC: 8.2 MG/DL — LOW (ref 8.4–10.5)
CHLORIDE SERPL-SCNC: 99 MMOL/L — SIGNIFICANT CHANGE UP (ref 98–107)
CO2 SERPL-SCNC: 25 MMOL/L — SIGNIFICANT CHANGE UP (ref 22–31)
CREAT SERPL-MCNC: 13.22 MG/DL — HIGH (ref 0.5–1.3)
EOSINOPHIL # BLD AUTO: 0.23 K/UL — SIGNIFICANT CHANGE UP (ref 0–0.5)
EOSINOPHIL NFR BLD AUTO: 1.9 % — SIGNIFICANT CHANGE UP (ref 0–6)
GLUCOSE SERPL-MCNC: 86 MG/DL — SIGNIFICANT CHANGE UP (ref 70–99)
HCT VFR BLD CALC: 22.3 % — LOW (ref 39–50)
HGB BLD-MCNC: 7.1 G/DL — LOW (ref 13–17)
IANC: 9.66 K/UL — HIGH (ref 1.5–8.5)
IMM GRANULOCYTES NFR BLD AUTO: 0.7 % — SIGNIFICANT CHANGE UP (ref 0–1.5)
INR BLD: 1.03 RATIO — SIGNIFICANT CHANGE UP (ref 0.88–1.16)
LYMPHOCYTES # BLD AUTO: 1.25 K/UL — SIGNIFICANT CHANGE UP (ref 1–3.3)
LYMPHOCYTES # BLD AUTO: 10.3 % — LOW (ref 13–44)
MCHC RBC-ENTMCNC: 27.8 PG — SIGNIFICANT CHANGE UP (ref 27–34)
MCHC RBC-ENTMCNC: 31.8 GM/DL — LOW (ref 32–36)
MCV RBC AUTO: 87.5 FL — SIGNIFICANT CHANGE UP (ref 80–100)
MONOCYTES # BLD AUTO: 0.82 K/UL — SIGNIFICANT CHANGE UP (ref 0–0.9)
MONOCYTES NFR BLD AUTO: 6.8 % — SIGNIFICANT CHANGE UP (ref 2–14)
NEUTROPHILS # BLD AUTO: 9.66 K/UL — HIGH (ref 1.8–7.4)
NEUTROPHILS NFR BLD AUTO: 79.7 % — HIGH (ref 43–77)
NRBC # BLD: 0 /100 WBCS — SIGNIFICANT CHANGE UP
NRBC # FLD: 0 K/UL — SIGNIFICANT CHANGE UP
PLATELET # BLD AUTO: 322 K/UL — SIGNIFICANT CHANGE UP (ref 150–400)
POTASSIUM SERPL-MCNC: 3.7 MMOL/L — SIGNIFICANT CHANGE UP (ref 3.5–5.3)
POTASSIUM SERPL-SCNC: 3.7 MMOL/L — SIGNIFICANT CHANGE UP (ref 3.5–5.3)
PROT SERPL-MCNC: 5.7 G/DL — LOW (ref 6–8.3)
PROTHROM AB SERPL-ACNC: 11.7 SEC — SIGNIFICANT CHANGE UP (ref 10.6–13.6)
RBC # BLD: 2.55 M/UL — LOW (ref 4.2–5.8)
RBC # FLD: 15.9 % — HIGH (ref 10.3–14.5)
RH IG SCN BLD-IMP: POSITIVE — SIGNIFICANT CHANGE UP
SODIUM SERPL-SCNC: 138 MMOL/L — SIGNIFICANT CHANGE UP (ref 135–145)
WBC # BLD: 12.11 K/UL — HIGH (ref 3.8–10.5)
WBC # FLD AUTO: 12.11 K/UL — HIGH (ref 3.8–10.5)

## 2021-07-09 PROCEDURE — 99291 CRITICAL CARE FIRST HOUR: CPT

## 2021-07-09 NOTE — ED PROVIDER NOTE - CLINICAL SUMMARY MEDICAL DECISION MAKING FREE TEXT BOX
35 y/o M with PMH ESRD on HD  5 days a week at home), HTN, HLD, recent hemorrhoid surgery w/ dr. ball p/w rectal bleeding.He states that the bleeding restarted today at 530pm. pt w/ pale conjuctiva, states he continues to bleed, no abdominal pain. no blood thinners, will obtain cbc, cmp, pt/inr, typen and screeen, likely transfusion, surgery consult 35 y/o M with PMH ESRD on HD  5 days a week at home), HTN, HLD, recent hemorrhoid surgery w/ dr. ball p/w rectal bleeding.He states that the bleeding restarted today at 530pm. pt w/ pale conjuctiva, states he continues to bleed, no abdominal pain. no blood thinners, will obtain cbc, cmp, pt/inr, typen and screen, likely transfusion, surgery consult

## 2021-07-09 NOTE — ED ADULT NURSE REASSESSMENT NOTE - NS ED NURSE REASSESS COMMENT FT1
1unit PRBC transfused as fast as possible, as per MD. PT in bed, denies chest pain. C/P SOB, PT put on 3L NC for comfort. Will reassess.

## 2021-07-09 NOTE — ED PROVIDER NOTE - NS ED ROS FT
Gen: No fever, normal appetite  Eyes: No eye irritation or discharge  ENT: No ear pain, congestion, sore throat  Resp: No cough or trouble breathing  Cardiovascular: No chest pain or palpitation  Gastroenteric: No nausea/vomiting, diarrhea, constipation + abd discomfort  :  No change in urine output; no dysuria  MS: No joint or muscle pain  Skin: No rashes  Heme: + rectal bleeding.   Neuro: No headache; no abnormal movements  Remainder negative, except as per the HPI

## 2021-07-09 NOTE — ED PROVIDER NOTE - OBJECTIVE STATEMENT
36M presents with multiple episodes of BRBPR since 530pm today. Now lightheaded and dizzy. also has abd discomfort and rectal discomfort. Hemmroidectomy on June 25th. Readmitted on July 1st for rebleeding (was packed with sugicel)     Hx hemodialysis preforms it at home 5 days a week himself (3 hours per day)

## 2021-07-09 NOTE — ED PROVIDER NOTE - ATTENDING CONTRIBUTION TO CARE
37 y/o M with PMH ESRD on HD  5 days a week at home), HTN, HLD, recent hemorrhoid surgery w/ dr. ball p/w rectal bleeding. Pt states he has been having multiple cups of blood since 530pm. Pt states he has had mild rectal pressure . His surgery was a hemorroidectomy on june 25th. He states he had bleeding on july 1st and was admitted to the hospital and got a few units of blood. He states that the bleeding restarted today at 530pm.  denies fever, chills, chest pain, SOB, abdominal pain, diarrhea, dysuria, syncope, +bleeding, new rash,weakness, numbness, blurred vision    ROS  otherwise negative as per HPI  Gen: Awake, Alert, WD, WN, NAD  Head:  NC/AT  Eyes:  PERRL, EOMI, Conjunctiva pale, lids normal, no scleral icterus  ENT: OP clear, no exudates, no erythema, moist mucus membranes  Neck: supple, nontender, no meningismus, no JVD, trachea midline  Cardiac/CV:  S1 S2, RRR, no M/G/R  Respiratory/Pulm:  CTAB, good air movement, normal resp effort, no wheezes/stridor/retractions/rales/rhonchi  Gastrointestinal/Abdomen:  Soft, nontender, nondistended, +BS, no rebound/guarding  Back:  no CVAT, no MLT  Ext:  warm, well perfused, moving all extremities spontaneously, no peripheral edema, distal pulses intact LUE AVF + bruit   Skin: intact, no rash  Neuro:  AAOx3, sensation intact, motor 5/5 x 4 extremities, normal gait, speech clear  MDM as above See progress note for Critical Care Elements   37 y/o M with PMH ESRD on HD  5 days a week at home), HTN, HLD, recent hemorrhoid surgery w/ dr. ball p/w rectal bleeding. Pt states he has been having multiple cups of blood since 530pm. Pt states he has had mild rectal pressure . His surgery was a hemorroidectomy on june 25th. He states he had bleeding on july 1st and was admitted to the hospital and got a few units of blood. He states that the bleeding restarted today at 530pm.  denies fever, chills, chest pain, SOB, abdominal pain, diarrhea, dysuria, syncope, +bleeding, new rash,weakness, numbness, blurred vision    ROS  otherwise negative as per HPI  Gen: Awake, Alert, WD, WN, NAD  Head:  NC/AT  Eyes:  PERRL, EOMI, Conjunctiva pale, lids normal, no scleral icterus  ENT: OP clear, no exudates, no erythema, moist mucus membranes  Neck: supple, nontender, no meningismus, no JVD, trachea midline  Cardiac/CV:  S1 S2, RRR, no M/G/R  Respiratory/Pulm:  CTAB, good air movement, normal resp effort, no wheezes/stridor/retractions/rales/rhonchi  Gastrointestinal/Abdomen:  Soft, nontender, nondistended, +BS, no rebound/guarding  Back:  no CVAT, no MLT  Ext:  warm, well perfused, moving all extremities spontaneously, no peripheral edema, distal pulses intact LUE AVF + bruit   Skin: intact, no rash  Neuro:  AAOx3, sensation intact, motor 5/5 x 4 extremities, normal gait, speech clear  MDM as above

## 2021-07-09 NOTE — ED ADULT NURSE NOTE - OBJECTIVE STATEMENT
ronald RN. Pt is reporting to the ED for rectal bleeding. PMH of HD, L av fistula , received HD at home, last treatment yesterday. had hemorrhoidectomy june 25. last Wednesday pt came to ED for rectal bleeding, received multiple blood transfusions and admitted to hospital. d/C  on Saturday. reporting today for rectal bleeding, reports less than last time but saturating 3-4 depends a day, increased bleeding when having a bowel movement. Pt is AOX4. Pt appears to be weak, pale, reports SOB and weakness. Pt repots intermittent chest pain. Pt placed on cardiac monitor, showing NSR. EKG done. spo2 98 % on room air. Pt denies abdominal pain, n/v/d. Denies dysuria, hematuria. 18 g iv placed, 20 g iv placed in R arm. report given to primary  RN elias

## 2021-07-09 NOTE — ED PROVIDER NOTE - PROGRESS NOTE DETAILS
Surg requesting 1 unit PRBC emergent release. Vernon- consulted surgery immediately rectal exam not preformed as pt with recent surgery and do not want to disrupt sutures and cause worsening bleeding Patient received in Signout from Dr Becker   The patient is a 36y Male who has a past medical and surgery history of ESRD Lt forearm AV fistula  hemodialysis 5 HTN Hyperthyroidism PTED with · HPI Objective Statement: 36M presents with multiple episodes of BRBPR since 530pm today. Now lightheaded and dizzy. also has abd discomfort and rectal discomfort. Hemmroidectomy on June 25th. Readmitted on July 1st for rebleeding (was packed with sugicel)   CRITICAL CARE TIME WAS NECESSARY TO TREAT OR PREVENT LIFE THREATENING DETERIORATION FROM THE FOLLOWING CONDITIONS:  [  ] Heart Failure and/or Circulatory Collapse [  ] Sepsis [  ] Respiratory failure  [  ]CNS Failure or Compromise  [X] Hemorrhagic Shock  [  ]Dehydration [  ]Renal Failure [  ]Hepatic Failure Sepsis [  ]Endocrine Crisis  [  ]Metabolic Crisis  [  ]Toxidrome   [  ]Trauma [X} Massive GI Hemorrhage    CRITICAL CARE TIME WAS SPENT BY ME IN THE FOLLOWING ACTIVITIES:  [X  ] Performance of the History and Physical Examination [X  ] Review of Old Charts [X   ] IV Access and or Obtaining Necessary Diagnostic Tests   [X  ] Ordering and Performing Treatments and Interventions:   Specifically:  [  ] Ventilator Management [  ] Vascular Access Procedures [  ] NGT Placement  [  ] Transcutaneous Pacing  [ X ] Establishment of Goals of Care with the Patient or Their Designated Representative  [X  ] Developing and Evaluating Treatment Plan with Consultants and/or the Primary Provider  [X  ] Serial Reevaluation of the Patients Condition and Response to Therapeutic Measures   Specifically: [  ] Interpretation of Cardiac and Respiratory Parameters  [X  ]Ordering and Interpretating  Diagnostic Studies  Comments:  Vital Signs Last 24 Hrs  T(F): 97.8  Max: 98.9 HR: 78 (10 Jul 2021 00:55) (69 - 103) BP: 82/54 (82/54 - 153/100 BP(mean): 63 (63 - 78) RR: 15 (13 - 22 SpO2: 100% (10 Jul 2021 00:55) (99% - 100%)  PE: as described; my additions and exceptions are noted in the chart  DATA:    LAB:                                           7.1    H: 4.9/Hct: 14.9:    12.11 )-----------( 322      ( 09 Jul 2021 22:43 )              @1am                  22.3   Mean Cell Volume: 87.5 fL (07-09-21 @ 22:43) Auto Neutrophil %: 79.7 % (07-09-21 @ 22:43) Auto Eosinophil %: 1.9 % (07-09-21 @ 22:43)  PT: 11.7 sec;   PTT:32.5 [id number]    138  |  99  |  43<H>  ----------------------------<  86  3.7   |  25  |  13.22<H>  Ca    8.2<L>      09 Jul 2021 22:43 TPro  5.7<L>  /  Alb  3.4  /  TBili  0.2  /  DBili  x   /  AST  12  /  ALT  10  / AlkPhos  46  07-09     CTA performed (req midazolam Injectable 1 milliGRAM(s) IV Push in order to obtain study prelim read suggests active rectal bleeding      IMPRESSION/RISK: Dx=Active lower GI bleed with HD instability and localizable source    Plan  reevaluated by Surgery to be admitted Richie YI aware of patient at 1 AM  SICU to eval  Patient with 2 large IV's above H/H drop occurred after 1 u prbcs given; Units 2 and 3 being given   will reassess

## 2021-07-09 NOTE — ED PROVIDER NOTE - PHYSICAL EXAMINATION
Physical Exam:    I have reviewed the triage vital signs.    Gen: NAD, AOx3, non-toxic appearing, able to ambulate without assistance  Head and Neck: NCAT, Neck supple without meningismus   HEENT: EOMI, PEERLA, pale conjunctiva, tongue midline, oral mucosa moist   Lung: CTAB, no respiratory distress, no wheezes/rhonchi/rales B/L, speaking in full sentences  CV: RRR, no murmurs, rubs or gallops  Abd: soft, mild diffuse ttp, ND, no guarding, no rigidity, no rebound tenderness, no CVA tenderness, no masses.   MSK: no gross deformities, ROM normal in UE/LE, no back tenderness  Neuro: CNs II-XII grossly intact. No focal sensory or motor deficits  Skin: Warm, well perfused, no rash, no leg swelling  Psych: Appropriate mood and affect

## 2021-07-09 NOTE — ED PROVIDER NOTE - NSFOLLOWUPINSTRUCTIONS_ED_ALL_ED_FT
The patient is a 36y Male who has a past medical and surgery history of ESRD Lt forearm AV fistula  hemodialysis 5 HTN Hyperthyroidism PTED with · HPI Objective Statement: 36M presents with multiple episodes of BRBPR since 530pm today. Now lightheaded and dizzy. also has abd discomfort and rectal discomfort. Hemmroidectomy on June 25th. Readmitted on July 1st for rebleeding (was packed with sugicel)   Vital Signs Last 24 Hrs  T(F): 97.8  Max: 98.9 HR: 78 (10 Jul 2021 00:55) (69 - 103) BP: 82/54 (82/54 - 153/100 BP(mean): 63 (63 - 78) RR: 15 (13 - 22 SpO2: 100% (10 Jul 2021 00:55) (99% - 100%)  PE: as described; my additions and exceptions are noted in the chart  DATA:  EKG:   LAB:                                           7.1    H: 4.9/Hct: 14.9:    12.11 )-----------( 322      ( 09 Jul 2021 22:43 )              @1am                  22.3   Mean Cell Volume: 87.5 fL (07-09-21 @ 22:43) Auto Neutrophil %: 79.7 % (07-09-21 @ 22:43) Auto Eosinophil %: 1.9 % (07-09-21 @ 22:43)  PT: 11.7 sec;   PTT:32.5 [id number]    138  |  99  |  43<H>  ----------------------------<  86  3.7   |  25  |  13.22<H>  Ca    8.2<L>      09 Jul 2021 22:43 TPro  5.7<L>  /  Alb  3.4  /  TBili  0.2  /  DBili  x   /  AST  12  /  ALT  10  / AlkPhos  46  07-09     CTA performed (req midazolam Injectable 1 milliGRAM(s) IV Push in order to obtain study prelim read suggests active rectal bleeding      IMPRESSION/RISK: Dx=Active lower GI bleed with HD instability     Plan  reevaluated by Surgery to be admitted Richie

## 2021-07-09 NOTE — ED ADULT TRIAGE NOTE - CHIEF COMPLAINT QUOTE
As per EMT" He just had surgery for hemoroid on 6/25...started to bleed." Pt st" Rectal bleeding started on Thursday stopped bleeding comes when going to bathroom." Dx Diaylsis 5 days does home diaylsis last done last night. DEnies blood thinners.

## 2021-07-09 NOTE — ED ADULT NURSE NOTE - NSIMPLEMENTINTERV_GEN_ALL_ED
Implemented All Fall Risk Interventions:  [place] to call system. Call bell, personal items and telephone within reach. Instruct patient to call for assistance. Room bathroom lighting operational. Non-slip footwear when patient is off stretcher. Physically safe environment: no spills, clutter or unnecessary equipment. Stretcher in lowest position, wheels locked, appropriate side rails in place. Provide visual cue, wrist band, yellow gown, etc. Monitor gait and stability. Monitor for mental status changes and reorient to person, place, and time. Review medications for side effects contributing to fall risk. Reinforce activity limits and safety measures with patient and family.

## 2021-07-09 NOTE — ED PROVIDER NOTE - PSH
AV fistula  L forearm  Elective surgical procedure  RACW permacatdiandra for HD, removed 5 years ago

## 2021-07-10 DIAGNOSIS — D64.9 ANEMIA, UNSPECIFIED: ICD-10-CM

## 2021-07-10 LAB
ANION GAP SERPL CALC-SCNC: 12 MMOL/L — SIGNIFICANT CHANGE UP (ref 7–14)
APTT BLD: 30.1 SEC — SIGNIFICANT CHANGE UP (ref 27–36.3)
BASOPHILS # BLD AUTO: 0.04 K/UL — SIGNIFICANT CHANGE UP (ref 0–0.2)
BASOPHILS NFR BLD AUTO: 0.4 % — SIGNIFICANT CHANGE UP (ref 0–2)
BUN SERPL-MCNC: 44 MG/DL — HIGH (ref 7–23)
CALCIUM SERPL-MCNC: 6.6 MG/DL — LOW (ref 8.4–10.5)
CHLORIDE SERPL-SCNC: 105 MMOL/L — SIGNIFICANT CHANGE UP (ref 98–107)
CO2 SERPL-SCNC: 20 MMOL/L — LOW (ref 22–31)
CREAT SERPL-MCNC: 12.6 MG/DL — HIGH (ref 0.5–1.3)
EOSINOPHIL # BLD AUTO: 0.06 K/UL — SIGNIFICANT CHANGE UP (ref 0–0.5)
EOSINOPHIL NFR BLD AUTO: 0.6 % — SIGNIFICANT CHANGE UP (ref 0–6)
GLUCOSE SERPL-MCNC: 132 MG/DL — HIGH (ref 70–99)
HCT VFR BLD CALC: 14.9 % — CRITICAL LOW (ref 39–50)
HCT VFR BLD CALC: 19.4 % — CRITICAL LOW (ref 39–50)
HCT VFR BLD CALC: 21.6 % — LOW (ref 39–50)
HCT VFR BLD CALC: 25.4 % — LOW (ref 39–50)
HCT VFR BLD CALC: 25.6 % — LOW (ref 39–50)
HGB BLD-MCNC: 4.9 G/DL — CRITICAL LOW (ref 13–17)
HGB BLD-MCNC: 6.7 G/DL — CRITICAL LOW (ref 13–17)
HGB BLD-MCNC: 7.4 G/DL — LOW (ref 13–17)
HGB BLD-MCNC: 8.3 G/DL — LOW (ref 13–17)
HGB BLD-MCNC: 8.8 G/DL — LOW (ref 13–17)
IANC: 8.19 K/UL — SIGNIFICANT CHANGE UP (ref 1.5–8.5)
IMM GRANULOCYTES NFR BLD AUTO: 1.3 % — SIGNIFICANT CHANGE UP (ref 0–1.5)
INR BLD: 1.21 RATIO — HIGH (ref 0.88–1.16)
LYMPHOCYTES # BLD AUTO: 0.62 K/UL — LOW (ref 1–3.3)
LYMPHOCYTES # BLD AUTO: 6.5 % — LOW (ref 13–44)
MAGNESIUM SERPL-MCNC: 2 MG/DL — SIGNIFICANT CHANGE UP (ref 1.6–2.6)
MCHC RBC-ENTMCNC: 28 PG — SIGNIFICANT CHANGE UP (ref 27–34)
MCHC RBC-ENTMCNC: 28.8 PG — SIGNIFICANT CHANGE UP (ref 27–34)
MCHC RBC-ENTMCNC: 29.2 PG — SIGNIFICANT CHANGE UP (ref 27–34)
MCHC RBC-ENTMCNC: 29.9 PG — SIGNIFICANT CHANGE UP (ref 27–34)
MCHC RBC-ENTMCNC: 30 PG — SIGNIFICANT CHANGE UP (ref 27–34)
MCHC RBC-ENTMCNC: 32.7 GM/DL — SIGNIFICANT CHANGE UP (ref 32–36)
MCHC RBC-ENTMCNC: 32.9 GM/DL — SIGNIFICANT CHANGE UP (ref 32–36)
MCHC RBC-ENTMCNC: 34.3 GM/DL — SIGNIFICANT CHANGE UP (ref 32–36)
MCHC RBC-ENTMCNC: 34.4 GM/DL — SIGNIFICANT CHANGE UP (ref 32–36)
MCHC RBC-ENTMCNC: 34.5 GM/DL — SIGNIFICANT CHANGE UP (ref 32–36)
MCV RBC AUTO: 83.7 FL — SIGNIFICANT CHANGE UP (ref 80–100)
MCV RBC AUTO: 85.1 FL — SIGNIFICANT CHANGE UP (ref 80–100)
MCV RBC AUTO: 86.6 FL — SIGNIFICANT CHANGE UP (ref 80–100)
MCV RBC AUTO: 87.4 FL — SIGNIFICANT CHANGE UP (ref 80–100)
MCV RBC AUTO: 89.4 FL — SIGNIFICANT CHANGE UP (ref 80–100)
MONOCYTES # BLD AUTO: 0.47 K/UL — SIGNIFICANT CHANGE UP (ref 0–0.9)
MONOCYTES NFR BLD AUTO: 4.9 % — SIGNIFICANT CHANGE UP (ref 2–14)
MRSA PCR RESULT.: SIGNIFICANT CHANGE UP
NEUTROPHILS # BLD AUTO: 8.19 K/UL — HIGH (ref 1.8–7.4)
NEUTROPHILS NFR BLD AUTO: 86.3 % — HIGH (ref 43–77)
NRBC # BLD: 0 /100 WBCS — SIGNIFICANT CHANGE UP
NRBC # FLD: 0 K/UL — SIGNIFICANT CHANGE UP
PHOSPHATE SERPL-MCNC: 4.1 MG/DL — SIGNIFICANT CHANGE UP (ref 2.5–4.5)
PLATELET # BLD AUTO: 195 K/UL — SIGNIFICANT CHANGE UP (ref 150–400)
PLATELET # BLD AUTO: 216 K/UL — SIGNIFICANT CHANGE UP (ref 150–400)
PLATELET # BLD AUTO: 222 K/UL — SIGNIFICANT CHANGE UP (ref 150–400)
PLATELET # BLD AUTO: 227 K/UL — SIGNIFICANT CHANGE UP (ref 150–400)
PLATELET # BLD AUTO: 233 K/UL — SIGNIFICANT CHANGE UP (ref 150–400)
POTASSIUM SERPL-MCNC: 4.3 MMOL/L — SIGNIFICANT CHANGE UP (ref 3.5–5.3)
POTASSIUM SERPL-SCNC: 4.3 MMOL/L — SIGNIFICANT CHANGE UP (ref 3.5–5.3)
PROTHROM AB SERPL-ACNC: 13.8 SEC — HIGH (ref 10.6–13.6)
RBC # BLD: 1.75 M/UL — LOW (ref 4.2–5.8)
RBC # BLD: 2.24 M/UL — LOW (ref 4.2–5.8)
RBC # BLD: 2.47 M/UL — LOW (ref 4.2–5.8)
RBC # BLD: 2.84 M/UL — LOW (ref 4.2–5.8)
RBC # BLD: 3.06 M/UL — LOW (ref 4.2–5.8)
RBC # FLD: 15.4 % — HIGH (ref 10.3–14.5)
RBC # FLD: 15.8 % — HIGH (ref 10.3–14.5)
RBC # FLD: 16 % — HIGH (ref 10.3–14.5)
RBC # FLD: 16.1 % — HIGH (ref 10.3–14.5)
RBC # FLD: 16.2 % — HIGH (ref 10.3–14.5)
S AUREUS DNA NOSE QL NAA+PROBE: SIGNIFICANT CHANGE UP
SARS-COV-2 RNA SPEC QL NAA+PROBE: SIGNIFICANT CHANGE UP
SODIUM SERPL-SCNC: 137 MMOL/L — SIGNIFICANT CHANGE UP (ref 135–145)
WBC # BLD: 13.15 K/UL — HIGH (ref 3.8–10.5)
WBC # BLD: 16.62 K/UL — HIGH (ref 3.8–10.5)
WBC # BLD: 16.94 K/UL — HIGH (ref 3.8–10.5)
WBC # BLD: 17.63 K/UL — HIGH (ref 3.8–10.5)
WBC # BLD: 9.5 K/UL — SIGNIFICANT CHANGE UP (ref 3.8–10.5)
WBC # FLD AUTO: 13.15 K/UL — HIGH (ref 3.8–10.5)
WBC # FLD AUTO: 16.62 K/UL — HIGH (ref 3.8–10.5)
WBC # FLD AUTO: 16.94 K/UL — HIGH (ref 3.8–10.5)
WBC # FLD AUTO: 17.63 K/UL — HIGH (ref 3.8–10.5)
WBC # FLD AUTO: 9.5 K/UL — SIGNIFICANT CHANGE UP (ref 3.8–10.5)

## 2021-07-10 PROCEDURE — 99233 SBSQ HOSP IP/OBS HIGH 50: CPT | Mod: GC,57

## 2021-07-10 PROCEDURE — 74174 CTA ABD&PLVS W/CONTRAST: CPT | Mod: 26

## 2021-07-10 RX ORDER — DESMOPRESSIN ACETATE 0.1 MG/1
26 TABLET ORAL ONCE
Refills: 0 | Status: DISCONTINUED | OUTPATIENT
Start: 2021-07-10 | End: 2021-07-10

## 2021-07-10 RX ORDER — MIDAZOLAM HYDROCHLORIDE 1 MG/ML
1 INJECTION, SOLUTION INTRAMUSCULAR; INTRAVENOUS ONCE
Refills: 0 | Status: DISCONTINUED | OUTPATIENT
Start: 2021-07-10 | End: 2021-07-10

## 2021-07-10 RX ORDER — ERYTHROPOIETIN 10000 [IU]/ML
10000 INJECTION, SOLUTION INTRAVENOUS; SUBCUTANEOUS
Refills: 0 | Status: DISCONTINUED | OUTPATIENT
Start: 2021-07-10 | End: 2021-07-15

## 2021-07-10 RX ORDER — SODIUM CHLORIDE 9 MG/ML
1000 INJECTION, SOLUTION INTRAVENOUS
Refills: 0 | Status: DISCONTINUED | OUTPATIENT
Start: 2021-07-10 | End: 2021-07-11

## 2021-07-10 RX ORDER — CHLORHEXIDINE GLUCONATE 213 G/1000ML
1 SOLUTION TOPICAL DAILY
Refills: 0 | Status: DISCONTINUED | OUTPATIENT
Start: 2021-07-10 | End: 2021-07-15

## 2021-07-10 RX ORDER — DESMOPRESSIN ACETATE 0.1 MG/1
26 TABLET ORAL ONCE
Refills: 0 | Status: COMPLETED | OUTPATIENT
Start: 2021-07-10 | End: 2021-07-10

## 2021-07-10 RX ORDER — HYDROMORPHONE HYDROCHLORIDE 2 MG/ML
1 INJECTION INTRAMUSCULAR; INTRAVENOUS; SUBCUTANEOUS ONCE
Refills: 0 | Status: DISCONTINUED | OUTPATIENT
Start: 2021-07-10 | End: 2021-07-10

## 2021-07-10 RX ORDER — ACETAMINOPHEN 500 MG
1000 TABLET ORAL ONCE
Refills: 0 | Status: COMPLETED | OUTPATIENT
Start: 2021-07-10 | End: 2021-07-10

## 2021-07-10 RX ORDER — DESMOPRESSIN ACETATE 0.1 MG/1
0.1 TABLET ORAL ONCE
Refills: 0 | Status: DISCONTINUED | OUTPATIENT
Start: 2021-07-10 | End: 2021-07-10

## 2021-07-10 RX ORDER — CHLORHEXIDINE GLUCONATE 213 G/1000ML
1 SOLUTION TOPICAL
Refills: 0 | Status: DISCONTINUED | OUTPATIENT
Start: 2021-07-10 | End: 2021-07-15

## 2021-07-10 RX ORDER — PHYTONADIONE (VIT K1) 5 MG
10 TABLET ORAL ONCE
Refills: 0 | Status: COMPLETED | OUTPATIENT
Start: 2021-07-10 | End: 2021-07-10

## 2021-07-10 RX ADMIN — CHLORHEXIDINE GLUCONATE 1 APPLICATION(S): 213 SOLUTION TOPICAL at 03:00

## 2021-07-10 RX ADMIN — DESMOPRESSIN ACETATE 226 MICROGRAM(S): 0.1 TABLET ORAL at 17:29

## 2021-07-10 RX ADMIN — Medication 400 MILLIGRAM(S): at 09:20

## 2021-07-10 RX ADMIN — Medication 1000 MILLIGRAM(S): at 09:35

## 2021-07-10 RX ADMIN — HYDROMORPHONE HYDROCHLORIDE 1 MILLIGRAM(S): 2 INJECTION INTRAMUSCULAR; INTRAVENOUS; SUBCUTANEOUS at 01:47

## 2021-07-10 RX ADMIN — SODIUM CHLORIDE 100 MILLILITER(S): 9 INJECTION, SOLUTION INTRAVENOUS at 20:47

## 2021-07-10 RX ADMIN — HYDROMORPHONE HYDROCHLORIDE 1 MILLIGRAM(S): 2 INJECTION INTRAMUSCULAR; INTRAVENOUS; SUBCUTANEOUS at 01:32

## 2021-07-10 RX ADMIN — SODIUM CHLORIDE 100 MILLILITER(S): 9 INJECTION, SOLUTION INTRAVENOUS at 08:37

## 2021-07-10 RX ADMIN — Medication 102 MILLIGRAM(S): at 14:37

## 2021-07-10 RX ADMIN — ERYTHROPOIETIN 10000 UNIT(S): 10000 INJECTION, SOLUTION INTRAVENOUS; SUBCUTANEOUS at 16:26

## 2021-07-10 RX ADMIN — MIDAZOLAM HYDROCHLORIDE 1 MILLIGRAM(S): 1 INJECTION, SOLUTION INTRAMUSCULAR; INTRAVENOUS at 00:25

## 2021-07-10 NOTE — CONSULT NOTE ADULT - SUBJECTIVE AND OBJECTIVE BOX
Vascular & Interventional Radiology Brief Consult Note    HPI: 36y Male with prior hemorrhoidectomy     Allergies: topical cleanser for dialysis access.   Exsept (Rash)    Medications (Abx/Cardiac/Anticoagulation/Blood Products)      Data:  180.3  86.9  T(C): 36.1  HR: 78  BP: 134/88  RR: 15  SpO2: 100%    -WBC 16.62 / HgB 7.4 / Hct 21.6 / Plt 227  -Na 137 / Cl 105 / BUN 44 / Glucose 132  -K 4.3 / CO2 20 / Cr 12.60  -ALT -- / Alk Phos -- / T.Bili --  -INR1.21     Vascular & Interventional Radiology Brief Consult Note    HPI: 36y Male with prior hemorrhoidectomy     Allergies: topical cleanser for dialysis access.   Exsept (Rash)    Medications (Abx/Cardiac/Anticoagulation/Blood Products)      Data:  180.3  86.9  T(C): 36.1  HR: 78  BP: 134/88  RR: 15  SpO2: 100%    -WBC 16.62 / HgB 7.4 / Hct 21.6 / Plt 227  -Na 137 / Cl 105 / BUN 44 / Glucose 132  -K 4.3 / CO2 20 / Cr 12.60  -ALT -- / Alk Phos -- / T.Bili --  -INR1.21    Imaging: CT abd/pelvis - Small focal areas of contrast extravasation in the sigmoid colon. Large contrast extravasation in the rectum, consistent with hemorrhoidal bleeding given history.

## 2021-07-10 NOTE — CHART NOTE - NSCHARTNOTEFT_GEN_A_CORE
IR Event Note.     Called by ED regarding CTA findings. Patient admitted to SICU since conversation with ED. Case discussed with SICU team, IR consult not requested at this time as they are managing the patient conservatively. Please call IR for re-evaluation if patient's clinical status changes or if formal IR consult requested.     Case reviewed with Dr. Argueta  Case discussed with SICU team. IR Event Note.     Called by ED regarding CTA findings of lower GI bleed. Patient admitted to SICU since conversation with ED. Case discussed with SICU team, IR consult not requested at this time as they are managing the patient conservatively. Please call IR for re-evaluation if patient's clinical status changes or if formal IR consult requested.     Case reviewed with Dr. Argueta  Case discussed with SICU team. IR Event Note.     Called by ED regarding CTA findings of lower GI bleed. Patient admitted to SICU since conversation with ED. Case discussed with SICU team, IR consult not requested at this time as they are managing the patient conservatively. Please call IR for re-evaluation if patient's clinical status changes or if formal IR consult requested.     Case reviewed with Dr. Argueta.  Case discussed with SICU team.

## 2021-07-10 NOTE — CHART NOTE - NSCHARTNOTEFT_GEN_A_CORE
Patient seen and examined by surgical consult resident and myself 7/9 at 22:30. Rectal packing placed for active BRBPR. At the time, patient was hemodynamically stable.  Discussed URGENT IR and GI consultation with ED, and requested stat 2U PRBC for resuscitation.   This was discussed with surgical attending.     7/10 at 1:00 we were paged back as the patient had another large volume bleeding episode and was now hypotensive. Seen and examined, packing replaced by surgical attending.   IR was called after this episode. 2nd PRBC ordered at this time. After receiving blood via rapid infusion, hemodynamics normalized.   Pt admitted to SICU for further resuscitation and hemodynamic monitoring. Plan for OR if bleeding continues.

## 2021-07-10 NOTE — H&P ADULT - NSHPPHYSICALEXAM_GEN_ALL_CORE
PHYSICAL EXAM:  GENERAL: Moaning, twisting and turning in bed, complaining of abdominal cramping   HEAD:  Atraumatic, Normocephalic  EYES: EOM  NECK: Supple  CHEST/LUNG: Unlabored respirations  HEART: Regular rate and rhythm  ABDOMEN: Soft, Nontender. No rebound or guarding.   ANORECTAL: BRB and clot from anus. Gelfoam and surgicel rectal packing placed. No visible active bleed on bedside exam.   NERVOUS SYSTEM:  Alert & Oriented X3. Intermittently lethargic.   MSK: FROM all 4 extremities, full and equal strength

## 2021-07-10 NOTE — H&P ADULT - NSHPLABSRESULTS_GEN_ALL_CORE
4.9    9.50  )-----------( 216      ( 10 Jul 2021 01:02 )             14.9       07-09    138  |  99  |  43<H>  ----------------------------<  86  3.7   |  25  |  13.22<H>    Ca    8.2<L>      09 Jul 2021 22:43    TPro  5.7<L>  /  Alb  3.4  /  TBili  0.2  /  DBili  x   /  AST  12  /  ALT  10  /  AlkPhos  46  07-09          PT/INR - ( 09 Jul 2021 22:43 )   PT: 11.7 sec;   INR: 1.03 ratio         PTT - ( 09 Jul 2021 22:43 )  PTT:32.5 sec      RADIOLOGY & IMAGING:     EXAM:  CT ANGIO ABD PELV (W)AW IC        PROCEDURE DATE:  Jul 10 2021         INTERPRETATION:  CLINICAL INFORMATION: Active lower GI bleed    COMPARISON: CT abdomen pelvis dated 9/25/2020    CONTRAST/COMPLICATIONS:  IV Contrast: Omnipaque 350  180 cc administered   20 cc discarded  Oral Contrast: NONE  Complications: None reported at time of study completion    PROCEDURE:  CT of the Abdomen and Pelvis was performed.  Precontrast, Arterial and Delayed phases were performed.  Sagittal and coronal reformats were performed.    FINDINGS:  LOWER CHEST: Within normal limits.    LIVER: Within normal limits.  BILE DUCTS: Normal caliber.  GALLBLADDER: Within normal limits.  SPLEEN: Within normal limits.  PANCREAS: Within normal limits.  ADRENALS: Within normal limits.  KIDNEYS/URETERS: Bilateral atrophic kidneys with bilateral cysts. No hydronephrosis.    BLADDER: Decompressed bladder with thickened bladder wall, limiting evaluation.  REPRODUCTIVE ORGANS: Prostate within normal limits.    BOWEL/VESSELS: There is active arterial contrast extravasation within the rectum with some foci of active arterial extravasation within the sigmoid colon. There are multiple areas of hyperdensity visualized throughout the colon, however these are also appreciated on the noncontrast images. No bowel obstruction.  PERITONEUM: No ascites.  RETROPERITONEUM/LYMPH NODES: No lymphadenopathy.  ABDOMINAL WALL: Within normal limits.  BONES: Within normal limits.    IMPRESSION:  There is an active GI bleed within the rectum with some foci of active arterial contrast extravasation within the sigmoid colon.  There are multiple areas of hypodensity visualized throughout the colon, however these are also appreciated on noncontrast images limiting evaluation for a GI bleed within this area.    The above findings were discussed with Dr. Junior at 1:01 AM on 7/10/2021 and Dr. Lizama at 1:06 AM on 7/10/2021 with read back confirmation.

## 2021-07-10 NOTE — ED ADULT NURSE REASSESSMENT NOTE - NS ED NURSE REASSESS COMMENT FT1
ED MD Suarez and surgery MD aware packing x2 coming out when PT having bowel movements. PT in bed, tachypneic and lethargic. Multiple bloody bowel movements. Assisted to CT with RN. Awaiting MD orders.

## 2021-07-10 NOTE — CONSULT NOTE ADULT - SUBJECTIVE AND OBJECTIVE BOX
SICU Consultation Note  =====================================================  HPI:       HISTORY:    37 y/o M with PMH HTN, ESRD on dialysis, hyperthyroidism (no meds), recent hemorrhoidectomy who presents for BRBPR in s/o 3 column hemorrhoidectomy on 6/25 with Dr. Ontiveros. Patient recently admitted 07/01 for similar presentation requiring blood transfusion and rectal packing with Surgicel at bedside with Colorectal Surgery, and was discharged after appropriate response to transfusion and no bleeding for more than 24 hours.     Presents today with subjective lightheadedness and dizziness. Endorses rectal discomfort and some abdominal pain. Given 1U pRBC, reassessed with downtrending H/h, ordered for 2 additional units.     Patient seen and examined at bedside with Dr. Quiroz. Appears tired, but alert. Hemodynamically stable with MAPs in 100s, receiving 2nd unit of blood. JOSE with evacuation of stool and clots. Anal canal packed with Quik Clot. Transported to SICU.     Allergies: topical cleanser for dialysis access.   Exsept (Rash)    PAST MEDICAL & SURGICAL HISTORY:  ESRD on hemodialysis  at home 5 x week    HTN (hypertension)    ESRD (end stage renal disease) on dialysis    HTN (hypertension)    Bell&#x27;s palsy    Hyperthyroidism    AV fistula  L forearm    Elective surgical procedure  RACW permacath for HD, removed 5 years ago      FAMILY HISTORY:  Family history of hypertension (Father, Mother)  Father and mother    ADVANCE DIRECTIVES: Presumed Full Code      REVIEW OF SYSTEMS:   General: Non-Contributory  Skin/Breast: Non-Contributory  Ophthalmologic: Non-Contributory  ENMT: Non-Contributory  Respiratory and Thorax: Non-Contributory  Cardiovascular: Non-Contributory  Gastrointestinal: Non-Contributory  Genitourinary: Non-Contributory  Musculoskeletal: Non-Contributory  Neurological: Non-Contributory  Psychiatric: Non-Contributory  Hematology/Lymphatics: Non-Contributory  Endocrine: Non-Contributory  Allergic/Immunologic: Non-Contributory    --------------------------------------------------------------------------------------    VITAL SIGNS, INS/OUTS (last 24 hours):  --------------------------------------------------------------------------------------  ICU Vital Signs Last 24 Hrs  T(C): 35.5 (10 Jul 2021 02:10), Max: 37.2 (09 Jul 2021 21:29)  T(F): 95.9 (10 Jul 2021 02:10), Max: 98.9 (09 Jul 2021 21:29)  HR: 78 (10 Jul 2021 02:10) (65 - 103)  BP: 126/81 (10 Jul 2021 02:10) (82/54 - 153/100)  BP(mean): 91 (10 Jul 2021 02:10) (63 - 96)  ABP: --  ABP(mean): --  RR: 15 (10 Jul 2021 02:10) (13 - 22)  SpO2: 100% (10 Jul 2021 02:10) (99% - 100%)  --------------------------------------------------------------------------------------    EXAM  NEUROLOGY  RASS:   	GCS:  15  Exam: Normal, NAD, alert, oriented x 3, no focal deficits.     HEENT  Exam: Normocephalic, atraumatic.  EOMI    RESPIRATORY  Exam: Lungs clear to auscultation, Normal expansion/effort.      CARDIOVASCULAR  Exam: S1, S2.  Regular rate and rhythm.  Peripheral edema     GI/NUTRITION  Exam: Abdomen soft, Non-tender, Non-distended.    JOSE: blood and stool evacuated, packed with Quik Clot  Current Diet:  NPO     VASCULAR  Exam: Extremities warm, pink, well-perfused.      MUSCULOSKELETAL  Exam: All extremities moving spontaneously without limitations.      SKIN:  Exam: Good skin turgor, no skin breakdown.      METABOLIC/FLUIDS/ELECTROLYTES      HEMATOLOGIC  [x] DVT Prophylaxis:   Transfusions:	[] PRBC	[] Platelets		[] FFP	[] Cryoprecipitate    INFECTIOUS DISEASE  Antimicrobials/Immunologic Medications: none      Tubes/Lines/Drains  *  [x] Peripheral IV  [] Central Venous Line     	[] R	[] L	[] IJ	[] Fem	[] SC	Date Placed:   [] Arterial Line		[] R	[] L	[] Fem	[] Rad	[] Ax	Date Placed:   [] PICC:         	[] Midline		[] Mediport  [] Urinary Catheter		Date Placed:     LABS                        4.9    9.50  )-----------( 216      ( 10 Jul 2021 01:02 )             14.9     07-09    138  |  99  |  43<H>  ----------------------------<  86  3.7   |  25  |  13.22<H>    Ca    8.2<L>      09 Jul 2021 22:43    TPro  5.7<L>  /  Alb  3.4  /  TBili  0.2  /  DBili  x   /  AST  12  /  ALT  10  /  AlkPhos  46  07-09    PT/INR - ( 09 Jul 2021 22:43 )   PT: 11.7 sec;   INR: 1.03 ratio         PTT - ( 09 Jul 2021 22:43 )  PTT:32.5 sec    --------------------------------------------------------------------------------------    OTHER LABS    IMAGING RESULTS  Echo: < from: Stress Echo Exercise (w/TTE, w/Cont) (12.10.19 @ 14:45) >  Conclusions:  1. Exercise capacity is 8 METS, which is poor for age and  gender.  2. Hypertensive hemodynamic response.  3. Normal electrocardiographic response.  4. Normal augmentation in left ventricular systolic  function.  5. Appropriate heart rate response.  6. Hypertensive response (excessive increase in  systolic/diastolic BP).  7. Normal stress echocardiogram with no evidence of  inducible ischemia.    < end of copied text >  < from: Transthoracic Echocardiogram (11.09.15 @ 08:34) >  CONCLUSIONS:  1. Normal mitral valve. Mild mitral regurgitation.  2. Normal left ventricular internal dimensions and wall  thicknesses.  3. Normal left ventricular systolic function. No segmental  wall motion abnormalities.  4. Normal left ventricular diastolic function.  5. Normal right ventricular size and function.    CT: < from: CT Angio Abdomen and Pelvis w/ IV Cont (07.10.21 @ 00:35) >  FINDINGS:  LOWER CHEST: Within normal limits.    LIVER: Within normal limits.  BILE DUCTS: Normal caliber.  GALLBLADDER: Within normal limits.  SPLEEN: Within normal limits.  PANCREAS: Within normal limits.  ADRENALS: Within normal limits.  KIDNEYS/URETERS: Bilateral atrophic kidneys with bilateral cysts. No hydronephrosis.    BLADDER: Decompressed bladder with thickened bladder wall, limiting evaluation.  REPRODUCTIVE ORGANS: Prostate within normal limits.    BOWEL/VESSELS: There is active arterial contrast extravasation within the rectum with some foci of active arterial extravasation within the sigmoid colon. There are multiple areas of hyperdensity visualized throughout the colon, however these are also appreciated on the noncontrast images. No bowel obstruction.  PERITONEUM: No ascites.  RETROPERITONEUM/LYMPH NODES: No lymphadenopathy.  ABDOMINAL WALL: Within normal limits.  BONES: Within normal limits.    IMPRESSION:  There is an active GI bleed within the rectum with some foci of active arterial contrast extravasation within the sigmoid colon.  There are multiple areas of hypodensity visualized throughout the colon, however these are also appreciated on noncontrast images limiting evaluation for a GI bleed within this area.    The above findings were discussed with Dr. Junior at 1:01 AM on 7/10/2021 and Dr. Lizama at 1:06 AM on 7/10/2021 with read back confirmation.

## 2021-07-10 NOTE — CONSULT NOTE ADULT - ASSESSMENT
35 y/o M with PMH HTN, ESRD on dialysis, hyperthyroidism (no meds) who presents for BRBPR in s/o 3 column hemorrhoidectomy on 6/25 with Dr. Ontiveros recently admitted 07/01 for similar presentation requiring blood transfusion and rectal packing with Surgicel at bedside with Colorectal Surgery, now with symptomatic anemia with H/h 4.9/14.9. SICU consulted for hemodynamic monitoring and hemorrhage watch.     PLAN:    Neurologic:   Pain control PRN    Respiratory:  On RA  SpO2 >92%     Cardiovascular:   Monitor hemodynamic status/VS    Gastrointestinal/Nutrition:   NPO  GI c/s    Renal/Genitourinary:   LR@100  Monitor UOP  BMP QD  Hemodialyzes at home on his own, last session 7/9    Hematologic: s/p 3U pRBC in ED  Hemorrhage watch  CBC q4  Trend H/h  IR c/s    Infectious Disease:   Monitor WBC/fever curve    Tubes/Lines/Drains:   PIV x2    Endocrine:   CINDY    Disposition:   SICU    SICU  [id number]   35 y/o M with PMH HTN, ESRD on dialysis, hyperthyroidism (no meds) who presents for BRBPR in s/o 3 column hemorrhoidectomy on 6/25 with Dr. Ontiveros recently admitted 07/01 for similar presentation requiring blood transfusion and rectal packing with Surgicel at bedside with Colorectal Surgery, now with symptomatic anemia with H/h 4.9/14.9. SICU consulted for hemodynamic monitoring and hemorrhage watch.     PLAN:    Neurologic:   Pain control PRN    Respiratory:  On RA  SpO2 >92%     Cardiovascular:   Monitor hemodynamic status/VS  No pressor requirement currently    Gastrointestinal/Nutrition:   NPO  GI c/s    Renal/Genitourinary:   LR@100  Monitor UOP  BMP QD  Hemodialyzes at home on his own, last session 7/9    Hematologic: s/p 3U pRBC in ED  Hemorrhage watch  CBC q4  Trend H/h  IR c/s  Plan for OR if no response to blood/change in clinical status     Infectious Disease:   Monitor WBC/fever curve    Tubes/Lines/Drains:   PIV x2    Endocrine:   CINDY    Disposition:   SICU    SICU  [id number]

## 2021-07-10 NOTE — CONSULT NOTE ADULT - ASSESSMENT
36y Male with history of ESRD presents with BRBPR. Nephrology consulted for ESRD status.    1) ESRD: Last HD on 7/8/21 as an outpatient. Plan for next maintenance HD today with gentle UF. Can D/C IVF once diet started. Monitor electrolytes.    2) HTN with ESRD: BP controlled. Holding home medications due to hypotension on admission. Monitor BP.    3) Anemia of renal disease: With component of GIB. S/P PRBC. Start Epo 10K with HD. Monitor Hb.    4) Hyperphosphatemia: Phosphorus controlled. Resume renvela 4 tabs with meals and renal diet once diet resumed. High calcium bath with HD given hypocalcemia likely due to PRBC transfusion. Check iPTH. Monitor serum calcium and phosphorus.      [facility] NEPHROLOGY  Betito Dodson M.D.  Octavio Hernandez D.O.  Lynne Biswas M.D.  Mallory Hook, RONALDO, ANP-C    Telephone: [phone]  Facsimile: [phone]    [address] [place] [address]

## 2021-07-10 NOTE — CONSULT NOTE ADULT - ASSESSMENT
-- Note Incomplete --   A/P  36 yea old male with lower GI bleed. Bleeding being treated conservatively with packing by surgical team. Focal areas in the sigmoid colon are relatively small and not accounting for the degree of hemoglobin drop. Patient continues to be responsive to blood transfusions.   - recommend GI eval if bleeding persists  - would defer intervention at this time as focal lesions in the sigmoid are small and likely have limited clinical impact given degree of bleeding from the rectum.    - case reviewed with Dr. Argueta   A/P  36 yea old male with lower GI bleed. Bleeding being treated conservatively with packing by surgical team. Focal areas in the sigmoid colon are relatively small and not accounting for the degree of hemoglobin drop. Patient continues to be responsive to blood transfusions.   - recommend GI eval if bleeding persists, for a less invasive diagnostic and possibly therapeutic approach.  - would defer intervention at this time as focal lesions in the sigmoid are small and likely have limited clinical impact given degree of bleeding from the rectum.    - case reviewed with Dr. Argueta.

## 2021-07-10 NOTE — PROGRESS NOTE ADULT - SUBJECTIVE AND OBJECTIVE BOX
Pt admitted overnight w/ blood per rectum and hypotension.  CTA showed active rectal bleed w/ active sigmoid colon bleed.  Pt responded to 3 units of PRBC.  currently hemodynamically stable.  improving pain after hemorrhoidectomy.  pt reports brown BMs all week and acute onset of blood per rectum          Objective:    MEDICATIONS  (STANDING):  chlorhexidine 4% Liquid 1 Application(s) Topical <User Schedule>  lactated ringers. 1000 milliLiter(s) (100 mL/Hr) IV Continuous <Continuous>    MEDICATIONS  (PRN):      Vital Signs Last 24 Hrs  T(C): 35.9 (10 Jul 2021 08:00), Max: 37.2 (2021 21:29)  T(F): 96.6 (10 Jul 2021 08:00), Max: 98.9 (2021 21:29)  HR: 85 (10 Jul 2021 09:00) (65 - 103)  BP: 132/79 (10 Jul 2021 09:00) (82/54 - 153/100)  BP(mean): 91 (10 Jul 2021 09:00) (63 - 103)  RR: 16 (10 Jul 2021 09:00) (12 - 22)  SpO2: 99% (10 Jul 2021 09:00) (99% - 100%)    I&O's Detail    2021 07:01  -  10 Jul 2021 07:00  --------------------------------------------------------  IN:    Lactated Ringers: 500 mL  Total IN: 500 mL    OUT:    Emesis (mL): 100 mL    Voided (mL): 0 mL  Total OUT: 100 mL    Total NET: 400 mL      10 Jul 2021 07:01  -  10 Jul 2021 10:02  --------------------------------------------------------  IN:    Lactated Ringers: 100 mL  Total IN: 100 mL    OUT:  Total OUT: 0 mL    Total NET: 100 mL          Daily Height in cm: 180.34 (2021 21:29)    Daily Weight in k.9 (10 Jul 2021 03:00)    LABS:                        7.4    16.62 )-----------( 227      ( 10 Jul 2021 09:37 )             21.6     07-10    137  |  105  |  44<H>  ----------------------------<  132<H>  4.3   |  20<L>  |  12.60<H>    Ca    6.6<L>      10 Jul 2021 02:56  Phos  4.1     07-10  Mg     2.00     07-10    TPro  5.7<L>  /  Alb  3.4  /  TBili  0.2  /  DBili  x   /  AST  12  /  ALT  10  /  AlkPhos  46  07-09    PT/INR - ( 10 Jul 2021 02:56 )   PT: 13.8 sec;   INR: 1.21 ratio         PTT - ( 10 Jul 2021 02:56 )  PTT:30.1 sec      RADIOLOGY & ADDITIONAL STUDIES:

## 2021-07-10 NOTE — H&P ADULT - HISTORY OF PRESENT ILLNESS
HPI:   36M s/p three column excisional hemorrhoidectomy, presented with bleeding per rectum. No overt evidence of bleeding was appreciated from suture lines. Patient's bleeding improved after packing with surgicel and kerlix gauze, but had repeat  episode of bleeding. On 7/1 he was given 3u pRBC, on 7/2 he was given another with dialysis. On 7/3, no bleeding was seen on exam. Patient is not reporting any pain, able to ambulate, tolerating diet, and stable for discharge.     35 yo M POD# 7 s/p three column excisional hemorrhoidectomy, presenting with bleeding per rectum. Patient's bleeding improved after packing, but had repeat episode of bleeding s/p transfusion of 4 U PRBC    35 y/o M with PMH HTN, ESRD on dialysis, hyperthyroidism (no meds), recent hemorrhoidectomy who presents for BRBPR. He reports that he woke up at 1am to urinate and was straining and did notice some blood come out of his rectum. He went to sleep and then woke up again around 3am and felt some fluid but did not think anything of it. He then woke up in the morning with blood soaking his bed. He says that he felt lightheaded and dizzy but he denies chest pain, SOB, HA. He has pain in his rectum. No abdominal pain. All other ROS negative.     PAST MEDICAL & SURGICAL HISTORY:  ESRD on hemodialysis  at home 5 x week    HTN (hypertension)    ESRD (end stage renal disease) on dialysis    HTN (hypertension)    Bell&#x27;s palsy    Hyperthyroidism    AV fistula  L forearm    Elective surgical procedure  RACW permacath for HD, removed 5 years ago        ROS: Negative except for HPI    MEDICATIONS:          chlorhexidine 4% Liquid 1 Application(s) Topical <User Schedule>  lactated ringers. 1000 milliLiter(s) IV Continuous <Continuous>      Allergies    No Known Drug Allergies  topical cleanser for dialysis access.   Exsept (Rash)    Intolerances        SOCIAL HISTORY: Denies tobacco, social ETOH, denies illicit drug use    FAMILY HISTORY:  Family history of hypertension (Father, Mother)  Father and mother        Vital Signs Last 24 Hrs  T(C): 35.5 (10 Jul 2021 02:10), Max: 37.2 (09 Jul 2021 21:29)  T(F): 95.9 (10 Jul 2021 02:10), Max: 98.9 (09 Jul 2021 21:29)  HR: 78 (10 Jul 2021 02:10) (65 - 103)  BP: 126/81 (10 Jul 2021 02:10) (82/54 - 153/100)  BP(mean): 91 (10 Jul 2021 02:10) (63 - 96)  RR: 15 (10 Jul 2021 02:10) (13 - 22)  SpO2: 100% (10 Jul 2021 02:10) (99% - 100%)    PHYSICAL EXAM:    Constitutional: NAD well-developed  HEENT: PERRLA, EOMI  Neck: No JVD  Respiratory: CTAB, Cardiovascular: S1 and S2, RRR, no M/G/R  Gastrointestinal: BS+, soft, NT/ND  Extremities: No peripheral edema  Vascular: 2+ peripheral pulses  Neurological: A/O x 3, no focal deficits  Skin: No rashes    LABS:                        4.9    9.50  )-----------( 216      ( 10 Jul 2021 01:02 )             14.9     07-09    138  |  99  |  43<H>  ----------------------------<  86  3.7   |  25  |  13.22<H>    Ca    8.2<L>      09 Jul 2021 22:43    TPro  5.7<L>  /  Alb  3.4  /  TBili  0.2  /  DBili  x   /  AST  12  /  ALT  10  /  AlkPhos  46  07-09    PT/INR - ( 09 Jul 2021 22:43 )   PT: 11.7 sec;   INR: 1.03 ratio         PTT - ( 09 Jul 2021 22:43 )  PTT:32.5 sec        RADIOLOGY & ADDITIONAL STUDIES:    ASSESSMENT/PLAN:  Patient is a 36y old  Male who presents with a chief complaint of     -  -  -  - HPI:   36M s/p three column excisional hemorrhoidectomy, presented with bleeding per rectum. No overt evidence of bleeding was appreciated from suture lines. Patient's bleeding improved after packing with surgicel and kerlix gauze, but had repeat  episode of bleeding. On 7/1 he was given 3u pRBC, on 7/2 he was given another with dialysis. On 7/3, no bleeding was seen on exam. Patient is not reporting any pain, able to ambulate, tolerating diet, and stable for discharge.     37 yo M POD# 7 s/p three column excisional hemorrhoidectomy, presenting with bleeding per rectum. Patient's bleeding improved after packing, but had repeat episode of bleeding s/p transfusion of 4 U PRBC    35 y/o M with PMH HTN, ESRD on dialysis, hyperthyroidism (no meds), recent hemorrhoidectomy who presents for BRBPR. He reports that he woke up at 1am to urinate and was straining and did notice some blood come out of his rectum. He went to sleep and then woke up again around 3am and felt some fluid but did not think anything of it. He then woke up in the morning with blood soaking his bed. He says that he felt lightheaded and dizzy but he denies chest pain, SOB, HA. He has pain in his rectum. No abdominal pain. All other ROS negative.          HPI:   35 y/o M with PMH HTN, ESRD on dialysis, hyperthyroidism (no meds), recent 3 column excisional hemorrhoidectomy on 6/25 complicated by readmission for BRBPR improved after rectal packing and multiple transfusions, now representing with recurrent BRBPR.                                        . Patient's bleeding improved after packing with surgicel and kerlix gauze, but had repeat  episode of bleeding. On 7/1 he was given 3u pRBC, on 7/2 he was given another with dialysis. On 7/3, no bleeding was seen on exam. Patient is not reporting any pain, able to ambulate, tolerating diet, and stable for discharge.            HPI:   35 y/o M with PMH HTN, ESRD on dialysis, hyperthyroidism (no meds), recent 3 column excisional hemorrhoidectomy on 6/25 complicated by readmission for BRBPR improved after rectal packing and multiple transfusions, now representing with recurrent BRBPR. Pt states that after discharge on 7/3 he was doing well at home, having loose BM with occasional hard stools without evidence of bleeding. Pt states that today he was urinating when he passed gas and noted BRB "spurting out". Since then pt states he has had 5+ episodes of large volume BRBPR with associated lightheadedness. Per patient bloody BM are preceded by abdominal cramping which is temporarily relieved by passing the blood/stool. Pt denies fever, chills, cp, sob, trauma to rectum/anus.     On arrival to ED pt afebrile and HD normal. Labs remarkable for Hgb 7.1, Cr of 16 and otherwise unremarkable. Pt continued to have multiple bloody BM in ED despite rectal packing with gel foam/surgicel. CTA performed and significant for evidence of active rectal bleed w/ concern for active extravasation in the sigmoid. After scan pt became hypotensive despite 1 unit PRBC and fluids. 2 additional units were rapidly infused. Dr. Vasquez bedside. Now hemodynamically stable with MAPs in 100s, receiving 2nd unit of blood. JOSE with evacuation of stool and clots. Anal canal packed with Quik Clot and transported to SICU.

## 2021-07-10 NOTE — H&P ADULT - ATTENDING COMMENTS
R57.8 Nontraumatic hemorrhagic shock  -transfuse to crit > 7/21 and physiologic norms. Hypertensive at baseline but target MAP > 65  -will likely develop transient end organ injury depending on if the bleed is able to be quickly stopped   -SICU consult  -FFP given as well, slightly uremic which may worsen coagulopathy  K62.5 Bleeding per rectum   -packed at bedside with multiple agents including quick clot  -WIll take to OR if he does not respond to transfusion or continues to bleed, will need resuscitationa first however  N18.6 ESRD (end stage renal disease) on dialysis   Z99.2 Dependence on renal dialysis   -high likelihood of overload given ongoing resuscitation.   -monitor K, daily labs   -Nephrology consult     I saw and examined the patient. I was physically present for the key portions of the evaluation and management (E/M) service provided.  I agree with the above history, physical, and plan which I have reviewed and edited where appropriate.    Pipo Quiroz MD  Acute and Critical Care Surgery    The Acute Care Surgery (B Team) Attending Group Practice:  Dr. Isabel Rivero, Dr. Levon Dale, Dr. Pipo Quiroz, and Dr. Santiago Mccann    Urgent issues - spectra [id number] or [id number]  Nonurgent issues - [phone]  Patient appointments or after hours - [phone]

## 2021-07-10 NOTE — H&P ADULT - ASSESSMENT
37 yo M POD#14/15 s/p three column excisional hemorrhoidectomy, presenting with recurrent bleeding per rectum. Patient was admitted one week ago with BRBPR requiring transfusion 4 units PRBC and improved after packing. Pt states that since discharge on 7/3 he had not had any bloody discharge until earlier today. He continues to have multiple episodes of high volume blood per rectum and abdominal cramping with associated hemodynamic instability.     - Pt HD unstable in ED from hemorrhagic shock. CT suggestive of rectal bleed with active extravasation also noted in sigmoid colon   - SICU consult for HD monitoring and resuscitation   - Trend CBC, transfuse PRN   - IR and IG consults for possible intervention given HD instability.   - Obtain nephrology consult d/t hx of ESRD   - Rectal placking in place, replace as needed  - NPO/IVF   - Plan for transfer to colorectal surgery - Dr. Ontiveros   - Discussed with Dr. Richie COLLAZO Team Surgery   [id number] 37 yo M POD#14/15 s/p three column excisional hemorrhoidectomy, presenting with recurrent bleeding per rectum. Patient was admitted one week ago with BRBPR requiring transfusion 4 units PRBC and improved after packing. Pt states that since discharge on 7/3 he had not had any bloody discharge until earlier today. He continues to have multiple episodes of high volume blood per rectum and abdominal cramping with associated hemodynamic instability.     - Pt HD unstable in ED from hemorrhagic shock. CT suggestive of rectal bleed with active extravasation also noted in sigmoid colon   - SICU consult for HD monitoring and resuscitation   - Trend CBC, transfuse PRN   - IR and IG consults for possible intervention given HD instability.   - Obtain nephrology consult d/t hx of ESRD on HD  - Rectal placking in place, replace as needed  - NPO/IVF   - Plan for transfer to colorectal surgery - Dr. Ontiveros   - Discussed with Dr. Richie COLLAZO Team Surgery   [id number]

## 2021-07-10 NOTE — H&P ADULT - NSICDXPASTSURGICALHX_GEN_ALL_CORE_FT
PAST SURGICAL HISTORY:  AV fistula L forearm    Elective surgical procedure RACW permacath for HD, removed 5 years ago

## 2021-07-10 NOTE — ED ADULT NURSE REASSESSMENT NOTE - NS ED NURSE REASSESS COMMENT FT1
After CT, multiple episodes of bloody bowel movements. PT hypotensive, MD aware. Awaiting 2units prbc to be sent. RN at bedside.

## 2021-07-10 NOTE — CONSULT NOTE ADULT - SUBJECTIVE AND OBJECTIVE BOX
[facility] NEPHROLOGY- CONSULTATION NOTE    36y Male with history of below presents with BRBPR. Nephrology consulted for ESRD status.    Patient well known to our practice as follows with Dr. Dodson as an outpatient at [facility] for h/o ESRD on home HD 5X/week via LUE AVF. ESRD etiology thought to be due to HTN. Patient presents with rectal bleeding and anemia s/p PRBC transfusion.    REVIEW OF SYSTEMS:  Gen: no changes in weight  HEENT: no rhinorrhea  Neck: no sore throat  Cards: no chest pain  Resp: no dyspnea  GI: no nausea or vomiting or diarrhea, + rectal bleeding resolving  : no dysuria or hematuria  Vascular: no LE edema  Derm: no rashes  Neuro: no numbness/tingling    No Known Drug Allergies  topical cleanser for dialysis access.   Exsept (Rash)      Home Medications Reviewed  Hospital Medications:   MEDICATIONS  (STANDING):      PAST MEDICAL & SURGICAL HISTORY:  ESRD on hemodialysis  at home 5 x week    HTN (hypertension)    ESRD (end stage renal disease) on dialysis    HTN (hypertension)    Bell&#x27;s palsy    Hyperthyroidism    AV fistula  L forearm    Elective surgical procedure  RACW permacath for HD, removed 5 years ago        FAMILY HISTORY:  Family history of hypertension (Father, Mother)  Father and mother        SOCIAL HISTORY:  Denies toxic substance use       VITALS:  T(F): 97 (07-10-21 @ 12:00), Max: 98.9 (07-09-21 @ 21:29)  HR: 76 (07-10-21 @ 12:00)  BP: 142/84 (07-10-21 @ 12:00)  RR: 14 (07-10-21 @ 12:00)  SpO2: 100% (07-10-21 @ 12:00)  Wt(kg): --    07-09 @ 07:01  -  07-10 @ 07:00  --------------------------------------------------------  IN: 500 mL / OUT: 100 mL / NET: 400 mL    07-10 @ 07:01  -  07-10 @ 13:29  --------------------------------------------------------  IN: 400 mL / OUT: 0 mL / NET: 400 mL      Height (cm): 180.3 (07-09 @ 21:29)  Weight (kg): 86.9 (07-10 @ 03:00)  BMI (kg/m2): 26.7 (07-10 @ 03:00)  BSA (m2): 2.07 (07-10 @ 03:00)      PHYSICAL EXAM:  Gen: NAD, calm  HEENT: MMM  Neck: no JVD  Cards: RRR, +S1/S2, no M/G/R  Resp: CTA B/L  GI: soft, NT/ND, NABS  : no CVA tenderness  Vascular: no LE edema B/L, LUE AVF + bruit/thrill with buttonholes   Derm: no rashes  Neuro: non-focal      LABS:  07-10    137  |  105  |  44<H>  ----------------------------<  132<H>  4.3   |  20<L>  |  12.60<H>    Ca    6.6<L>      10 Jul 2021 02:56  Phos  4.1     07-10  Mg     2.00     07-10    TPro  5.7<L>  /  Alb  3.4  /  TBili  0.2  /  DBili      /  AST  12  /  ALT  10  /  AlkPhos  46  07-09    Creatinine Trend: 12.60 <--, 13.22 <--                        7.4    16.62 )-----------( 227      ( 10 Jul 2021 09:37 )             21.6     Urine Studies:        < from: CT Angio Abdomen and Pelvis w/ IV Cont (07.10.21 @ 00:35) >  IMPRESSION:  There is an active GI bleed within the rectum with some foci of active arterial contrast extravasation within the sigmoid colon.  There are multiple areas of hypodensity visualized throughout the colon, however these are also appreciated on noncontrast images limiting evaluation for a GI bleed within this area.    The above findings were discussed with Dr. Junior at 1:01 AM on 7/10/2021 and Dr. Lizama at 1:06 AM on 7/10/2021 with read back confirmation.    --- End of Report ---    < end of copied text >

## 2021-07-11 LAB
ANION GAP SERPL CALC-SCNC: 15 MMOL/L — HIGH (ref 7–14)
BUN SERPL-MCNC: 35 MG/DL — HIGH (ref 7–23)
CALCIUM SERPL-MCNC: 8.3 MG/DL — LOW (ref 8.4–10.5)
CHLORIDE SERPL-SCNC: 97 MMOL/L — LOW (ref 98–107)
CO2 SERPL-SCNC: 24 MMOL/L — SIGNIFICANT CHANGE UP (ref 22–31)
COVID-19 SPIKE DOMAIN AB INTERP: POSITIVE
COVID-19 SPIKE DOMAIN ANTIBODY RESULT: >250 U/ML — HIGH
CREAT SERPL-MCNC: 9.67 MG/DL — HIGH (ref 0.5–1.3)
GLUCOSE BLDC GLUCOMTR-MCNC: 79 MG/DL — SIGNIFICANT CHANGE UP (ref 70–99)
GLUCOSE SERPL-MCNC: 92 MG/DL — SIGNIFICANT CHANGE UP (ref 70–99)
HCT VFR BLD CALC: 23.8 % — LOW (ref 39–50)
HGB BLD-MCNC: 8.3 G/DL — LOW (ref 13–17)
MAGNESIUM SERPL-MCNC: 1.9 MG/DL — SIGNIFICANT CHANGE UP (ref 1.6–2.6)
MCHC RBC-ENTMCNC: 29.1 PG — SIGNIFICANT CHANGE UP (ref 27–34)
MCHC RBC-ENTMCNC: 34.9 GM/DL — SIGNIFICANT CHANGE UP (ref 32–36)
MCV RBC AUTO: 83.5 FL — SIGNIFICANT CHANGE UP (ref 80–100)
NRBC # BLD: 0 /100 WBCS — SIGNIFICANT CHANGE UP
NRBC # FLD: 0 K/UL — SIGNIFICANT CHANGE UP
PHOSPHATE SERPL-MCNC: 4.6 MG/DL — HIGH (ref 2.5–4.5)
PLATELET # BLD AUTO: 250 K/UL — SIGNIFICANT CHANGE UP (ref 150–400)
POTASSIUM SERPL-MCNC: 4.2 MMOL/L — SIGNIFICANT CHANGE UP (ref 3.5–5.3)
POTASSIUM SERPL-SCNC: 4.2 MMOL/L — SIGNIFICANT CHANGE UP (ref 3.5–5.3)
PTH-INTACT FLD-MCNC: 434 PG/ML — HIGH (ref 15–65)
RBC # BLD: 2.85 M/UL — LOW (ref 4.2–5.8)
RBC # FLD: 16 % — HIGH (ref 10.3–14.5)
SARS-COV-2 IGG+IGM SERPL QL IA: >250 U/ML — HIGH
SARS-COV-2 IGG+IGM SERPL QL IA: POSITIVE
SODIUM SERPL-SCNC: 136 MMOL/L — SIGNIFICANT CHANGE UP (ref 135–145)
WBC # BLD: 12.71 K/UL — HIGH (ref 3.8–10.5)
WBC # FLD AUTO: 12.71 K/UL — HIGH (ref 3.8–10.5)

## 2021-07-11 PROCEDURE — 99232 SBSQ HOSP IP/OBS MODERATE 35: CPT

## 2021-07-11 RX ORDER — CALCITRIOL 0.5 UG/1
0.5 CAPSULE ORAL DAILY
Refills: 0 | Status: DISCONTINUED | OUTPATIENT
Start: 2021-07-11 | End: 2021-07-15

## 2021-07-11 RX ORDER — POLYETHYLENE GLYCOL 3350 17 G/17G
17 POWDER, FOR SOLUTION ORAL DAILY
Refills: 0 | Status: DISCONTINUED | OUTPATIENT
Start: 2021-07-11 | End: 2021-07-15

## 2021-07-11 RX ADMIN — POLYETHYLENE GLYCOL 3350 17 GRAM(S): 17 POWDER, FOR SOLUTION ORAL at 12:44

## 2021-07-11 RX ADMIN — CHLORHEXIDINE GLUCONATE 1 APPLICATION(S): 213 SOLUTION TOPICAL at 12:47

## 2021-07-11 RX ADMIN — CHLORHEXIDINE GLUCONATE 1 APPLICATION(S): 213 SOLUTION TOPICAL at 06:47

## 2021-07-11 NOTE — PROGRESS NOTE ADULT - SUBJECTIVE AND OBJECTIVE BOX
[facility] NEPHROLOGY- PROGRESS NOTE    36y Male with history of ESRD presents with BRBPR. Nephrology consulted for ESRD status.    REVIEW OF SYSTEMS:  Gen: no changes in weight  Cards: no chest pain  Resp: no dyspnea  GI: no nausea or vomiting or diarrhea, no further rectal bleeding  Vascular: no LE edema    No Known Drug Allergies  topical cleanser for dialysis access.   Exsept (Rash)      Hospital Medications: Medications reviewed    VITALS:  T(F): 98.3 (07-11-21 @ 08:00), Max: 99.3 (07-10-21 @ 20:00)  HR: 76 (07-11-21 @ 11:00)  BP: 150/83 (07-11-21 @ 11:00)  RR: 19 (07-11-21 @ 11:00)  SpO2: 100% (07-11-21 @ 11:00)  Wt(kg): --  Height (cm): 180.3 (07-09 @ 21:29), 180.3 (07-01 @ 05:43), 180.3 (06-25 @ 11:16), 180.3 (06-18 @ 11:18)  Weight (kg): 86.9 (07-10 @ 03:00), 84.3 (07-02 @ 02:28), 91.6 (06-25 @ 11:16), 91.6 (06-18 @ 11:18)  BMI (kg/m2): 26.7 (07-10 @ 03:00), 25.9 (07-09 @ 21:29), 25.9 (07-02 @ 02:28), 28.2 (07-01 @ 05:43), 28.2 (06-25 @ 11:16), 28.2 (06-18 @ 11:18)  BSA (m2): 2.07 (07-10 @ 03:00), 2.04 (07-09 @ 21:29), 2.04 (07-02 @ 02:28), 2.12 (07-01 @ 05:43), 2.12 (06-25 @ 11:16), 2.12 (06-18 @ 11:18)    07-10 @ 07:01  -  07-11 @ 07:00  --------------------------------------------------------  IN: 2950 mL / OUT: 1500 mL / NET: 1450 mL    07-11 @ 07:01  -  07-11 @ 12:13  --------------------------------------------------------  IN: 650 mL / OUT: 0 mL / NET: 650 mL        PHYSICAL EXAM:    Gen: NAD, calm  Cards: RRR, +S1/S2, no M/G/R  Resp: CTA B/L  GI: soft, NT/ND, NABS  Vascular: no LE edema B/L, LUE AVF + bruit/thrill with buttonholes    LABS:  07-11    136  |  97<L>  |  35<H>  ----------------------------<  92  4.2   |  24  |  9.67<H>    Ca    8.3<L>      11 Jul 2021 03:26  Phos  4.6     07-11  Mg     1.90     07-11    TPro  5.7<L>  /  Alb  3.4  /  TBili  0.2  /  DBili      /  AST  12  /  ALT  10  /  AlkPhos  46  07-09    Creatinine Trend: 9.67 <--, 12.60 <--, 13.22 <--                        8.3    12.71 )-----------( 250      ( 11 Jul 2021 03:26 )             23.8     Urine Studies:        RADIOLOGY & ADDITIONAL STUDIES:

## 2021-07-11 NOTE — PROGRESS NOTE ADULT - SUBJECTIVE AND OBJECTIVE BOX
SICU AM PROGRESS NOTE:  ====================================================  INTERVAL/Overnight Events:   - Given 2 units PRBC, IV Vitamin K 10mg x1 and DDAVP on 7/10/21  - seen by Dr. Ontiveros with anoscope, no anal bleed noted at this time. packed w/ gelfoam/surgicel packing  -During HD pt became acutely hypotensive, tachycardic and diaphoretic when trying to have BM- likely vasovagal episode          HPI:  37 y/o M with PMH HTN, ESRD on dialysis, hyperthyroidism (no meds), recent hemorrhoidectomy who presents for BRBPR in s/o 3 column hemorrhoidectomy on 6/25 with Dr. Ontiveros. Patient recently admitted 07/01 for similar presentation requiring blood transfusion and rectal packing with Surgicel at bedside with Colorectal Surgery, and was discharged after appropriate response to transfusion and no bleeding for more than 24 hours.     Presents with subjective lightheadedness and dizziness. Endorses rectal discomfort and some abdominal pain. Given 1U pRBC, reassessed with downtrending H/h, ordered for 2 additional units.     Patient seen and examined at bedside with Dr. Quiroz. Appears tired, but alert. Hemodynamically stable with MAPs in 100s, receiving 2nd unit of blood. JOSE with evacuation of stool and clots. Anal canal packed with Quik Clot. Transported to SICU.     Allergies: topical cleanser for dialysis access.   Exsept (Rash)    PAST MEDICAL & SURGICAL HISTORY:  ESRD on hemodialysis  at home 5 x week    HTN (hypertension)    ESRD (end stage renal disease) on dialysis    HTN (hypertension)    Bell&#x27;s palsy    Hyperthyroidism    AV fistula  L forearm    Elective surgical procedure  RACW permacath for HD, removed 5 years ago    --------------------------------------------------------------------------------------    VITAL SIGNS, INS/OUTS (last 24 hours):  --------------------------------------------------------------------------------------  ICU Vital Signs Last 24 Hrs  T(C): 36.6 (07-10-21 @ 19:15), Max: 36.7 (07-10-21 @ 15:50)  HR: 93 (07-10-21 @ 20:00) (65 - 131)  BP: 160/93 (07-10-21 @ 20:00) (100/59 - 160/93)  ABP: --  ABP(mean): --  RR: 16 (07-10-21 @ 20:00) (12 - 27)  SpO2: 100% (07-10-21 @ 20:00) (98% - 100%)  Wt(kg): --  CVP(mm Hg): --      07-09 @ 07:01  -  07-10 @ 07:00  --------------------------------------------------------  IN:    Lactated Ringers: 500 mL  Total IN: 500 mL    OUT:    Emesis (mL): 100 mL    Voided (mL): 0 mL  Total OUT: 100 mL    Total NET: 400 mL      07-10 @ 07:01  -  07-11 @ 01:09  --------------------------------------------------------  IN:    IV PiggyBack: 50 mL    Lactated Ringers: 1100 mL    Other (mL): 600 mL  Total IN: 1750 mL    OUT:    Other (mL): 1500 mL  Total OUT: 1500 mL    Total NET: 250 mL      --------------------------------------------------------------------------------------    EXAM  NEUROLOGY  RASS:   	GCS:  15  Exam: Normal, NAD, alert, oriented x 3, no focal deficits.     HEENT  Exam: Normocephalic, atraumatic.  EOMI    RESPIRATORY  Exam: Lungs clear to auscultation, Normal expansion/effort.      CARDIOVASCULAR  Exam: S1, S2.  Regular rate and rhythm.  Peripheral edema     GI/NUTRITION  Exam: Abdomen soft, Non-tender, Non-distended.    JOSE: blood and stool evacuated, packed with Quik Clot  Current Diet:  NPO     VASCULAR  Exam: Extremities warm, pink, well-perfused.      MUSCULOSKELETAL  Exam: All extremities moving spontaneously without limitations.      SKIN:  Exam: Good skin turgor, no skin breakdown.        Tubes/Lines/Drains    [x] Peripheral IV  [] Central Venous Line     	[] R	[] L	[] IJ	[] Fem	[] SC	Date Placed:   [] Arterial Line		[] R	[] L	[] Fem	[] Rad	[] Ax	Date Placed:   [] PICC:         	[] Midline		[] Mediport  [] Urinary Catheter		Date Placed:           LABS    ****      --------------------------------------------------------------------------------------        IMAGING RESULTS  Echo: < from: Stress Echo Exercise (w/TTE, w/Cont) (12.10.19 @ 14:45) >  Conclusions:  1. Exercise capacity is 8 METS, which is poor for age and  gender.  2. Hypertensive hemodynamic response.  3. Normal electrocardiographic response.  4. Normal augmentation in left ventricular systolic  function.  5. Appropriate heart rate response.  6. Hypertensive response (excessive increase in  systolic/diastolic BP).  7. Normal stress echocardiogram with no evidence of  inducible ischemia.    < end of copied text >  < from: Transthoracic Echocardiogram (11.09.15 @ 08:34) >  CONCLUSIONS:  1. Normal mitral valve. Mild mitral regurgitation.  2. Normal left ventricular internal dimensions and wall  thicknesses.  3. Normal left ventricular systolic function. No segmental  wall motion abnormalities.  4. Normal left ventricular diastolic function.  5. Normal right ventricular size and function.    CT: < from: CT Angio Abdomen and Pelvis w/ IV Cont (07.10.21 @ 00:35) >  FINDINGS:  LOWER CHEST: Within normal limits.    LIVER: Within normal limits.  BILE DUCTS: Normal caliber.  GALLBLADDER: Within normal limits.  SPLEEN: Within normal limits.  PANCREAS: Within normal limits.  ADRENALS: Within normal limits.  KIDNEYS/URETERS: Bilateral atrophic kidneys with bilateral cysts. No hydronephrosis.    BLADDER: Decompressed bladder with thickened bladder wall, limiting evaluation.  REPRODUCTIVE ORGANS: Prostate within normal limits.    BOWEL/VESSELS: There is active arterial contrast extravasation within the rectum with some foci of active arterial extravasation within the sigmoid colon. There are multiple areas of hyperdensity visualized throughout the colon, however these are also appreciated on the noncontrast images. No bowel obstruction.  PERITONEUM: No ascites.  RETROPERITONEUM/LYMPH NODES: No lymphadenopathy.  ABDOMINAL WALL: Within normal limits.  BONES: Within normal limits.    IMPRESSION:  There is an active GI bleed within the rectum with some foci of active arterial contrast extravasation within the sigmoid colon.  There are multiple areas of hypodensity visualized throughout the colon, however these are also appreciated on noncontrast images limiting evaluation for a GI bleed within this area.    The above findings were discussed with Dr. Junior at 1:01 AM on 7/10/2021 and Dr. Lizama at 1:06 AM on 7/10/2021 with read back confirmation.       SICU AM PROGRESS NOTE:  ====================================================  INTERVAL/Overnight Events:   - Given 2 units PRBC, IV Vitamin K 10mg x1 and DDAVP on 7/10/21  - seen by Dr. Ontiveros with anoscope, no anal bleed noted at this time. packed w/ gelfoam/surgicel packing  -During HD pt became acutely hypotensive, tachycardic and diaphoretic when trying to have BM- likely vasovagal episode          HPI:  37 y/o M with PMH HTN, ESRD on dialysis, hyperthyroidism (no meds), recent hemorrhoidectomy who presents for BRBPR in s/o 3 column hemorrhoidectomy on 6/25 with Dr. Ontiveros. Patient recently admitted 07/01 for similar presentation requiring blood transfusion and rectal packing with Surgicel at bedside with Colorectal Surgery, and was discharged after appropriate response to transfusion and no bleeding for more than 24 hours.     Presents with subjective lightheadedness and dizziness. Endorses rectal discomfort and some abdominal pain. Given 1U pRBC, reassessed with downtrending H/h, ordered for 2 additional units.     Patient seen and examined at bedside with Dr. Quiroz. Appears tired, but alert. Hemodynamically stable with MAPs in 100s, receiving 2nd unit of blood. JOSE with evacuation of stool and clots. Anal canal packed with Quik Clot. Transported to SICU.     Allergies: topical cleanser for dialysis access.   Exsept (Rash)    PAST MEDICAL & SURGICAL HISTORY:  ESRD on hemodialysis  at home 5 x week    HTN (hypertension)    ESRD (end stage renal disease) on dialysis    HTN (hypertension)    Bell&#x27;s palsy    Hyperthyroidism    AV fistula  L forearm    Elective surgical procedure  RACW permacath for HD, removed 5 years ago    --------------------------------------------------------------------------------------    VITAL SIGNS, INS/OUTS (last 24 hours):  --------------------------------------------------------------------------------------  ICU Vital Signs Last 24 Hrs  T(C): 36.6 (07-10-21 @ 19:15), Max: 36.7 (07-10-21 @ 15:50)  HR: 93 (07-10-21 @ 20:00) (65 - 131)  BP: 160/93 (07-10-21 @ 20:00) (100/59 - 160/93)  ABP: --  ABP(mean): --  RR: 16 (07-10-21 @ 20:00) (12 - 27)  SpO2: 100% (07-10-21 @ 20:00) (98% - 100%)  Wt(kg): --  CVP(mm Hg): --      07-09 @ 07:01  -  07-10 @ 07:00  --------------------------------------------------------  IN:    Lactated Ringers: 500 mL  Total IN: 500 mL    OUT:    Emesis (mL): 100 mL    Voided (mL): 0 mL  Total OUT: 100 mL    Total NET: 400 mL      07-10 @ 07:01  -  07-11 @ 01:09  --------------------------------------------------------  IN:    IV PiggyBack: 50 mL    Lactated Ringers: 1100 mL    Other (mL): 600 mL  Total IN: 1750 mL    OUT:    Other (mL): 1500 mL  Total OUT: 1500 mL    Total NET: 250 mL      --------------------------------------------------------------------------------------    EXAM  NEUROLOGY  RASS:   	GCS:  15  Exam: Normal, NAD, alert, oriented x 3, no focal deficits.     HEENT  Exam: Normocephalic, atraumatic.  EOMI    RESPIRATORY  Exam: Lungs clear to auscultation, Normal expansion/effort.      CARDIOVASCULAR  Exam: S1, S2.  Regular rate and rhythm.  Peripheral edema     GI/NUTRITION  Exam: Abdomen soft, Non-tender, Non-distended.    JOSE: blood and stool evacuated, packed with Quik Clot  Current Diet:  NPO     VASCULAR  Exam: Extremities warm, pink, well-perfused.      MUSCULOSKELETAL  Exam: All extremities moving spontaneously without limitations.      SKIN:  Exam: Good skin turgor, no skin breakdown.        Tubes/Lines/Drains    [x] Peripheral IV  [] Central Venous Line     	[] R	[] L	[] IJ	[] Fem	[] SC	Date Placed:   [] Arterial Line		[] R	[] L	[] Fem	[] Rad	[] Ax	Date Placed:   [] PICC:         	[] Midline		[] Mediport  [] Urinary Catheter		Date Placed:           LABS    CBC (07-11 @ 03:26)                              8.3<L>                         12.71<H>  )----------------(  250        --    % Neutrophils, --    % Lymphocytes, ANC: --                                  23.8<L>  CBC (07-10 @ 21:48)                              8.8<L>                         17.63<H>  )----------------(  195        --    % Neutrophils, --    % Lymphocytes, ANC: --                                  25.6<L>    BMP (07-10 @ 02:56)             137     |  105     |  44<H> 		Ca++ --      Ca 6.6<L>             ---------------------------------( 132<H>		Mg 2.00               4.3     |  20<L>   |  12.60<H>			Ph 4.1     BMP (07-09 @ 22:43)             138     |  99      |  43<H> 		Ca++ --      Ca 8.2<L>             ---------------------------------( 86    		Mg --                 3.7     |  25      |  13.22<H>			Ph --        LFTs (07-09 @ 22:43)      TPro 5.7<L> / Alb 3.4 / TBili 0.2 / DBili -- / AST 12 / ALT 10 / AlkPhos 46    Coags (07-10 @ 02:56)  aPTT 30.1 / INR 1.21<H> / PT 13.8<H>  Coags (07-09 @ 22:43)  aPTT 32.5 / INR 1.03 / PT 11.7              --------------------------------------------------------------------------------------        IMAGING RESULTS  Echo: < from: Stress Echo Exercise (w/TTE, w/Cont) (12.10.19 @ 14:45) >  Conclusions:  1. Exercise capacity is 8 METS, which is poor for age and  gender.  2. Hypertensive hemodynamic response.  3. Normal electrocardiographic response.  4. Normal augmentation in left ventricular systolic  function.  5. Appropriate heart rate response.  6. Hypertensive response (excessive increase in  systolic/diastolic BP).  7. Normal stress echocardiogram with no evidence of  inducible ischemia.    < end of copied text >  < from: Transthoracic Echocardiogram (11.09.15 @ 08:34) >  CONCLUSIONS:  1. Normal mitral valve. Mild mitral regurgitation.  2. Normal left ventricular internal dimensions and wall  thicknesses.  3. Normal left ventricular systolic function. No segmental  wall motion abnormalities.  4. Normal left ventricular diastolic function.  5. Normal right ventricular size and function.    CT: < from: CT Angio Abdomen and Pelvis w/ IV Cont (07.10.21 @ 00:35) >  FINDINGS:  LOWER CHEST: Within normal limits.    LIVER: Within normal limits.  BILE DUCTS: Normal caliber.  GALLBLADDER: Within normal limits.  SPLEEN: Within normal limits.  PANCREAS: Within normal limits.  ADRENALS: Within normal limits.  KIDNEYS/URETERS: Bilateral atrophic kidneys with bilateral cysts. No hydronephrosis.    BLADDER: Decompressed bladder with thickened bladder wall, limiting evaluation.  REPRODUCTIVE ORGANS: Prostate within normal limits.    BOWEL/VESSELS: There is active arterial contrast extravasation within the rectum with some foci of active arterial extravasation within the sigmoid colon. There are multiple areas of hyperdensity visualized throughout the colon, however these are also appreciated on the noncontrast images. No bowel obstruction.  PERITONEUM: No ascites.  RETROPERITONEUM/LYMPH NODES: No lymphadenopathy.  ABDOMINAL WALL: Within normal limits.  BONES: Within normal limits.    IMPRESSION:  There is an active GI bleed within the rectum with some foci of active arterial contrast extravasation within the sigmoid colon.  There are multiple areas of hypodensity visualized throughout the colon, however these are also appreciated on noncontrast images limiting evaluation for a GI bleed within this area.    The above findings were discussed with Dr. Junior at 1:01 AM on 7/10/2021 and Dr. Lizama at 1:06 AM on 7/10/2021 with read back confirmation.

## 2021-07-11 NOTE — PROGRESS NOTE ADULT - SUBJECTIVE AND OBJECTIVE BOX
no events, no blood per rectum    external anal exam-packing in place, starting to dissolve, no blood noted              Objective:    MEDICATIONS  (STANDING):  chlorhexidine 2% Cloths 1 Application(s) Topical daily  chlorhexidine 4% Liquid 1 Application(s) Topical <User Schedule>  epoetin elizabeth-epbx (RETACRIT) Injectable 62134 Unit(s) IV Push <User Schedule>  lactated ringers. 1000 milliLiter(s) (100 mL/Hr) IV Continuous <Continuous>    MEDICATIONS  (PRN):      Vital Signs Last 24 Hrs  T(C): 36.8 (2021 08:00), Max: 37.4 (10 Jul 2021 20:00)  T(F): 98.3 (2021 08:00), Max: 99.3 (10 Jul 2021 20:00)  HR: 78 (2021 08:00) (65 - 131)  BP: 130/66 (2021 08:00) (100/59 - 165/96)  BP(mean): 79 (2021 08:00) (68 - 112)  RR: 20 (2021 08:00) (12 - 27)  SpO2: 99% (2021 08:00) (98% - 100%)    I&O's Detail    10 Jul 2021 07:01  -  2021 07:00  --------------------------------------------------------  IN:    IV PiggyBack: 50 mL    Lactated Ringers: 2300 mL    Other (mL): 600 mL  Total IN: 2950 mL    OUT:    Other (mL): 1500 mL  Total OUT: 1500 mL    Total NET: 1450 mL      2021 07:01  -  2021 08:59  --------------------------------------------------------  IN:    Lactated Ringers: 100 mL  Total IN: 100 mL    OUT:  Total OUT: 0 mL    Total NET: 100 mL          Daily     Daily Weight in k.4 (2021 06:00)    LABS:                        8.3    12.71 )-----------( 250      ( 2021 03:26 )             23.8     07-11    136  |  97<L>  |  35<H>  ----------------------------<  92  4.2   |  24  |  9.67<H>    Ca    8.3<L>      2021 03:26  Phos  4.6     07-11  Mg     1.90     11    TPro  5.7<L>  /  Alb  3.4  /  TBili  0.2  /  DBili  x   /  AST  12  /  ALT  10  /  AlkPhos  46  07-09    PT/INR - ( 10 Jul 2021 02:56 )   PT: 13.8 sec;   INR: 1.21 ratio         PTT - ( 10 Jul 2021 02:56 )  PTT:30.1 sec      RADIOLOGY & ADDITIONAL STUDIES:

## 2021-07-11 NOTE — CONSULT NOTE ADULT - SUBJECTIVE AND OBJECTIVE BOX
CARDIOLOGY CONSULT - Dr. May     HPI:   37 y/o M with PMH HTN, ESRD on dialysis, hyperthyroidism (no meds), recent 3 column excisional hemorrhoidectomy on 6/25 complicated by readmission for BRBPR improved after rectal packing and multiple transfusions, now representing with recurrent BRBPR. Pt states that after discharge on 7/3 he was doing well at home, having loose BM with occasional hard stools without evidence of bleeding. Pt states that today he was urinating when he passed gas and noted BRB "spurting out". Since then pt states he has had 5+ episodes of large volume BRBPR with associated lightheadedness. Per patient bloody BM are preceded by abdominal cramping which is temporarily relieved by passing the blood/stool. Pt denies fever, chills, cp, sob, trauma to rectum/anus.     On arrival to ED pt afebrile and HD normal. Labs remarkable for Hgb 7.1, Cr of 16 and otherwise unremarkable. Pt continued to have multiple bloody BM in ED despite rectal packing with gel foam/surgicel. CTA performed and significant for evidence of active rectal bleed w/ concern for active extravasation in the sigmoid. After scan pt became hypotensive despite 1 unit PRBC and fluids. 2 additional units were rapidly infused. Dr. Vasquez bedside. Now hemodynamically stable with MAPs in 100s, receiving 2nd unit of blood. JOSE with evacuation of stool and clots. Anal canal packed with Quik Clot and transported to SICU.  (10 Jul 2021 02:23)     Cardiac eval for acutely hypotensive, tachycardic and diaphoretic when trying to have BM during HD . Pt denies any cardiac hx, unsure about most recent cardaic work up. Reports having to wear holter monitor about 8 months ago, but unsure why, holter monitor with no events.  Patient denies any chest pain, dyspnea, palpitations, cough, syncope, edema, exertional symptoms, nausea, abdominal pain, fever, chills,  or rash.  no further episodes noted         PAST MEDICAL & SURGICAL HISTORY:  ESRD on hemodialysis  at home 5 x week    HTN (hypertension)    ESRD (end stage renal disease) on dialysis    HTN (hypertension)    Bell&#x27;s palsy    Hyperthyroidism    AV fistula  L forearm    Elective surgical procedure  RACW permacath for HD, removed 5 years ago            PREVIOUS DIAGNOSTIC TESTING:    [ x] Echocardiogram:   < from: Stress Echo Exercise (w/TTE, w/Cont) (12.10.19 @ 14:45) >  Dimensions:    Normal Values:  LA:     3.8    2.0 - 4.0 cm  Ao:     3.5  2.0 - 3.8 cm  SEPTUM: 1.3    0.6 - 1.2 cm  PWT:    1.3    0.6 - 1.1 cm  LVIDd:  5.0    3.0 - 5.6 cm  LVIDs:  3.3    1.8 - 4.0 cm  Derived variables:  LVMI: 116 g/m2  RWT: 0.52  Fractional short: 34 %  EF (Teicholtz): 63 %  ------------------------------------------------------------------------  Observations:  Mitral Valve: Normal mitral valve. Moderate mitral  regurgitation.  Aortic Valve/Aorta: Normal trileaflet aortic valve.  Normal aortic root.  Left Atrium: Mildly dilated left atrium.  LA volume index =  36 cc/m2.  Left Ventricle: Normal left ventricular systolic function.  No segmental wall motion abnormalities.  Endocardial  visualization enhanced with intravenous injection of echo  contrast (Definity). Mild concentric left ventricular  hypertrophy.  Right Heart: Normal right atrium. Normal right ventricular  size and function. Normal tricuspid valve.  Mild-moderate  tricuspid regurgitation. Normal pulmonic valve.  Pericardium/Pleura: Normal pericardium with trace  pericardial effusion.  ------------------------------------------------------------------------  Exercise tolerance test:  Duration of Exercise:   07:00 min    Maximum stage: 3  BruceBaseline HR: 84 bpm  Peak HR: 160 bpm  % MPHR: 87 %  Baseline BP: 149/97 mmHg  Peak BP: 210/98 mmHg  Peak RPP: 48493 (Rate Pressure Product)  Test terminated: Fatigue.  Performance: Exercise capacity is 8 METS, which is poor for  age and gender.  Baseline EKG: Normal sinus rhythm.  EKG changes: No significant ST segment changes.  Arrhythmia: None  Heart Rhythm: Normal sinus  HR Response: Appropriate heart rate response.  BP Response: Hypertensive response (excessive increase in  systolic/diastolic BP).  Symptoms: None  ------------------------------------------------------------------------  Echocardiographic Study:  (A) Baseline echocardiogram reveals:  1. Normal mitral valve. Moderate mitral regurgitation.  2. Mildly dilated left atrium.  LA volume index = 36 cc/m2.  3. Mild concentric left ventricular hypertrophy.  4. Normal left ventricular systolic function. No segmental  wall motion abnormalities.  Endocardial visualization  enhanced with intravenous injection of echo contrast  (Definity).  5. Normal right ventricular size and function.  (B) Stress echocardiogram reveals:  Normal augmentation in left ventricular systolic function.  ------------------------------------------------------------------------  Conclusions:  1. Exercise capacity is 8 METS, which is poor for age and  gender.  2. Hypertensive hemodynamic response.  3. Normal electrocardiographic response.  4. Normal augmentation in left ventricular systolic  function.  5. Appropriate heart rate response.  6. Hypertensive response (excessive increase in  systolic/diastolic BP).  7. Normal stress echocardiogram with no evidence of  inducible ischemia.    < end of copied text >    [ ]  Catheterization:  [ ] Stress Test:  	    MEDICATIONS:  Home Medications:  calcitriol 0.25 mcg oral capsule: 1 cap(s) orally once a day (25 Jun 2021 13:29)  isosorbide mononitrate 60 mg oral tablet, extended release: 1 tab(s) orally once a day (in the morning) (25 Jun 2021 13:29)  labetalol 300 mg oral tablet: 1.5 tab(s) orally 2 times a day (25 Jun 2021 13:29)  lanthanum 1000 mg oral tablet, chewable: 1 tab(s) orally 4 times a day (25 Jun 2021 13:29)  MiraLax oral powder for reconstitution: 1 scoop orally daily (25 Jun 2021 13:29)  Sensipar 30 mg oral tablet: 1 tab(s) orally once a day (25 Jun 2021 13:29)  sevelamer hydrochloride 800 mg oral tablet: 3 tab(s) orally 3 times a day (25 Jun 2021 13:29)      MEDICATIONS  (STANDING):  chlorhexidine 2% Cloths 1 Application(s) Topical daily  chlorhexidine 4% Liquid 1 Application(s) Topical <User Schedule>  epoetin elizabeth-epbx (RETACRIT) Injectable 04748 Unit(s) IV Push <User Schedule>  lactated ringers. 1000 milliLiter(s) (100 mL/Hr) IV Continuous <Continuous>  polyethylene glycol 3350 17 Gram(s) Oral daily      FAMILY HISTORY:  Family history of hypertension (Father, Mother)  Father and mother        SOCIAL HISTORY:    [ ] Non-smoker  [ ] Smoker  [ ] Alcohol    Allergies    No Known Drug Allergies  topical cleanser for dialysis access.   Exsept (Rash)    Intolerances    	    REVIEW OF SYSTEMS:  CONSTITUTIONAL: No fever, weight loss, or fatigue  EYES: No eye pain, visual disturbances, or discharge  ENMT:  No difficulty hearing, tinnitus, vertigo; No sinus or throat pain  NECK: No pain or stiffness  RESPIRATORY: No cough, wheezing, chills or hemoptysis; No Shortness of Breath  CARDIOVASCULAR: No chest pain, palpitations, passing out, dizziness, or leg swelling  GASTROINTESTINAL: No abdominal or epigastric pain. No nausea, vomiting, or hematemesis; No diarrhea or constipation. No melena or hematochezia.  GENITOURINARY: No dysuria, frequency, hematuria, or incontinence  NEUROLOGICAL: No headaches, memory loss, loss of strength, numbness, or tremors  SKIN: No itching, burning, rashes, or lesions   	    x[ ] All others negative	ssee hpi   [ ] Unable to obtain    PHYSICAL EXAM:  T(C): 36.8 (07-11-21 @ 08:00), Max: 37.4 (07-10-21 @ 20:00)  HR: 82 (07-11-21 @ 09:00) (74 - 131)  BP: 138/84 (07-11-21 @ 09:00) (100/59 - 165/96)  RR: 20 (07-11-21 @ 09:00) (12 - 27)  SpO2: 95% (07-11-21 @ 09:00) (95% - 100%)  Wt(kg): --  I&O's Summary    10 Jul 2021 07:01  -  11 Jul 2021 07:00  --------------------------------------------------------  IN: 2950 mL / OUT: 1500 mL / NET: 1450 mL    11 Jul 2021 07:01  -  11 Jul 2021 10:28  --------------------------------------------------------  IN: 300 mL / OUT: 0 mL / NET: 300 mL        Appearance: Normal	  Psychiatry: A & O x 3, Mood & affect appropriate  HEENT:   Normal oral mucosa, PERRL, EOMI	  Lymphatic: No lymphadenopathy  Cardiovascular: Normal S1 S2,RRR, No JVD, No murmurs  Respiratory: Lungs clear to auscultation	  Gastrointestinal:  Soft, Non-tender, + BS	  Skin: No rashes, No ecchymoses, No cyanosis	  Neurologic: Non-focal  Extremities: Normal range of motion, No clubbing, cyanosis or edema  Vascular: Peripheral pulses palpable 2+ bilaterally    TELEMETRY: NSR 70-80s	    ECG:  	  RADIOLOGY:  < from: CT Angio Abdomen and Pelvis w/ IV Cont (07.10.21 @ 00:35) >    FINDINGS:  LOWER CHEST: Within normal limits.    LIVER: Within normal limits.  BILE DUCTS: Normal caliber.  GALLBLADDER: Within normal limits.  SPLEEN: Within normal limits.  PANCREAS: Within normal limits.  ADRENALS: Within normal limits.  KIDNEYS/URETERS: Bilateral atrophic kidneys with bilateral cysts. No hydronephrosis.    BLADDER: Decompressed bladder with thickened bladder wall, limiting evaluation.  REPRODUCTIVE ORGANS: Prostate within normal limits.    BOWEL/VESSELS: There is active arterial contrast extravasation within the rectum with some foci of active arterial extravasation within the sigmoid colon. There are multiple areas of hyperdensity visualized throughout the colon, however these are also appreciated on the noncontrast images. No bowel obstruction.  PERITONEUM: No ascites.  RETROPERITONEUM/LYMPH NODES: No lymphadenopathy.  ABDOMINAL WALL: Within normal limits.  BONES: Within normal limits.    IMPRESSION:  There is an active GI bleed within the rectum with some foci of active arterial contrast extravasation within the sigmoid colon.  There are multiple areas of hypodensity visualized throughout the colon, however these are also appreciated on noncontrast images limiting evaluation for a GI bleed within this area.    The above findings were discussed with Dr. Junior at 1:01 AM on 7/10/2021 and Dr. Lizama at 1:06 AM on 7/10/2021 with read back confirmation.    < end of copied text >    OTHER: 	  	  LABS:	 	    CARDIAC MARKERS:                                  8.3    12.71 )-----------( 250      ( 11 Jul 2021 03:26 )             23.8     07-11    136  |  97<L>  |  35<H>  ----------------------------<  92  4.2   |  24  |  9.67<H>    Ca    8.3<L>      11 Jul 2021 03:26  Phos  4.6     07-11  Mg     1.90     07-11    TPro  5.7<L>  /  Alb  3.4  /  TBili  0.2  /  DBili  x   /  AST  12  /  ALT  10  /  AlkPhos  46  07-09    PT/INR - ( 10 Jul 2021 02:56 )   PT: 13.8 sec;   INR: 1.21 ratio         PTT - ( 10 Jul 2021 02:56 )  PTT:30.1 sec  proBNP:   Lipid Profile:   HgA1c:   TSH:

## 2021-07-11 NOTE — CONSULT NOTE ADULT - ASSESSMENT
Stress Echo 12/10/19: EF 63%, nl lv sys fx, mild LVH, mild-mod TR, no evidence of ischemia     a/p  37 y/o M with PMH HTN, ESRD on dialysis, hyperthyroidism (no meds), recent 3 column excisional hemorrhoidectomy on 6/25 complicated by readmission for BRBPR improved after rectal packing and multiple transfusions, now representing with recurrent BRBPR.  Cardiac eval for acutely hypotensive, tachycardic and diaphoretic when trying to have BM during HD     # Hypotension, Sinus Tachycardia   -brief episode during HD yesterday associated with diaphoresis  -? Vasovagal 2/2 rectal packing   -most recent echo noted with nl lv sys fx, most recent stress without evidence of ischemia  -EKG noted without acute ischemic changes   -bp stable, not on pressors   -check echo    #GIB s/p hemorrhoidectomy  -h/h improved s/p PRBCs  -CT a/p noted  -no intervention with IR at this time  -GI eval  -colorectal sx f/u    #CKD  -hd per renal   -renal f/u     dvt ppx    Care per SICU

## 2021-07-11 NOTE — CONSULT NOTE ADULT - TIME BILLING
Pt seen and examined, agree with the above assessment and plan by JOAQUIN De León.  Pt known to me from prior admission  37 y/o M with PMH HTN, ESRD on dialysis, hyperthyroidism (no meds), recent 3 column excisional hemorrhoidectomy on 6/25 complicated by readmission for BRBPR improved after rectal packing and multiple transfusions, now representing with recurrent BRBPR. and near-syncope event during a BM  Event vasovagal in setting of blood loss anemia  now resolved  H/H improved s/p prbc  CV stable  Repeat echo  care per  SICU

## 2021-07-11 NOTE — PROGRESS NOTE ADULT - ASSESSMENT
36y Male with history of ESRD presents with BRBPR. Nephrology consulted for ESRD status.    1) ESRD: Last HD on 7/10 with intradialytic hypotension for which UF goal reduced to 0.9L. Plan for next maintenance HD on 7/13. Would D/C IVF if patient tolerating PO to minimize risk of volume overload. Monitor electrolytes.    2) HTN with ESRD: BP controlled. Holding home medications due to hypotension on admission. Monitor BP.    3) Anemia of renal disease: With component of GIB. S/P PRBC. Continue with Epo 10K with HD. Monitor Hb.    4) Hyperphosphatemia: Phosphorus controlled with acceptable iPTH. Resume calcitriol 0.5 mcg daily and renvela 4 tabs with meals. Monitor serum calcium and phosphorus.      [facility] NEPHROLOGY  Betito Dodson M.D.  Octavio Hernandez D.O.  Lynne Biswas M.D.  Mallory Hook, RONALDO, ANP-C    Telephone: [phone]  Facsimile: [phone]    [address] [place] [address]

## 2021-07-12 LAB
ANION GAP SERPL CALC-SCNC: 14 MMOL/L — SIGNIFICANT CHANGE UP (ref 7–14)
BILIRUB DIRECT SERPL-MCNC: <0.2 MG/DL — SIGNIFICANT CHANGE UP (ref 0–0.2)
BILIRUB INDIRECT FLD-MCNC: >0 MG/DL — SIGNIFICANT CHANGE UP (ref 0–1)
BILIRUB SERPL-MCNC: 0.2 MG/DL — SIGNIFICANT CHANGE UP (ref 0.2–1.2)
BLD GP AB SCN SERPL QL: NEGATIVE — SIGNIFICANT CHANGE UP
BUN SERPL-MCNC: 54 MG/DL — HIGH (ref 7–23)
CALCIUM SERPL-MCNC: 7.9 MG/DL — LOW (ref 8.4–10.5)
CHLORIDE SERPL-SCNC: 96 MMOL/L — LOW (ref 98–107)
CO2 SERPL-SCNC: 26 MMOL/L — SIGNIFICANT CHANGE UP (ref 22–31)
CREAT SERPL-MCNC: 13 MG/DL — HIGH (ref 0.5–1.3)
GLUCOSE SERPL-MCNC: 103 MG/DL — HIGH (ref 70–99)
HAPTOGLOB SERPL-MCNC: 75 MG/DL — SIGNIFICANT CHANGE UP (ref 34–200)
HCT VFR BLD CALC: 18.4 % — CRITICAL LOW (ref 39–50)
HCT VFR BLD CALC: 19.6 % — CRITICAL LOW (ref 39–50)
HCT VFR BLD CALC: 20.2 % — CRITICAL LOW (ref 39–50)
HCT VFR BLD CALC: 22 % — LOW (ref 39–50)
HCT VFR BLD CALC: 24.1 % — LOW (ref 39–50)
HGB BLD-MCNC: 6.2 G/DL — CRITICAL LOW (ref 13–17)
HGB BLD-MCNC: 6.5 G/DL — CRITICAL LOW (ref 13–17)
HGB BLD-MCNC: 6.7 G/DL — CRITICAL LOW (ref 13–17)
HGB BLD-MCNC: 7.4 G/DL — LOW (ref 13–17)
HGB BLD-MCNC: 8.1 G/DL — LOW (ref 13–17)
LDH SERPL L TO P-CCNC: 280 U/L — HIGH (ref 135–225)
MAGNESIUM SERPL-MCNC: 2.2 MG/DL — SIGNIFICANT CHANGE UP (ref 1.6–2.6)
MCHC RBC-ENTMCNC: 28.2 PG — SIGNIFICANT CHANGE UP (ref 27–34)
MCHC RBC-ENTMCNC: 28.3 PG — SIGNIFICANT CHANGE UP (ref 27–34)
MCHC RBC-ENTMCNC: 28.4 PG — SIGNIFICANT CHANGE UP (ref 27–34)
MCHC RBC-ENTMCNC: 28.6 PG — SIGNIFICANT CHANGE UP (ref 27–34)
MCHC RBC-ENTMCNC: 29 PG — SIGNIFICANT CHANGE UP (ref 27–34)
MCHC RBC-ENTMCNC: 33.2 GM/DL — SIGNIFICANT CHANGE UP (ref 32–36)
MCHC RBC-ENTMCNC: 33.2 GM/DL — SIGNIFICANT CHANGE UP (ref 32–36)
MCHC RBC-ENTMCNC: 33.6 GM/DL — SIGNIFICANT CHANGE UP (ref 32–36)
MCHC RBC-ENTMCNC: 33.6 GM/DL — SIGNIFICANT CHANGE UP (ref 32–36)
MCHC RBC-ENTMCNC: 33.7 GM/DL — SIGNIFICANT CHANGE UP (ref 32–36)
MCV RBC AUTO: 84 FL — SIGNIFICANT CHANGE UP (ref 80–100)
MCV RBC AUTO: 84.3 FL — SIGNIFICANT CHANGE UP (ref 80–100)
MCV RBC AUTO: 85.2 FL — SIGNIFICANT CHANGE UP (ref 80–100)
MCV RBC AUTO: 86 FL — SIGNIFICANT CHANGE UP (ref 80–100)
MCV RBC AUTO: 86.3 FL — SIGNIFICANT CHANGE UP (ref 80–100)
NRBC # BLD: 0 /100 WBCS — SIGNIFICANT CHANGE UP
NRBC # FLD: 0 K/UL — SIGNIFICANT CHANGE UP
PHOSPHATE SERPL-MCNC: 4.9 MG/DL — HIGH (ref 2.5–4.5)
PLATELET # BLD AUTO: 226 K/UL — SIGNIFICANT CHANGE UP (ref 150–400)
PLATELET # BLD AUTO: 238 K/UL — SIGNIFICANT CHANGE UP (ref 150–400)
PLATELET # BLD AUTO: 242 K/UL — SIGNIFICANT CHANGE UP (ref 150–400)
PLATELET # BLD AUTO: 264 K/UL — SIGNIFICANT CHANGE UP (ref 150–400)
PLATELET # BLD AUTO: 267 K/UL — SIGNIFICANT CHANGE UP (ref 150–400)
POTASSIUM SERPL-MCNC: 4.1 MMOL/L — SIGNIFICANT CHANGE UP (ref 3.5–5.3)
POTASSIUM SERPL-SCNC: 4.1 MMOL/L — SIGNIFICANT CHANGE UP (ref 3.5–5.3)
RBC # BLD: 2.14 M/UL — LOW (ref 4.2–5.8)
RBC # BLD: 2.27 M/UL — LOW (ref 4.2–5.8)
RBC # BLD: 2.37 M/UL — LOW (ref 4.2–5.8)
RBC # BLD: 2.61 M/UL — LOW (ref 4.2–5.8)
RBC # BLD: 2.87 M/UL — LOW (ref 4.2–5.8)
RBC # FLD: 15.8 % — HIGH (ref 10.3–14.5)
RBC # FLD: 15.9 % — HIGH (ref 10.3–14.5)
RBC # FLD: 16.1 % — HIGH (ref 10.3–14.5)
RH IG SCN BLD-IMP: POSITIVE — SIGNIFICANT CHANGE UP
SODIUM SERPL-SCNC: 136 MMOL/L — SIGNIFICANT CHANGE UP (ref 135–145)
WBC # BLD: 10.6 K/UL — HIGH (ref 3.8–10.5)
WBC # BLD: 10.79 K/UL — HIGH (ref 3.8–10.5)
WBC # BLD: 11.04 K/UL — HIGH (ref 3.8–10.5)
WBC # BLD: 8.01 K/UL — SIGNIFICANT CHANGE UP (ref 3.8–10.5)
WBC # BLD: 8.37 K/UL — SIGNIFICANT CHANGE UP (ref 3.8–10.5)
WBC # FLD AUTO: 10.6 K/UL — HIGH (ref 3.8–10.5)
WBC # FLD AUTO: 10.79 K/UL — HIGH (ref 3.8–10.5)
WBC # FLD AUTO: 11.04 K/UL — HIGH (ref 3.8–10.5)
WBC # FLD AUTO: 8.01 K/UL — SIGNIFICANT CHANGE UP (ref 3.8–10.5)
WBC # FLD AUTO: 8.37 K/UL — SIGNIFICANT CHANGE UP (ref 3.8–10.5)

## 2021-07-12 PROCEDURE — 99232 SBSQ HOSP IP/OBS MODERATE 35: CPT | Mod: GC

## 2021-07-12 RX ORDER — LANOLIN ALCOHOL/MO/W.PET/CERES
3 CREAM (GRAM) TOPICAL AT BEDTIME
Refills: 0 | Status: DISCONTINUED | OUTPATIENT
Start: 2021-07-12 | End: 2021-07-12

## 2021-07-12 RX ORDER — LANOLIN ALCOHOL/MO/W.PET/CERES
3 CREAM (GRAM) TOPICAL ONCE
Refills: 0 | Status: COMPLETED | OUTPATIENT
Start: 2021-07-12 | End: 2021-07-12

## 2021-07-12 RX ORDER — PREGABALIN 225 MG/1
1000 CAPSULE ORAL DAILY
Refills: 0 | Status: DISCONTINUED | OUTPATIENT
Start: 2021-07-12 | End: 2021-07-15

## 2021-07-12 RX ORDER — FOLIC ACID 0.8 MG
1 TABLET ORAL DAILY
Refills: 0 | Status: DISCONTINUED | OUTPATIENT
Start: 2021-07-12 | End: 2021-07-15

## 2021-07-12 RX ADMIN — CALCITRIOL 0.5 MICROGRAM(S): 0.5 CAPSULE ORAL at 12:07

## 2021-07-12 RX ADMIN — CHLORHEXIDINE GLUCONATE 1 APPLICATION(S): 213 SOLUTION TOPICAL at 05:58

## 2021-07-12 RX ADMIN — CHLORHEXIDINE GLUCONATE 1 APPLICATION(S): 213 SOLUTION TOPICAL at 12:26

## 2021-07-12 RX ADMIN — Medication 1 MILLIGRAM(S): at 11:47

## 2021-07-12 RX ADMIN — POLYETHYLENE GLYCOL 3350 17 GRAM(S): 17 POWDER, FOR SOLUTION ORAL at 11:47

## 2021-07-12 RX ADMIN — PREGABALIN 1000 MICROGRAM(S): 225 CAPSULE ORAL at 12:08

## 2021-07-12 RX ADMIN — Medication 3 MILLIGRAM(S): at 03:31

## 2021-07-12 NOTE — PROGRESS NOTE ADULT - ASSESSMENT
Stress Echo 12/10/19: EF 63%, nl lv sys fx, mild LVH, mild-mod TR, no evidence of ischemia     a/p  35 y/o M with PMH HTN, ESRD on dialysis, hyperthyroidism (no meds), recent 3 column excisional hemorrhoidectomy on 6/25 complicated by readmission for BRBPR improved after rectal packing and multiple transfusions, now representing with recurrent BRBPR.  Cardiac eval for acutely hypotensive, tachycardic and diaphoretic when trying to have BM during HD     # Hypotension, Sinus Tachycardia   -brief episode during HD yesterday associated with diaphoresis  -? Vasovagal 2/2 rectal packing   -most recent echo noted with nl lv sys fx, most recent stress without evidence of ischemia  -EKG noted without acute ischemic changes   -bp stable, not on pressors   -check echo    #GIB s/p hemorrhoidectomy  -h/h noted - s/p PRBCs  -CT a/p noted  -no intervention with IR at this time  -GI eval  -colorectal sx f/u    #CKD  -hd per renal   -renal f/u     dvt ppx    Care per SICU

## 2021-07-12 NOTE — PROGRESS NOTE ADULT - ASSESSMENT
36y Male with history of ESRD presents with BRBPR. Nephrology consulted for ESRD status.    1) ESRD: Last HD on 7/10 with intradialytic hypotension for which UF goal reduced to 0.9L. Plan for next maintenance HD on 7/13. Monitor electrolytes.    2) HTN with ESRD: BP controlled. Holding home medications due to hypotension on admission. Can resume as needed if BP increases. Monitor BP.    3) Anemia of renal disease: With component of GIB. S/P PRBC. Continue with Epo 10K with HD. Monitor Hb.    4) Hyperphosphatemia: Phosphorus controlled with acceptable iPTH. Continue with calcitriol 0.5 mcg daily. Will need to resume home renvela if phosphorus increases further. Monitor serum calcium and phosphorus.      [facility] NEPHROLOGY  Betito Dodson M.D.  Octavio Hernandez D.O.  Lynne Biswas M.D.  M[place]ory [place], MSN, ANP-C    Telephone: [phone]  Facsimile: [phone]    [address] [place] [address]

## 2021-07-12 NOTE — PROGRESS NOTE ADULT - SUBJECTIVE AND OBJECTIVE BOX
SURGERY  Pager: #[phone]    STATUS POST: three column excisional hemorrhoidectomy    POST OPERATIVE DAY #17      INTERVAL EVENTS/SUBJECTIVE: Overnight patient noted to have decreased H/H, transfused one unit. Will receive second one this morning. Patient denying pain, bloody bowel movements. Has been eating and tolerating well.     ______________________________________________  OBJECTIVE:   T(C): 36.8 (07-12-21 @ 04:00), Max: 37.2 (07-11-21 @ 20:00)  HR: 79 (07-12-21 @ 06:00) (74 - 101)  BP: 140/92 (07-12-21 @ 06:00) (124/71 - 150/83)  RR: 19 (07-12-21 @ 06:00) (12 - 22)  SpO2: 99% (07-12-21 @ 06:00) (95% - 100%)  Wt(kg): --  CAPILLARY BLOOD GLUCOSE        I&O's Detail    10 Jul 2021 07:01  -  11 Jul 2021 07:00  --------------------------------------------------------  IN:    IV PiggyBack: 50 mL    Lactated Ringers: 2300 mL    Other (mL): 600 mL  Total IN: 2950 mL    OUT:    Other (mL): 1500 mL  Total OUT: 1500 mL    Total NET: 1450 mL      11 Jul 2021 07:01  -  12 Jul 2021 06:52  --------------------------------------------------------  IN:    IV PiggyBack: 200 mL    Lactated Ringers: 500 mL    Oral Fluid: 900 mL    PRBCs (Packed Red Blood Cells): 283 mL  Total IN: 1883 mL    OUT:    Voided (mL): 50 mL  Total OUT: 50 mL    Total NET: 1833 mL          Physical exam:  Gen: NAD, lying in bed, sleepy   Respiratory: no increased WOB, normal inspiratory effort   Abdomen: soft, nontender, nondistended  Vascular: WWP, MAEx4  ______________________________________________  LABS:  CBC Full  -  ( 12 Jul 2021 03:22 )  WBC Count : 10.79 K/uL  RBC Count : 2.27 M/uL  Hemoglobin : 6.5 g/dL  Hematocrit : 19.6 %  Platelet Count - Automated : 264 K/uL  Mean Cell Volume : 86.3 fL  Mean Cell Hemoglobin : 28.6 pg  Mean Cell Hemoglobin Concentration : 33.2 gm/dL  Auto Neutrophil # : x  Auto Lymphocyte # : x  Auto Monocyte # : x  Auto Eosinophil # : x  Auto Basophil # : x  Auto Neutrophil % : x  Auto Lymphocyte % : x  Auto Monocyte % : x  Auto Eosinophil % : x  Auto Basophil % : x    07-12    136  |  96<L>  |  54<H>  ----------------------------<  103<H>  4.1   |  26  |  13.00<H>    Ca    7.9<L>      12 Jul 2021 01:47  Phos  4.9     07-12  Mg     2.20     07-12      _____________________________________________  RADIOLOGY:

## 2021-07-12 NOTE — PROGRESS NOTE ADULT - SUBJECTIVE AND OBJECTIVE BOX
[facility] NEPHROLOGY- PROGRESS NOTE    36y Male with history of ESRD presents with BRBPR. Nephrology consulted for ESRD status.    REVIEW OF SYSTEMS:  Gen: no changes in weight  Cards: no chest pain  Resp: no dyspnea  GI: no nausea or vomiting or diarrhea, + rectal bleeding improving  Vascular: no LE edema    No Known Drug Allergies  topical cleanser for dialysis access.   Exsept (Rash)      Hospital Medications: Medications reviewed      VITALS:  T(F): 97 (07-12-21 @ 12:00), Max: 99 (07-11-21 @ 20:00)  HR: 81 (07-12-21 @ 14:00)  BP: 141/89 (07-12-21 @ 14:00)  RR: 23 (07-12-21 @ 14:00)  SpO2: 100% (07-12-21 @ 14:00)  Wt(kg): --    07-11 @ 07:01  -  07-12 @ 07:00  --------------------------------------------------------  IN: 1883 mL / OUT: 50 mL / NET: 1833 mL    07-12 @ 07:01  -  07-12 @ 15:07  --------------------------------------------------------  IN: 300 mL / OUT: 0 mL / NET: 300 mL        PHYSICAL EXAM:    Gen: NAD, calm  Cards: RRR, +S1/S2, no M/G/R  Resp: CTA B/L  GI: soft, NT/ND, NABS  Vascular: no LE edema B/L, LUE AVF + bruit/thrill with buttonholes      LABS:  07-12    136  |  96<L>  |  54<H>  ----------------------------<  103<H>  4.1   |  26  |  13.00<H>    Ca    7.9<L>      12 Jul 2021 01:47  Phos  4.9     07-12  Mg     2.20     07-12      Creatinine Trend: 13.00 <--, 9.67 <--, 12.60 <--, 13.22 <--                        7.4    8.01  )-----------( 601 ( 12 Jul 2021 11:08 )             22.0     Urine Studies:

## 2021-07-12 NOTE — PROGRESS NOTE ADULT - SUBJECTIVE AND OBJECTIVE BOX
CARDIOLOGY FOLLOW UP - Dr. May  Date of Service: 7/12/21  CC: denies cp, sob, and palpitations     Review of Systems:  Constitutional: No fever, weight loss, or fatigue  Respiratory: No cough, wheezing, or hemoptysis, no shortness of breath  Cardiovascular: No chest pain, palpitations, passing out, dizziness, or leg swelling  Gastrointestinal: No abd or epigastric pain.  No nausea, vomiting, or hematemesis; no diarrhea or constipation, no melena or hematochezia  Vascular: no edema       PHYSICAL EXAM:  T(C): 36.6 (07-12-21 @ 08:00), Max: 37.2 (07-11-21 @ 20:00)  HR: 76 (07-12-21 @ 10:00) (71 - 101)  BP: 144/80 (07-12-21 @ 10:00) (124/71 - 150/85)  RR: 20 (07-12-21 @ 10:00) (12 - 22)  SpO2: 99% (07-12-21 @ 10:00) (98% - 100%)  Wt(kg): --  I&O's Summary    11 Jul 2021 07:01  -  12 Jul 2021 07:00  --------------------------------------------------------  IN: 1883 mL / OUT: 50 mL / NET: 1833 mL    12 Jul 2021 07:01  -  12 Jul 2021 11:59  --------------------------------------------------------  IN: 300 mL / OUT: 0 mL / NET: 300 mL        Appearance: Normal	  Cardiovascular: Normal S1 S2,RRR, No JVD, No murmurs  Respiratory: Lungs clear to auscultation	  Gastrointestinal:  Soft, Non-tender, + BS	  Extremities: Normal range of motion, No clubbing, cyanosis or edema      Home Medications:  calcitriol 0.25 mcg oral capsule: 1 cap(s) orally once a day (25 Jun 2021 13:29)  isosorbide mononitrate 60 mg oral tablet, extended release: 1 tab(s) orally once a day (in the morning) (25 Jun 2021 13:29)  labetalol 300 mg oral tablet: 1.5 tab(s) orally 2 times a day (25 Jun 2021 13:29)  lanthanum 1000 mg oral tablet, chewable: 1 tab(s) orally 4 times a day (25 Jun 2021 13:29)  MiraLax oral powder for reconstitution: 1 scoop orally daily (25 Jun 2021 13:29)  Sensipar 30 mg oral tablet: 1 tab(s) orally once a day (25 Jun 2021 13:29)  sevelamer hydrochloride 800 mg oral tablet: 3 tab(s) orally 3 times a day (25 Jun 2021 13:29)      MEDICATIONS  (STANDING):  calcitriol   Capsule 0.5 MICROGram(s) Oral daily  chlorhexidine 2% Cloths 1 Application(s) Topical daily  chlorhexidine 4% Liquid 1 Application(s) Topical <User Schedule>  cyanocobalamin 1000 MICROGram(s) Oral daily  epoetin elizabeth-epbx (RETACRIT) Injectable 69463 Unit(s) IV Push <User Schedule>  folic acid 1 milliGRAM(s) Oral daily  polyethylene glycol 3350 17 Gram(s) Oral daily      TELEMETRY: NSR 	    ECG:  	  RADIOLOGY:   DIAGNOSTIC TESTING:  [ ] Echocardiogram:  [ ]  Catheterization:  [ ] Stress Test:    OTHER: 	    LABS:	 	                            7.4    8.01  )-----------( 238      ( 12 Jul 2021 11:08 )             22.0     07-12    136  |  96<L>  |  54<H>  ----------------------------<  103<H>  4.1   |  26  |  13.00<H>    Ca    7.9<L>      12 Jul 2021 01:47  Phos  4.9     07-12  Mg     2.20     07-12

## 2021-07-12 NOTE — PROGRESS NOTE ADULT - SUBJECTIVE AND OBJECTIVE BOX
SICU Daily Progress Note  =====================================================  Interval/Overnight Events:    - packing fell out overnight w/ attempt at BM. No active bleeding.	    HPI:   35 y/o M with PMH HTN, ESRD on dialysis, hyperthyroidism (no meds), recent 3 column excisional hemorrhoidectomy on 6/25 complicated by readmission for BRBPR improved after rectal packing and multiple transfusions, now representing with recurrent BRBPR. Pt states that after discharge on 7/3 he was doing well at home, having loose BM with occasional hard stools without evidence of bleeding. Pt states that today he was urinating when he passed gas and noted BRB "spurting out". Since then pt states he has had 5+ episodes of large volume BRBPR with associated lightheadedness. Per patient bloody BM are preceded by abdominal cramping which is temporarily relieved by passing the blood/stool. Pt denies fever, chills, cp, sob, trauma to rectum/anus.     On arrival to ED pt afebrile and HD normal. Labs remarkable for Hgb 7.1, Cr of 16 and otherwise unremarkable. Pt continued to have multiple bloody BM in ED despite rectal packing with gel foam/surgicel. CTA performed and significant for evidence of active rectal bleed w/ concern for active extravasation in the sigmoid. After scan pt became hypotensive despite 1 unit PRBC and fluids. 2 additional units were rapidly infused. Dr. Vasquez bedside. Now hemodynamically stable with MAPs in 100s, receiving 2nd unit of blood. JOSE with evacuation of stool and clots. Anal canal packed with Quik Clot and transported to SICU.  (10 Jul 2021 02:23)      PAST MEDICAL & SURGICAL HISTORY:  ESRD on hemodialysis  at home 5 x week  HTN (hypertension)  ESRD (end stage renal disease) on dialysis  HTN (hypertension)  Bell&#x27;s palsy  Hyperthyroidism  AV fistula  L forearm  Elective surgical procedure  RACW permacath for HD, removed 5 years ago    ALLERGIES:  No Known Drug Allergies  topical cleanser for dialysis access.   Exsept (Rash)    --------------------------------------------------------------------------------------    MEDICATIONS:  Gastrointestinal Medications  calcitriol   Capsule 0.5 MICROGram(s) Oral daily  polyethylene glycol 3350 17 Gram(s) Oral daily    Hematologic/Oncologic Medications  epoetin elizabeth-epbx (RETACRIT) Injectable 21051 Unit(s) IV Push <User Schedule>    Topical/Other Medications  chlorhexidine 2% Cloths 1 Application(s) Topical daily  chlorhexidine 4% Liquid 1 Application(s) Topical <User Schedule>    --------------------------------------------------------------------------------------    VITAL SIGNS:  ICU Vital Signs Last 24 Hrs  T(C): 37.1 (12 Jul 2021 00:00), Max: 37.2 (11 Jul 2021 20:00)  T(F): 98.7 (12 Jul 2021 00:00), Max: 99 (11 Jul 2021 20:00)  HR: 85 (12 Jul 2021 01:00) (74 - 101)  BP: 139/73 (12 Jul 2021 01:00) (124/71 - 150/83)  BP(mean): 87 (12 Jul 2021 01:00) (78 - 104)  ABP: --  ABP(mean): --  RR: 18 (12 Jul 2021 01:00) (12 - 22)  SpO2: 100% (12 Jul 2021 01:00) (95% - 100%)      --------------------------------------------------------------------------------------    INS AND OUTS:  I&O's Detail    10 Jul 2021 07:01  -  11 Jul 2021 07:00  --------------------------------------------------------  IN:    IV PiggyBack: 50 mL    Lactated Ringers: 2300 mL    Other (mL): 600 mL  Total IN: 2950 mL    OUT:    Other (mL): 1500 mL  Total OUT: 1500 mL    Total NET: 1450 mL      11 Jul 2021 07:01  -  12 Jul 2021 01:20  --------------------------------------------------------  IN:    Lactated Ringers: 500 mL    Oral Fluid: 850 mL  Total IN: 1350 mL    OUT:    Voided (mL): 50 mL  Total OUT: 50 mL    Total NET: 1300 mL    --------------------------------------------------------------------------------------    EXAM    NEURO: NAD, alert  HEENT: NC/AT  RESPIRATORY: nonlabored respirations, normal CW expansion  CARDIO: RRR, S1S2  ABDOMEN: soft, nontender, nondistended,  EXTREMITIES: normal strength, no deformities    --------------------------------------------------------------------------------------    LABS               -------------------------------------------------------------------------------------- SICU Daily Progress Note  =====================================================  Interval/Overnight Events:    - 7/10 PM downtrended, 2U PRBC + vit K + DVAVP  - packing fell out overnight w/ attempt at BM, no blood on packing per nurse. No active bleeding.	  - 7/12 2U PRBC  - f/u hemolysis workup    HPI:   35 y/o M with PMH HTN, ESRD on dialysis, hyperthyroidism (no meds), recent 3 column excisional hemorrhoidectomy on 6/25 complicated by readmission for BRBPR improved after rectal packing and multiple transfusions, now representing with recurrent BRBPR. Pt states that after discharge on 7/3 he was doing well at home, having loose BM with occasional hard stools without evidence of bleeding. Pt states that today he was urinating when he passed gas and noted BRB "spurting out". Since then pt states he has had 5+ episodes of large volume BRBPR with associated lightheadedness. Per patient bloody BM are preceded by abdominal cramping which is temporarily relieved by passing the blood/stool. Pt denies fever, chills, cp, sob, trauma to rectum/anus.     On arrival to ED pt afebrile and HD normal. Labs remarkable for Hgb 7.1, Cr of 16 and otherwise unremarkable. Pt continued to have multiple bloody BM in ED despite rectal packing with gel foam/surgicel. CTA performed and significant for evidence of active rectal bleed w/ concern for active extravasation in the sigmoid. After scan pt became hypotensive despite 1 unit PRBC and fluids. 2 additional units were rapidly infused. Dr. Vasquez bedside. Now hemodynamically stable with MAPs in 100s, receiving 2nd unit of blood. JOSE with evacuation of stool and clots. Anal canal packed with Quik Clot and transported to SICU.  (10 Jul 2021 02:23)      PAST MEDICAL & SURGICAL HISTORY:  ESRD on hemodialysis  at home 5 x week  HTN (hypertension)  ESRD (end stage renal disease) on dialysis  HTN (hypertension)  Bell&#x27;s palsy  Hyperthyroidism  AV fistula  L forearm  Elective surgical procedure  RACW permacath for HD, removed 5 years ago    ALLERGIES:  No Known Drug Allergies  topical cleanser for dialysis access.   Exsept (Rash)    --------------------------------------------------------------------------------------    MEDICATIONS:  Gastrointestinal Medications  calcitriol   Capsule 0.5 MICROGram(s) Oral daily  polyethylene glycol 3350 17 Gram(s) Oral daily    Hematologic/Oncologic Medications  epoetin elizabeth-epbx (RETACRIT) Injectable 45600 Unit(s) IV Push <User Schedule>    Topical/Other Medications  chlorhexidine 2% Cloths 1 Application(s) Topical daily  chlorhexidine 4% Liquid 1 Application(s) Topical <User Schedule>    --------------------------------------------------------------------------------------    VITAL SIGNS:  ICU Vital Signs Last 24 Hrs  T(C): 37.1 (12 Jul 2021 00:00), Max: 37.2 (11 Jul 2021 20:00)  T(F): 98.7 (12 Jul 2021 00:00), Max: 99 (11 Jul 2021 20:00)  HR: 85 (12 Jul 2021 01:00) (74 - 101)  BP: 139/73 (12 Jul 2021 01:00) (124/71 - 150/83)  BP(mean): 87 (12 Jul 2021 01:00) (78 - 104)  ABP: --  ABP(mean): --  RR: 18 (12 Jul 2021 01:00) (12 - 22)  SpO2: 100% (12 Jul 2021 01:00) (95% - 100%)      --------------------------------------------------------------------------------------    INS AND OUTS:  I&O's Detail    10 Jul 2021 07:01  -  11 Jul 2021 07:00  --------------------------------------------------------  IN:    IV PiggyBack: 50 mL    Lactated Ringers: 2300 mL    Other (mL): 600 mL  Total IN: 2950 mL    OUT:    Other (mL): 1500 mL  Total OUT: 1500 mL    Total NET: 1450 mL      11 Jul 2021 07:01  -  12 Jul 2021 01:20  --------------------------------------------------------  IN:    Lactated Ringers: 500 mL    Oral Fluid: 850 mL  Total IN: 1350 mL    OUT:    Voided (mL): 50 mL  Total OUT: 50 mL    Total NET: 1300 mL    --------------------------------------------------------------------------------------    EXAM    NEURO: NAD, alert  HEENT: NC/AT  RESPIRATORY: nonlabored respirations, normal CW expansion  CARDIO: RRR, S1S2  ABDOMEN: soft, nontender, nondistended,  EXTREMITIES: normal strength, no deformities    --------------------------------------------------------------------------------------    LABS               --------------------------------------------------------------------------------------

## 2021-07-12 NOTE — PROGRESS NOTE ADULT - ASSESSMENT
ASSESSMENT: 37 y/o M with PMH HTN, ESRD on dialysis, hyperthyroidism (no meds) who presents for BRBPR in s/o 3 column hemorrhoidectomy on 6/25 with Dr. Ontiveros recently admitted 07/01 for similar presentation requiring blood transfusion and rectal packing now with symptomatic anemia     PLAN:    Neurologic:   - Pain control PRN    Respiratory:  - On RA  - SpO2 >92%     Cardiovascular:   - continue to monitor BP    Gastrointestinal/Nutrition:   - Regular diet  - IR consulted - would defer intervention at this time as focal lesions in the sigmoid are small and likely have limited clinical impact given degree of bleeding from the rectum.    - cw miralax for constipation    Renal/Genitourinary:   - IV lock  - Monitor UOP  - BMP QD  - Hemodialyses at home on his own, last HD 7/10  - cw Calcitriol 0.5mcg daily per Nephrology    Hematologic: s/p 3U pRBC in ED  -continue to trend h/h     Infectious Disease:   -monitor CBC  -remains afebrile     Tubes/Lines/Drains:   PIV x2    Endocrine:   CINDY    Disposition:   SICU   ASSESSMENT: 35 y/o M with PMH HTN, ESRD on dialysis, hyperthyroidism (no meds) who presents for BRBPR in s/o 3 column hemorrhoidectomy on 6/25 with Dr. Ontiveros recently admitted 07/01 for similar presentation requiring blood transfusion and rectal packing now with symptomatic anemia     PLAN:    Neurologic:   - Pain control PRN    Respiratory:  - On RA  - SpO2 >92%     Cardiovascular:   - continue to monitor BP    Gastrointestinal/Nutrition:   - Regular diet  - IR consulted - would defer intervention at this time as focal lesions in the sigmoid are small and likely have limited clinical impact given degree of bleeding from the rectum.    - cw miralax for constipation    Renal/Genitourinary:   - IV lock  - Monitor UOP  - BMP QD  - Hemodialyses at home on his own, last HD 7/10  - cw Calcitriol 0.5mcg daily per Nephrology    Hematologic: s/p 3U pRBC in ED  -continue to trend h/h   - hemolytic workup    Infectious Disease:   -monitor CBC  -remains afebrile     Tubes/Lines/Drains:   PIV x2    Endocrine:   CINDY    Disposition:   SICU

## 2021-07-12 NOTE — PROGRESS NOTE ADULT - ASSESSMENT
35 yo M s/p three column excisional hemorrhoidectomy (6/25), presenting with recurrent bleeding per rectum. Patient was admitted one week ago with BRBPR requiring transfusion 4 units PRBC and improved after packing. Pt states that since discharge on 7/3 he had not had any bloody discharge until earlier today. He continues to have multiple episodes of high volume blood per rectum and abdominal cramping with associated hemodynamic instability.     PLAN:   - Trend CBC, transfuse PRN; receiving 2nd unit this AM for low H/H of 6.5/196  - Obtain nephrology consult d/t hx of ESRD on HD: dialysis today   - Replace rectal packing as needed  - Low fiber diet   - Appreciate excellent SICU care for HD monitoring and resuscitation     [facility]   [id number]

## 2021-07-13 ENCOUNTER — APPOINTMENT (OUTPATIENT)
Dept: COLORECTAL SURGERY | Facility: CLINIC | Age: 36
End: 2021-07-13

## 2021-07-13 LAB
ANION GAP SERPL CALC-SCNC: 17 MMOL/L — HIGH (ref 7–14)
BUN SERPL-MCNC: 61 MG/DL — HIGH (ref 7–23)
CALCIUM SERPL-MCNC: 8.1 MG/DL — LOW (ref 8.4–10.5)
CHLORIDE SERPL-SCNC: 96 MMOL/L — LOW (ref 98–107)
CO2 SERPL-SCNC: 24 MMOL/L — SIGNIFICANT CHANGE UP (ref 22–31)
CREAT SERPL-MCNC: 15.49 MG/DL — HIGH (ref 0.5–1.3)
GLUCOSE SERPL-MCNC: 95 MG/DL — SIGNIFICANT CHANGE UP (ref 70–99)
HAPTOGLOB SERPL-MCNC: 79 MG/DL — SIGNIFICANT CHANGE UP (ref 34–200)
HCT VFR BLD CALC: 21.4 % — LOW (ref 39–50)
HCT VFR BLD CALC: 24 % — LOW (ref 39–50)
HGB BLD-MCNC: 7.5 G/DL — LOW (ref 13–17)
HGB BLD-MCNC: 8 G/DL — LOW (ref 13–17)
LDH SERPL L TO P-CCNC: 134 U/L — LOW (ref 135–225)
MAGNESIUM SERPL-MCNC: 2.3 MG/DL — SIGNIFICANT CHANGE UP (ref 1.6–2.6)
MCHC RBC-ENTMCNC: 28.4 PG — SIGNIFICANT CHANGE UP (ref 27–34)
MCHC RBC-ENTMCNC: 29.1 PG — SIGNIFICANT CHANGE UP (ref 27–34)
MCHC RBC-ENTMCNC: 33.3 GM/DL — SIGNIFICANT CHANGE UP (ref 32–36)
MCHC RBC-ENTMCNC: 35 GM/DL — SIGNIFICANT CHANGE UP (ref 32–36)
MCV RBC AUTO: 82.9 FL — SIGNIFICANT CHANGE UP (ref 80–100)
MCV RBC AUTO: 85.1 FL — SIGNIFICANT CHANGE UP (ref 80–100)
NRBC # BLD: 0 /100 WBCS — SIGNIFICANT CHANGE UP
NRBC # BLD: 0 /100 WBCS — SIGNIFICANT CHANGE UP
NRBC # FLD: 0 K/UL — SIGNIFICANT CHANGE UP
NRBC # FLD: 0 K/UL — SIGNIFICANT CHANGE UP
PHOSPHATE SERPL-MCNC: 5.5 MG/DL — HIGH (ref 2.5–4.5)
PLATELET # BLD AUTO: 271 K/UL — SIGNIFICANT CHANGE UP (ref 150–400)
PLATELET # BLD AUTO: 301 K/UL — SIGNIFICANT CHANGE UP (ref 150–400)
POTASSIUM SERPL-MCNC: 4.2 MMOL/L — SIGNIFICANT CHANGE UP (ref 3.5–5.3)
POTASSIUM SERPL-SCNC: 4.2 MMOL/L — SIGNIFICANT CHANGE UP (ref 3.5–5.3)
RBC # BLD: 2.58 M/UL — LOW (ref 4.2–5.8)
RBC # BLD: 2.82 M/UL — LOW (ref 4.2–5.8)
RBC # FLD: 15.9 % — HIGH (ref 10.3–14.5)
RBC # FLD: 15.9 % — HIGH (ref 10.3–14.5)
SODIUM SERPL-SCNC: 137 MMOL/L — SIGNIFICANT CHANGE UP (ref 135–145)
WBC # BLD: 10.23 K/UL — SIGNIFICANT CHANGE UP (ref 3.8–10.5)
WBC # BLD: 10.95 K/UL — HIGH (ref 3.8–10.5)
WBC # FLD AUTO: 10.23 K/UL — SIGNIFICANT CHANGE UP (ref 3.8–10.5)
WBC # FLD AUTO: 10.95 K/UL — HIGH (ref 3.8–10.5)

## 2021-07-13 PROCEDURE — 99024 POSTOP FOLLOW-UP VISIT: CPT

## 2021-07-13 PROCEDURE — 93306 TTE W/DOPPLER COMPLETE: CPT | Mod: 26

## 2021-07-13 RX ORDER — LABETALOL HCL 100 MG
10 TABLET ORAL ONCE
Refills: 0 | Status: DISCONTINUED | OUTPATIENT
Start: 2021-07-13 | End: 2021-07-13

## 2021-07-13 RX ORDER — ISOSORBIDE MONONITRATE 60 MG/1
60 TABLET, EXTENDED RELEASE ORAL DAILY
Refills: 0 | Status: DISCONTINUED | OUTPATIENT
Start: 2021-07-13 | End: 2021-07-15

## 2021-07-13 RX ORDER — ACETAMINOPHEN 500 MG
650 TABLET ORAL EVERY 6 HOURS
Refills: 0 | Status: DISCONTINUED | OUTPATIENT
Start: 2021-07-13 | End: 2021-07-15

## 2021-07-13 RX ORDER — HYDRALAZINE HCL 50 MG
10 TABLET ORAL ONCE
Refills: 0 | Status: COMPLETED | OUTPATIENT
Start: 2021-07-13 | End: 2021-07-13

## 2021-07-13 RX ORDER — POLYETHYLENE GLYCOL 3350 17 G/17G
17 POWDER, FOR SOLUTION ORAL ONCE
Refills: 0 | Status: COMPLETED | OUTPATIENT
Start: 2021-07-13 | End: 2021-07-13

## 2021-07-13 RX ORDER — LABETALOL HCL 100 MG
300 TABLET ORAL
Refills: 0 | Status: DISCONTINUED | OUTPATIENT
Start: 2021-07-13 | End: 2021-07-15

## 2021-07-13 RX ADMIN — Medication 10 MILLIGRAM(S): at 18:40

## 2021-07-13 RX ADMIN — ERYTHROPOIETIN 10000 UNIT(S): 10000 INJECTION, SOLUTION INTRAVENOUS; SUBCUTANEOUS at 07:29

## 2021-07-13 RX ADMIN — Medication 10 MILLIGRAM(S): at 06:17

## 2021-07-13 RX ADMIN — Medication 300 MILLIGRAM(S): at 16:14

## 2021-07-13 RX ADMIN — Medication 1 MILLIGRAM(S): at 11:22

## 2021-07-13 RX ADMIN — POLYETHYLENE GLYCOL 3350 17 GRAM(S): 17 POWDER, FOR SOLUTION ORAL at 11:22

## 2021-07-13 RX ADMIN — Medication 650 MILLIGRAM(S): at 10:09

## 2021-07-13 RX ADMIN — Medication 650 MILLIGRAM(S): at 10:39

## 2021-07-13 RX ADMIN — PREGABALIN 1000 MICROGRAM(S): 225 CAPSULE ORAL at 11:22

## 2021-07-13 RX ADMIN — CHLORHEXIDINE GLUCONATE 1 APPLICATION(S): 213 SOLUTION TOPICAL at 12:55

## 2021-07-13 RX ADMIN — CALCITRIOL 0.5 MICROGRAM(S): 0.5 CAPSULE ORAL at 11:22

## 2021-07-13 RX ADMIN — POLYETHYLENE GLYCOL 3350 17 GRAM(S): 17 POWDER, FOR SOLUTION ORAL at 03:40

## 2021-07-13 RX ADMIN — CHLORHEXIDINE GLUCONATE 1 APPLICATION(S): 213 SOLUTION TOPICAL at 06:17

## 2021-07-13 RX ADMIN — Medication 650 MILLIGRAM(S): at 20:05

## 2021-07-13 NOTE — PROGRESS NOTE ADULT - SUBJECTIVE AND OBJECTIVE BOX
CARDIOLOGY FOLLOW UP - Dr. May  Date of Service: 7/13/21  CC: denies cp, sob, and palpitations     Review of Systems:  Constitutional: No fever, weight loss, or fatigue  Respiratory: No cough, wheezing, or hemoptysis, no shortness of breath  Cardiovascular: No chest pain, palpitations, passing out, dizziness, or leg swelling  Gastrointestinal: No abd or epigastric pain.  No nausea, vomiting, or hematemesis; no diarrhea or constipation, no melena or hematochezia  Vascular: no edema       PHYSICAL EXAM:  T(C): 37 (07-13-21 @ 12:00), Max: 37.1 (07-12-21 @ 20:00)  HR: 77 (07-13-21 @ 12:00) (71 - 88)  BP: 170/98 (07-13-21 @ 12:00) (141/89 - 170/98)  RR: 12 (07-13-21 @ 12:00) (12 - 25)  SpO2: 100% (07-13-21 @ 12:00) (97% - 100%)  Wt(kg): --  I&O's Summary    12 Jul 2021 07:01  -  13 Jul 2021 07:00  --------------------------------------------------------  IN: 700 mL / OUT: 100 mL / NET: 600 mL    13 Jul 2021 07:01  -  13 Jul 2021 12:58  --------------------------------------------------------  IN: 1100 mL / OUT: 2500 mL / NET: -1400 mL        Appearance: Normal	  Cardiovascular: Normal S1 S2,RRR, No JVD, No murmurs  Respiratory: Lungs clear to auscultation	  Gastrointestinal:  Soft, Non-tender, + BS	  Extremities: Normal range of motion, No clubbing, cyanosis or edema      Home Medications:  calcitriol 0.25 mcg oral capsule: 1 cap(s) orally once a day (25 Jun 2021 13:29)  isosorbide mononitrate 60 mg oral tablet, extended release: 1 tab(s) orally once a day (in the morning) (25 Jun 2021 13:29)  labetalol 300 mg oral tablet: 1.5 tab(s) orally 2 times a day (25 Jun 2021 13:29)  lanthanum 1000 mg oral tablet, chewable: 1 tab(s) orally 4 times a day (25 Jun 2021 13:29)  MiraLax oral powder for reconstitution: 1 scoop orally daily (25 Jun 2021 13:29)  Sensipar 30 mg oral tablet: 1 tab(s) orally once a day (25 Jun 2021 13:29)  sevelamer hydrochloride 800 mg oral tablet: 3 tab(s) orally 3 times a day (25 Jun 2021 13:29)      MEDICATIONS  (STANDING):  calcitriol   Capsule 0.5 MICROGram(s) Oral daily  chlorhexidine 2% Cloths 1 Application(s) Topical daily  chlorhexidine 4% Liquid 1 Application(s) Topical <User Schedule>  cyanocobalamin 1000 MICROGram(s) Oral daily  epoetin elizabeth-epbx (RETACRIT) Injectable 71539 Unit(s) IV Push <User Schedule>  folic acid 1 milliGRAM(s) Oral daily  isosorbide   mononitrate ER Tablet (IMDUR) 60 milliGRAM(s) Oral daily  labetalol 300 milliGRAM(s) Oral two times a day  polyethylene glycol 3350 17 Gram(s) Oral daily      TELEMETRY: 	NSR     ECG:  	  RADIOLOGY:   DIAGNOSTIC TESTING:  [ ] Echocardiogram:  [ ]  Catheterization:  [ ] Stress Test:    OTHER: 	    LABS:	 	                            7.5    10.23 )-----------( 271      ( 13 Jul 2021 01:48 )             21.4     07-13    137  |  96<L>  |  61<H>  ----------------------------<  95  4.2   |  24  |  15.49<H>    Ca    8.1<L>      13 Jul 2021 01:48  Phos  5.5     07-13  Mg     2.30     07-13    TPro  x   /  Alb  x   /  TBili  0.2  /  DBili  <0.2  /  AST  x   /  ALT  x   /  AlkPhos  x   07-12

## 2021-07-13 NOTE — PROGRESS NOTE ADULT - SUBJECTIVE AND OBJECTIVE BOX
Surgery Daily Progress Note  =====================================================  Interval / Overnight Events: No acute events overnight.      HPI:  Patient is a 36 year old male with a PMHx of HTN, ESRD (on dialysis), hyperthyroidism (not on medication), recent 3 column excisional hemorrhoidectomy (6/25/21 - complicated by readmission for BRBPR - improved after rectal packing and multiple transfusions) who presented with recurrent BRBPR. (10 Jul 2021 02:23)      PAST MEDICAL & SURGICAL HISTORY:  ESRD on hemodialysis  at home 5 x week  HTN (hypertension)  ESRD (end stage renal disease) on dialysis  HTN (hypertension)  Bell&#x27;s palsy  Hyperthyroidism  AV fistula  L forearm  Elective surgical procedure  RACW permacath for HD, removed 5 years ago      ALLERGIES:  No Known Drug Allergies  topical cleanser for dialysis access.   Exsept (Rash)    --------------------------------------------------------------------------------------    MEDICATIONS:    Neurologic Medications    Respiratory Medications    Cardiovascular Medications    Gastrointestinal Medications  calcitriol   Capsule 0.5 MICROGram(s) Oral daily  cyanocobalamin 1000 MICROGram(s) Oral daily  folic acid 1 milliGRAM(s) Oral daily  polyethylene glycol 3350 17 Gram(s) Oral daily    Genitourinary Medications    Hematologic/Oncologic Medications  epoetin elizabeth-epbx (RETACRIT) Injectable 15767 Unit(s) IV Push <User Schedule>    Antimicrobial/Immunologic Medications    Endocrine/Metabolic Medications    Topical/Other Medications  chlorhexidine 2% Cloths 1 Application(s) Topical daily  chlorhexidine 4% Liquid 1 Application(s) Topical <User Schedule>    --------------------------------------------------------------------------------------    VITAL SIGNS:    --------------------------------------------------------------------------------------    INS AND OUTS:  ((Insert SICU Vitals/Is+Os here))***  --------------------------------------------------------------------------------------    EXAM  NEUROLOGY  RASS:   	GCS:    Exam: Normal, in no acute distress.  Alert and oriented x4.  No focal neurologic deficits. ***    HEENT  Exam: Normocephalic, atraumatic, EOMI.  ***    RESPIRATORY  Exam: Lungs clear to auscultation, Normal expansion/effort. ***  Mechanical Ventilation:     CARDIOVASCULAR  Exam: S1, S2.  Regular rate and rhythm.   ***    GI/NUTRITION  Exam: Abdomen soft, Non-tender, Non-distended.  ***  Wound:  ***  Current Diet: NPO***    VASCULAR  Exam: Extremities warm, pink, well-perfused. ***    MUSCULOSKELETAL  Exam: All extremities moving spontaneously without limitations. ***    SKIN  Exam: Good skin turgor, no skin breakdown. ***    METABOLIC / FLUIDS / ELECTROLYTES  calcitriol   Capsule 0.5 MICROGram(s) Oral daily  cyanocobalamin 1000 MICROGram(s) Oral daily  folic acid 1 milliGRAM(s) Oral daily      HEMATOLOGIC  [x] VTE Prophylaxis:   Transfusions:	[] PRBC	[] Platelets		[] FFP	[] Cryoprecipitate    INFECTIOUS DISEASE  Antimicrobials/Immunologic Medications:  epoetin elizabeth-epbx (RETACRIT) Injectable 17103 Unit(s) IV Push <User Schedule>    Day #      of     ***    TUBES / LINES / DRAINS  ***  [x] Peripheral IV  [] Central Venous Line     	[] R	[] L	[] IJ	[] Fem	[] SC	Date Placed:   [] Arterial Line		[] R	[] L	[] Fem	[] Rad	[] Ax	Date Placed:   [] PICC		[] Midline		[] Mediport  [] Urinary Catheter		Date Placed:   [x] Necessity of urinary, arterial, and venous catheters discussed    --------------------------------------------------------------------------------------    LABS    ((Insert SICU Labs here))***  --------------------------------------------------------------------------------------    OTHER LABORATORY:     IMAGING STUDIES:   CXR:     ASSESSMENT:  36y Male    ((insert from previous))***    PLAN:   ***  NEUROLOGY:    RESPIRATORY:  - Maintain O2 saturation >92%    CARDIOVASCULAR:    GI / NUTRITION:    RENAL / GENITOURINARY:  - Indwelling underwood catheter  - Monitor electrolytes and replete PRN  - Continue to monitor strict ins and outs q1 hour    HEMATOLOGIC:    INFECTIOUS DISEASE:  - Currently afebrile with no leukocytosis  - Continue to monitor off antibiotics    ENDOCRINOLOGY:  - No active issues  - Continue to monitor glucose on BMP (goal 140-180)    Disposition: SICU    --------------------------------------------------------------------------------------    Critical Care Diagnoses: Surgery Daily Progress Note  =====================================================  Interval / Overnight Events: No acute events overnight.      HPI:  Patient is a 36 year old male with a PMHx of HTN, ESRD (on dialysis), hyperthyroidism (not on medication), recent 3 column excisional hemorrhoidectomy (6/25/21 - complicated by readmission for BRBPR - improved after rectal packing and multiple transfusions) who presented with recurrent BRBPR. (10 Jul 2021 02:23)      PAST MEDICAL & SURGICAL HISTORY:  ESRD on hemodialysis  at home 5 x week  HTN (hypertension)  ESRD (end stage renal disease) on dialysis  HTN (hypertension)  Bell&#x27;s palsy  Hyperthyroidism  AV fistula  L forearm  Elective surgical procedure  RACW permacath for HD, removed 5 years ago      ALLERGIES:  No Known Drug Allergies  topical cleanser for dialysis access.   Exsept (Rash)    --------------------------------------------------------------------------------------    MEDICATIONS:    Gastrointestinal Medications  calcitriol   Capsule 0.5 MICROGram(s) Oral daily  cyanocobalamin 1000 MICROGram(s) Oral daily  folic acid 1 milliGRAM(s) Oral daily  polyethylene glycol 3350 17 Gram(s) Oral daily    Hematologic/Oncologic Medications  epoetin elizabeth-epbx (RETACRIT) Injectable 56694 Unit(s) IV Push <User Schedule>    Topical/Other Medications  chlorhexidine 2% Cloths 1 Application(s) Topical daily  chlorhexidine 4% Liquid 1 Application(s) Topical <User Schedule>    --------------------------------------------------------------------------------------    VITAL SIGNS:  T(C): 36.4 (13 Jul 2021 07:10), Max: 37.1 (12 Jul 2021 20:00)  T(F): 97.6 (13 Jul 2021 07:10), Max: 98.8 (12 Jul 2021 20:00)  HR: 80 (13 Jul 2021 07:10) (71 - 88)  BP: 144/88 (13 Jul 2021 07:10) (139/90 - 170/97)  BP(mean): 101 (13 Jul 2021 07:00) (93 - 121)  RR: 18 (13 Jul 2021 07:10) (12 - 25)  SpO2: 100% (13 Jul 2021 07:00) (97% - 100%)    --------------------------------------------------------------------------------------    INS AND OUTS:    12 Jul 2021 07:01  -  13 Jul 2021 07:00  --------------------------------------------------------  IN:    Oral Fluid: 400 mL    PRBCs (Packed Red Blood Cells): 300 mL  Total IN: 700 mL    OUT:    Voided (mL): 100 mL  Total OUT: 100 mL    Total NET: 600 mL    --------------------------------------------------------------------------------------    EXAM    NEUROLOGY  Exam: Normal, in no acute distress.    HEENT  Exam: Normocephalic, atraumatic.    RESPIRATORY  Exam: Normal expansion / effort.    CARDIOVASCULAR  Exam: S1, S2.  Regular rate and rhythm.    GI/NUTRITION  Exam: Abdomen soft, Non-tender, Non-distended.  Current Diet: Low fiber diet    MUSCULOSKELETAL  Exam: All extremities moving spontaneously without limitations.      METABOLIC / FLUIDS / ELECTROLYTES  calcitriol   Capsule 0.5 MICROGram(s) Oral daily  cyanocobalamin 1000 MICROGram(s) Oral daily  folic acid 1 milliGRAM(s) Oral daily      INFECTIOUS DISEASE  Antimicrobials/Immunologic Medications:  epoetin elizabeth-epbx (RETACRIT) Injectable 88721 Unit(s) IV Push <User Schedule>    --------------------------------------------------------------------------------------

## 2021-07-13 NOTE — PROGRESS NOTE ADULT - ASSESSMENT
36y Male with history of ESRD presents with BRBPR. Nephrology consulted for ESRD status.    1) ESRD: Last HD earlier this morning tolerated well with 2L removed. Plan for next maintenance HD on 7/15. Monitor electrolytes.    2) HTN with ESRD: BP uncontrolled. Agree with resuming home medications. Monitor BP.    3) Anemia of renal disease: With component of GIB. S/P PRBC. Hb remains low for which would check iron stores to ensure they are replete on Epo. Continue with Epo 10K with HD. Monitor Hb.    4) Hyperphosphatemia: Phosphorus controlled with acceptable iPTH. Continue with calcitriol 0.5 mcg daily and start renvela 1600 mg TID with meals. Monitor serum calcium and phosphorus.      [facility] NEPHROLOGY  Betito Dodson M.D.  Octavio Hernandez D.O.  Lynne Biswas M.D.  Mallory Hook, MSN, ANP-C    Telephone: [phone]  Facsimile: [phone]    [address] [place] [address]

## 2021-07-13 NOTE — PROGRESS NOTE ADULT - ASSESSMENT
Stress Echo 12/10/19: EF 63%, nl lv sys fx, mild LVH, mild-mod TR, no evidence of ischemia     a/p  35 y/o M with PMH HTN, ESRD on dialysis, hyperthyroidism (no meds), recent 3 column excisional hemorrhoidectomy on 6/25 complicated by readmission for BRBPR improved after rectal packing and multiple transfusions, now representing with recurrent BRBPR.  Cardiac eval for acutely hypotensive, tachycardic and diaphoretic when trying to have BM during HD     # Hypotension, Sinus Tachycardia   -brief episode during HD yesterday associated with diaphoresis  -? Vasovagal 2/2 rectal packing   -most recent echo noted with nl lv sys fx, most recent stress without evidence of ischemia  -EKG noted without acute ischemic changes   -check echo    #GIB s/p hemorrhoidectomy  -h/h improved s/p PRBCs   -CT a/p noted  -no intervention with IR at this time  -GI eval  -colorectal sx f/u    #CKD  -hd per renal   -renal f/u     #HTN  -bp elevated  -imudr and bb resumed  -if bp remains elevated can resume outpt clonidine   -cont to monitor     dvt ppx    Care per SICU

## 2021-07-13 NOTE — PROGRESS NOTE ADULT - SUBJECTIVE AND OBJECTIVE BOX
SICU Daily Progress Note  =====================================================  Interval/Overnight Events:    - 7/10 PM downtrended, 2U PRBC + vit K + DVAVP  - packing fell out overnight w/ attempt at BM, no blood on packing per nurse. No active bleeding.	  - 7/12 2U PRBC  - f/u hemolysis workup  - No BM since SICU admission, no blood per rectum    HPI:   35 y/o M with PMH HTN, ESRD on dialysis, hyperthyroidism (no meds), recent 3 column excisional hemorrhoidectomy on 6/25 complicated by readmission for BRBPR improved after rectal packing and multiple transfusions, now representing with recurrent BRBPR. Pt states that after discharge on 7/3 he was doing well at home, having loose BM with occasional hard stools without evidence of bleeding. Pt states that today he was urinating when he passed gas and noted BRB "spurting out". Since then pt states he has had 5+ episodes of large volume BRBPR with associated lightheadedness. Per patient bloody BM are preceded by abdominal cramping which is temporarily relieved by passing the blood/stool. Pt denies fever, chills, cp, sob, trauma to rectum/anus.     On arrival to ED pt afebrile and HD normal. Labs remarkable for Hgb 7.1, Cr of 16 and otherwise unremarkable. Pt continued to have multiple bloody BM in ED despite rectal packing with gel foam/surgicel. CTA performed and significant for evidence of active rectal bleed w/ concern for active extravasation in the sigmoid. After scan pt became hypotensive despite 1 unit PRBC and fluids. 2 additional units were rapidly infused. Dr. Vasquez bedside. Now hemodynamically stable with MAPs in 100s, receiving 2nd unit of blood. JOSE with evacuation of stool and clots. Anal canal packed with Quik Clot and transported to SICU.  (10 Jul 2021 02:23)      --------------------------------------------------------------------------------------  Allergies: No Known Drug Allergies  topical cleanser for dialysis access.   Exsept (Rash)      MEDICATIONS:   --------------------------------------------------------------------------------------  Neurologic Medications    Respiratory Medications    Cardiovascular Medications    Gastrointestinal Medications  calcitriol   Capsule 0.5 MICROGram(s) Oral daily  cyanocobalamin 1000 MICROGram(s) Oral daily  folic acid 1 milliGRAM(s) Oral daily  polyethylene glycol 3350 17 Gram(s) Oral daily    Genitourinary Medications    Hematologic/Oncologic Medications  epoetin elizabeth-epbx (RETACRIT) Injectable 75176 Unit(s) IV Push <User Schedule>    Antimicrobial/Immunologic Medications    Endocrine/Metabolic Medications    Topical/Other Medications  chlorhexidine 2% Cloths 1 Application(s) Topical daily  chlorhexidine 4% Liquid 1 Application(s) Topical <User Schedule>    --------------------------------------------------------------------------------------    VITAL SIGNS, INS/OUTS (last 24 hours):  --------------------------------------------------------------------------------------  T(C): 36.9 (07-13-21 @ 00:00), Max: 37.1 (07-12-21 @ 20:00)  HR: 74 (07-13-21 @ 02:00) (71 - 88)  BP: 158/94 (07-13-21 @ 02:00) (139/83 - 166/97)  BP(mean): 110 (07-13-21 @ 02:00) (93 - 115)  ABP: --  ABP(mean): --  RR: 18 (07-13-21 @ 02:00) (12 - 25)  SpO2: 97% (07-13-21 @ 02:00) (97% - 100%)  Wt(kg): --  CVP(mm Hg): --  CI: --  CAPILLARY BLOOD GLUCOSE       N/A      07-11 @ 07:01  -  07-12 @ 07:00  --------------------------------------------------------  IN:    IV PiggyBack: 200 mL    Lactated Ringers: 500 mL    Oral Fluid: 900 mL    PRBCs (Packed Red Blood Cells): 283 mL  Total IN: 1883 mL    OUT:    Voided (mL): 50 mL  Total OUT: 50 mL    Total NET: 1833 mL      07-12 @ 07:01  -  07-13 @ 03:28  --------------------------------------------------------  IN:    Oral Fluid: 100 mL    PRBCs (Packed Red Blood Cells): 300 mL  Total IN: 400 mL    OUT:    Voided (mL): 100 mL  Total OUT: 100 mL    Total NET: 300 mL        --------------------------------------------------------------------------------------    EXAM    NEUROLOGY  Exam: Normal, NAD, alert, oriented x3, no focal deficits.    HEENT  Exam: Normocephalic, atraumatic, EOMI.     RESPIRATORY  Exam: Lungs clear to auscultation, Normal expansion/effort.      CARDIOVASCULAR  Exam: S1, S2.  Regular rate and rhythm.      GI/NUTRITION  Exam: Abdomen soft, Non-tender, Non-distended.   Current Diet: Diet, Low Fiber        METABOLIC/FLUIDS/ELECTROLYTES  calcitriol   Capsule 0.5 MICROGram(s) Oral daily  cyanocobalamin 1000 MICROGram(s) Oral daily  folic acid 1 milliGRAM(s) Oral daily      HEMATOLOGIC  [x] VTE Prophylaxis:     LABS  --------------------------------------------------------------------------------------  CBC (07-13 @ 01:48)                          7.5<L>                   10.23   )--------------(  271        --    % Neuts, --    % Lymphs, ANC: --                              21.4<L>  CBC (07-12 @ 18:09)                          8.1<L>                   11.04<H>  )--------------(  267        --    % Neuts, --    % Lymphs, ANC: --                              24.1<L>    BMP (07-13 @ 01:48)       137     |  96<L>   |  61<H> 			Ca++ --      Ca 8.1<L>       ---------------------------------( 95    		Mg 2.30         4.2     |  24      |  15.49<H>			Ph 5.5<H>  BMP (07-12 @ 01:47)       136     |  96<L>   |  54<H> 			Ca++ --      Ca 7.9<L>       ---------------------------------( 103<H>		Mg 2.20         4.1     |  26      |  13.00<H>			Ph 4.9<H>    LFTs (07-12 @ 18:09)      TPro -- / Alb -- / TBili 0.2 / DBili <0.2 / AST -- / ALT -- / AlkPhos --              -> .Blood Blood-Venous Culture (07-11 @ 06:57)     NG    NG    No growth to date.      -------------------------------------------------------------------------------------- SICU Daily Progress Note  =====================================================  Interval/Overnight Events:    - 7/10 PM downtrended, 2U PRBC + vit K + DVAVP  - packing fell out overnight w/ attempt at BM, no blood on packing per nurse. No active bleeding.	  - 7/12 2U PRBC  - f/u hemolysis workup  - No BM since SICU admission, no blood per rectum  - MORE Miralax  - 10 hydralazine x 1 given at 5 am for consistent SBPs >170s    HPI:   37 y/o M with PMH HTN, ESRD on dialysis, hyperthyroidism (no meds), recent 3 column excisional hemorrhoidectomy on 6/25 complicated by readmission for BRBPR improved after rectal packing and multiple transfusions, now representing with recurrent BRBPR. Pt states that after discharge on 7/3 he was doing well at home, having loose BM with occasional hard stools without evidence of bleeding. Pt states that today he was urinating when he passed gas and noted BRB "spurting out". Since then pt states he has had 5+ episodes of large volume BRBPR with associated lightheadedness. Per patient bloody BM are preceded by abdominal cramping which is temporarily relieved by passing the blood/stool. Pt denies fever, chills, cp, sob, trauma to rectum/anus.     On arrival to ED pt afebrile and HD normal. Labs remarkable for Hgb 7.1, Cr of 16 and otherwise unremarkable. Pt continued to have multiple bloody BM in ED despite rectal packing with gel foam/surgicel. CTA performed and significant for evidence of active rectal bleed w/ concern for active extravasation in the sigmoid. After scan pt became hypotensive despite 1 unit PRBC and fluids. 2 additional units were rapidly infused. Dr. Vasquez bedside. Now hemodynamically stable with MAPs in 100s, receiving 2nd unit of blood. JOSE with evacuation of stool and clots. Anal canal packed with Quik Clot and transported to SICU.  (10 Jul 2021 02:23)      --------------------------------------------------------------------------------------  Allergies: No Known Drug Allergies  topical cleanser for dialysis access.   Exsept (Rash)      MEDICATIONS:   --------------------------------------------------------------------------------------  Neurologic Medications    Respiratory Medications    Cardiovascular Medications    Gastrointestinal Medications  calcitriol   Capsule 0.5 MICROGram(s) Oral daily  cyanocobalamin 1000 MICROGram(s) Oral daily  folic acid 1 milliGRAM(s) Oral daily  polyethylene glycol 3350 17 Gram(s) Oral daily    Genitourinary Medications    Hematologic/Oncologic Medications  epoetin elizabeth-epbx (RETACRIT) Injectable 66013 Unit(s) IV Push <User Schedule>    Antimicrobial/Immunologic Medications    Endocrine/Metabolic Medications    Topical/Other Medications  chlorhexidine 2% Cloths 1 Application(s) Topical daily  chlorhexidine 4% Liquid 1 Application(s) Topical <User Schedule>    --------------------------------------------------------------------------------------    VITAL SIGNS, INS/OUTS (last 24 hours):  --------------------------------------------------------------------------------------  T(C): 36.9 (07-13-21 @ 00:00), Max: 37.1 (07-12-21 @ 20:00)  HR: 74 (07-13-21 @ 02:00) (71 - 88)  BP: 158/94 (07-13-21 @ 02:00) (139/83 - 166/97)  BP(mean): 110 (07-13-21 @ 02:00) (93 - 115)  ABP: --  ABP(mean): --  RR: 18 (07-13-21 @ 02:00) (12 - 25)  SpO2: 97% (07-13-21 @ 02:00) (97% - 100%)  Wt(kg): --  CVP(mm Hg): --  CI: --  CAPILLARY BLOOD GLUCOSE       N/A      07-11 @ 07:01  -  07-12 @ 07:00  --------------------------------------------------------  IN:    IV PiggyBack: 200 mL    Lactated Ringers: 500 mL    Oral Fluid: 900 mL    PRBCs (Packed Red Blood Cells): 283 mL  Total IN: 1883 mL    OUT:    Voided (mL): 50 mL  Total OUT: 50 mL    Total NET: 1833 mL      07-12 @ 07:01  -  07-13 @ 03:28  --------------------------------------------------------  IN:    Oral Fluid: 100 mL    PRBCs (Packed Red Blood Cells): 300 mL  Total IN: 400 mL    OUT:    Voided (mL): 100 mL  Total OUT: 100 mL    Total NET: 300 mL        --------------------------------------------------------------------------------------    EXAM    NEUROLOGY  Exam: Normal, NAD, alert, oriented x3, no focal deficits.    HEENT  Exam: Normocephalic, atraumatic, EOMI.     RESPIRATORY  Exam: Lungs clear to auscultation, Normal expansion/effort.      CARDIOVASCULAR  Exam: S1, S2.  Regular rate and rhythm.      GI/NUTRITION  Exam: Abdomen soft, Non-tender, Non-distended.   Current Diet: Diet, Low Fiber        METABOLIC/FLUIDS/ELECTROLYTES  calcitriol   Capsule 0.5 MICROGram(s) Oral daily  cyanocobalamin 1000 MICROGram(s) Oral daily  folic acid 1 milliGRAM(s) Oral daily      HEMATOLOGIC  [x] VTE Prophylaxis:     LABS  --------------------------------------------------------------------------------------  CBC (07-13 @ 01:48)                          7.5<L>                   10.23   )--------------(  271        --    % Neuts, --    % Lymphs, ANC: --                              21.4<L>  CBC (07-12 @ 18:09)                          8.1<L>                   11.04<H>  )--------------(  267        --    % Neuts, --    % Lymphs, ANC: --                              24.1<L>    BMP (07-13 @ 01:48)       137     |  96<L>   |  61<H> 			Ca++ --      Ca 8.1<L>       ---------------------------------( 95    		Mg 2.30         4.2     |  24      |  15.49<H>			Ph 5.5<H>  BMP (07-12 @ 01:47)       136     |  96<L>   |  54<H> 			Ca++ --      Ca 7.9<L>       ---------------------------------( 103<H>		Mg 2.20         4.1     |  26      |  13.00<H>			Ph 4.9<H>    LFTs (07-12 @ 18:09)      TPro -- / Alb -- / TBili 0.2 / DBili <0.2 / AST -- / ALT -- / AlkPhos --              -> .Blood Blood-Venous Culture (07-11 @ 06:57)     NG    NG    No growth to date.      --------------------------------------------------------------------------------------

## 2021-07-13 NOTE — PROGRESS NOTE ADULT - ASSESSMENT
ASSESSMENT: 37 y/o M with PMH HTN, ESRD on dialysis, hyperthyroidism (no meds) who presents for BRBPR in s/o 3 column hemorrhoidectomy on 6/25 with Dr. Ontiveros recently admitted 07/01 for similar presentation requiring blood transfusion and rectal packing now with symptomatic anemia     PLAN:    Neurologic:   - Pain control PRN    Respiratory:  - On RA  - SpO2 >92%     Cardiovascular:   - continue to monitor BP    Gastrointestinal/Nutrition:   - Regular diet  - IR consulted - would defer intervention at this time as focal lesions in the sigmoid are small and likely have limited clinical impact given degree of bleeding from the rectum.    - cw miralax for constipation    Renal/Genitourinary:   - IV lock  - Monitor UOP  - BMP QD  - Hemodialyses at home on his own, last HD 7/10  - cw Calcitriol 0.5mcg daily per Nephrology    Hematologic: s/p 3U pRBC in ED  -continue to trend h/h   - hemolytic workup    Infectious Disease:   -monitor CBC  -remains afebrile     Tubes/Lines/Drains:   PIV x2    Endocrine:   CINDY    Disposition:   SICU   ASSESSMENT: 37 y/o M with PMH HTN, ESRD on dialysis, hyperthyroidism (no meds) who presents for BRBPR in s/o 3 column hemorrhoidectomy on 6/25 with Dr. Ontiveros recently admitted 07/01 for similar presentation requiring blood transfusion and rectal packing now with symptomatic anemia     PLAN:    Neurologic:   - Pain control PRN    Respiratory:  - On RA  - SpO2 >92%     Cardiovascular:   - continue to monitor BP  - hypertensive prior to dialysis    Gastrointestinal/Nutrition:   - Regular diet  - IR consulted - would defer intervention at this time as focal lesions in the sigmoid are small and likely have limited clinical impact given degree of bleeding from the rectum.    - cw miralax for constipation    Renal/Genitourinary:   - IV lock  - Monitor UOP  - BMP QD  - Hemodialyses at home on his own, last HD 7/10  - cw Calcitriol 0.5mcg daily per Nephrology  - start home sevalamer    Hematologic: s/p 3U pRBC in ED  -continue to trend h/h   -hemolytic workup    Infectious Disease:   -monitor CBC  -remains afebrile     Tubes/Lines/Drains:   PIV x2    Endocrine:   CINDY    Disposition:   SICU

## 2021-07-13 NOTE — PROGRESS NOTE ADULT - SUBJECTIVE AND OBJECTIVE BOX
[facility] NEPHROLOGY- PROGRESS NOTE    36y Male with history of ESRD presents with BRBPR. Nephrology consulted for ESRD status.    REVIEW OF SYSTEMS:  Gen: no changes in weight  Cards: no chest pain  Resp: no dyspnea  GI: no nausea or vomiting or diarrhea, + rectal bleeding resolved  Vascular: no LE edema    No Known Drug Allergies  topical cleanser for dialysis access.   Exsept (Rash)      Hospital Medications: Medications reviewed      VITALS:  T(F): 98.6 (07-13-21 @ 12:00), Max: 98.8 (07-12-21 @ 20:00)  HR: 84 (07-13-21 @ 15:00)  BP: 167/99 (07-13-21 @ 15:00)  RR: 12 (07-13-21 @ 15:00)  SpO2: 100% (07-13-21 @ 15:00)  Wt(kg): --    07-12 @ 07:01  -  07-13 @ 07:00  --------------------------------------------------------  IN: 700 mL / OUT: 100 mL / NET: 600 mL    07-13 @ 07:01  -  07-13 @ 15:26  --------------------------------------------------------  IN: 1100 mL / OUT: 2500 mL / NET: -1400 mL        PHYSICAL EXAM:    Gen: NAD, calm  Cards: RRR, +S1/S2, no M/G/R  Resp: CTA B/L  GI: soft, NT/ND, NABS  Vascular: no LE edema B/L, LUE AVF + bruit/thrill with buttonholes      LABS:  07-13    137  |  96<L>  |  61<H>  ----------------------------<  95  4.2   |  24  |  15.49<H>    Ca    8.1<L>      13 Jul 2021 01:48  Phos  5.5     07-13  Mg     2.30     07-13    TPro      /  Alb      /  TBili  0.2  /  DBili  <0.2  /  AST      /  ALT      /  AlkPhos      07-12    Creatinine Trend: 15.49 <--, 13.00 <--, 9.67 <--, 12.60 <--, 13.22 <--                        8.0    10.95 )-----------( 248 ( 13 Jul 2021 13:13 )             24.0     Urine Studies:

## 2021-07-13 NOTE — PROGRESS NOTE ADULT - ASSESSMENT
Patient is a 36 year old male with a PMHx of HTN, ESRD (on dialysis), hyperthyroidism (not on medication), recent 3 column excisional hemorrhoidectomy (6/25/21 - complicated by readmission for BRBPR - improved after rectal packing and multiple transfusions) who presented with recurrent BRBPR.  CT Angio Abdomen / Pelvis performed showing an active GI bleed within the rectum with some foci of active arterial contrast extravasation within the sigmoid colon.  There are multiple areas of hypodensity visualized throughout the colon, however these are also appreciated on noncontrast images limiting evaluation for a GI bleed within this area.  Patient was transferred to SICU for close hemodynamic monitoring.      PLAN:  - Low fiber diet  - Monitor serial CBCs  - Transfuse PRN  - HD as scheduled  - Pain control  - Appreciate SICU care      #[id number]  [facility]

## 2021-07-14 LAB
ANION GAP SERPL CALC-SCNC: 15 MMOL/L — HIGH (ref 7–14)
BUN SERPL-MCNC: 40 MG/DL — HIGH (ref 7–23)
CALCIUM SERPL-MCNC: 8.4 MG/DL — SIGNIFICANT CHANGE UP (ref 8.4–10.5)
CHLORIDE SERPL-SCNC: 97 MMOL/L — LOW (ref 98–107)
CO2 SERPL-SCNC: 23 MMOL/L — SIGNIFICANT CHANGE UP (ref 22–31)
CREAT SERPL-MCNC: 11.6 MG/DL — HIGH (ref 0.5–1.3)
FERRITIN SERPL-MCNC: 581 NG/ML — HIGH (ref 30–400)
GLUCOSE SERPL-MCNC: 87 MG/DL — SIGNIFICANT CHANGE UP (ref 70–99)
HCT VFR BLD CALC: 23.9 % — LOW (ref 39–50)
HGB BLD-MCNC: 7.8 G/DL — LOW (ref 13–17)
IRON SATN MFR SERPL: 16 % — SIGNIFICANT CHANGE UP (ref 14–50)
IRON SATN MFR SERPL: 32 UG/DL — LOW (ref 45–165)
MAGNESIUM SERPL-MCNC: 2.1 MG/DL — SIGNIFICANT CHANGE UP (ref 1.6–2.6)
MCHC RBC-ENTMCNC: 28.2 PG — SIGNIFICANT CHANGE UP (ref 27–34)
MCHC RBC-ENTMCNC: 32.6 GM/DL — SIGNIFICANT CHANGE UP (ref 32–36)
MCV RBC AUTO: 86.3 FL — SIGNIFICANT CHANGE UP (ref 80–100)
NRBC # BLD: 0 /100 WBCS — SIGNIFICANT CHANGE UP
NRBC # FLD: 0 K/UL — SIGNIFICANT CHANGE UP
PHOSPHATE SERPL-MCNC: 4.6 MG/DL — HIGH (ref 2.5–4.5)
PLATELET # BLD AUTO: 317 K/UL — SIGNIFICANT CHANGE UP (ref 150–400)
POTASSIUM SERPL-MCNC: 4.3 MMOL/L — SIGNIFICANT CHANGE UP (ref 3.5–5.3)
POTASSIUM SERPL-SCNC: 4.3 MMOL/L — SIGNIFICANT CHANGE UP (ref 3.5–5.3)
RBC # BLD: 2.77 M/UL — LOW (ref 4.2–5.8)
RBC # FLD: 15.6 % — HIGH (ref 10.3–14.5)
SODIUM SERPL-SCNC: 135 MMOL/L — SIGNIFICANT CHANGE UP (ref 135–145)
TIBC SERPL-MCNC: 199 UG/DL — LOW (ref 220–430)
UIBC SERPL-MCNC: 167 UG/DL — SIGNIFICANT CHANGE UP (ref 110–370)
WBC # BLD: 9.55 K/UL — SIGNIFICANT CHANGE UP (ref 3.8–10.5)
WBC # FLD AUTO: 9.55 K/UL — SIGNIFICANT CHANGE UP (ref 3.8–10.5)

## 2021-07-14 PROCEDURE — 93971 EXTREMITY STUDY: CPT | Mod: 26

## 2021-07-14 PROCEDURE — 99024 POSTOP FOLLOW-UP VISIT: CPT

## 2021-07-14 RX ORDER — IRON SUCROSE 20 MG/ML
200 INJECTION, SOLUTION INTRAVENOUS ONCE
Refills: 0 | Status: COMPLETED | OUTPATIENT
Start: 2021-07-15 | End: 2021-07-15

## 2021-07-14 RX ORDER — IRON SUCROSE 20 MG/ML
200 INJECTION, SOLUTION INTRAVENOUS ONCE
Refills: 0 | Status: COMPLETED | OUTPATIENT
Start: 2021-07-14 | End: 2021-07-14

## 2021-07-14 RX ADMIN — Medication 1 MILLIGRAM(S): at 12:15

## 2021-07-14 RX ADMIN — Medication 650 MILLIGRAM(S): at 02:15

## 2021-07-14 RX ADMIN — Medication 650 MILLIGRAM(S): at 03:00

## 2021-07-14 RX ADMIN — Medication 650 MILLIGRAM(S): at 19:57

## 2021-07-14 RX ADMIN — CHLORHEXIDINE GLUCONATE 1 APPLICATION(S): 213 SOLUTION TOPICAL at 12:18

## 2021-07-14 RX ADMIN — CHLORHEXIDINE GLUCONATE 1 APPLICATION(S): 213 SOLUTION TOPICAL at 07:30

## 2021-07-14 RX ADMIN — ISOSORBIDE MONONITRATE 60 MILLIGRAM(S): 60 TABLET, EXTENDED RELEASE ORAL at 12:17

## 2021-07-14 RX ADMIN — CALCITRIOL 0.5 MICROGRAM(S): 0.5 CAPSULE ORAL at 12:15

## 2021-07-14 RX ADMIN — POLYETHYLENE GLYCOL 3350 17 GRAM(S): 17 POWDER, FOR SOLUTION ORAL at 12:14

## 2021-07-14 RX ADMIN — Medication 650 MILLIGRAM(S): at 20:45

## 2021-07-14 RX ADMIN — IRON SUCROSE 110 MILLIGRAM(S): 20 INJECTION, SOLUTION INTRAVENOUS at 17:45

## 2021-07-14 RX ADMIN — Medication 300 MILLIGRAM(S): at 05:57

## 2021-07-14 RX ADMIN — PREGABALIN 1000 MICROGRAM(S): 225 CAPSULE ORAL at 12:14

## 2021-07-14 RX ADMIN — Medication 300 MILLIGRAM(S): at 17:45

## 2021-07-14 NOTE — PROGRESS NOTE ADULT - SUBJECTIVE AND OBJECTIVE BOX
Subjective:     Overnight: No acute events.    Objective:  Vital Signs (24 Hrs):  T(C): 37 (21 @ 01:00), Max: 37 (21 @ 12:00)  HR: 92 (21 @ 01:00) (73 - 98)  BP: 151/86 (21 @ 01:00) (144/88 - 173/98)  RR: 18 (21 @ 01:00) (12 - 23)  SpO2: 98% (21 @ 01:00) (98% - 100%)  Wt(kg): --  Daily     Daily Weight in k.6 (2021 10:45)    I&O's Summary    2021 07:01  -  2021 07:00  --------------------------------------------------------  IN: 700 mL / OUT: 100 mL / NET: 600 mL    2021 07:01  -  2021 04:39  --------------------------------------------------------  IN: 1300 mL / OUT: 2700 mL / NET: -1400 mL      MEDICATIONS  (STANDING):  calcitriol   Capsule 0.5 MICROGram(s) Oral daily  chlorhexidine 2% Cloths 1 Application(s) Topical daily  chlorhexidine 4% Liquid 1 Application(s) Topical <User Schedule>  cyanocobalamin 1000 MICROGram(s) Oral daily  epoetin elizabeth-epbx (RETACRIT) Injectable 40354 Unit(s) IV Push <User Schedule>  folic acid 1 milliGRAM(s) Oral daily  isosorbide   mononitrate ER Tablet (IMDUR) 60 milliGRAM(s) Oral daily  labetalol 300 milliGRAM(s) Oral two times a day  polyethylene glycol 3350 17 Gram(s) Oral daily    MEDICATIONS  (PRN):  acetaminophen    Suspension .. 650 milliGRAM(s) Oral every 6 hours PRN Mild Pain (1 - 3), Moderate Pain (4 - 6)      PHYSICAL EXAM (to be evaluated on morning rounds):  GENERAL: NAD, lying in bed comfortably  CHEST: nonlabored, no increased WOB  ABDOMEN: Soft, Nontender, Nondistended. Incision sites clean, dry with no erythema or induration.  EXTREMITIES: Well perfused. No clubbing, cyanosis, or edema  NERVOUS SYSTEM:  Alert & Oriented X3    LABS:                         8.0    10.95 )-----------( 301      ( 2021 13:13 )             24.0     07-    137  |  96<L>  |  61<H>  ----------------------------<  95  4.2   |  24  |  15.49<H>    Ca    8.1<L>      2021 01:48  Phos  5.5       Mg     2.30         TPro  x   /  Alb  x   /  TBili  0.2  /  DBili  <0.2  /  AST  x   /  ALT  x   /  AlkPhos  x                 RADIOLOGY, EKG & ADDITIONAL TESTS: Reviewed.     Subjective: Patient denies any blood per rectum.  Patient had brown stool overnight.  Passing flatus.  No nausea/emesis/CP/SOB/Palpitations.  No dizziness/HA.  + OOB ambulating.  Patient complaining of Left arm swelling since yesterday.    Objective:  Vital Signs (24 Hrs):  T(C): 37 (21 @ 01:00), Max: 37 (21 @ 12:00)  HR: 92 (21 @ 01:00) (73 - 98)  BP: 151/86 (21 @ 01:00) (144/88 - 173/98)  RR: 18 (21 @ 01:00) (12 - 23)  SpO2: 98% (21 @ 01:00) (98% - 100%)  Wt(kg): --  Daily     Daily Weight in k.6 (2021 10:45)    I&O's Summary    2021 07:  -  2021 07:00  --------------------------------------------------------  IN: 700 mL / OUT: 100 mL / NET: 600 mL    2021 07:01  -  2021 04:39  --------------------------------------------------------  IN: 1300 mL / OUT: 2700 mL / NET: -1400 mL      MEDICATIONS  (STANDING):  calcitriol   Capsule 0.5 MICROGram(s) Oral daily  chlorhexidine 2% Cloths 1 Application(s) Topical daily  chlorhexidine 4% Liquid 1 Application(s) Topical <User Schedule>  cyanocobalamin 1000 MICROGram(s) Oral daily  epoetin elizabeth-epbx (RETACRIT) Injectable 95233 Unit(s) IV Push <User Schedule>  folic acid 1 milliGRAM(s) Oral daily  isosorbide   mononitrate ER Tablet (IMDUR) 60 milliGRAM(s) Oral daily  labetalol 300 milliGRAM(s) Oral two times a day  polyethylene glycol 3350 17 Gram(s) Oral daily    MEDICATIONS  (PRN):  acetaminophen    Suspension .. 650 milliGRAM(s) Oral every 6 hours PRN Mild Pain (1 - 3), Moderate Pain (4 - 6)      PHYSICAL EXAM:  GENERAL: NAD, lying in bed comfortably  CHEST: nonlabored, no increased WOB  ABDOMEN: Soft, Nontender, Nondistended.  EXTREMITIES: LUE noticeably swollen.  No erythema/hematoma.  AV Fistula Dressing C/D/I.  +Palpable Thrill.  2+ Radial Pulse.  Full ROM of wrist and shoulder.  Strength 5/5.  Sensation intact.  NERVOUS SYSTEM:  Alert & Oriented X3    LABS:                         8.0    10.95 )-----------( 301      ( 2021 13:13 )             24.0     07-13    137  |  96<L>  |  61<H>  ----------------------------<  95  4.2   |  24  |  15.49<H>    Ca    8.1<L>      2021 01:48  Phos  5.5       Mg     2.30         TPro  x   /  Alb  x   /  TBili  0.2  /  DBili  <0.2  /  AST  x   /  ALT  x   /  AlkPhos  x

## 2021-07-14 NOTE — PROGRESS NOTE ADULT - ASSESSMENT
36y Male with history of ESRD presents with BRBPR. Nephrology consulted for ESRD status.    1) ESRD: Last HD on 7/13 tolerated well with 2L removed. Plan for next maintenance HD on 7/15. Monitor electrolytes.    2) HTN with ESRD: BP improving after home medications resumed. Increase UF with HD. Monitor BP.    3) Anemia of renal disease: With component of GIB. S/P PRBC. Hb stable with iron deficiency (low TSAT) for which will give venofer 200 mg IV X 2 doses. Continue with Epo 10K with HD. Monitor Hb.    4) Hyperphosphatemia: Phosphorus controlled with acceptable iPTH. Continue with calcitriol 0.5 mcg daily. Off of renvela at this time. Monitor serum calcium and phosphorus.      [facility] NEPHROLOGY  Betito Dodson M.D.  Octavio Hernandez D.O.  Lynne Biswas M.D.  [place]ory [place], MSN, ANP-C    Telephone: [phone]  Facsimile: [phone]    [address] [place] [address]

## 2021-07-14 NOTE — PROGRESS NOTE ADULT - SUBJECTIVE AND OBJECTIVE BOX
CARDIOLOGY FOLLOW UP - Dr. May  Date of Service: 7/14/21  CC: feeling better, denies cp, sob, and palpitations     Review of Systems:  Constitutional: No fever, weight loss, or fatigue  Respiratory: No cough, wheezing, or hemoptysis, no shortness of breath  Cardiovascular: No chest pain, palpitations, passing out, dizziness, or leg swelling  Gastrointestinal: No abd or epigastric pain.  No nausea, vomiting, or hematemesis; no diarrhea or constipation, no melena or hematochezia  Vascular: no edema       PHYSICAL EXAM:  T(C): 36.9 (07-14-21 @ 12:14), Max: 37.1 (07-14-21 @ 08:45)  HR: 89 (07-14-21 @ 12:14) (82 - 98)  BP: 155/96 (07-14-21 @ 12:14) (146/97 - 173/98)  RR: 18 (07-14-21 @ 12:14) (12 - 23)  SpO2: 100% (07-14-21 @ 12:14) (98% - 100%)  Wt(kg): --  I&O's Summary    13 Jul 2021 07:01  -  14 Jul 2021 07:00  --------------------------------------------------------  IN: 1300 mL / OUT: 2700 mL / NET: -1400 mL    14 Jul 2021 07:01  -  14 Jul 2021 13:07  --------------------------------------------------------  IN: 0 mL / OUT: 50 mL / NET: -50 mL        Appearance: Normal	  Cardiovascular: Normal S1 S2,RRR, No JVD, No murmurs  Respiratory: Lungs clear to auscultation	  Gastrointestinal:  Soft, Non-tender, + BS	  Extremities: Normal range of motion, No clubbing, cyanosis or edema      Home Medications:  calcitriol 0.25 mcg oral capsule: 1 cap(s) orally once a day (25 Jun 2021 13:29)  isosorbide mononitrate 60 mg oral tablet, extended release: 1 tab(s) orally once a day (in the morning) (25 Jun 2021 13:29)  labetalol 300 mg oral tablet: 1.5 tab(s) orally 2 times a day (25 Jun 2021 13:29)  lanthanum 1000 mg oral tablet, chewable: 1 tab(s) orally 4 times a day (25 Jun 2021 13:29)  MiraLax oral powder for reconstitution: 1 scoop orally daily (25 Jun 2021 13:29)  Sensipar 30 mg oral tablet: 1 tab(s) orally once a day (25 Jun 2021 13:29)  sevelamer hydrochloride 800 mg oral tablet: 3 tab(s) orally 3 times a day (25 Jun 2021 13:29)      MEDICATIONS  (STANDING):  calcitriol   Capsule 0.5 MICROGram(s) Oral daily  chlorhexidine 2% Cloths 1 Application(s) Topical daily  chlorhexidine 4% Liquid 1 Application(s) Topical <User Schedule>  cyanocobalamin 1000 MICROGram(s) Oral daily  epoetin elizabeth-epbx (RETACRIT) Injectable 69328 Unit(s) IV Push <User Schedule>  folic acid 1 milliGRAM(s) Oral daily  iron sucrose IVPB 200 milliGRAM(s) IV Intermittent once  isosorbide   mononitrate ER Tablet (IMDUR) 60 milliGRAM(s) Oral daily  labetalol 300 milliGRAM(s) Oral two times a day  polyethylene glycol 3350 17 Gram(s) Oral daily      TELEMETRY: 	    ECG:  	  RADIOLOGY:   DIAGNOSTIC TESTING:  [ x] Echocardiogram:   < from: Transthoracic Echocardiogram (07.13.21 @ 13:35) >  DIMENSIONS:  Dimensions:     Normal Values:  LA:     4.1 cm    2.0 - 4.0 cm  Ao:     3.6 cm    2.0 - 3.8 cm  SEPTUM: 1.2 cm    0.6 - 1.2 cm  PWT:    1.2 cm   0.6 - 1.1 cm  LVIDd:  5.4 cm    3.0 - 5.6 cm  LVIDs:  3.3 cm    1.8 - 4.0 cm  Derived Variables:  LVMI: 128 g/m2  RWT: 0.44  Fractional short: 39 %  Ejection Fraction (Teicholtz): 69 %  ------------------------------------------------------------------------  OBSERVATIONS:  Mitral Valve: Thickened mitral valve leaflets. Moderate  mitral regurgitation.  Aortic Root: Normal aortic root.  Aortic Valve: Normal trileaflet aortic valve.  Left Atrium: Moderately dilated left atrium.  LA volume  index= 48 cc/m2.  Left Ventricle: Normal left ventricular systolic function.  No segmental wall motion abnormalities. Mild concentric  left ventricular hypertrophy. Normal left ventricular  diastolic function.  Right Heart: Normal right atrium. Normal right ventricular  size and function. Normal tricuspid valve.  Minimal  tricuspid regurgitation. Normal pulmonic valve.  Pericardium/PleuraNormal pericardium with no pericardial  effusion.  ------------------------------------------------------------------------  CONCLUSIONS:  1. Thickened mitral valve leaflets. Moderate mitral  regurgitation.  2. Moderately dilated left atrium.  LA volume index = 48  cc/m2.  3. Mild concentric left ventricular hypertrophy.  4. Normal left ventricular systolic function. No segmental  wall motion abnormalities.  5. Normal left ventricular diastolic function.  6. Normal right ventricular size and function.    < end of copied text >    [ ]  Catheterization:  [ ] Stress Test:    OTHER: 	    LABS:	 	                            7.8    9.55  )-----------( 317      ( 14 Jul 2021 06:16 )             23.9     07-14    135  |  97<L>  |  40<H>  ----------------------------<  87  4.3   |  23  |  11.60<H>    Ca    8.4      14 Jul 2021 06:16  Phos  4.6     07-14  Mg     2.10     07-14    TPro  x   /  Alb  x   /  TBili  0.2  /  DBili  <0.2  /  AST  x   /  ALT  x   /  AlkPhos  x   07-12

## 2021-07-14 NOTE — PROGRESS NOTE ADULT - ASSESSMENT
Echo 7/13/21:  EF 69%, mod MR, nl lv sys fx, mild LVH, min TR   Stress Echo 12/10/19: EF 63%, nl lv sys fx, mild LVH, mild-mod TR, no evidence of ischemia     a/p  37 y/o M with PMH HTN, ESRD on dialysis, hyperthyroidism (no meds), recent 3 column excisional hemorrhoidectomy on 6/25 complicated by readmission for BRBPR improved after rectal packing and multiple transfusions, now representing with recurrent BRBPR.  Cardiac eval for acutely hypotensive, tachycardic and diaphoretic when trying to have BM during HD     # Hypotension, Sinus Tachycardia   -brief episode during HD yesterday associated with diaphoresis  -? Vasovagal 2/2 rectal packing   -no further events   -EKG noted without acute ischemic changes   -Echo noted w nl lv sys fx     #GIB s/p hemorrhoidectomy  -h/h improved s/p PRBCs   -CT a/p noted  -no intervention with IR at this time  -GI eval  -colorectal sx f/u    #CKD  -hd per renal   -renal f/u     #HTN  -bp improving  -cont bb, imdur   -cont to monitor     dvt ppx

## 2021-07-14 NOTE — PROGRESS NOTE ADULT - ATTENDING COMMENTS
2 weeks s/p hemorrhoidectomy w/ acute blood per rectum.  Pt admitted last week with similar issue.  Full anoscopic exam showed no active bleed and pt was discharged at that time.  He reports normal BMs all week with acute episode of bleeding yesterday.  CTA shows active rectal and sigmoid bleed  -Unusual presentation of delayed post hemorrhoidectomy bleed.  Active bleed noted on CTA with finding of active sigmoid colon bleed as well.  Pt responded fully to 3 units PRBC and has had only dark stool at this time.  Currently hemodynamically stable  -Anoscopy performed in standard fashion with lighted small and large anoscope.  Full visualization of the 3 suture lines and distal rectum was achieved.  The area was irrigated w/ saline.  No active bleeding noted circumferentially.  The anal canal was raw in appearance consistent with recent hemorrhoidectomy.  Some clot was removed proximally.  The area was packed w/ gelfoam/surgicel packing  -Pt with delayed post hemorrhoidectomy bleed w/ associated diverticular bleed.  Delayed post hemorrhoidectomy bleeding is fairly rare, and almost never recurs a second time after resolution.  It is also extremely rare for patient to have a concomitant sigmoid colon hemorrhage.  Regardless, bleeding appears to have stopped at this time.  -Will continue to monitor in SICU  -serial hct  -Nephrology evaluation for HD
delayed post hemorrhoidectomy bleed w/ likely diverticular sigmoid colon bleed  -trend hct  -monitor for bleeding (no bleeding in >24hrs)  -dvt ppx  -HD per renal   -floor tx per SICU  -oob  -regular diet
s/p hemorrhoidectomy w/ delayed post hemorrhoidectomy and sigmoid colon bleed  -no bleed noted for 3 days, but pt w/ persistent anemia.  Likely secondary to ESRD  -cont to monitor Hg  -oob  -Regular diet  -dvt ppx  -HD per renal
I agree with the above history, physical, and plan, which I have reviewed and edited where appropriate.  I agree with notes/assessment and detailed interval history of the health care providers on my service.  I have personally examined the patient, reviewed data and laboratory tests/x-rays and all pertinent electronic images.    The plan is specified below:  The patient is in SICU with diagnosis mentioned in the note.    The SICU team has a constant risk benefit analyzes discussion and coordinating care with the primary team, all consultants, House Staff and PA's.  I was physically present for the key portions of the evaluation and management (E/M) service provided.  35 y/o M with PMH HTN, ESRD with BRBPR in s/o 3 column hemorrhoidectomy requiring blood transfusion and rectal packing now with asymptomatic anemia   alert, oriented x 3, no focal deficits.   HEENT  Exam: Normocephalic,   RESPIRATORY  Exam: Lungs clear   CARDIOVASCULAR  Exam: Regular   GI/NUTRITION  Exam: Abdomen soft, Non-tender, Non-distended.    VASCULAR  Exam: Extremities warm    PLAN:    Neurologic:   -Pain control PRN  Respiratory:  -On RA  -SpO2 >92%   Cardiovascular:   currently not requiring pressors   Gastrointestinal/Nutrition:   -NPO  Renal/Genitourinary:   -LR@100  Hematologic:   -continue to trend h/h   Infectious Disease:   -monitor CBC  Endocrine:   CINDY    Disposition:   SICU
Critical Care Dx    R57.8 Nontraumatic hemorrhagic shock   -1:1 resuscitation, monitor for coagulopathy given uremia  -target euvolemia, crit > 21, responded well to transfusions  -colorectal evaluation today  N18.6 ESRD (end stage renal disease) on dialysis   Z99.2 Dependence on renal dialysis   -nephrology for dialysis once stable, high likelihood of overload or electrolyte imbalance   I10 Uncontrolled hypertension   -Restart home meds when stable, cannot give home meds currently       Pipo Quiroz MD  Acute and Critical Care Surgery .
GI bleed/delayed post hemorrhoidectomy bleed  -no bleeding greater that 4 days.  Brad BMs  -tolerating diet  -hct stable, although w/ chronic anemia  -significant Left arm swelling.  Duplex is negative.  Vascular evaluation to determine if further imaging needed

## 2021-07-14 NOTE — CONSULT NOTE ADULT - ASSESSMENT
36 year old M with LUE AVF and LUE swelling   -Duplex negative for DVT   -No acute vascular surgery intervention   -Recommend elevation of LUE   -Recommend ACE wrap of LUE   -D/W Fellow     YOSHI TALBOT Team Surgery   [id number]

## 2021-07-14 NOTE — CONSULT NOTE ADULT - REASON FOR ADMISSION
BRBPR
BRBPR/Hemorrhagic Shock

## 2021-07-14 NOTE — PROVIDER CONTACT NOTE (OTHER) - ACTION/TREATMENT ORDERED:
ACE wrap dsg taken off/ice packs given. Pt. states he is feeling better. Denies numbness/tingling. Team aware, stated will come assess and re-do dsg. PM RN notified.
Provider advised to reassess in 1 hour and notify if still abnormal. will cont to monitor

## 2021-07-14 NOTE — CONSULT NOTE ADULT - SUBJECTIVE AND OBJECTIVE BOX
37 y/o M with PMH HTN, ESRD on dialysis, hyperthyroidism (no meds), recent 3 column excisional hemorrhoidectomy on 6/25 complicated by readmission for BRBPR improved after rectal packing and multiple transfusions, now representing with recurrent BRBPR.     CTA was significant for bleeding in the sigmoid and rectum.  Bedside anoscopic exam without obvious source of bleeding from hemorrhoidectomy site.  Patient initially monitored in the SICU with fluctuating H/H but no further episodes of rectal bleeding. Patient has since been transferred to the floor.     Patient is ESRD on home HD via LUE radiocephalic fistula. The fistula was created by Dr. Wilkins approximately 5 years ago. Yesterday evening he developed significant LUE swelling. No complaints of pain. Completed HD successfully yesterday. Patient reports that today HD was stopped 10-15 minutes early, but was recorded as a full session (2500 off).     PAST MEDICAL & SURGICAL HISTORY:  ESRD on hemodialysis  at home 5 x week  HTN (hypertension)  ESRD (end stage renal disease) on dialysis  HTN (hypertension)  Bell&#x27;s palsy  Hyperthyroidism  AV fistulla L forearm    Elective surgical procedure  RACW permacat for HD, removed 5 years ago    Social Hx: Nonsmoker      Vital Signs Last 24 Hrs  T(C): 36.9 (14 Jul 2021 12:14), Max: 37.1 (14 Jul 2021 08:45)  T(F): 98.5 (14 Jul 2021 12:14), Max: 98.7 (14 Jul 2021 08:45)  HR: 89 (14 Jul 2021 12:14) (82 - 98)  BP: 155/96 (14 Jul 2021 12:14) (146/97 - 173/98)  BP(mean): 103 (13 Jul 2021 20:00) (103 - 117)  RR: 18 (14 Jul 2021 12:14) (17 - 23)  SpO2: 100% (14 Jul 2021 12:14) (98% - 100%)    No acute distress   Breathing comfortably on room air   Abd soft, NT, ND   LUE with radiocepahlic AVF with palpable thrill.  Palpable radial pulse. Arm significantly swollen compared to RUE.  LUE sensation intact. Fine finger movements, flexion/extension at wrist and elbow intact.   B/L lower extremities without edema     < from: [facility] Duplex Ext Veins Upper Comp, Left. (07.14.21 @ 09:48) >  Patient name: BOUBACAR WRIGHT  Date of test: 7/14/2021  MR#: [id number]  [facility] [id number]    Location: [facility] Physician(s): , Ad Ontiveros MD  Interpreted by: Gómez Murray MD, [facility], MALGORZATA GASCA  Tech: Gayle Grubbs, [facility]  Type of Test: Upper Extremity Venous  ------------------------------------------------------------------------  Procedure: Real-time grayscale and color Duplex  ultrasonography was used to interrogate the deep veins of  the left upper extremity.  Indications: Localized swelling, mass and lump, left upper  limb (R22.32)  ------------------------------------------------------------------------  RESULT:  ------------------------------------------------------------------------  LEFT:  Deep venous thrombosis: No  Superficialvenous thrombosis: No  ------------------------------------------------------------------------  Left Findings: The left internal jugular, innominate,  subclavian, axillary, brachial, radial, and ulnar veins  are patent, and demonstrate full compressibility where  able, with phasic Doppler waveforms.  No evidence of  thrombosis.  The left basilic and cephalic veins appear sonographically  normal and compressible, without evidence of thrombosis.  Left upper extremity radiocephalic arteriovenous fistula  appears patent. No evidence of thrombosis.  ------------------------------------------------------------------------  Summary/Impressions:  1. No evidence of deep vein thrombosis in the left upper  extremity.  2. Left upper extremity radiocephalic arteriovenous  fistula appears patent.  ------------------------------------------------------------------------  Confirmed on  7/14/2021 - 10:41 AM by Gómez Murray MD,  [facility], Critical access hospital, [facility]  By signing this report, the attending physician certifies  that he or she has personally supervised and interpreted  the vascular study and has reviewed and or edited and  agrees with the written comments contained within the  report.    < end of copied text >

## 2021-07-14 NOTE — PROGRESS NOTE ADULT - SUBJECTIVE AND OBJECTIVE BOX
[facility] NEPHROLOGY- PROGRESS NOTE    36y Male with history of ESRD presents with BRBPR. Nephrology consulted for ESRD status.    REVIEW OF SYSTEMS:  Gen: no changes in weight  Cards: no chest pain  Resp: no dyspnea  GI: no nausea or vomiting or diarrhea, + rectal bleeding resolved  Vascular: no LE edema, + LUE edema    No Known Drug Allergies  topical cleanser for dialysis access.   Exsept (Rash)      Hospital Medications: Medications reviewed      VITALS:  T(F): 98.7 (07-14-21 @ 08:45), Max: 98.7 (07-14-21 @ 08:45)  HR: 87 (07-14-21 @ 08:45)  BP: 146/97 (07-14-21 @ 08:45)  RR: 18 (07-14-21 @ 08:45)  SpO2: 99% (07-14-21 @ 08:45)  Wt(kg): --    07-13 @ 07:01  -  07-14 @ 07:00  --------------------------------------------------------  IN: 1300 mL / OUT: 2700 mL / NET: -1400 mL    07-14 @ 07:01  -  07-14 @ 10:51  --------------------------------------------------------  IN: 0 mL / OUT: 50 mL / NET: -50 mL      PHYSICAL EXAM:    Gen: NAD, calm  Cards: RRR, +S1/S2, no M/G/R  Resp: CTA B/L  GI: soft, NT/ND, NABS  Vascular: no LE edema B/L, LUE AVF + bruit/thrill with buttonholes        LABS:  07-14    135  |  97<L>  |  40<H>  ----------------------------<  87  4.3   |  23  |  11.60<H>    Ca    8.4      14 Jul 2021 06:16  Phos  4.6     07-14  Mg     2.10     07-14    TPro      /  Alb      /  TBili  0.2  /  DBili  <0.2  /  AST      /  ALT      /  AlkPhos      07-12    Creatinine Trend: 11.60 <--, 15.49 <--, 13.00 <--, 9.67 <--, 12.60 <--, 13.22 <--                        7.8    9.55  )-----------( 720 ( 43 Qrb 2021 06:16 )             23.9     Urine Studies:

## 2021-07-14 NOTE — CONSULT NOTE ADULT - ATTENDING COMMENTS
I have discussed the case with the surgical house staff. I have personally seen, examined, and participated in the care of this patient. I have reviewed all pertinent clinical information.     Pt with esrd via left arm fistula with arm swelling  reports multiple prior fistulograms with intervention by his vascular surgeon    duplex negative for dvt  patent avf    on exam, left arm swelling with palpable thrill overlying left radiocephalic fistula.     no acute intervention needed.  patient to follow up with his vascular surgeon as outpatient.  left arm elevation and gentle ace for compression.
Critical Care Dx    R57.8 Nontraumatic hemorrhagic shock   -1:1 resuscitation, monitor for coagulopathy given uremia  -target euvolemia, crit > 21   -may need OR if rectal packing is insufficient   N18.6 ESRD (end stage renal disease) on dialysis   Z99.2 Dependence on renal dialysis   -nephrology for dialysis once stable, high likelihood of overload or electrolyte imbalance   I10 Uncontrolled hypertension   -Restart home meds when stable, cannot give home meds currently       The patient is a critical care patient with life threatening hemodynamic instability in SICU.  I have personally interviewed and examined the patient, reviewed data and laboratory tests/x-rays and all pertinent electronic images.  The SICU team has a constant risk benefit analyzes discussion with the primary team, all consultants, House Staff and PA's on all decisions.   Time involved in performance of separately billable procedures was not counted toward my critical care time. There is no overlap.    I have personally provided 60 minutes of critical care time concurrently with the resident/fellow. This time excludes time spent on separate procedures and time spent teaching. I have reviewed the resident's/fellow's documentation and agree with the assessment and plan of care.  I was physically present for the key portions of the evaluation and management (E/M) service provided.      Pipo Quiroz MD  Acute and Critical Care Surgery

## 2021-07-14 NOTE — PROVIDER CONTACT NOTE (OTHER) - SITUATION
Pt hypertensive to 171/108. Hr wnl to 92, O2 98% on room air.
Pt. complaining of extreme cramping and pain on L arm post ACE wrap dsg.

## 2021-07-14 NOTE — PROVIDER CONTACT NOTE (OTHER) - ASSESSMENT
Upon assessment pt asymptomatic. no c/o headaches or dizziness. pt stated they started him back on his home bp meds today
Pts. left arm cramping up/VSS/fingers cramped up together.

## 2021-07-14 NOTE — PROGRESS NOTE ADULT - ASSESSMENT
Assessment:   Patient is a 36 year old male with a PMHx of HTN, ESRD (on dialysis), hyperthyroidism (not on medication), recent 3 column excisional hemorrhoidectomy (6/25/21 - complicated by readmission for BRBPR - improved after rectal packing and multiple transfusions) who presented with recurrent BRBPR.  CT Angio Abdomen / Pelvis performed showing an active GI bleed within the rectum with some foci of active arterial contrast extravasation within the sigmoid colon.  There are multiple areas of hypodensity visualized throughout the colon, however these are also appreciated on noncontrast images limiting evaluation for a GI bleed within this area.  Patient was transferred to SICU for close hemodynamic monitoring.      PLAN (to be discussed with attending in AM):  -  -  -  -  - Assessment:   Patient is a 36 year old male with a PMHx of HTN, ESRD (on dialysis), hyperthyroidism (not on medication), recent 3 column excisional hemorrhoidectomy (6/25/21 - complicated by readmission for BRBPR - improved after rectal packing and multiple transfusions) who presented with recurrent BRBPR.  CT Angio Abdomen / Pelvis performed showing an active GI bleed within the rectum with some foci of active arterial contrast extravasation within the sigmoid colon.  There are multiple areas of hypodensity visualized throughout the colon, however these are also appreciated on noncontrast images limiting evaluation for a GI bleed within this area.  S/p SICU admission for close hemodynamic monitoring now on the floor without any further BRBPR and stable H/H      PLAN:  - SWATI RANGEL Duplex venous to r/o clot  - H/D Monitoring  - check labs - H/H  - f/u Nephrology for HD   - Continue to monitor any further BMs

## 2021-07-15 ENCOUNTER — TRANSCRIPTION ENCOUNTER (OUTPATIENT)
Age: 36
End: 2021-07-15

## 2021-07-15 VITALS
DIASTOLIC BLOOD PRESSURE: 92 MMHG | HEART RATE: 84 BPM | OXYGEN SATURATION: 100 % | TEMPERATURE: 98 F | SYSTOLIC BLOOD PRESSURE: 156 MMHG | RESPIRATION RATE: 19 BRPM

## 2021-07-15 LAB
ANION GAP SERPL CALC-SCNC: 16 MMOL/L — HIGH (ref 7–14)
BUN SERPL-MCNC: 50 MG/DL — HIGH (ref 7–23)
CALCIUM SERPL-MCNC: 8.2 MG/DL — LOW (ref 8.4–10.5)
CHLORIDE SERPL-SCNC: 97 MMOL/L — LOW (ref 98–107)
CO2 SERPL-SCNC: 23 MMOL/L — SIGNIFICANT CHANGE UP (ref 22–31)
CREAT SERPL-MCNC: 14.49 MG/DL — HIGH (ref 0.5–1.3)
GLUCOSE SERPL-MCNC: 81 MG/DL — SIGNIFICANT CHANGE UP (ref 70–99)
HCT VFR BLD CALC: 24.3 % — LOW (ref 39–50)
HGB BLD-MCNC: 7.8 G/DL — LOW (ref 13–17)
MAGNESIUM SERPL-MCNC: 2.3 MG/DL — SIGNIFICANT CHANGE UP (ref 1.6–2.6)
MCHC RBC-ENTMCNC: 28.5 PG — SIGNIFICANT CHANGE UP (ref 27–34)
MCHC RBC-ENTMCNC: 32.1 GM/DL — SIGNIFICANT CHANGE UP (ref 32–36)
MCV RBC AUTO: 88.7 FL — SIGNIFICANT CHANGE UP (ref 80–100)
NRBC # BLD: 0 /100 WBCS — SIGNIFICANT CHANGE UP
NRBC # FLD: 0 K/UL — SIGNIFICANT CHANGE UP
PHOSPHATE SERPL-MCNC: 5.6 MG/DL — HIGH (ref 2.5–4.5)
PLATELET # BLD AUTO: 351 K/UL — SIGNIFICANT CHANGE UP (ref 150–400)
POTASSIUM SERPL-MCNC: 4.5 MMOL/L — SIGNIFICANT CHANGE UP (ref 3.5–5.3)
POTASSIUM SERPL-SCNC: 4.5 MMOL/L — SIGNIFICANT CHANGE UP (ref 3.5–5.3)
RBC # BLD: 2.74 M/UL — LOW (ref 4.2–5.8)
RBC # FLD: 15.8 % — HIGH (ref 10.3–14.5)
SODIUM SERPL-SCNC: 136 MMOL/L — SIGNIFICANT CHANGE UP (ref 135–145)
WBC # BLD: 7.92 K/UL — SIGNIFICANT CHANGE UP (ref 3.8–10.5)
WBC # FLD AUTO: 7.92 K/UL — SIGNIFICANT CHANGE UP (ref 3.8–10.5)

## 2021-07-15 PROCEDURE — 99221 1ST HOSP IP/OBS SF/LOW 40: CPT

## 2021-07-15 RX ORDER — FOLIC ACID 0.8 MG
1 TABLET ORAL
Qty: 0 | Refills: 0 | DISCHARGE
Start: 2021-07-15

## 2021-07-15 RX ORDER — POLYETHYLENE GLYCOL 3350 17 G/17G
17 POWDER, FOR SOLUTION ORAL
Qty: 0 | Refills: 0 | DISCHARGE
Start: 2021-07-15

## 2021-07-15 RX ORDER — NIFEDIPINE 30 MG
1 TABLET, EXTENDED RELEASE 24 HR ORAL
Qty: 30 | Refills: 0
Start: 2021-07-15

## 2021-07-15 RX ORDER — PREGABALIN 225 MG/1
1 CAPSULE ORAL
Qty: 0 | Refills: 0 | DISCHARGE
Start: 2021-07-15

## 2021-07-15 RX ORDER — NIFEDIPINE 30 MG
30 TABLET, EXTENDED RELEASE 24 HR ORAL DAILY
Refills: 0 | Status: DISCONTINUED | OUTPATIENT
Start: 2021-07-15 | End: 2021-07-15

## 2021-07-15 RX ORDER — POLYETHYLENE GLYCOL 3350 17 G/17G
1 POWDER, FOR SOLUTION ORAL
Qty: 0 | Refills: 0 | DISCHARGE

## 2021-07-15 RX ADMIN — Medication 300 MILLIGRAM(S): at 19:39

## 2021-07-15 RX ADMIN — IRON SUCROSE 200 MILLIGRAM(S): 20 INJECTION, SOLUTION INTRAVENOUS at 12:34

## 2021-07-15 RX ADMIN — ERYTHROPOIETIN 10000 UNIT(S): 10000 INJECTION, SOLUTION INTRAVENOUS; SUBCUTANEOUS at 12:34

## 2021-07-15 RX ADMIN — Medication 1 MILLIGRAM(S): at 15:52

## 2021-07-15 RX ADMIN — Medication 300 MILLIGRAM(S): at 05:57

## 2021-07-15 RX ADMIN — CALCITRIOL 0.5 MICROGRAM(S): 0.5 CAPSULE ORAL at 15:51

## 2021-07-15 RX ADMIN — PREGABALIN 1000 MICROGRAM(S): 225 CAPSULE ORAL at 15:51

## 2021-07-15 RX ADMIN — ISOSORBIDE MONONITRATE 60 MILLIGRAM(S): 60 TABLET, EXTENDED RELEASE ORAL at 15:52

## 2021-07-15 NOTE — PROGRESS NOTE ADULT - REASON FOR ADMISSION
BRBPR/Hemorrhagic Shock
BRBPR/Hemorrhagic Shock
BRBPR / Hemorrhagic Shock
BRBPR/Hemorrhagic Shock

## 2021-07-15 NOTE — PROGRESS NOTE ADULT - TIME BILLING
Agree with above NP note.  cv stable  cont current tx  sbp stable  echo w normal LVEF  care per surgery
Agree with above NP note.  cv stable  cont current tx  sbp stable  f/u echo   care per icu
Agree with above NP note.  cv stable  cont current tx  sbp stable  f/u echo   care per icu
Agree with above NP note.  cv stable   cont current tx  care per sx

## 2021-07-15 NOTE — DISCHARGE NOTE NURSING/CASE MANAGEMENT/SOCIAL WORK - NSDCPNINST_GEN_ALL_CORE
NOTIFY YOUR SURGEON IF YOU HAVE: any bleeding that does not stop,  any fever (over 100.4 F) persistent nausea/vomiting, stop passing gas/having bowel movements, or if you have worsening pain

## 2021-07-15 NOTE — DISCHARGE NOTE PROVIDER - NSDCCPCAREPLAN_GEN_ALL_CORE_FT
PRINCIPAL DISCHARGE DIAGNOSIS  Diagnosis: Anemia  Assessment and Plan of Treatment: DIET: low fiber diet   NOTIFY YOUR SURGEON IF YOU HAVE: any bleeding that does not stop,  any fever (over 100.4 F) persistent nausea/vomiting, stop passing gas/having bowel movements, or if you have worsening pain   Please follow up with your primary care physician in one week regarding your hospitalization, bring copies of your discharge paperwork.  Please follow up with your surgeon, Dr. Ontiveros as an outpatient, please call to schedule appointment         SECONDARY DISCHARGE DIAGNOSES  Diagnosis: Rectal bleeding  Assessment and Plan of Treatment:

## 2021-07-15 NOTE — PROGRESS NOTE ADULT - ASSESSMENT
Echo 7/13/21:  EF 69%, mod MR, nl lv sys fx, mild LVH, min TR   Stress Echo 12/10/19: EF 63%, nl lv sys fx, mild LVH, mild-mod TR, no evidence of ischemia     a/p  37 y/o M with PMH HTN, ESRD on dialysis, hyperthyroidism (no meds), recent 3 column excisional hemorrhoidectomy on 6/25 complicated by readmission for BRBPR improved after rectal packing and multiple transfusions, now representing with recurrent BRBPR.  Cardiac eval for acutely hypotensive, tachycardic and diaphoretic when trying to have BM during HD     # Hypotension, Sinus Tachycardia   -brief episode during HD yesterday associated with diaphoresis  -? Vasovagal 2/2 rectal packing   -no further events   -EKG noted without acute ischemic changes   -Echo noted w nl lv sys fx     #GIB s/p hemorrhoidectomy  -h/h improved s/p PRBCs   -CT a/p noted  -no intervention with IR at this time  -colorectal sx f/u    #CKD  -hd per renal   -renal f/u     #HTN  -bp improving  -cont bb, imdur   -cont to monitor     dvt ppx  DCP per primary team

## 2021-07-15 NOTE — DISCHARGE NOTE PROVIDER - NSDCMRMEDTOKEN_GEN_ALL_CORE_FT
calcitriol 0.25 mcg oral capsule: 1 cap(s) orally once a day  cloNIDine 0.1 mg oral tablet: 1 tab(s) orally now   isosorbide mononitrate 60 mg oral tablet, extended release: 1 tab(s) orally once a day (in the morning)  labetalol 300 mg oral tablet: 1.5 tab(s) orally 2 times a day  lanthanum 1000 mg oral tablet, chewable: 1 tab(s) orally 4 times a day  MiraLax oral powder for reconstitution: 1 scoop orally daily  Sensipar 30 mg oral tablet: 1 tab(s) orally once a day  sevelamer hydrochloride 800 mg oral tablet: 3 tab(s) orally 3 times a day   calcitriol 0.25 mcg oral capsule: 1 cap(s) orally once a day  cloNIDine 0.1 mg oral tablet: 1 tab(s) orally now   cyanocobalamin 1000 mcg oral tablet: 1 tab(s) orally once a day  folic acid 1 mg oral tablet: 1 tab(s) orally once a day  isosorbide mononitrate 60 mg oral tablet, extended release: 1 tab(s) orally once a day (in the morning)  labetalol 300 mg oral tablet: 1.5 tab(s) orally 2 times a day  lanthanum 1000 mg oral tablet, chewable: 1 tab(s) orally 4 times a day  MiraLax oral powder for reconstitution: 1 scoop orally daily  Sensipar 30 mg oral tablet: 1 tab(s) orally once a day  sevelamer hydrochloride 800 mg oral tablet: 3 tab(s) orally 3 times a day   calcitriol 0.25 mcg oral capsule: 1 cap(s) orally once a day  cloNIDine 0.1 mg oral tablet: 1 tab(s) orally now   cyanocobalamin 1000 mcg oral tablet: 1 tab(s) orally once a day  folic acid 1 mg oral tablet: 1 tab(s) orally once a day  isosorbide mononitrate 60 mg oral tablet, extended release: 1 tab(s) orally once a day (in the morning)  labetalol 300 mg oral tablet: 1.5 tab(s) orally 2 times a day  lanthanum 1000 mg oral tablet, chewable: 1 tab(s) orally 4 times a day  Metamucil 3.4 g/3.7 g oral powder for reconstitution: 1 Tsp dissolved in a glass of water orally once a day  polyethylene glycol 3350 oral powder for reconstitution: 17 gram(s) orally once a day (dissolve in 4-8 oz of liquid) as needed for constipation (do not take if having regular bowel movements or loose bowel movements)  Sensipar 30 mg oral tablet: 1 tab(s) orally once a day  sevelamer hydrochloride 800 mg oral tablet: 3 tab(s) orally 3 times a day

## 2021-07-15 NOTE — DISCHARGE NOTE NURSING/CASE MANAGEMENT/SOCIAL WORK - NSCORESITESY/N_GEN_A_CORE_RD
INTERNAL MEDICINE RESIDENT ADMISSION NOTE     Patient: Jose Mix Date: 2020   YOB: 1958 Admission Date: 2020   MRN: [id number] Attending: Racheal Medina MD     Team: Aristides Kinney    Chief Complaint:   Chief Complaint   Patient presents with   â¢ Chest Pain Adult   â¢ Abdominal Pain        HISTORY AND PHYSICAL     This patient is a 59year old female with a PMHx significant for type 2 DM, hypertension, gastritis who presented with lightheadedness, nausea, vomiting, epigastric abdominal pain since last 2020. She was also having greenish dark, foul-smelling diarrhea frequently since Friday. She takes baby aspirin but denies taking ibuprofen, naproxen or meloxicam.  She smokes cigarettes daily but denies drinking alcohol or using illicit drugs. Past Medical History:   Diagnosis Date   â¢ Arthralgia     multiple   â¢ Degeneration of lumbar or lumbosacral intervertebral disc     LBP; facet joint pain, lumbosacral   â¢ Diabetes mellitus (CMS/HCC)     insulin dependent   â¢ Diabetic neuropathy (CMS/HCC)    â¢ HTN (hypertension)    â¢ Hyperlipidemia    â¢ Lumbar radicular pain     left;    â¢ Obesity     and weight gain   â¢ Onychomycosis    â¢ Thyroid mass     heterogeneous lobulated mass, left lobe       Past Surgical History:   Procedure Laterality Date   â¢  postpartum care increased service     â¢ Joint replacement     â¢ Knee surgery     â¢ Tubal ligation         Prior to Admission medications    Medication Sig Start Date End Date Taking? Authorizing Provider   pantoprazole (PROTONIX) 40 MG tablet Take 1 tablet by mouth daily. 19   Angela Hope PA-C   benzonatate (TESSALON PERLES) 100 MG capsule Take 1 capsule by mouth 3 times daily as needed for Cough. 18   Makenna Tam PA-C   metformin (FORTAMET) 500 MG 24 hr tablet Take 1 tablet by mouth daily (with breakfast). 16   David Becker MD   glipiZIDE (GLUCOTROL) 2.5 MG 24 hr tablet Take 1 tablet by mouth daily. 2/4/16   Rodri Acosta MD   lisinopril (PRINIVIL,ZESTRIL) 40 MG tablet Take 1 tablet by mouth daily. 4/24/15   Marci Leblanc MD   hydrochlorothiazide (HYDRODIURIL) 12.5 MG tablet Take 1 tablet by mouth daily. 4/24/15   Marci Leblanc MD   ibuprofen (MOTRIN) 800 MG tablet Take 1 tablet by mouth every 8 hours as needed for Pain. 2/6/15   Kelsey Singh NP   Omega-3 Fatty Acids (FISH OIL) 1000 MG capsule Take 2 g by mouth daily. 1/19/15   Kelsey Singh NP   cetirizine (ZYRTEC ALLERGY) 10 MG tablet Take 1 tablet by mouth daily. 1/19/15   Kelsey Singh NP   amLODIPine (NORVASC) 10 MG tablet Take 1 tablet by mouth daily. 1/19/15   Kelsey Singh NP   albuterol 108 (90 BASE) MCG/ACT inhaler Inhale 2 puffs into the lungs every 4 hours as needed for Shortness of Breath or Wheezing. 1/19/15   Kelsey Singh NP   cholecalciferol (VITAMIN D3) 1000 UNITS tablet Take 1 tablet by mouth daily. 2 tablets 1/19/15   Kelsey Singh NP   nicotine (NICODERM) 7 MG/24HR Place 1 patch onto the skin every 24 hours. 1/19/15   Kelsey Singh NP   Glucose Blood (FREESTYLE LITE) strip 2 times daily. 1/19/15   Severo Ban, NP   DISPENSE Glucometer 1/19/15   Severo Ban, NP   aspirin 81 MG tablet Take 1 tablet by mouth daily. 1/19/15   Kelsey Singh NP   diclofenac (VOLTAREN) 75 MG EC tablet Take 1 tablet by mouth 2 times daily. 1/19/15   Kelsey Singh NP   diclofenac epolamine (FLECTOR) 1.3 % Leave on for 12 hours, take off for 12. 12/5/14   Cathy Bone MD   meloxicam (MOBIC) 15 MG tablet Take 1 tablet by mouth daily. 11/24/14   Cathy Bone MD   guaiFENesin (MUCINEX) 600 MG 12 hr tablet Take 2 tablets by mouth 2 times daily.  12/4/13   Kelsey Singh NP       ALLERGIES:   Allergen Reactions   â¢ Dulaglutide Other (See Comments)     Injection site reaction   â¢ Latex   (Environmental) HIVES and RASH   â¢ Prednisone SWELLING     Extremity and facial edema, severe   â¢ Lyrica [Pregabalin] "SWELLING     Ankle edema only       Code Status: Full code    Primary MD: Verify Pcp    Family History   Problem Relation Age of Onset   â¢ Arthritis Sister         lupus   â¢ Arthritis Daughter    â¢ Arthritis Sister         lupus   â¢ Hypertension Father    â¢ Arthritis Father    â¢ Asthma Other         several nieces and nephews   â¢ Asthma Grandchild    â¢ Cancer Mother    â¢ Heart Mother    â¢ Stroke Sister    â¢ Heart Brother      Social  Social History     Tobacco Use   â¢ Smoking status: Current Every Day Smoker     Packs/day: 0.25     Years: 41.00     Pack years: 10.25     Types: Cigarettes     Last attempt to quit: 2015     Years since quittin.8   â¢ Smokeless tobacco: Never Used   â¢ Tobacco comment: pt agrees to quit smoking today. Was not smoking on a daily basis. , smk hx per care everywhere   Substance Use Topics   â¢ Alcohol use: No   â¢ Drug use: No     Social History     Patient does not qualify to have social determinant information on file (likely too young). Social History Narrative   â¢ Not on file       REVIEW OF SYSTEMS     ROS:  All systems reviewed and negative as stated in the HPI    PHYSICAL EXAM     Vital Signs (most recent):   Visit Vitals  /69   Pulse 83   Temp 98.2 Â°F (36.8 Â°C) (Oral)   Resp 17   Wt 98.6 kg   LMP  (LMP Unknown)   SpO2 99%   BMI 37.33 kg/mÂ²       PHYSICAL EXAM:  General: Alert, cooperative, conversive. No acute distress. Skin: warm, dry no evidence of rash or other lesions. HEENT: The sclerae and conjunctivae are pale bilaterally. .   The nasal sinuses are normal.  The nasal septum is midline. .  The posterior pharynx is without lesion, edema or erythema.""}. Neck:  The trachea is midline. No cervical or supraclavicular lymphadenopathy. Respiratory:  Bilaterally clear to auscultation   Cardiovascular: Regular rate and rhythm  Abdomen: Abdomen: soft, non-tender, non-distended. Positive bowel sounds all quadrants. No guarding or rebound.  No hepatomegaly or " splenomegaly. Extremities and Spine:  No edema. No deformity. No tenderness. .  . Back:  Normal alignment. No CVA  or midline bony tenderness. Neuro:  Alert, oriented x4. Speech intact. No dysphasia or dysarthria. Symmetrical facial structures. Strength 5/5 all extremities. Sensation intact to light touch. Psych:  Affect is  appropriate with normal social interaction.       LABS       Recent Results (from the past 24 hour(s))   Comprehensive Metabolic Panel    Collection Time: 02/24/20 12:41 PM   Result Value Ref Range    Sodium 141 135 - 145 mmol/L    Potassium 4.2 3.4 - 5.1 mmol/L    Chloride 106 98 - 107 mmol/L    Carbon Dioxide 27 21 - 32 mmol/L    Anion Gap 12 10 - 20 mmol/L    Glucose 180 (H) 65 - 99 mg/dL    BUN 11 6 - 20 mg/dL    Creatinine 0.75 0.51 - 0.95 mg/dL    Glomerular Filtration Rate >90 >90    BUN/ Creatinine Ratio 15 7 - 25    Bilirubin, Total 0.8 0.2 - 1.0 mg/dL    GOT/AST 51 (H) <=37 Units/L    Alkaline Phosphatase 37 (L) 45 - 117 Units/L    Albumin 4.0 3.6 - 5.1 g/dL    Protein, Total 6.5 6.4 - 8.2 g/dL    Globulin 2.5 2.0 - 4.0 g/dL    A/G Ratio 1.6 1.0 - 2.4    GPT/ALT 52 <64 Units/L    Calcium 8.3 (L) 8.4 - 10.2 mg/dL   Troponin I Ultra Sensitive    Collection Time: 02/24/20 12:41 PM   Result Value Ref Range    Troponin I, Ultra Sensitive <0.02 <=0.04 ng/mL   Lipase    Collection Time: 02/24/20 12:41 PM   Result Value Ref Range    Lipase <50 (L) 73 - 393 Units/L   CBC with Automated Differential (performable only)    Collection Time: 02/24/20 12:41 PM   Result Value Ref Range    WBC 6.6 4.2 - 11.0 K/mcL    RBC 1.79 (L) 4.00 - 5.20 mil/mcL    HGB 6.8 (LL) 12.0 - 15.5 g/dL    HCT 19.4 (L) 36.0 - 46.5 %    .4 (H) 78.0 - 100.0 fl    MCH 38.0 (H) 26.0 - 34.0 pg    MCHC 35.1 32.0 - 36.5 g/dL    RDW-SD 66.7 (H) 39.0 - 50.0 fL    RDW-CV 19.6 (H) 11.0 - 15.0 %     140 - 450 K/mcL    NRBC 0 <=0 /100 WBC    Neutrophil, Percent 63 %    Lymphocytes, Percent 33 %    Mono, Percent 2 %    Eosinophils, Percent 1 %    Basophils, Percent 0 %    Immature Granulocytes 1 %    Absolute Neutrophils 4.1 1.8 - 7.7 K/mcL    Absolute Lymphocytes 2.2 1.0 - 4.0 K/mcL    Absolute Monocytes 0.2 (L) 0.3 - 0.9 K/mcL    Absolute Eosinophils  0.1 0.1 - 0.5 K/mcL    Absolute Basophils 0.0 0.0 - 0.3 K/mcL    Absolute Immmature Granulocytes 0.1 0.0 - 0.2 K/mcL   Light Blue Top    Collection Time: 02/24/20 12:41 PM   Result Value Ref Range    Extra Tube Hold for Add Ons    Gold Top    Collection Time: 02/24/20 12:41 PM   Result Value Ref Range    Extra Tube Hold for Add Ons    URINALYSIS, MACROSCOPIC -POINT OF CARE    Collection Time: 02/24/20  1:12 PM   Result Value Ref Range    COLOR - POINT OF CARE Yellow     APPEARANCE, URINALYSIS - POINT OF CARE Hazy     GLUCOSE, URINALYSIS - POINT OF CARE Negative Negative mg/dL    BILIRUBIN, URINALYSIS - POINT OF CARE Negative Negative    KETONES, URINALYSIS - POINT OF CARE Negative Negative mg/dL    SPECIFIC GRAVITY, URINALYSIS - POINT OF CARE 1.025 1.005 - 1.030 NULL    OCCULT BLOOD, URINALYSIS - POINT OF CARE Trace (A) Negative    PH, URINALYSIS - POINT OF CARE 6.0 5.0 - 7.0 Units    PROTEIN, URINALYSIS - POINT OF CARE Negative Negative mg/dL    UROBILINOGEN, URINALYSIS - POINT OF CARE 2.0 (A) 0.2, 1.0 mg/dL    NITRITE, URINALYSIS - POINT OF CARE Negative Negative    WBC ESTERASE, URINALYSIS - POINT OF CARE Negative Negative       MICROBIOLOGY:  Blood Culture: Not done No results found for this or any previous visit.   Urine Culture: Not done   Results for orders placed or performed in visit on 10/04/11   URINE CULTURE INCLUDE SENS   Result Value Ref Range    Source URINE, SPECIMEN TYPE NOT STATED     Culture NO GROWTH        IMAGING:  US Liver / Gallbladder / Pancreas   Final Result   IMPRESSION:     Sludge within the gallbladder             Cardiac:  EKG Interpretation:  Results for orders placed or performed during the hospital encounter of 12/02/19   ECG   Result No Value Ref Range    Ventricular Rate EKG/Min (BPM) 72     Atrial Rate (BPM) 72     GA-Interval (MSEC) 162     QRS-Interval (MSEC) 74     QT-Interval (MSEC) 418     QTc 457     P Axis (Degrees) 72     R Axis (Degrees) 57     T Axis (Degrees) 62     REPORT TEXT       Sinus rhythm  with marked sinus arrhythmia  Low voltage QRS  Cannot rule out  Anterior infarct  (cited on or before  02-DEC-2019)  Abnormal ECG  When compared with ECG of  02-DEC-2019 13:29,  No significant change was found  Confirmed by Sarah Beth Soriano M.D., Sandra Mullins [phone]),  Corrine Trujillo ([id number]) on 12/2/2019 4:17:10 PM         Cardiac Markers:  Lab Results   Component Value Date    RAPDTR <0.02 02/24/2020    RAPDTR <0.02 08/06/2012    BNP 18 08/06/2012           Vital Last Value 24 Hour Range   Temperature 98.2 Â°F (36.8 Â°C) (02/24/20 1158) Temp  Min: 98.2 Â°F (36.8 Â°C)  Max: 98.2 Â°F (36.8 Â°C)   Pulse 83 (02/24/20 1437) Pulse  Min: 83  Max: 93   Respiratory 17 (02/24/20 1158) Resp  Min: 17  Max: 17   Non-Invasive  Blood Pressure 132/69 (02/24/20 1437) BP  Min: 132/69  Max: 144/83   Arterial   Blood Pressure   No data recorded   Pulse Oximetry 99 % (02/24/20 1437) SpO2  Min: 99 %  Max: 100 %         ASSESSMENT AND PLAN     Alessandra Romeo is a 59year old female admitted on 2/24/2020 with present illness severe anemia;    #Severe Macrocytic Acute Anemia  -she presented with nausea and vomiting,epigastric pain, lightheadedness, melena likes stool, and a significant drop in her hemoglobin from 9.5 in December 2019  To 6.8 this admission  -her MCV is elevated at 108. 4(was 110.1) in 12/2019  -Takes only ASA 81mg ;but has Ibuprofen ;diclofenac and Meloxicam listed On Her PTA meds--->denies taking those  -Protonix 40mg IV BID  -Reticulocyte count;B12 and folate levels;Peripheral Morphology  -transfuse with 1 unit of packed RBC  -GI consulted;appreciate recommendations  -monitor hemoglobin and stool color  -no stigmata of chronic liver disease/cirrhosis;AST is 51 ;Normal ALT and ALK Phosp    # Hypertension  -Bp 144/64  -continue amlodipine 10 mg p.o. daily,Lisinopril 40mg Po daily; at HCTZ 12.5 mg p.o. daily  -Monitor closely for BP,daily BMP    # Type 2 DM  -Last A1C is 7% 1/2020  -will hold Glipizide and metformin  -will put her on SSI while NPO for possible EGD    #Tobaco Use  -counseled on cessation  -nicotine Patch    #HLD  -T. Cholesterol is 132/LDL 71/HDL 47/Triglyceride 69          5. Nutrition  NPO except Meds    6. GI Prophylaxis:  Pantoprazole IV    7. DVT Prophylaxis:  currently none    Quality Indicators     AMI With Heart Failure with Reduced LVEF (<40%)? no  Heart failure? No  Stroke measures indicated? no  AMI? No  DVT/VTE Prophylaxis:  VTE Pharmacologic Prophylaxis: No pharmacologic Venous Thromboembolism prophylaxis due to Active Bleeding / Risk of Bleeding  VTE Mechanical Prophylaxis: Yes  Severe sepsis with septic shock? No      Signed,  Juanjose Prado MD  PGY II  2/24/2020 3:27 PM  Pager: [phone]    The patient's history and physical were dicussed with Bill Miller MD who, upon seeing the patient, agreed with the above assessment and plan. IM ATTENDING ADDENDUM 2/25  Patient seen and examined with the team. Labs and chart reviewed in EPIC. Performed a complete History and Physical on the patient. Discussed the case in detail with the team. Reviewed the above H and P document and agree with it.     Trish Bauman

## 2021-07-15 NOTE — PROGRESS NOTE ADULT - SUBJECTIVE AND OBJECTIVE BOX
ubjective:    Pt doing well, no active complants      Vital Signs Last 24 Hrs  T(C): 36.8 (15 Jul 2021 15:01), Max: 37 (14 Jul 2021 22:12)  T(F): 98.3 (15 Jul 2021 15:01), Max: 98.6 (14 Jul 2021 22:12)  HR: 76 (15 Jul 2021 15:01) (66 - 87)  BP: 161/98 (15 Jul 2021 15:01) (146/81 - 167/107)  BP(mean): 97 (14 Jul 2021 22:12) (97 - 97)  RR: 18 (15 Jul 2021 15:01) (16 - 18)  SpO2: 100% (15 Jul 2021 15:01) (100% - 100%)    General: AAOx3 No acute distress   Respiratory: Breathing comfortably on room air   Abd: soft, NT, ND   Extremities LUE with radiocepahlic AVF with palpable thrill.  Palpable radial pulse. Arm significantly swollen compared to RUE.  LUE sensation intact. Fine finger movements, flexion/extension at wrist and elbow intact.   B/L lower extremities without edema     <

## 2021-07-15 NOTE — PROGRESS NOTE ADULT - SUBJECTIVE AND OBJECTIVE BOX
CARDIOLOGY FOLLOW UP - Dr. May  Date of Service: 7/15/21  CC: denies cp, sob, and palpitations     Review of Systems:  Constitutional: No fever, weight loss, or fatigue  Respiratory: No cough, wheezing, or hemoptysis, no shortness of breath  Cardiovascular: No chest pain, palpitations, passing out, dizziness, or leg swelling  Gastrointestinal: No abd or epigastric pain.  No nausea, vomiting, or hematemesis; no diarrhea or constipation, no melena or hematochezia  Vascular: no edema       PHYSICAL EXAM:  T(C): 36.9 (07-15-21 @ 05:54), Max: 37.1 (07-14-21 @ 17:03)  HR: 80 (07-15-21 @ 05:54) (79 - 99)  BP: 154/92 (07-15-21 @ 05:54) (146/81 - 155/98)  RR: 17 (07-15-21 @ 05:54) (16 - 19)  SpO2: 100% (07-15-21 @ 05:54) (100% - 100%)  Wt(kg): --  I&O's Summary    14 Jul 2021 07:01  -  15 Jul 2021 07:00  --------------------------------------------------------  IN: 0 mL / OUT: 150 mL / NET: -150 mL        Appearance: Normal	  Cardiovascular: Normal S1 S2,RRR, No JVD, No murmurs  Respiratory: Lungs clear to auscultation	  Gastrointestinal:  Soft, Non-tender, + BS	  Extremities: Normal range of motion, No clubbing, cyanosis or edema      Home Medications:  calcitriol 0.25 mcg oral capsule: 1 cap(s) orally once a day (25 Jun 2021 13:29)  isosorbide mononitrate 60 mg oral tablet, extended release: 1 tab(s) orally once a day (in the morning) (25 Jun 2021 13:29)  labetalol 300 mg oral tablet: 1.5 tab(s) orally 2 times a day (25 Jun 2021 13:29)  lanthanum 1000 mg oral tablet, chewable: 1 tab(s) orally 4 times a day (25 Jun 2021 13:29)  MiraLax oral powder for reconstitution: 1 scoop orally daily (25 Jun 2021 13:29)  Sensipar 30 mg oral tablet: 1 tab(s) orally once a day (25 Jun 2021 13:29)  sevelamer hydrochloride 800 mg oral tablet: 3 tab(s) orally 3 times a day (25 Jun 2021 13:29)      MEDICATIONS  (STANDING):  calcitriol   Capsule 0.5 MICROGram(s) Oral daily  chlorhexidine 2% Cloths 1 Application(s) Topical daily  chlorhexidine 4% Liquid 1 Application(s) Topical <User Schedule>  cyanocobalamin 1000 MICROGram(s) Oral daily  epoetin elizabeth-epbx (RETACRIT) Injectable 67095 Unit(s) IV Push <User Schedule>  folic acid 1 milliGRAM(s) Oral daily  iron sucrose Injectable 200 milliGRAM(s) IV Push once  isosorbide   mononitrate ER Tablet (IMDUR) 60 milliGRAM(s) Oral daily  labetalol 300 milliGRAM(s) Oral two times a day  polyethylene glycol 3350 17 Gram(s) Oral daily      TELEMETRY: 	    ECG:  	  RADIOLOGY:   DIAGNOSTIC TESTING:  [ ] Echocardiogram:  [ ]  Catheterization:  [ ] Stress Test:    OTHER: 	    LABS:	 	                            7.8    7.92  )-----------( 351      ( 15 Jul 2021 08:11 )             24.3     07-15    136  |  97<L>  |  50<H>  ----------------------------<  81  4.5   |  23  |  14.49<H>    Ca    8.2<L>      15 Jul 2021 08:11  Phos  5.6     07-15  Mg     2.30     07-15

## 2021-07-15 NOTE — DISCHARGE NOTE NURSING/CASE MANAGEMENT/SOCIAL WORK - PATIENT PORTAL LINK FT
You can access the FollowMyHealth Patient Portal offered by [facility] by registering at the following website: [email or web address]. By joining [facility]’s FollowMyHealth portal, you will also be able to view your health information using other applications (apps) compatible with our system.

## 2021-07-15 NOTE — PROGRESS NOTE ADULT - NSICDXPILOT_GEN_ALL_CORE
[place]

## 2021-07-15 NOTE — PROGRESS NOTE ADULT - ASSESSMENT
36 year old M with LUE AVF and LUE swelling     Plan:  -No acute vascular surgery intervention   -Recommend elevation  w/ ACE wrap of [place]       C Team Surgery   [id number]

## 2021-07-15 NOTE — PROGRESS NOTE ADULT - PROVIDER SPECIALTY LIST ADULT
Cardiology
Cardiology
Colorectal Surgery
SICU
Colorectal Surgery
Nephrology
Surgery
Vascular Surgery
Nephrology
Nephrology
SICU
SICU
Cardiology
Cardiology
Nephrology
Surgery
Surgery
Nephrology
Colorectal Surgery

## 2021-07-15 NOTE — DISCHARGE NOTE PROVIDER - HOSPITAL COURSE
37 y/o M with PMH HTN, ESRD on dialysis, hyperthyroidism (no meds), recent hemorrhoidectomy who presents for BRBPR in s/o 3 column hemorrhoidectomy on 6/25 with Dr. Ontiveros. Patient recently admitted 07/01 for similar presentation requiring blood transfusion and rectal packing with Surgicel at bedside with Colorectal Surgery, and was discharged after appropriate response to transfusion and no bleeding for more than 24 hours.     Presents today with subjective lightheadedness and dizziness. Endorses rectal discomfort and some abdominal pain. Given 1U pRBC, reassessed with downtrending H/h, ordered for 2 additional units. Pt also received IV Vitamin K 10mg x1 and DDAVP.      CTA performed and showed active GI bleed within the rectum with some foci of active arterial contrast extravasation within the sigmoid colon. There are multiple areas of hypodensity visualized throughout the colon, however these are also appreciated on noncontrast images limiting evaluation for a GI bleed within this area.    Dr. Ontiveros performed bedside anoscope, no anal bleed noted at the time, packed w/ gelfoam/surgicel packing    SICU consulted for close hemodynamic monitoring    IR consulted, deferred intervention at the time as focal lesions in the sigmoid are small and likely have limited clinical impact given degree of bleeding from the rectum.  Recommend GI eval if bleeding persists, for a less invasive diagnostic and possibly therapeutic approach.    Nephrology consulted bc pt hx ESRD undergoes HD 5X/week. Pt cont to undergo HD sessions while in the hospital     During HD 7/10 pt became acutely hypotensive and tachycardic. Cardiology consulted for tachycardia, state likely vasovagal event in setting of blood loss anemia. Recommend echo     Pt with no bleeding noted for 3 days, but pt w/ persistent anemia, likely secondary to ESRD, 7/12 pt transfused additional 2U PRBC    7/13 echo performed and showed 1. Thickened mitral valve leaflets. Moderate mitral regurgitation. 2. Moderately dilated left atrium.  LA volume index = 48 cc/m2. 3. Mild concentric left ventricular hypertrophy 4. Normal left ventricular systolic function. No segmental wall motion abnormalities. 5. Normal left ventricular diastolic function. 6. Normal right ventricular size and function.    7/14 pt with LUE swelling for which Vascular consulted and pt underwent UE duplex which showed 1. No evidence of deep vein thrombosis in the left upper extremity. 2. Left upper extremity radiocephalic arteriovenous fistula appears patent. Recommend ACE wrap and elevation of LUE     Per Attending pt now stable for dc home. Pt hemodynamically stable, tolerating diet, and pain well controlled. Pt to follow up with Dr Ontiveros as an outpatient, instructed to call to schedule appointment 35 y/o M with PMH HTN, ESRD on dialysis, hyperthyroidism (no meds), recent hemorrhoidectomy who presents for BRBPR in s/o 3 column hemorrhoidectomy on 6/25 with Dr. Ontiveros. Patient recently admitted 07/01 for similar presentation requiring blood transfusion and rectal packing with Surgicel at bedside with Colorectal Surgery, and was discharged after appropriate response to transfusion and no bleeding for more than 24 hours.     Presents today with subjective lightheadedness and dizziness. Endorses rectal discomfort and some abdominal pain. Given 1U pRBC, reassessed with downtrending H/h, ordered for 2 additional units. Pt also received IV Vitamin K 10mg x1 and DDAVP.      CTA performed and showed active GI bleed within the rectum with some foci of active arterial contrast extravasation within the sigmoid colon. There are multiple areas of hypodensity visualized throughout the colon, however these are also appreciated on noncontrast images limiting evaluation for a GI bleed within this area.    Dr. Ontiveros performed bedside anoscope, no anal bleed noted at the time, packed w/ gelfoam/surgicel packing    SICU consulted for close hemodynamic monitoring    IR consulted, deferred intervention at the time as focal lesions in the sigmoid are small and likely have limited clinical impact given degree of bleeding from the rectum.  Recommend GI eval if bleeding persists, for a less invasive diagnostic and possibly therapeutic approach.    Nephrology consulted bc pt hx ESRD undergoes HD 5X/week. Pt cont to undergo HD sessions while in the hospital     During HD 7/10 pt became acutely hypotensive and tachycardic. Cardiology consulted for tachycardia, state likely vasovagal event in setting of blood loss anemia. Recommend echo     Pt with no bleeding noted for 3 days, but pt w/ persistent anemia, likely secondary to ESRD, 7/12 pt transfused additional 2U PRBC    7/13 echo performed and showed 1. Thickened mitral valve leaflets. Moderate mitral regurgitation. 2. Moderately dilated left atrium.  LA volume index = 48 cc/m2. 3. Mild concentric left ventricular hypertrophy 4. Normal left ventricular systolic function. No segmental wall motion abnormalities. 5. Normal left ventricular diastolic function. 6. Normal right ventricular size and function.    7/14 Patient complained of swollen LUE.  +palpable thrill, + radial/ulnar pulse, sensation/motor/sensory intact.  LUE Duplex revealed  no DVT and patent fistula.  Vascular consulted who recommended elevation/compression and no further testing.  Nephrology agreed that fistula was easily dialyzable.      7/15 Patient underwent another dialysis session in hospital on 7/15 successfully.  All teams agreed that patient is stable for d/c home with no further workup.  Patient understands and agrees with above.  Patient will follow up with Dr. Ontiveros in 1 week.      7/14 pt with LUE swelling for which Vascular consulted and pt underwent UE duplex which showed 1. No evidence of deep vein thrombosis in the left upper extremity. 2. Left upper extremity radiocephalic arteriovenous fistula appears patent. Recommend ACE wrap and elevation of LUE     Per Attending pt now stable for dc home. Pt hemodynamically stable, tolerating diet, and pain well controlled. Pt to follow up with Dr Ontiveros as an outpatient, instructed to call to schedule appointment 35 y/o M with PMH HTN, ESRD on dialysis, hyperthyroidism (no meds), recent hemorrhoidectomy who presents for BRBPR in s/o 3 column hemorrhoidectomy on 6/25 with Dr. Ontiveros. Patient recently admitted 07/01 for similar presentation requiring blood transfusion and rectal packing with Surgicel at bedside with Colorectal Surgery, and was discharged after appropriate response to transfusion and no bleeding for more than 24 hours.     Presents today with subjective lightheadedness and dizziness. Endorses rectal discomfort and some abdominal pain. Given 1U pRBC, reassessed with downtrending H/h, ordered for 2 additional units. Pt also received IV Vitamin K 10mg x1 and DDAVP.      CTA performed and showed active GI bleed within the rectum with some foci of active arterial contrast extravasation within the sigmoid colon. There are multiple areas of hypodensity visualized throughout the colon, however these are also appreciated on noncontrast images limiting evaluation for a GI bleed within this area.    Dr. Ontiveros performed bedside anoscope, no anal bleed noted at the time, packed w/ gelfoam/surgicel packing    SICU consulted for close hemodynamic monitoring    IR consulted, deferred intervention at the time as focal lesions in the sigmoid are small and likely have limited clinical impact given degree of bleeding from the rectum.  Recommend GI eval if bleeding persists, for a less invasive diagnostic and possibly therapeutic approach.    Nephrology consulted bc pt hx ESRD undergoes HD 5X/week. Pt cont to undergo HD sessions while in the hospital     During HD 7/10 pt became acutely hypotensive and tachycardic. Cardiology consulted for tachycardia, state likely vasovagal event in setting of blood loss anemia. Recommend echo     Pt with no bleeding noted for 3 days, but pt w/ persistent anemia, likely secondary to ESRD, 7/12 pt transfused additional 2U PRBC    7/13 echo performed and showed 1. Thickened mitral valve leaflets. Moderate mitral regurgitation. 2. Moderately dilated left atrium.  LA volume index = 48 cc/m2. 3. Mild concentric left ventricular hypertrophy 4. Normal left ventricular systolic function. No segmental wall motion abnormalities. 5. Normal left ventricular diastolic function. 6. Normal right ventricular size and function.    7/14 Patient complained of swollen LUE.  +palpable thrill, + radial/ulnar pulse, sensation/motor/sensory intact.  LUE Duplex revealed 1. No evidence of deep vein thrombosis in the left upper extremity. 2. Left upper extremity radiocephalic arteriovenous fistula appears patent. Vascular consulted who recommended elevation/compression with ACE wrap and no further testing.  Nephrology agreed that fistula was easily dialyzable.      7/15 Patient underwent another dialysis session in hospital on 7/15 successfully.  Vascular surgery/Nephrology and Dr. Ontiveros all agreed that patient is stable for d/c home with no further workup.  Patient understands and agrees with above.  Patient will follow up with Dr. Ontiveros in 1 week.      Per Attending pt now stable for dc home. Pt hemodynamically stable, tolerating diet, and pain well controlled with no further bleeding per rectum. Pt to follow up with Dr Ontiveros as an outpatient, instructed to call to schedule appointment

## 2021-07-15 NOTE — PROGRESS NOTE ADULT - ASSESSMENT
36y Male with history of ESRD presents with BRBPR. Nephrology consulted for ESRD status.    1) ESRD: Last HD on 7/13 tolerated well with 2L removed. Plan for next maintenance HD today prior to discharge. Monitor electrolytes.    2) HTN with ESRD: BP uncontrolled. Increase UF with HD today. If BP remains uncontrolled post-HD, can start lisinopril 10 mg QHS. Monitor BP.    3) Anemia of renal disease: With component of GIB. S/P PRBC. Hb stable with iron deficiency (low TSAT) s/p venofer. Continue with Epo 10K with HD. Monitor Hb.    4) Hyperphosphatemia: Phosphorus controlled with acceptable iPTH. Continue with calcitriol 0.5 mcg daily. Patient advised to resume home renvela on discharge. Monitor serum calcium and phosphorus.      [facility] NEPHROLOGY  Betito Dodson M.D.  Octavio Hernandez D.O.  Lynne Biswas M.D.  Mallory Hook, MSN, ANP-C    Telephone: [phone]  Facsimile: [phone]    [address] [place] [address]   36y Male with history of ESRD presents with BRBPR. Nephrology consulted for ESRD status.    1) ESRD: Last HD on 7/13 tolerated well with 2L removed. Plan for next maintenance HD today prior to discharge. Monitor electrolytes.    2) HTN with ESRD: BP uncontrolled. Increase UF with HD today. If BP remains uncontrolled post-HD, can start nifedipine 30 mg daily. Monitor BP.    3) Anemia of renal disease: With component of GIB. S/P PRBC. Hb stable with iron deficiency (low TSAT) s/p venofer. Continue with Epo 10K with HD. Monitor Hb.    4) Hyperphosphatemia: Phosphorus controlled with acceptable iPTH. Continue with calcitriol 0.5 mcg daily. Patient advised to resume home renvela on discharge. Monitor serum calcium and phosphorus.      [facility] NEPHROLOGY  Betito Dodson M.D.  Octavio Hernandez D.O.  Lynne Biswas M.D.  M[place]ory Hook, MSN, ANP-C    Telephone: [phone]  Facsimile: [phone]    [address] [place] [address]

## 2021-07-15 NOTE — PROGRESS NOTE ADULT - SUBJECTIVE AND OBJECTIVE BOX
INCOMPLETE *************      SURGERY  Pager:    STATUS POST:      POST OPERATIVE DAY #      INTERVAL EVENTS/SUBJECTIVE:     ______________________________________________  OBJECTIVE:   T(C): 36.9 (07-15-21 @ 01:44), Max: 37.1 (07-14-21 @ 08:45)  HR: 80 (07-15-21 @ 05:54) (79 - 99)  BP: 148/97 (07-15-21 @ 01:44) (146/81 - 155/98)  RR: 17 (07-15-21 @ 05:54) (16 - 19)  SpO2: 100% (07-15-21 @ 05:54) (99% - 100%)  Wt(kg): --  CAPILLARY BLOOD GLUCOSE        I&O's Detail    13 Jul 2021 07:01  -  14 Jul 2021 07:00  --------------------------------------------------------  IN:    Oral Fluid: 800 mL    Other (mL): 500 mL  Total IN: 1300 mL    OUT:    Other (mL): 2500 mL    Voided (mL): 200 mL  Total OUT: 2700 mL    Total NET: -1400 mL      14 Jul 2021 07:01  -  15 Jul 2021 05:55  --------------------------------------------------------  IN:  Total IN: 0 mL    OUT:    Voided (mL): 150 mL  Total OUT: 150 mL    Total NET: -150 mL          Physical exam:  Gen: NAD  Abdomen:  Vascular:  ______________________________________________  LABS:  CBC Full  -  ( 14 Jul 2021 06:16 )  WBC Count : 9.55 K/uL  RBC Count : 2.77 M/uL  Hemoglobin : 7.8 g/dL  Hematocrit : 23.9 %  Platelet Count - Automated : 317 K/uL  Mean Cell Volume : 86.3 fL  Mean Cell Hemoglobin : 28.2 pg  Mean Cell Hemoglobin Concentration : 32.6 gm/dL  Auto Neutrophil # : x  Auto Lymphocyte # : x  Auto Monocyte # : x  Auto Eosinophil # : x  Auto Basophil # : x  Auto Neutrophil % : x  Auto Lymphocyte % : x  Auto Monocyte % : x  Auto Eosinophil % : x  Auto Basophil % : x    07-14    135  |  97<L>  |  40<H>  ----------------------------<  87  4.3   |  23  |  11.60<H>    Ca    8.4      14 Jul 2021 06:16  Phos  4.6     07-14  Mg     2.10     07-14      _____________________________________________  RADIOLOGY:

## 2021-07-15 NOTE — PROGRESS NOTE ADULT - ASSESSMENT
Assessment:   Patient is a 36 year old male with a PMHx of HTN, ESRD (on dialysis), hyperthyroidism (not on medication), recent 3 column excisional hemorrhoidectomy (6/25/21 - complicated by readmission for BRBPR - improved after rectal packing and multiple transfusions) who presented with recurrent BRBPR.  CT Angio Abdomen / Pelvis performed showing an active GI bleed within the rectum with some foci of active arterial contrast extravasation within the sigmoid colon.  There are multiple areas of hypodensity visualized throughout the colon, however these are also appreciated on noncontrast images limiting evaluation for a GI bleed within this area.  S/p SICU admission for close hemodynamic monitoring now on the floor without any further BRBPR and stable H/H      PLAN:  - Continue to monitor BMs for blood  - Cont. trend H/h  - LUE VA Duplex venous 7/15: no evidence of clot, veins patent and compressible   - Cont. monitor LUE swelling  - Ok to f/u outpatient per nephrology       [facility]  #[id number]

## 2021-07-15 NOTE — PROGRESS NOTE ADULT - SUBJECTIVE AND OBJECTIVE BOX
[facility] NEPHROLOGY- PROGRESS NOTE    36y Male with history of ESRD presents with BRBPR. Nephrology consulted for ESRD status.    REVIEW OF SYSTEMS:  Gen: no changes in weight  Cards: no chest pain  Resp: no dyspnea  GI: no nausea or vomiting or diarrhea, + rectal bleeding resolved  Vascular: no LE edema, + LUE edema    No Known Drug Allergies  topical cleanser for dialysis access.   Exsept (Rash)      Hospital Medications: Medications reviewed      VITALS:  T(F): 97.8 (07-15-21 @ 11:00), Max: 98.8 (07-14-21 @ 17:03)  HR: 66 (07-15-21 @ 11:00)  BP: 167/107 (07-15-21 @ 11:00)  RR: 18 (07-15-21 @ 11:00)  SpO2: 100% (07-15-21 @ 05:54)  Wt(kg): --    07-14 @ 07:01  -  07-15 @ 07:00  --------------------------------------------------------  IN: 0 mL / OUT: 150 mL / NET: -150 mL      PHYSICAL EXAM:    Gen: NAD, calm  Cards: RRR, +S1/S2, no M/G/R  Resp: CTA B/L  GI: soft, NT/ND, NABS  Vascular: no LE edema B/L, LUE AVF + bruit/thrill with buttonholes, + LUE edema        LABS:  07-15    136  |  97<L>  |  50<H>  ----------------------------<  81  4.5   |  23  |  14.49<H>    Ca    8.2<L>      15 Jul 2021 08:11  Phos  5.6     07-15  Mg     2.30     07-15      Creatinine Trend: 14.49 <--, 11.60 <--, 15.49 <--, 13.00 <--, 9.67 <--, 12.60 <--, 13.22 <--                        7.8    7.92  )-----------( 351      ( 15 Jul 2021 08:11 )             24.3     Urine Studies:

## 2021-07-15 NOTE — DISCHARGE NOTE PROVIDER - CARE PROVIDER_API CALL
Ad Ontiveros)  ColonRectal Surgery; Surgery  [facility], [address] [place] [address]  Phone: [phone]  Fax: [phone]  Follow Up Time:    Ad Ontiveros)  ColonRectal Surgery; Surgery  [facility], [address] [place] [address]  Phone: [phone]  Fax: [phone]  Follow Up Time: 1 week

## 2021-07-15 NOTE — DISCHARGE NOTE PROVIDER - NSDCFUADDINST_GEN_ALL_CORE_FT
During your hospitalization, you had swelling of your Left arm.  Workup of the swelling was negative and fistula was functioning well during dialysis.  Per Vascular Surgery Attending, please keep your Left arm elevated as much as possible and keep an ace bandage on it for compression to decrease the swelling.    Please follow up with Dr. Corral [phone](or your home Vascular Surgeon) as needed if the swelling doesn't improve.    Please follow up with your Nephrologist at dialysis and as needed.    Your home dialysis has been set back up for you. During your hospitalization, you had swelling of your Left arm.  Workup of the swelling was negative and fistula was functioning well during dialysis.  Per Vascular Surgery Attending, please keep your Left arm elevated as much as possible and keep an ace bandage on it for compression to decrease the swelling.    Please follow up with Dr. Corral [phone](or your home Vascular Surgeon) as needed if the swelling doesn't improve.    Please follow up with your Nephrologist about your hospitalization, medications and blood pressure. Call today for appointment in 1 week.  Your home dialysis has been set back up for you.  Please follow up with your primary care doctor regarding your hospitalization and medications.  Call today for appointment in 1 week.

## 2021-07-16 LAB
CULTURE RESULTS: SIGNIFICANT CHANGE UP
CULTURE RESULTS: SIGNIFICANT CHANGE UP
SPECIMEN SOURCE: SIGNIFICANT CHANGE UP
SPECIMEN SOURCE: SIGNIFICANT CHANGE UP

## 2021-07-23 NOTE — ASU DISCHARGE PLAN (ADULT/PEDIATRIC) - NSDISCH_ACTIVITY_ENDO_ALL_CORE_FT
As you may have been given sedative drugs and medications, you should not drive or operate heavy machinery the next 24 hours. You should avoid any heavy lifting, straining or running today. To the extent possible, defer any important decisions for the next 24 hours.
Yes

## 2021-07-26 ENCOUNTER — APPOINTMENT (OUTPATIENT)
Dept: NEPHROLOGY | Facility: CLINIC | Age: 36
End: 2021-07-26

## 2021-08-26 ENCOUNTER — APPOINTMENT (OUTPATIENT)
Dept: COLORECTAL SURGERY | Facility: CLINIC | Age: 36
End: 2021-08-26
Payer: MEDICARE

## 2021-08-26 DIAGNOSIS — K64.9 UNSPECIFIED HEMORRHOIDS: ICD-10-CM

## 2021-08-26 DIAGNOSIS — K62.5 HEMORRHAGE OF ANUS AND RECTUM: ICD-10-CM

## 2021-08-26 PROCEDURE — 99024 POSTOP FOLLOW-UP VISIT: CPT

## 2021-08-26 NOTE — HISTORY OF PRESENT ILLNESS
[FreeTextEntry1] : Status post hemorrhoidectomy. Patient progressing well.Denies bleeding or pain. Normal bowel movements. Patient was readmitted postoperatively with rectal bleeding but no active bleeding was ever noted from hemorrhoidal surgical site. Currently patient without complaint

## 2021-08-26 NOTE — ASSESSMENT
[FreeTextEntry1] : Status post hemorrhoidectomy for chronic bleeding internal hemorrhoids\par -Patient denies any bleeding\par -Continue high fiber diet\par -Follow up as needed
[FreeTextEntry1] : Today's CBC (On 6/26/19) wbc 3.3 Hb 8.8 plt 143 ANC 1600\par \par On 3/26/19) wbc 4.4 with normal diff,  hb 9.6 plt 169\par On 3/6/19 wbc 3.1 hb 10.7 plt 74\par On 3/5/19 wbc 3.6 Hb 11.7 plt 82\par On 2/5/19 wbc 4.8 hb 12.2 plt 158\par On 11/5/18 wbc 4.2 Hb 11.5 plt 220\par

## 2021-08-27 ENCOUNTER — APPOINTMENT (OUTPATIENT)
Dept: TRANSPLANT | Facility: CLINIC | Age: 36
End: 2021-08-27
Payer: MEDICARE

## 2021-08-27 PROCEDURE — 99001T: CUSTOM

## 2021-09-17 ENCOUNTER — APPOINTMENT (OUTPATIENT)
Dept: TRANSPLANT | Facility: CLINIC | Age: 36
End: 2021-09-17
Payer: MEDICARE

## 2021-09-17 PROCEDURE — 99001T: CUSTOM

## 2021-09-20 ENCOUNTER — APPOINTMENT (OUTPATIENT)
Dept: TRANSPLANT | Facility: CLINIC | Age: 36
End: 2021-09-20
Payer: MEDICARE

## 2021-09-20 PROCEDURE — 86833 HLA CLASS II HIGH DEFIN QUAL: CPT

## 2021-09-20 PROCEDURE — 86832 HLA CLASS I HIGH DEFIN QUAL: CPT

## 2021-11-22 ENCOUNTER — APPOINTMENT (OUTPATIENT)
Dept: TRANSPLANT | Facility: CLINIC | Age: 36
End: 2021-11-22
Payer: MEDICARE

## 2021-11-22 PROCEDURE — 99001T: CUSTOM

## 2022-01-06 ENCOUNTER — APPOINTMENT (OUTPATIENT)
Dept: TRANSPLANT | Facility: CLINIC | Age: 37
End: 2022-01-06
Payer: MEDICARE

## 2022-01-06 PROCEDURE — 99001T: CUSTOM

## 2022-01-31 ENCOUNTER — APPOINTMENT (OUTPATIENT)
Dept: TRANSPLANT | Facility: CLINIC | Age: 37
End: 2022-01-31
Payer: MEDICARE

## 2022-01-31 PROCEDURE — 99001T: CUSTOM

## 2022-02-10 ENCOUNTER — APPOINTMENT (OUTPATIENT)
Dept: NEPHROLOGY | Facility: CLINIC | Age: 37
End: 2022-02-10

## 2022-02-14 ENCOUNTER — APPOINTMENT (OUTPATIENT)
Dept: TRANSPLANT | Facility: CLINIC | Age: 37
End: 2022-02-14
Payer: MEDICARE

## 2022-02-14 PROCEDURE — 99001T: CUSTOM

## 2022-02-16 ENCOUNTER — NON-APPOINTMENT (OUTPATIENT)
Age: 37
End: 2022-02-16

## 2022-02-16 ENCOUNTER — APPOINTMENT (OUTPATIENT)
Dept: NEPHROLOGY | Facility: CLINIC | Age: 37
End: 2022-02-16
Payer: COMMERCIAL

## 2022-02-16 VITALS
OXYGEN SATURATION: 99 % | HEART RATE: 80 BPM | SYSTOLIC BLOOD PRESSURE: 164 MMHG | BODY MASS INDEX: 29.94 KG/M2 | WEIGHT: 213.85 LBS | DIASTOLIC BLOOD PRESSURE: 94 MMHG | HEIGHT: 71 IN | RESPIRATION RATE: 15 BRPM | TEMPERATURE: 98.5 F

## 2022-02-16 PROCEDURE — 99072 ADDL SUPL MATRL&STAF TM PHE: CPT

## 2022-02-16 PROCEDURE — 99215 OFFICE O/P EST HI 40 MIN: CPT

## 2022-02-16 NOTE — REASON FOR VISIT
[Follow-Up] : a follow-up visit for [Kidney Transplant Evaluation] : kidney transplant evaluation [Initial Evaluation] : an initial evaluation [FreeTextEntry1] : pre kidney transplant evaluation

## 2022-02-16 NOTE — CONSULT LETTER
[Dear  ___] : Dear  [unfilled], [Courtesy Letter:] : I had the pleasure of seeing your patient, [unfilled], in my office today. [Please see my note below.] : Please see my note below. [Consult Closing:] : Thank you very much for allowing me to participate in the care of this patient.  If you have any questions, please do not hesitate to contact me. [Sincerely,] : Sincerely, [FreeTextEntry3] : Maximo Colindres, DO

## 2022-02-16 NOTE — HISTORY OF PRESENT ILLNESS
[FreeTextEntry1] : 36 years old AA male, ESRD 2015, diagnosed kidney disease prior to start of dialysis. He is followed by Dr. Betito Dodson, nephrologist. He has known HTN since age 17.  \par \par Hospitalized in 2021 for chronic bleeding internal hemorrhoids and underwent  hemorrhoidectomy \par He received PRBC at the time. \par Past surgeries: Left forearm AVF.\par Former smoker, 2-3 cig /day for 1 year, quit 2 years ago  .  No alcohol. Not working currently.  Stays with Mom and aunt.  \par Mother in 70 's both diabetic and hypertensive.  Father  from pneumonia and had DM and HTN.  \par He is single, no children. One sister in [facility] but not close\par No family h/o kidney disease.\par \par Last Seen 2020\par Listed 2016\par Dialysis 4/9/15\par ABO O\par PRA 0%\par \par Most recent testing\par Cardiac Dr Mccann Cleared 19\par EK19, normal sinus rhythm, nonspecific ST abnormality \par ECHO/Stress 12/10/19 EF 63%, 87% MPHR, 8 METS, moderate mitral regurg, normal LV systolic function, normal RV size and function, no evidence of inducible ischemia. \par \par Radiology\par CT abd and pelvis no contrast 2020 Vasculature has dense calcifications of bilateral internal iliac arteries most prominent along the right internal iliac artery . normal anatomical positioning of the bilateral renal veins and bilateral renal arteries. organs WNL. . \par Chest Xray 10/25/2019 clear lungs.\par \par Cancer Screening\par PSA 2020 0.49\par Colonoscopy in 2020. \par Endoscopy in 2019  \par

## 2022-02-16 NOTE — ASSESSMENT
[FreeTextEntry1] : 1.  ESRD on dialysis since 2015 - He remains an excellent candidate for kidney transplantation.  Unclear cause of ESRD. h/o HTN since age 17 . \par 2.  HTN - stable.  \par 3.  CV - needs updated cardiology  clearance. \par 4.  Needs repeat imaging\par 5. GI bleed sec to hemorrhoids-  s/p hemorrhoidectomy in July 2021 \par \par Reported off to: Jose Antonio NP and Starr ZACARIAS\par 	\par Patient must complete: cardiac clearance, Abd imaging, chest imaging, update serologies including COVID Ab here at the office, Complete COVID PCR testing, Send consents and COVID order with locations to the patient to sign and return. Pt must be scheduled for annual appointment. \par Donor coordinator contact information given to patient	\par KDPI >85%:NO\par CDC high risk: TBD\par Hep C Kidney: TBD\par A2B NA\par \par \par \par I have personally discussed the risks and benefits of transplantation where the following was disclosed:\par  \par Reviewed factors affecting survival and morbidity while on dialysis, the transplant wait list and reviewed sheyla-operative and long-term risk factors affecting outcome in kidney transplantation.  \par  \par One year SRTR outcomes for national and [facility] were discussed in regards to patient survival and graft survival after transplantation.  \par  \par Details of transplant surgery, including complications were discussed.\par Immunosuppression and complications including infection including life threatening sepsis and opportunistic infections, malignancy and new onset diabetes were discussed.  \par  \par Benefits of live donor transplantation as well as variability in wait times across regions and multiple listing were discussed. \par KDPI >85% and PHS high risk criteria donors were discussed. \par HCV kidney transplantation was discussed.\par  \par I spent > 50% of this 40 minute visit discussing the risks and benefits of kidney transplantation with the patient as outlined above.

## 2022-02-17 ENCOUNTER — APPOINTMENT (OUTPATIENT)
Dept: TRANSPLANT | Facility: CLINIC | Age: 37
End: 2022-02-17
Payer: MEDICARE

## 2022-02-17 LAB
ABO + RH PNL BLD: NORMAL
ALBUMIN SERPL ELPH-MCNC: 4.6 G/DL
ALP BLD-CCNC: 68 U/L
ALT SERPL-CCNC: 7 U/L
ANION GAP SERPL CALC-SCNC: 18 MMOL/L
AST SERPL-CCNC: 10 U/L
BASOPHILS # BLD AUTO: 0.04 K/UL
BASOPHILS NFR BLD AUTO: 0.8 %
BILIRUB SERPL-MCNC: 0.3 MG/DL
BUN SERPL-MCNC: 49 MG/DL
C PEPTIDE SERPL-MCNC: 8 NG/ML
CALCIUM SERPL-MCNC: 8.9 MG/DL
CHLORIDE SERPL-SCNC: 98 MMOL/L
CHOLEST SERPL-MCNC: 162 MG/DL
CK SERPL-CCNC: 305 U/L
CMV IGG SERPL QL: <0.2 U/ML
CMV IGG SERPL-IMP: NEGATIVE
CO2 SERPL-SCNC: 24 MMOL/L
COVID-19 SPIKE DOMAIN ANTIBODY INTERPRETATION: POSITIVE
CREAT SERPL-MCNC: 12.97 MG/DL
CRP SERPL-MCNC: <3 MG/L
EBV EA AB SER IA-ACNC: <5 U/ML
EBV EA AB TITR SER IF: POSITIVE
EBV EA IGG SER QL IA: >600 U/ML
EBV EA IGG SER-ACNC: NEGATIVE
EBV EA IGM SER IA-ACNC: NEGATIVE
EBV PATRN SPEC IB-IMP: NORMAL
EBV VCA IGG SER IA-ACNC: 46.6 U/ML
EBV VCA IGM SER QL IA: <10 U/ML
EOSINOPHIL # BLD AUTO: 0.24 K/UL
EOSINOPHIL NFR BLD AUTO: 4.5 %
EPSTEIN-BARR VIRUS CAPSID ANTIGEN IGG: POSITIVE
ERYTHROCYTE [SEDIMENTATION RATE] IN BLOOD BY WESTERGREN METHOD: 7 MM/HR
ESTIMATED AVERAGE GLUCOSE: 105 MG/DL
GLUCOSE SERPL-MCNC: 92 MG/DL
HAV IGM SER QL: NONREACTIVE
HBA1C MFR BLD HPLC: 5.3 %
HBV CORE IGG+IGM SER QL: NONREACTIVE
HBV SURFACE AB SER QL: REACTIVE
HBV SURFACE AB SERPL IA-ACNC: >1000 MIU/ML
HBV SURFACE AG SER QL: NONREACTIVE
HCT VFR BLD CALC: 28.3 %
HCV AB SER QL: NONREACTIVE
HCV S/CO RATIO: 0.13 S/CO
HDLC SERPL-MCNC: 108 MG/DL
HEPATITIS A IGG ANTIBODY: NONREACTIVE
HGB BLD-MCNC: 8.5 G/DL
HIV1+2 AB SPEC QL IA.RAPID: NONREACTIVE
HSV 1+2 IGG SER IA-IMP: NEGATIVE
HSV 1+2 IGG SER IA-IMP: POSITIVE
HSV 1+2 IGG SER IA-IMP: POSITIVE
HSV1 IGG SER QL: >62.2 INDEX
HSV1 IGG SER QL: >62.2 INDEX
HSV2 IGG SER QL: 0.13 INDEX
IMM GRANULOCYTES NFR BLD AUTO: 0.4 %
LDLC SERPL CALC-MCNC: 46 MG/DL
LYMPHOCYTES # BLD AUTO: 0.77 K/UL
LYMPHOCYTES NFR BLD AUTO: 14.5 %
MAGNESIUM SERPL-MCNC: 2.4 MG/DL
MAN DIFF?: NORMAL
MCHC RBC-ENTMCNC: 25.9 PG
MCHC RBC-ENTMCNC: 30 GM/DL
MCV RBC AUTO: 86.3 FL
MONOCYTES # BLD AUTO: 0.5 K/UL
MONOCYTES NFR BLD AUTO: 9.4 %
NEUTROPHILS # BLD AUTO: 3.74 K/UL
NEUTROPHILS NFR BLD AUTO: 70.4 %
NONHDLC SERPL-MCNC: 54 MG/DL
PHOSPHATE SERPL-MCNC: 4.2 MG/DL
PLATELET # BLD AUTO: 203 K/UL
POTASSIUM SERPL-SCNC: 4.2 MMOL/L
PROT SERPL-MCNC: 6.5 G/DL
PSA SERPL-MCNC: 0.31 NG/ML
RBC # BLD: 3.28 M/UL
RBC # FLD: 17.8 %
RUBV IGG FLD-ACNC: 4.1 INDEX
RUBV IGG SER-IMP: POSITIVE
SARS-COV-2 AB SERPL IA-ACNC: >250 U/ML
SARS-COV-2 N GENE NPH QL NAA+PROBE: NOT DETECTED
SODIUM SERPL-SCNC: 140 MMOL/L
T GONDII AB SER-IMP: NEGATIVE
T GONDII IGG SER QL: <3 IU/ML
T PALLIDUM AB SER QL IA: NEGATIVE
T3 SERPL-MCNC: 71 NG/DL
T4 FREE SERPL-MCNC: 1.2 NG/DL
TRIGL SERPL-MCNC: 40 MG/DL
TSH SERPL-ACNC: 1.59 UIU/ML
URATE SERPL-MCNC: 4.5 MG/DL
VZV AB TITR SER: POSITIVE
VZV IGG SER IF-ACNC: 1538 INDEX
WBC # FLD AUTO: 5.31 K/UL

## 2022-02-17 PROCEDURE — 86833 HLA CLASS II HIGH DEFIN QUAL: CPT

## 2022-02-17 PROCEDURE — 86832 HLA CLASS I HIGH DEFIN QUAL: CPT

## 2022-02-17 PROCEDURE — 99001T: CUSTOM

## 2022-02-22 LAB
M TB IFN-G BLD-IMP: NEGATIVE
QUANTIFERON TB PLUS MITOGEN MINUS NIL: 10 IU/ML
QUANTIFERON TB PLUS NIL: 0 IU/ML
QUANTIFERON TB PLUS TB1 MINUS NIL: 0 IU/ML
QUANTIFERON TB PLUS TB2 MINUS NIL: 0 IU/ML

## 2022-02-23 LAB
HSV1 IGM SER QL: NEGATIVE
HSV2 AB FLD-ACNC: NEGATIVE

## 2022-04-05 ENCOUNTER — APPOINTMENT (OUTPATIENT)
Dept: COLORECTAL SURGERY | Facility: CLINIC | Age: 37
End: 2022-04-05

## 2022-05-17 ENCOUNTER — NON-APPOINTMENT (OUTPATIENT)
Age: 37
End: 2022-05-17

## 2022-06-01 ENCOUNTER — APPOINTMENT (OUTPATIENT)
Dept: RADIOLOGY | Facility: IMAGING CENTER | Age: 37
End: 2022-06-01
Payer: COMMERCIAL

## 2022-06-01 ENCOUNTER — OUTPATIENT (OUTPATIENT)
Dept: OUTPATIENT SERVICES | Facility: HOSPITAL | Age: 37
LOS: 1 days | End: 2022-06-01
Payer: COMMERCIAL

## 2022-06-01 ENCOUNTER — APPOINTMENT (OUTPATIENT)
Dept: CT IMAGING | Facility: IMAGING CENTER | Age: 37
End: 2022-06-01
Payer: COMMERCIAL

## 2022-06-01 DIAGNOSIS — Z01.818 ENCOUNTER FOR OTHER PREPROCEDURAL EXAMINATION: ICD-10-CM

## 2022-06-01 DIAGNOSIS — Z41.9 ENCOUNTER FOR PROCEDURE FOR PURPOSES OTHER THAN REMEDYING HEALTH STATE, UNSPECIFIED: Chronic | ICD-10-CM

## 2022-06-01 DIAGNOSIS — I77.0 ARTERIOVENOUS FISTULA, ACQUIRED: Chronic | ICD-10-CM

## 2022-06-01 DIAGNOSIS — Z00.8 ENCOUNTER FOR OTHER GENERAL EXAMINATION: ICD-10-CM

## 2022-06-01 PROCEDURE — 74178 CT ABD&PLV WO CNTR FLWD CNTR: CPT | Mod: 26

## 2022-06-01 PROCEDURE — 71046 X-RAY EXAM CHEST 2 VIEWS: CPT | Mod: 26

## 2022-06-01 PROCEDURE — 71046 X-RAY EXAM CHEST 2 VIEWS: CPT

## 2022-06-01 PROCEDURE — 74178 CT ABD&PLV WO CNTR FLWD CNTR: CPT

## 2022-06-07 ENCOUNTER — NON-APPOINTMENT (OUTPATIENT)
Age: 37
End: 2022-06-07

## 2022-06-15 ENCOUNTER — NON-APPOINTMENT (OUTPATIENT)
Age: 37
End: 2022-06-15

## 2022-06-21 ENCOUNTER — NON-APPOINTMENT (OUTPATIENT)
Age: 37
End: 2022-06-21

## 2022-06-22 ENCOUNTER — APPOINTMENT (OUTPATIENT)
Dept: TRANSPLANT | Facility: HOSPITAL | Age: 37
End: 2022-06-22

## 2022-06-22 ENCOUNTER — INPATIENT (INPATIENT)
Facility: HOSPITAL | Age: 37
LOS: 4 days | Discharge: HOME | DRG: 652 | End: 2022-06-27
Payer: MEDICARE

## 2022-06-22 VITALS
OXYGEN SATURATION: 98 % | HEIGHT: 71 IN | RESPIRATION RATE: 18 BRPM | HEART RATE: 82 BPM | TEMPERATURE: 99 F | SYSTOLIC BLOOD PRESSURE: 173 MMHG | WEIGHT: 185.19 LBS | DIASTOLIC BLOOD PRESSURE: 103 MMHG

## 2022-06-22 DIAGNOSIS — I77.0 ARTERIOVENOUS FISTULA, ACQUIRED: Chronic | ICD-10-CM

## 2022-06-22 DIAGNOSIS — Z41.9 ENCOUNTER FOR PROCEDURE FOR PURPOSES OTHER THAN REMEDYING HEALTH STATE, UNSPECIFIED: Chronic | ICD-10-CM

## 2022-06-22 DIAGNOSIS — Z94.0 KIDNEY TRANSPLANT STATUS: ICD-10-CM

## 2022-06-22 LAB
ALBUMIN SERPL ELPH-MCNC: 3.4 G/DL — SIGNIFICANT CHANGE UP (ref 3.3–5)
ALBUMIN SERPL ELPH-MCNC: 4.8 G/DL — SIGNIFICANT CHANGE UP (ref 3.3–5)
ALP SERPL-CCNC: 54 U/L — SIGNIFICANT CHANGE UP (ref 40–120)
ALP SERPL-CCNC: 64 U/L — SIGNIFICANT CHANGE UP (ref 40–120)
ALT FLD-CCNC: 10 U/L — SIGNIFICANT CHANGE UP (ref 10–45)
ALT FLD-CCNC: 10 U/L — SIGNIFICANT CHANGE UP (ref 10–45)
ANION GAP SERPL CALC-SCNC: 13 MMOL/L — SIGNIFICANT CHANGE UP (ref 5–17)
ANION GAP SERPL CALC-SCNC: 16 MMOL/L — SIGNIFICANT CHANGE UP (ref 5–17)
ANION GAP SERPL CALC-SCNC: 20 MMOL/L — HIGH (ref 5–17)
ANISOCYTOSIS BLD QL: SLIGHT — SIGNIFICANT CHANGE UP
APTT BLD: 37.5 SEC — HIGH (ref 27.5–35.5)
APTT BLD: >200 SEC — CRITICAL HIGH (ref 27.5–35.5)
AST SERPL-CCNC: 11 U/L — SIGNIFICANT CHANGE UP (ref 10–40)
AST SERPL-CCNC: 15 U/L — SIGNIFICANT CHANGE UP (ref 10–40)
BASOPHILS # BLD AUTO: 0.02 K/UL — SIGNIFICANT CHANGE UP (ref 0–0.2)
BASOPHILS # BLD AUTO: 0.16 K/UL — SIGNIFICANT CHANGE UP (ref 0–0.2)
BASOPHILS NFR BLD AUTO: 0.2 % — SIGNIFICANT CHANGE UP (ref 0–2)
BASOPHILS NFR BLD AUTO: 3.4 % — HIGH (ref 0–2)
BILIRUB SERPL-MCNC: 0.4 MG/DL — SIGNIFICANT CHANGE UP (ref 0.2–1.2)
BILIRUB SERPL-MCNC: 0.5 MG/DL — SIGNIFICANT CHANGE UP (ref 0.2–1.2)
BLD GP AB SCN SERPL QL: NEGATIVE — SIGNIFICANT CHANGE UP
BUN SERPL-MCNC: 42 MG/DL — HIGH (ref 7–23)
BUN SERPL-MCNC: 46 MG/DL — HIGH (ref 7–23)
BUN SERPL-MCNC: 50 MG/DL — HIGH (ref 7–23)
CALCIUM SERPL-MCNC: 6.9 MG/DL — LOW (ref 8.4–10.5)
CALCIUM SERPL-MCNC: 7 MG/DL — LOW (ref 8.4–10.5)
CALCIUM SERPL-MCNC: 9.1 MG/DL — SIGNIFICANT CHANGE UP (ref 8.4–10.5)
CHLORIDE SERPL-SCNC: 104 MMOL/L — SIGNIFICANT CHANGE UP (ref 96–108)
CHLORIDE SERPL-SCNC: 95 MMOL/L — LOW (ref 96–108)
CHLORIDE SERPL-SCNC: 97 MMOL/L — SIGNIFICANT CHANGE UP (ref 96–108)
CO2 SERPL-SCNC: 16 MMOL/L — LOW (ref 22–31)
CO2 SERPL-SCNC: 18 MMOL/L — LOW (ref 22–31)
CO2 SERPL-SCNC: 25 MMOL/L — SIGNIFICANT CHANGE UP (ref 22–31)
CREAT SERPL-MCNC: 12.07 MG/DL — HIGH (ref 0.5–1.3)
CREAT SERPL-MCNC: 7.99 MG/DL — HIGH (ref 0.5–1.3)
CREAT SERPL-MCNC: 9.25 MG/DL — HIGH (ref 0.5–1.3)
DACRYOCYTES BLD QL SMEAR: SLIGHT — SIGNIFICANT CHANGE UP
EGFR: 5 ML/MIN/1.73M2 — LOW
EGFR: 7 ML/MIN/1.73M2 — LOW
EGFR: 8 ML/MIN/1.73M2 — LOW
ELLIPTOCYTES BLD QL SMEAR: SLIGHT — SIGNIFICANT CHANGE UP
EOSINOPHIL # BLD AUTO: 0 K/UL — SIGNIFICANT CHANGE UP (ref 0–0.5)
EOSINOPHIL # BLD AUTO: 0.2 K/UL — SIGNIFICANT CHANGE UP (ref 0–0.5)
EOSINOPHIL NFR BLD AUTO: 0 % — SIGNIFICANT CHANGE UP (ref 0–6)
EOSINOPHIL NFR BLD AUTO: 4.3 % — SIGNIFICANT CHANGE UP (ref 0–6)
GAS PNL BLDA: SIGNIFICANT CHANGE UP
GIANT PLATELETS BLD QL SMEAR: PRESENT — SIGNIFICANT CHANGE UP
GLUCOSE BLDC GLUCOMTR-MCNC: 118 MG/DL — HIGH (ref 70–99)
GLUCOSE BLDC GLUCOMTR-MCNC: 128 MG/DL — HIGH (ref 70–99)
GLUCOSE BLDC GLUCOMTR-MCNC: 143 MG/DL — HIGH (ref 70–99)
GLUCOSE BLDC GLUCOMTR-MCNC: 73 MG/DL — SIGNIFICANT CHANGE UP (ref 70–99)
GLUCOSE SERPL-MCNC: 140 MG/DL — HIGH (ref 70–99)
GLUCOSE SERPL-MCNC: 184 MG/DL — HIGH (ref 70–99)
GLUCOSE SERPL-MCNC: 92 MG/DL — SIGNIFICANT CHANGE UP (ref 70–99)
HBV CORE AB SER-ACNC: SIGNIFICANT CHANGE UP
HBV SURFACE AB SER-ACNC: 2170.8 MIU/ML — SIGNIFICANT CHANGE UP
HBV SURFACE AG SER-ACNC: SIGNIFICANT CHANGE UP
HCT VFR BLD CALC: 38.3 % — LOW (ref 39–50)
HCT VFR BLD CALC: 38.4 % — LOW (ref 39–50)
HCT VFR BLD CALC: 38.5 % — LOW (ref 39–50)
HCV AB S/CO SERPL IA: 0.14 S/CO — SIGNIFICANT CHANGE UP (ref 0–0.99)
HCV AB SERPL-IMP: SIGNIFICANT CHANGE UP
HCV RNA SPEC NAA+PROBE-LOG IU: SIGNIFICANT CHANGE UP
HCV RNA SPEC NAA+PROBE-LOG IU: SIGNIFICANT CHANGE UP LOGIU/ML
HGB BLD-MCNC: 12.2 G/DL — LOW (ref 13–17)
HGB BLD-MCNC: 12.2 G/DL — LOW (ref 13–17)
HGB BLD-MCNC: 12.4 G/DL — LOW (ref 13–17)
HIV 1+2 AB+HIV1 P24 AG SERPL QL IA: SIGNIFICANT CHANGE UP
IMM GRANULOCYTES NFR BLD AUTO: 0.4 % — SIGNIFICANT CHANGE UP (ref 0–1.5)
INR BLD: 1 RATIO — SIGNIFICANT CHANGE UP (ref 0.88–1.16)
INR BLD: 1.07 RATIO — SIGNIFICANT CHANGE UP (ref 0.88–1.16)
INR BLD: 1.21 RATIO — HIGH (ref 0.88–1.16)
LYMPHOCYTES # BLD AUTO: 0.55 K/UL — LOW (ref 1–3.3)
LYMPHOCYTES # BLD AUTO: 0.94 K/UL — LOW (ref 1–3.3)
LYMPHOCYTES # BLD AUTO: 20.7 % — SIGNIFICANT CHANGE UP (ref 13–44)
LYMPHOCYTES # BLD AUTO: 4.8 % — LOW (ref 13–44)
MACROCYTES BLD QL: SLIGHT — SIGNIFICANT CHANGE UP
MAGNESIUM SERPL-MCNC: 2 MG/DL — SIGNIFICANT CHANGE UP (ref 1.6–2.6)
MAGNESIUM SERPL-MCNC: 2 MG/DL — SIGNIFICANT CHANGE UP (ref 1.6–2.6)
MAGNESIUM SERPL-MCNC: 2.2 MG/DL — SIGNIFICANT CHANGE UP (ref 1.6–2.6)
MANUAL SMEAR VERIFICATION: SIGNIFICANT CHANGE UP
MCHC RBC-ENTMCNC: 24.7 PG — LOW (ref 27–34)
MCHC RBC-ENTMCNC: 31.7 GM/DL — LOW (ref 32–36)
MCHC RBC-ENTMCNC: 31.9 GM/DL — LOW (ref 32–36)
MCHC RBC-ENTMCNC: 32.3 GM/DL — SIGNIFICANT CHANGE UP (ref 32–36)
MCV RBC AUTO: 76.3 FL — LOW (ref 80–100)
MCV RBC AUTO: 77.5 FL — LOW (ref 80–100)
MCV RBC AUTO: 77.9 FL — LOW (ref 80–100)
MICROCYTES BLD QL: SLIGHT — SIGNIFICANT CHANGE UP
MONOCYTES # BLD AUTO: 0.67 K/UL — SIGNIFICANT CHANGE UP (ref 0–0.9)
MONOCYTES # BLD AUTO: 0.89 K/UL — SIGNIFICANT CHANGE UP (ref 0–0.9)
MONOCYTES NFR BLD AUTO: 14.7 % — HIGH (ref 2–14)
MONOCYTES NFR BLD AUTO: 7.8 % — SIGNIFICANT CHANGE UP (ref 2–14)
NEUTROPHILS # BLD AUTO: 2.52 K/UL — SIGNIFICANT CHANGE UP (ref 1.8–7.4)
NEUTROPHILS # BLD AUTO: 9.95 K/UL — HIGH (ref 1.8–7.4)
NEUTROPHILS NFR BLD AUTO: 55.2 % — SIGNIFICANT CHANGE UP (ref 43–77)
NEUTROPHILS NFR BLD AUTO: 86.8 % — HIGH (ref 43–77)
NRBC # BLD: 0 /100 WBCS — SIGNIFICANT CHANGE UP (ref 0–0)
NRBC # BLD: 0 /100 WBCS — SIGNIFICANT CHANGE UP (ref 0–0)
OVALOCYTES BLD QL SMEAR: SLIGHT — SIGNIFICANT CHANGE UP
PHOSPHATE SERPL-MCNC: 4.9 MG/DL — HIGH (ref 2.5–4.5)
PHOSPHATE SERPL-MCNC: 5.2 MG/DL — HIGH (ref 2.5–4.5)
PHOSPHATE SERPL-MCNC: 8.3 MG/DL — HIGH (ref 2.5–4.5)
PLAT MORPH BLD: NORMAL — SIGNIFICANT CHANGE UP
PLATELET # BLD AUTO: 139 K/UL — LOW (ref 150–400)
PLATELET # BLD AUTO: 139 K/UL — LOW (ref 150–400)
PLATELET # BLD AUTO: 151 K/UL — SIGNIFICANT CHANGE UP (ref 150–400)
POLYCHROMASIA BLD QL SMEAR: SLIGHT — SIGNIFICANT CHANGE UP
POTASSIUM SERPL-MCNC: 4.1 MMOL/L — SIGNIFICANT CHANGE UP (ref 3.5–5.3)
POTASSIUM SERPL-MCNC: 4.8 MMOL/L — SIGNIFICANT CHANGE UP (ref 3.5–5.3)
POTASSIUM SERPL-MCNC: 4.8 MMOL/L — SIGNIFICANT CHANGE UP (ref 3.5–5.3)
POTASSIUM SERPL-SCNC: 4.1 MMOL/L — SIGNIFICANT CHANGE UP (ref 3.5–5.3)
POTASSIUM SERPL-SCNC: 4.8 MMOL/L — SIGNIFICANT CHANGE UP (ref 3.5–5.3)
POTASSIUM SERPL-SCNC: 4.8 MMOL/L — SIGNIFICANT CHANGE UP (ref 3.5–5.3)
PROT SERPL-MCNC: 5.6 G/DL — LOW (ref 6–8.3)
PROT SERPL-MCNC: 7.1 G/DL — SIGNIFICANT CHANGE UP (ref 6–8.3)
PROTHROM AB SERPL-ACNC: 11.6 SEC — SIGNIFICANT CHANGE UP (ref 10.5–13.4)
PROTHROM AB SERPL-ACNC: 12.3 SEC — SIGNIFICANT CHANGE UP (ref 10.5–13.4)
PROTHROM AB SERPL-ACNC: 14 SEC — HIGH (ref 10.5–13.4)
RBC # BLD: 4.94 M/UL — SIGNIFICANT CHANGE UP (ref 4.2–5.8)
RBC # BLD: 4.94 M/UL — SIGNIFICANT CHANGE UP (ref 4.2–5.8)
RBC # BLD: 5.03 M/UL — SIGNIFICANT CHANGE UP (ref 4.2–5.8)
RBC # FLD: 15.1 % — HIGH (ref 10.3–14.5)
RBC # FLD: 15.3 % — HIGH (ref 10.3–14.5)
RBC # FLD: 15.4 % — HIGH (ref 10.3–14.5)
RBC BLD AUTO: ABNORMAL
RH IG SCN BLD-IMP: POSITIVE — SIGNIFICANT CHANGE UP
RH IG SCN BLD-IMP: POSITIVE — SIGNIFICANT CHANGE UP
SARS-COV-2 RNA SPEC QL NAA+PROBE: SIGNIFICANT CHANGE UP
SODIUM SERPL-SCNC: 133 MMOL/L — LOW (ref 135–145)
SODIUM SERPL-SCNC: 135 MMOL/L — SIGNIFICANT CHANGE UP (ref 135–145)
SODIUM SERPL-SCNC: 136 MMOL/L — SIGNIFICANT CHANGE UP (ref 135–145)
TARGETS BLD QL SMEAR: SLIGHT — SIGNIFICANT CHANGE UP
VARIANT LYMPHS # BLD: 1.7 % — SIGNIFICANT CHANGE UP (ref 0–6)
WBC # BLD: 10.95 K/UL — HIGH (ref 3.8–10.5)
WBC # BLD: 11.46 K/UL — HIGH (ref 3.8–10.5)
WBC # BLD: 4.56 K/UL — SIGNIFICANT CHANGE UP (ref 3.8–10.5)
WBC # FLD AUTO: 10.95 K/UL — HIGH (ref 3.8–10.5)
WBC # FLD AUTO: 11.46 K/UL — HIGH (ref 3.8–10.5)
WBC # FLD AUTO: 4.56 K/UL — SIGNIFICANT CHANGE UP (ref 3.8–10.5)

## 2022-06-22 PROCEDURE — 71045 X-RAY EXAM CHEST 1 VIEW: CPT | Mod: 26

## 2022-06-22 PROCEDURE — 93010 ELECTROCARDIOGRAM REPORT: CPT

## 2022-06-22 PROCEDURE — 76776 US EXAM K TRANSPL W/DOPPLER: CPT | Mod: 26,RT

## 2022-06-22 PROCEDURE — 50360 RNL ALTRNSPLJ W/O RCP NFRCT: CPT

## 2022-06-22 DEVICE — KIT A-LINE 1LUM 20G X 12CM SAFE KIT: Type: IMPLANTABLE DEVICE | Site: RIGHT | Status: FUNCTIONAL

## 2022-06-22 RX ORDER — ONDANSETRON 8 MG/1
4 TABLET, FILM COATED ORAL EVERY 6 HOURS
Refills: 0 | Status: DISCONTINUED | OUTPATIENT
Start: 2022-06-22 | End: 2022-06-27

## 2022-06-22 RX ORDER — CARVEDILOL PHOSPHATE 80 MG/1
6.25 CAPSULE, EXTENDED RELEASE ORAL ONCE
Refills: 0 | Status: DISCONTINUED | OUTPATIENT
Start: 2022-06-22 | End: 2022-06-22

## 2022-06-22 RX ORDER — CALCIUM GLUCONATE 100 MG/ML
2 VIAL (ML) INTRAVENOUS ONCE
Refills: 0 | Status: COMPLETED | OUTPATIENT
Start: 2022-06-22 | End: 2022-06-23

## 2022-06-22 RX ORDER — DEXTROSE 50 % IN WATER 50 %
25 SYRINGE (ML) INTRAVENOUS ONCE
Refills: 0 | Status: DISCONTINUED | OUTPATIENT
Start: 2022-06-22 | End: 2022-06-22

## 2022-06-22 RX ORDER — DEXTROSE 50 % IN WATER 50 %
12.5 SYRINGE (ML) INTRAVENOUS ONCE
Refills: 0 | Status: COMPLETED | OUTPATIENT
Start: 2022-06-22 | End: 2022-06-22

## 2022-06-22 RX ORDER — VALGANCICLOVIR 450 MG/1
450 TABLET, FILM COATED ORAL ONCE
Refills: 0 | Status: COMPLETED | OUTPATIENT
Start: 2022-06-22 | End: 2022-06-22

## 2022-06-22 RX ORDER — TACROLIMUS 5 MG/1
8 CAPSULE ORAL
Refills: 0 | Status: DISCONTINUED | OUTPATIENT
Start: 2022-06-23 | End: 2022-06-23

## 2022-06-22 RX ORDER — SIMETHICONE 80 MG/1
80 TABLET, CHEWABLE ORAL ONCE
Refills: 0 | Status: COMPLETED | OUTPATIENT
Start: 2022-06-22 | End: 2022-06-22

## 2022-06-22 RX ORDER — DEXTROSE 50 % IN WATER 50 %
25 SYRINGE (ML) INTRAVENOUS ONCE
Refills: 0 | Status: DISCONTINUED | OUTPATIENT
Start: 2022-06-22 | End: 2022-06-27

## 2022-06-22 RX ORDER — SENNA PLUS 8.6 MG/1
2 TABLET ORAL AT BEDTIME
Refills: 0 | Status: DISCONTINUED | OUTPATIENT
Start: 2022-06-22 | End: 2022-06-27

## 2022-06-22 RX ORDER — BASILIXIMAB 20 MG/5ML
20 INJECTION, POWDER, FOR SOLUTION INTRAVENOUS ONCE
Refills: 0 | Status: DISCONTINUED | OUTPATIENT
Start: 2022-06-22 | End: 2022-06-22

## 2022-06-22 RX ORDER — CEFAZOLIN SODIUM 1 G
2000 VIAL (EA) INJECTION ONCE
Refills: 0 | Status: DISCONTINUED | OUTPATIENT
Start: 2022-06-22 | End: 2022-06-23

## 2022-06-22 RX ORDER — INFLUENZA VIRUS VACCINE 15; 15; 15; 15 UG/.5ML; UG/.5ML; UG/.5ML; UG/.5ML
0.5 SUSPENSION INTRAMUSCULAR ONCE
Refills: 0 | Status: DISCONTINUED | OUTPATIENT
Start: 2022-06-22 | End: 2022-06-27

## 2022-06-22 RX ORDER — HYDROMORPHONE HYDROCHLORIDE 2 MG/ML
0.25 INJECTION INTRAMUSCULAR; INTRAVENOUS; SUBCUTANEOUS ONCE
Refills: 0 | Status: DISCONTINUED | OUTPATIENT
Start: 2022-06-22 | End: 2022-06-22

## 2022-06-22 RX ORDER — NYSTATIN 500MM UNIT
500000 POWDER (EA) MISCELLANEOUS
Refills: 0 | Status: DISCONTINUED | OUTPATIENT
Start: 2022-06-23 | End: 2022-06-27

## 2022-06-22 RX ORDER — BASILIXIMAB 20 MG/5ML
20 INJECTION, POWDER, FOR SOLUTION INTRAVENOUS ONCE
Refills: 0 | Status: COMPLETED | OUTPATIENT
Start: 2022-06-26 | End: 2022-06-26

## 2022-06-22 RX ORDER — DEXTROSE 50 % IN WATER 50 %
12.5 SYRINGE (ML) INTRAVENOUS ONCE
Refills: 0 | Status: DISCONTINUED | OUTPATIENT
Start: 2022-06-22 | End: 2022-06-22

## 2022-06-22 RX ORDER — SODIUM CHLORIDE 9 MG/ML
3 INJECTION INTRAMUSCULAR; INTRAVENOUS; SUBCUTANEOUS EVERY 8 HOURS
Refills: 0 | Status: DISCONTINUED | OUTPATIENT
Start: 2022-06-22 | End: 2022-06-22

## 2022-06-22 RX ORDER — ACETAMINOPHEN 500 MG
1000 TABLET ORAL ONCE
Refills: 0 | Status: COMPLETED | OUTPATIENT
Start: 2022-06-22 | End: 2022-06-22

## 2022-06-22 RX ORDER — VALGANCICLOVIR 450 MG/1
450 TABLET, FILM COATED ORAL
Refills: 0 | Status: DISCONTINUED | OUTPATIENT
Start: 2022-06-24 | End: 2022-06-27

## 2022-06-22 RX ORDER — FAMOTIDINE 10 MG/ML
20 INJECTION INTRAVENOUS DAILY
Refills: 0 | Status: DISCONTINUED | OUTPATIENT
Start: 2022-06-23 | End: 2022-06-24

## 2022-06-22 RX ORDER — SODIUM CHLORIDE 9 MG/ML
1000 INJECTION, SOLUTION INTRAVENOUS
Refills: 0 | Status: DISCONTINUED | OUTPATIENT
Start: 2022-06-22 | End: 2022-06-22

## 2022-06-22 RX ORDER — NIFEDIPINE 30 MG
30 TABLET, EXTENDED RELEASE 24 HR ORAL ONCE
Refills: 0 | Status: COMPLETED | OUTPATIENT
Start: 2022-06-22 | End: 2022-06-24

## 2022-06-22 RX ORDER — HYDROMORPHONE HYDROCHLORIDE 2 MG/ML
0.5 INJECTION INTRAMUSCULAR; INTRAVENOUS; SUBCUTANEOUS ONCE
Refills: 0 | Status: DISCONTINUED | OUTPATIENT
Start: 2022-06-22 | End: 2022-06-22

## 2022-06-22 RX ORDER — GLUCAGON INJECTION, SOLUTION 0.5 MG/.1ML
1 INJECTION, SOLUTION SUBCUTANEOUS ONCE
Refills: 0 | Status: DISCONTINUED | OUTPATIENT
Start: 2022-06-22 | End: 2022-06-22

## 2022-06-22 RX ORDER — INSULIN LISPRO 100/ML
VIAL (ML) SUBCUTANEOUS
Refills: 0 | Status: DISCONTINUED | OUTPATIENT
Start: 2022-06-22 | End: 2022-06-27

## 2022-06-22 RX ORDER — TACROLIMUS 5 MG/1
5 CAPSULE ORAL ONCE
Refills: 0 | Status: COMPLETED | OUTPATIENT
Start: 2022-06-22 | End: 2022-06-22

## 2022-06-22 RX ORDER — GLUCAGON INJECTION, SOLUTION 0.5 MG/.1ML
1 INJECTION, SOLUTION SUBCUTANEOUS ONCE
Refills: 0 | Status: DISCONTINUED | OUTPATIENT
Start: 2022-06-22 | End: 2022-06-27

## 2022-06-22 RX ORDER — SODIUM CHLORIDE 9 MG/ML
1000 INJECTION, SOLUTION INTRAVENOUS
Refills: 0 | Status: DISCONTINUED | OUTPATIENT
Start: 2022-06-22 | End: 2022-06-27

## 2022-06-22 RX ORDER — DEXTROSE 50 % IN WATER 50 %
15 SYRINGE (ML) INTRAVENOUS ONCE
Refills: 0 | Status: DISCONTINUED | OUTPATIENT
Start: 2022-06-22 | End: 2022-06-27

## 2022-06-22 RX ORDER — SODIUM CHLORIDE 9 MG/ML
1000 INJECTION INTRAMUSCULAR; INTRAVENOUS; SUBCUTANEOUS
Refills: 0 | Status: DISCONTINUED | OUTPATIENT
Start: 2022-06-22 | End: 2022-06-23

## 2022-06-22 RX ORDER — DEXTROSE 50 % IN WATER 50 %
12.5 SYRINGE (ML) INTRAVENOUS ONCE
Refills: 0 | Status: DISCONTINUED | OUTPATIENT
Start: 2022-06-22 | End: 2022-06-27

## 2022-06-22 RX ORDER — MYCOPHENOLATE MOFETIL 250 MG/1
1000 CAPSULE ORAL
Refills: 0 | Status: DISCONTINUED | OUTPATIENT
Start: 2022-06-22 | End: 2022-06-27

## 2022-06-22 RX ORDER — LABETALOL HCL 100 MG
300 TABLET ORAL ONCE
Refills: 0 | Status: COMPLETED | OUTPATIENT
Start: 2022-06-22 | End: 2022-06-22

## 2022-06-22 RX ORDER — SODIUM CHLORIDE 9 MG/ML
1000 INJECTION INTRAMUSCULAR; INTRAVENOUS; SUBCUTANEOUS
Refills: 0 | Status: DISCONTINUED | OUTPATIENT
Start: 2022-06-22 | End: 2022-06-24

## 2022-06-22 RX ORDER — TRAMADOL HYDROCHLORIDE 50 MG/1
25 TABLET ORAL EVERY 4 HOURS
Refills: 0 | Status: DISCONTINUED | OUTPATIENT
Start: 2022-06-22 | End: 2022-06-27

## 2022-06-22 RX ORDER — TRAMADOL HYDROCHLORIDE 50 MG/1
50 TABLET ORAL EVERY 4 HOURS
Refills: 0 | Status: DISCONTINUED | OUTPATIENT
Start: 2022-06-22 | End: 2022-06-27

## 2022-06-22 RX ORDER — HYDROMORPHONE HYDROCHLORIDE 2 MG/ML
0.5 INJECTION INTRAMUSCULAR; INTRAVENOUS; SUBCUTANEOUS
Refills: 0 | Status: DISCONTINUED | OUTPATIENT
Start: 2022-06-22 | End: 2022-06-22

## 2022-06-22 RX ADMIN — HYDROMORPHONE HYDROCHLORIDE 0.5 MILLIGRAM(S): 2 INJECTION INTRAMUSCULAR; INTRAVENOUS; SUBCUTANEOUS at 15:52

## 2022-06-22 RX ADMIN — Medication 12.5 GRAM(S): at 06:46

## 2022-06-22 RX ADMIN — HYDROMORPHONE HYDROCHLORIDE 0.5 MILLIGRAM(S): 2 INJECTION INTRAMUSCULAR; INTRAVENOUS; SUBCUTANEOUS at 17:10

## 2022-06-22 RX ADMIN — HYDROMORPHONE HYDROCHLORIDE 0.5 MILLIGRAM(S): 2 INJECTION INTRAMUSCULAR; INTRAVENOUS; SUBCUTANEOUS at 20:57

## 2022-06-22 RX ADMIN — HYDROMORPHONE HYDROCHLORIDE 0.5 MILLIGRAM(S): 2 INJECTION INTRAMUSCULAR; INTRAVENOUS; SUBCUTANEOUS at 18:17

## 2022-06-22 RX ADMIN — Medication 0.1 MILLIGRAM(S): at 08:16

## 2022-06-22 RX ADMIN — SODIUM CHLORIDE 50 MILLILITER(S): 9 INJECTION INTRAMUSCULAR; INTRAVENOUS; SUBCUTANEOUS at 16:02

## 2022-06-22 RX ADMIN — ONDANSETRON 4 MILLIGRAM(S): 8 TABLET, FILM COATED ORAL at 15:50

## 2022-06-22 RX ADMIN — HYDROMORPHONE HYDROCHLORIDE 0.5 MILLIGRAM(S): 2 INJECTION INTRAMUSCULAR; INTRAVENOUS; SUBCUTANEOUS at 18:32

## 2022-06-22 RX ADMIN — HYDROMORPHONE HYDROCHLORIDE 0.5 MILLIGRAM(S): 2 INJECTION INTRAMUSCULAR; INTRAVENOUS; SUBCUTANEOUS at 17:25

## 2022-06-22 RX ADMIN — SIMETHICONE 80 MILLIGRAM(S): 80 TABLET, CHEWABLE ORAL at 23:08

## 2022-06-22 RX ADMIN — HYDROMORPHONE HYDROCHLORIDE 0.25 MILLIGRAM(S): 2 INJECTION INTRAMUSCULAR; INTRAVENOUS; SUBCUTANEOUS at 23:40

## 2022-06-22 RX ADMIN — SODIUM CHLORIDE 70 MILLILITER(S): 9 INJECTION INTRAMUSCULAR; INTRAVENOUS; SUBCUTANEOUS at 16:02

## 2022-06-22 RX ADMIN — Medication 400 MILLIGRAM(S): at 20:54

## 2022-06-22 RX ADMIN — HYDROMORPHONE HYDROCHLORIDE 0.5 MILLIGRAM(S): 2 INJECTION INTRAMUSCULAR; INTRAVENOUS; SUBCUTANEOUS at 16:05

## 2022-06-22 RX ADMIN — Medication 1000 MILLIGRAM(S): at 21:15

## 2022-06-22 RX ADMIN — SODIUM CHLORIDE 3 MILLILITER(S): 9 INJECTION INTRAMUSCULAR; INTRAVENOUS; SUBCUTANEOUS at 08:38

## 2022-06-22 RX ADMIN — TACROLIMUS 5 MILLIGRAM(S): 5 CAPSULE ORAL at 18:21

## 2022-06-22 RX ADMIN — TRAMADOL HYDROCHLORIDE 50 MILLIGRAM(S): 50 TABLET ORAL at 19:09

## 2022-06-22 RX ADMIN — MYCOPHENOLATE MOFETIL 1000 MILLIGRAM(S): 250 CAPSULE ORAL at 20:31

## 2022-06-22 RX ADMIN — ONDANSETRON 4 MILLIGRAM(S): 8 TABLET, FILM COATED ORAL at 22:57

## 2022-06-22 RX ADMIN — TRAMADOL HYDROCHLORIDE 50 MILLIGRAM(S): 50 TABLET ORAL at 19:42

## 2022-06-22 RX ADMIN — Medication 300 MILLIGRAM(S): at 08:16

## 2022-06-22 RX ADMIN — VALGANCICLOVIR 450 MILLIGRAM(S): 450 TABLET, FILM COATED ORAL at 18:21

## 2022-06-22 RX ADMIN — HYDROMORPHONE HYDROCHLORIDE 0.25 MILLIGRAM(S): 2 INJECTION INTRAMUSCULAR; INTRAVENOUS; SUBCUTANEOUS at 23:08

## 2022-06-22 RX ADMIN — HYDROMORPHONE HYDROCHLORIDE 0.5 MILLIGRAM(S): 2 INJECTION INTRAMUSCULAR; INTRAVENOUS; SUBCUTANEOUS at 16:22

## 2022-06-22 RX ADMIN — HYDROMORPHONE HYDROCHLORIDE 0.5 MILLIGRAM(S): 2 INJECTION INTRAMUSCULAR; INTRAVENOUS; SUBCUTANEOUS at 21:15

## 2022-06-22 RX ADMIN — HYDROMORPHONE HYDROCHLORIDE 0.5 MILLIGRAM(S): 2 INJECTION INTRAMUSCULAR; INTRAVENOUS; SUBCUTANEOUS at 16:37

## 2022-06-22 NOTE — H&P ADULT - NSHPPHYSICALEXAM_GEN_ALL_CORE
PHYSICAL EXAM:  Constitutional: Well developed / well nourished  Eyes: Anicteric, PERRLA  ENMT: nc/at  Neck: supple  Respiratory: CTA B/L  Cardiovascular: RRR  Gastrointestinal: Soft abdomen, non tender ND  Genitourinary: anuric  Extremities: no edema  Vascular: Palpable dp pulses bilaterally  Neurological: A&O x3  Skin: intact no rashes or lesions  Musculoskeletal: Moving all extremities  Psychiatric: Responsive

## 2022-06-22 NOTE — BRIEF OPERATIVE NOTE - NSICDXBRIEFPROCEDURE_GEN_ALL_CORE_FT
PROCEDURES:  Transplant, kidney,  donor 2022 14:34:30 1-  donor right kidney transplant to right external iliac vessels  2- Preparation of kidney in back table  3- Transposition of right external iliac vessels Ko Garcia P

## 2022-06-22 NOTE — H&P ADULT - ASSESSMENT
38 y/o male w/ PMH sign for HTN since age 17 and ESRD on HD since 2015 via LUE AVF, hyperthyroidism,  now admitted for potential DDRT.        Plan:  Admit to transplant surgery  NPO  CBC/ CMP/ Mg /Phos/ T&S/ coags/ covid swab  EKG  Induction: simulect/ methylpred  Pre op Abx: Ancef

## 2022-06-22 NOTE — H&P ADULT - NSHPREVIEWOFSYSTEMS_GEN_ALL_CORE
Gen: No weight changes, fatigue, fevers/chills, weakness  Skin: No rashes  Head/Eyes/Ears/Mouth: No headache; Normal hearing; Normal vision w/o blurriness; No sinus pain/discomfort, sore throat  Respiratory: No dyspnea, cough, wheezing, hemoptysis  CV: No chest pain, PND, orthopnea  GI:  No abdominal pain, diarrhea, constipation, nausea, vomiting, melena, hematochezia  : No increased frequency, dysuria, hematuria, nocturia  MSK: No joint pain/swelling; no back pain; no edema  Neuro: No dizziness/lightheadedness, weakness, seizures, numbness, tingling  Heme: No easy bruising or bleeding  Endo: No heat/cold intolerance  Psych: No significant nervousness, anxiety, stress, depression  All other systems were reviewed and are negative, except as noted.

## 2022-06-22 NOTE — BRIEF OPERATIVE NOTE - ANTIBIOTIC PROTOCOL
2 grams IV ancef given less than 30 minutes prior to incision.  Re-dosed at end of case/Followed protocol

## 2022-06-22 NOTE — BRIEF OPERATIVE NOTE - COMMENTS
Referring MD: Betito Dosdon (I personally communicated prior to and after case)  Op note dictation: [id number]

## 2022-06-22 NOTE — BRIEF OPERATIVE NOTE - OPERATION/FINDINGS
Donor UNOS [id number]  Match [id number]  35 y.o. female, KDPI 14%, brain dead, increased risk PHS, terminal creatinine 0.79  Right kidney, single artery, single vein, single ureter  Donor: blood type O, CMV+, EBV+  Recipient; blood type O, CMV-, EBV+  HLA 5/6 mismatch (1,2,2)  Simulect induction  Cold ischemia time: 17 hrs 45 mins  Warm ischemia time: 30 mins  Weight of the kidney: 306 grams  The right renal vein was NOT reconstructed  The bladder anastomosis was done twice.  The initial bladder opening was closed and a new opening undertaken.

## 2022-06-22 NOTE — H&P ADULT - HISTORY OF PRESENT ILLNESS
38 y/o male w/ PMH sign for HTN since age 17 and ESRD on HD since 2015 via LUE AVF, hyperthyroidism,  now admitted for potential DDRT. He feels well offers no complaints denies h/a, dizziness, c/p, palp, sob, recent travel or sick contacts.    Cardiac clearance Dr. Mccann 20  EK20: NSR non specific ST abnormality  Stress: 12/10/19: no evidence inducible ischemia  Echo: 12/10/19: Mod MR, mild LAE, mild concentric LVH, normal LV systolic funct, normal RV size and funct. LVEF 63%

## 2022-06-22 NOTE — H&P ADULT - NS ATTEND AMEND GEN_ALL_CORE FT
Patient presents for a  donor kidney transplant.  End stage kidney disease on home hemodialysis.  Alert, oriented, responsive, appropriate.  Abdomen soft and non tender.  palpable DP pulses bilaterally.  I had discussed the donor with Dr. Dodson, patient's nephrologist, who in turn spoke with patient.  Patient had initially accepted the organ, then declined it, and then accepted it.  Patient is aware that the donor had a history of increased risk (for transmission of infectious diseases including but not limited to HIV/AIDS, hepatitis C, hepatitis B) based on PHS criteria based at least on a reported history of sexual activity and IV drug usage.  patient is also aware that the donor KDPI was less than 20%.  Patient is aware that although testing for such infections is negative, the risk of a false negative is approximately 1 in 10,000.  I obtained the surgical and transplant consents myself.  I marked the surgical and DP sites bilaterally.  Patient agrees to proceed.

## 2022-06-22 NOTE — PATIENT PROFILE ADULT - FALL HARM RISK - UNIVERSAL INTERVENTIONS
Call bell, personal items and telephone in reach/Instruct patient to call for assistance before getting out of bed or chair/Non-slip footwear when patient is out of bed/[place] to call system/Physically safe environment - no spills, clutter or unnecessary equipment/Purposeful Proactive Rounding/Room/bathroom lighting operational, light cord in reach

## 2022-06-23 DIAGNOSIS — Z94.0 KIDNEY TRANSPLANT STATUS: ICD-10-CM

## 2022-06-23 DIAGNOSIS — Z79.899 OTHER LONG TERM (CURRENT) DRUG THERAPY: ICD-10-CM

## 2022-06-23 LAB
A1C WITH ESTIMATED AVERAGE GLUCOSE RESULT: 4.6 % — SIGNIFICANT CHANGE UP (ref 4–5.6)
ALBUMIN SERPL ELPH-MCNC: 3.6 G/DL — SIGNIFICANT CHANGE UP (ref 3.3–5)
ALBUMIN SERPL ELPH-MCNC: 3.7 G/DL — SIGNIFICANT CHANGE UP (ref 3.3–5)
ALBUMIN SERPL ELPH-MCNC: 3.8 G/DL — SIGNIFICANT CHANGE UP (ref 3.3–5)
ALP SERPL-CCNC: 56 U/L — SIGNIFICANT CHANGE UP (ref 40–120)
ALP SERPL-CCNC: 59 U/L — SIGNIFICANT CHANGE UP (ref 40–120)
ALP SERPL-CCNC: 60 U/L — SIGNIFICANT CHANGE UP (ref 40–120)
ALT FLD-CCNC: 10 U/L — SIGNIFICANT CHANGE UP (ref 10–45)
ALT FLD-CCNC: 8 U/L — LOW (ref 10–45)
ALT FLD-CCNC: 8 U/L — LOW (ref 10–45)
ANION GAP SERPL CALC-SCNC: 16 MMOL/L — SIGNIFICANT CHANGE UP (ref 5–17)
ANION GAP SERPL CALC-SCNC: 16 MMOL/L — SIGNIFICANT CHANGE UP (ref 5–17)
ANION GAP SERPL CALC-SCNC: 18 MMOL/L — HIGH (ref 5–17)
AST SERPL-CCNC: 15 U/L — SIGNIFICANT CHANGE UP (ref 10–40)
AST SERPL-CCNC: 16 U/L — SIGNIFICANT CHANGE UP (ref 10–40)
AST SERPL-CCNC: 18 U/L — SIGNIFICANT CHANGE UP (ref 10–40)
BASOPHILS # BLD AUTO: 0.07 K/UL — SIGNIFICANT CHANGE UP (ref 0–0.2)
BASOPHILS # BLD AUTO: 0.15 K/UL — SIGNIFICANT CHANGE UP (ref 0–0.2)
BASOPHILS NFR BLD AUTO: 0.6 % — SIGNIFICANT CHANGE UP (ref 0–2)
BASOPHILS NFR BLD AUTO: 0.9 % — SIGNIFICANT CHANGE UP (ref 0–2)
BILIRUB SERPL-MCNC: 0.3 MG/DL — SIGNIFICANT CHANGE UP (ref 0.2–1.2)
BILIRUB SERPL-MCNC: 0.4 MG/DL — SIGNIFICANT CHANGE UP (ref 0.2–1.2)
BILIRUB SERPL-MCNC: 0.4 MG/DL — SIGNIFICANT CHANGE UP (ref 0.2–1.2)
BUN SERPL-MCNC: 44 MG/DL — HIGH (ref 7–23)
BUN SERPL-MCNC: 45 MG/DL — HIGH (ref 7–23)
BUN SERPL-MCNC: 46 MG/DL — HIGH (ref 7–23)
CALCIUM SERPL-MCNC: 8.1 MG/DL — LOW (ref 8.4–10.5)
CALCIUM SERPL-MCNC: 8.2 MG/DL — LOW (ref 8.4–10.5)
CALCIUM SERPL-MCNC: 8.9 MG/DL — SIGNIFICANT CHANGE UP (ref 8.4–10.5)
CHLORIDE SERPL-SCNC: 102 MMOL/L — SIGNIFICANT CHANGE UP (ref 96–108)
CHLORIDE SERPL-SCNC: 103 MMOL/L — SIGNIFICANT CHANGE UP (ref 96–108)
CHLORIDE SERPL-SCNC: 103 MMOL/L — SIGNIFICANT CHANGE UP (ref 96–108)
CO2 SERPL-SCNC: 16 MMOL/L — LOW (ref 22–31)
CO2 SERPL-SCNC: 18 MMOL/L — LOW (ref 22–31)
CO2 SERPL-SCNC: 20 MMOL/L — LOW (ref 22–31)
CREAT SERPL-MCNC: 6.54 MG/DL — HIGH (ref 0.5–1.3)
CREAT SERPL-MCNC: 8.03 MG/DL — HIGH (ref 0.5–1.3)
CREAT SERPL-MCNC: 8.49 MG/DL — HIGH (ref 0.5–1.3)
EGFR: 10 ML/MIN/1.73M2 — LOW
EGFR: 8 ML/MIN/1.73M2 — LOW
EGFR: 8 ML/MIN/1.73M2 — LOW
EOSINOPHIL # BLD AUTO: 0 K/UL — SIGNIFICANT CHANGE UP (ref 0–0.5)
EOSINOPHIL # BLD AUTO: 0 K/UL — SIGNIFICANT CHANGE UP (ref 0–0.5)
EOSINOPHIL NFR BLD AUTO: 0 % — SIGNIFICANT CHANGE UP (ref 0–6)
EOSINOPHIL NFR BLD AUTO: 0 % — SIGNIFICANT CHANGE UP (ref 0–6)
ESTIMATED AVERAGE GLUCOSE: 85 MG/DL — SIGNIFICANT CHANGE UP (ref 68–114)
GLUCOSE BLDC GLUCOMTR-MCNC: 120 MG/DL — HIGH (ref 70–99)
GLUCOSE BLDC GLUCOMTR-MCNC: 127 MG/DL — HIGH (ref 70–99)
GLUCOSE BLDC GLUCOMTR-MCNC: 147 MG/DL — HIGH (ref 70–99)
GLUCOSE BLDC GLUCOMTR-MCNC: 85 MG/DL — SIGNIFICANT CHANGE UP (ref 70–99)
GLUCOSE BLDC GLUCOMTR-MCNC: 93 MG/DL — SIGNIFICANT CHANGE UP (ref 70–99)
GLUCOSE SERPL-MCNC: 131 MG/DL — HIGH (ref 70–99)
GLUCOSE SERPL-MCNC: 139 MG/DL — HIGH (ref 70–99)
GLUCOSE SERPL-MCNC: 154 MG/DL — HIGH (ref 70–99)
HCT VFR BLD CALC: 37.5 % — LOW (ref 39–50)
HCT VFR BLD CALC: 41 % — SIGNIFICANT CHANGE UP (ref 39–50)
HCT VFR BLD CALC: 43 % — SIGNIFICANT CHANGE UP (ref 39–50)
HGB BLD-MCNC: 11.6 G/DL — LOW (ref 13–17)
HGB BLD-MCNC: 12.5 G/DL — LOW (ref 13–17)
HGB BLD-MCNC: 13.1 G/DL — SIGNIFICANT CHANGE UP (ref 13–17)
IMM GRANULOCYTES NFR BLD AUTO: 0.4 % — SIGNIFICANT CHANGE UP (ref 0–1.5)
LYMPHOCYTES # BLD AUTO: 0.42 K/UL — LOW (ref 1–3.3)
LYMPHOCYTES # BLD AUTO: 0.64 K/UL — LOW (ref 1–3.3)
LYMPHOCYTES # BLD AUTO: 2.6 % — LOW (ref 13–44)
LYMPHOCYTES # BLD AUTO: 5.6 % — LOW (ref 13–44)
MAGNESIUM SERPL-MCNC: 2.2 MG/DL — SIGNIFICANT CHANGE UP (ref 1.6–2.6)
MCHC RBC-ENTMCNC: 24.5 PG — LOW (ref 27–34)
MCHC RBC-ENTMCNC: 24.5 PG — LOW (ref 27–34)
MCHC RBC-ENTMCNC: 24.7 PG — LOW (ref 27–34)
MCHC RBC-ENTMCNC: 30.5 GM/DL — LOW (ref 32–36)
MCHC RBC-ENTMCNC: 30.5 GM/DL — LOW (ref 32–36)
MCHC RBC-ENTMCNC: 30.9 GM/DL — LOW (ref 32–36)
MCV RBC AUTO: 79.1 FL — LOW (ref 80–100)
MCV RBC AUTO: 80.5 FL — SIGNIFICANT CHANGE UP (ref 80–100)
MCV RBC AUTO: 81 FL — SIGNIFICANT CHANGE UP (ref 80–100)
MONOCYTES # BLD AUTO: 0.9 K/UL — SIGNIFICANT CHANGE UP (ref 0–0.9)
MONOCYTES # BLD AUTO: 1.25 K/UL — HIGH (ref 0–0.9)
MONOCYTES NFR BLD AUTO: 7.7 % — SIGNIFICANT CHANGE UP (ref 2–14)
MONOCYTES NFR BLD AUTO: 7.9 % — SIGNIFICANT CHANGE UP (ref 2–14)
NEUTROPHILS # BLD AUTO: 14.38 K/UL — HIGH (ref 1.8–7.4)
NEUTROPHILS # BLD AUTO: 9.77 K/UL — HIGH (ref 1.8–7.4)
NEUTROPHILS NFR BLD AUTO: 85.5 % — HIGH (ref 43–77)
NEUTROPHILS NFR BLD AUTO: 87.9 % — HIGH (ref 43–77)
NRBC # BLD: 0 /100 WBCS — SIGNIFICANT CHANGE UP (ref 0–0)
NRBC # BLD: 0 /100 WBCS — SIGNIFICANT CHANGE UP (ref 0–0)
PHOSPHATE SERPL-MCNC: 6.3 MG/DL — HIGH (ref 2.5–4.5)
PLATELET # BLD AUTO: 189 K/UL — SIGNIFICANT CHANGE UP (ref 150–400)
PLATELET # BLD AUTO: 191 K/UL — SIGNIFICANT CHANGE UP (ref 150–400)
PLATELET # BLD AUTO: 226 K/UL — SIGNIFICANT CHANGE UP (ref 150–400)
POTASSIUM SERPL-MCNC: 4.7 MMOL/L — SIGNIFICANT CHANGE UP (ref 3.5–5.3)
POTASSIUM SERPL-MCNC: 5 MMOL/L — SIGNIFICANT CHANGE UP (ref 3.5–5.3)
POTASSIUM SERPL-MCNC: 5.1 MMOL/L — SIGNIFICANT CHANGE UP (ref 3.5–5.3)
POTASSIUM SERPL-SCNC: 4.7 MMOL/L — SIGNIFICANT CHANGE UP (ref 3.5–5.3)
POTASSIUM SERPL-SCNC: 5 MMOL/L — SIGNIFICANT CHANGE UP (ref 3.5–5.3)
POTASSIUM SERPL-SCNC: 5.1 MMOL/L — SIGNIFICANT CHANGE UP (ref 3.5–5.3)
PROT SERPL-MCNC: 6 G/DL — SIGNIFICANT CHANGE UP (ref 6–8.3)
PROT SERPL-MCNC: 6 G/DL — SIGNIFICANT CHANGE UP (ref 6–8.3)
PROT SERPL-MCNC: 6.2 G/DL — SIGNIFICANT CHANGE UP (ref 6–8.3)
RBC # BLD: 4.74 M/UL — SIGNIFICANT CHANGE UP (ref 4.2–5.8)
RBC # BLD: 5.06 M/UL — SIGNIFICANT CHANGE UP (ref 4.2–5.8)
RBC # BLD: 5.34 M/UL — SIGNIFICANT CHANGE UP (ref 4.2–5.8)
RBC # FLD: 15.8 % — HIGH (ref 10.3–14.5)
RBC # FLD: 16 % — HIGH (ref 10.3–14.5)
RBC # FLD: 16 % — HIGH (ref 10.3–14.5)
SODIUM SERPL-SCNC: 136 MMOL/L — SIGNIFICANT CHANGE UP (ref 135–145)
SODIUM SERPL-SCNC: 137 MMOL/L — SIGNIFICANT CHANGE UP (ref 135–145)
SODIUM SERPL-SCNC: 139 MMOL/L — SIGNIFICANT CHANGE UP (ref 135–145)
TACROLIMUS SERPL-MCNC: 11.8 NG/ML — SIGNIFICANT CHANGE UP
WBC # BLD: 11.43 K/UL — HIGH (ref 3.8–10.5)
WBC # BLD: 13.67 K/UL — HIGH (ref 3.8–10.5)
WBC # BLD: 16.19 K/UL — HIGH (ref 3.8–10.5)
WBC # FLD AUTO: 11.43 K/UL — HIGH (ref 3.8–10.5)
WBC # FLD AUTO: 13.67 K/UL — HIGH (ref 3.8–10.5)
WBC # FLD AUTO: 16.19 K/UL — HIGH (ref 3.8–10.5)

## 2022-06-23 PROCEDURE — 74176 CT ABD & PELVIS W/O CONTRAST: CPT | Mod: 26

## 2022-06-23 PROCEDURE — 99223 1ST HOSP IP/OBS HIGH 75: CPT | Mod: GC

## 2022-06-23 RX ORDER — POLYETHYLENE GLYCOL 3350 17 G/17G
17 POWDER, FOR SOLUTION ORAL ONCE
Refills: 0 | Status: COMPLETED | OUTPATIENT
Start: 2022-06-23 | End: 2022-06-23

## 2022-06-23 RX ORDER — MULTIVIT WITH MIN/MFOLATE/K2 340-15/3 G
1 POWDER (GRAM) ORAL ONCE
Refills: 0 | Status: COMPLETED | OUTPATIENT
Start: 2022-06-23 | End: 2022-06-23

## 2022-06-23 RX ORDER — POLYETHYLENE GLYCOL 3350 17 G/17G
17 POWDER, FOR SOLUTION ORAL DAILY
Refills: 0 | Status: DISCONTINUED | OUTPATIENT
Start: 2022-06-23 | End: 2022-06-27

## 2022-06-23 RX ORDER — SENNOSIDES 8.6 MG
8.6 TABLET ORAL
Qty: 30 | Refills: 1 | Status: ACTIVE | COMMUNITY
Start: 2022-06-23 | End: 1900-01-01

## 2022-06-23 RX ORDER — ONDANSETRON 8 MG/1
4 TABLET, FILM COATED ORAL ONCE
Refills: 0 | Status: COMPLETED | OUTPATIENT
Start: 2022-06-23 | End: 2022-06-23

## 2022-06-23 RX ORDER — ACETAMINOPHEN 500 MG
1000 TABLET ORAL ONCE
Refills: 0 | Status: COMPLETED | OUTPATIENT
Start: 2022-06-23 | End: 2022-06-23

## 2022-06-23 RX ORDER — METOCLOPRAMIDE HCL 10 MG
5 TABLET ORAL ONCE
Refills: 0 | Status: COMPLETED | OUTPATIENT
Start: 2022-06-23 | End: 2022-06-23

## 2022-06-23 RX ORDER — LABETALOL HCL 100 MG
100 TABLET ORAL ONCE
Refills: 0 | Status: COMPLETED | OUTPATIENT
Start: 2022-06-23 | End: 2022-06-23

## 2022-06-23 RX ORDER — SODIUM CHLORIDE 9 MG/ML
1000 INJECTION INTRAMUSCULAR; INTRAVENOUS; SUBCUTANEOUS ONCE
Refills: 0 | Status: COMPLETED | OUTPATIENT
Start: 2022-06-23 | End: 2022-06-23

## 2022-06-23 RX ORDER — LACTULOSE 10 G/15ML
20 SOLUTION ORAL ONCE
Refills: 0 | Status: COMPLETED | OUTPATIENT
Start: 2022-06-23 | End: 2022-06-23

## 2022-06-23 RX ORDER — METOCLOPRAMIDE HCL 10 MG
5 TABLET ORAL ONCE
Refills: 0 | Status: DISCONTINUED | OUTPATIENT
Start: 2022-06-23 | End: 2022-06-23

## 2022-06-23 RX ORDER — OXYCODONE HYDROCHLORIDE 5 MG/1
5 TABLET ORAL ONCE
Refills: 0 | Status: DISCONTINUED | OUTPATIENT
Start: 2022-06-23 | End: 2022-06-23

## 2022-06-23 RX ADMIN — TRAMADOL HYDROCHLORIDE 50 MILLIGRAM(S): 50 TABLET ORAL at 21:25

## 2022-06-23 RX ADMIN — TRAMADOL HYDROCHLORIDE 50 MILLIGRAM(S): 50 TABLET ORAL at 20:57

## 2022-06-23 RX ADMIN — TACROLIMUS 8 MILLIGRAM(S): 5 CAPSULE ORAL at 09:21

## 2022-06-23 RX ADMIN — TRAMADOL HYDROCHLORIDE 50 MILLIGRAM(S): 50 TABLET ORAL at 06:47

## 2022-06-23 RX ADMIN — OXYCODONE HYDROCHLORIDE 5 MILLIGRAM(S): 5 TABLET ORAL at 18:05

## 2022-06-23 RX ADMIN — Medication 500000 UNIT(S): at 05:25

## 2022-06-23 RX ADMIN — Medication 1000 MILLIGRAM(S): at 23:35

## 2022-06-23 RX ADMIN — TRAMADOL HYDROCHLORIDE 50 MILLIGRAM(S): 50 TABLET ORAL at 02:32

## 2022-06-23 RX ADMIN — Medication 5 MILLIGRAM(S): at 00:52

## 2022-06-23 RX ADMIN — Medication 125 MILLIGRAM(S): at 05:24

## 2022-06-23 RX ADMIN — TRAMADOL HYDROCHLORIDE 50 MILLIGRAM(S): 50 TABLET ORAL at 02:05

## 2022-06-23 RX ADMIN — Medication 500000 UNIT(S): at 17:20

## 2022-06-23 RX ADMIN — TRAMADOL HYDROCHLORIDE 25 MILLIGRAM(S): 50 TABLET ORAL at 08:39

## 2022-06-23 RX ADMIN — OXYCODONE HYDROCHLORIDE 5 MILLIGRAM(S): 5 TABLET ORAL at 19:00

## 2022-06-23 RX ADMIN — Medication 400 MILLIGRAM(S): at 23:05

## 2022-06-23 RX ADMIN — TRAMADOL HYDROCHLORIDE 50 MILLIGRAM(S): 50 TABLET ORAL at 07:10

## 2022-06-23 RX ADMIN — Medication 400 MILLIGRAM(S): at 10:54

## 2022-06-23 RX ADMIN — Medication 1 TABLET(S): at 11:48

## 2022-06-23 RX ADMIN — POLYETHYLENE GLYCOL 3350 17 GRAM(S): 17 POWDER, FOR SOLUTION ORAL at 18:05

## 2022-06-23 RX ADMIN — Medication 200 GRAM(S): at 00:02

## 2022-06-23 RX ADMIN — Medication 1000 MILLIGRAM(S): at 05:56

## 2022-06-23 RX ADMIN — Medication 500000 UNIT(S): at 11:48

## 2022-06-23 RX ADMIN — ONDANSETRON 4 MILLIGRAM(S): 8 TABLET, FILM COATED ORAL at 00:27

## 2022-06-23 RX ADMIN — TRAMADOL HYDROCHLORIDE 25 MILLIGRAM(S): 50 TABLET ORAL at 23:16

## 2022-06-23 RX ADMIN — TRAMADOL HYDROCHLORIDE 25 MILLIGRAM(S): 50 TABLET ORAL at 23:46

## 2022-06-23 RX ADMIN — MYCOPHENOLATE MOFETIL 1000 MILLIGRAM(S): 250 CAPSULE ORAL at 09:21

## 2022-06-23 RX ADMIN — Medication 1 BOTTLE: at 16:59

## 2022-06-23 RX ADMIN — SODIUM CHLORIDE 1000 MILLILITER(S): 9 INJECTION INTRAMUSCULAR; INTRAVENOUS; SUBCUTANEOUS at 00:51

## 2022-06-23 RX ADMIN — TRAMADOL HYDROCHLORIDE 50 MILLIGRAM(S): 50 TABLET ORAL at 17:18

## 2022-06-23 RX ADMIN — LACTULOSE 20 GRAM(S): 10 SOLUTION ORAL at 10:54

## 2022-06-23 RX ADMIN — ONDANSETRON 4 MILLIGRAM(S): 8 TABLET, FILM COATED ORAL at 09:13

## 2022-06-23 RX ADMIN — TRAMADOL HYDROCHLORIDE 25 MILLIGRAM(S): 50 TABLET ORAL at 09:35

## 2022-06-23 RX ADMIN — Medication 100 MILLIGRAM(S): at 19:10

## 2022-06-23 RX ADMIN — TRAMADOL HYDROCHLORIDE 50 MILLIGRAM(S): 50 TABLET ORAL at 18:18

## 2022-06-23 RX ADMIN — Medication 5 MILLIGRAM(S): at 05:24

## 2022-06-23 RX ADMIN — POLYETHYLENE GLYCOL 3350 17 GRAM(S): 17 POWDER, FOR SOLUTION ORAL at 11:49

## 2022-06-23 RX ADMIN — MYCOPHENOLATE MOFETIL 1000 MILLIGRAM(S): 250 CAPSULE ORAL at 20:56

## 2022-06-23 RX ADMIN — Medication 400 MILLIGRAM(S): at 05:24

## 2022-06-23 RX ADMIN — SENNA PLUS 2 TABLET(S): 8.6 TABLET ORAL at 20:57

## 2022-06-23 RX ADMIN — FAMOTIDINE 20 MILLIGRAM(S): 10 INJECTION INTRAVENOUS at 11:48

## 2022-06-23 RX ADMIN — Medication 125 MILLIGRAM(S): at 17:20

## 2022-06-23 RX ADMIN — Medication 1000 MILLIGRAM(S): at 11:09

## 2022-06-23 NOTE — CONSULT NOTE ADULT - PROBLEM SELECTOR RECOMMENDATION 9
s/p DDRT (1a, 1v, 1u- no stent)- Simulect induction, cPRA 0%, CMV-/ EBV+.  CIT 17 hours. KDPI 14%, COD: Drug Intoxication, Terminal Cr:  0.79. Pt. is CMV-. Donor is CMV+. Both are EBV+. Monitor UOP and trend renal function. Will give lactulose and enema to promote BM. CT AP showing increased stool burden, and large sheyla-transplant hematoma.

## 2022-06-23 NOTE — CONSULT NOTE ADULT - PROBLEM SELECTOR RECOMMENDATION 2
s/p Simulect induction. Envarsus titration to achieve level between 8-10. MMF 1gm BID. Steroid Taper protocol. C/w Bactrim, Nystatin and famotidine pf prophylaxis. C/w Valcyte for CMV for prophylaxis and titrate to 900mg BID once renal function improves given Donor kidney is CMV+ and patient is CMV-.     If you have any questions, please feel free to contact me  Rodney Santiago  Nephrology Fellow  [phone]; Prefer [employer] TEAMS  (After 5pm or on weekends please page the on-call fellow)

## 2022-06-23 NOTE — CONSULT NOTE ADULT - ATTENDING COMMENTS
37 year old male with HTN , ESRD was on home HD since 2015 admitted s/p DDRT (1a,1v,1u- no stent) with Simulect induction on 6/22/22.     1. s/p DDRT on 6/22/22- Allograft function with excellent UOP and solutes trending down  2. IS meds- Simulect induction. Tac dosing for goal 8-10, Cellcept 1gm po bid and Steroid taper per protocol  3. HTN- BP at goal, will titrate up BP as needed.

## 2022-06-23 NOTE — CONSULT NOTE ADULT - ASSESSMENT
Pt. is a 37 y.o. M w/ PMHx of HTN (since age 17), and ESRD (on home HD since 2015) via LUE AVF, hyperthyroidism, now admitted for renal transplantation. Now S/p DDRT(1a,1v,1u- no stent) with Simulect induction on 6/22/22.

## 2022-06-23 NOTE — CONSULT NOTE ADULT - SUBJECTIVE AND OBJECTIVE BOX
[facility] DIVISION OF KIDNEY DISEASES AND HYPERTENSION -- INITIAL CONSULT NOTE  --------------------------------------------------------------------------------    HPI:  Pt. is a 37 y.o. M w/ PMHx of HTN (since age 17), and ESRD (on home HD since 2015) via LUE AVF, hyperthyroidism, now admitted for renal transplantation. cPRA 0%, CMV-/ EBV+. Pt. is POD #1 from DDRT (1a, 1v, 1u- no stent)- Simulect induction, CIT 17 hours     Donor:  Age:  36 yo, KDPI 14%, COD:  Drug Intoxication, Terminal Cr:  0.79  CMV- positive EBV-positive  HepBcAb- negative  Hepatitis C-JAKE- negative  Hepatitis C ab- negative    Pt. seen this AM. Good UOP. had decreas for a couple hours but after IVF bolus UOP increased. SCr. improving. CTA Abdomen pelvis done and reviewed with Dr. Garcia. Renal US showing patent vessels.       PAST HISTORY  --------------------------------------------------------------------------------  PAST MEDICAL & SURGICAL HISTORY:  ESRD on hemodialysis  at home 5 x week      HTN (hypertension)      ESRD (end stage renal disease) on dialysis      HTN (hypertension)      Bell&#x27;s palsy      Hyperthyroidism      AV fistula  L forearm      Elective surgical procedure  RACW permacath for HD, removed 5 years ago        FAMILY HISTORY:  Family history of hypertension (Father, Mother)  Father and mother      Social History:  doesn't work, lives with mother and aunt (22 Jun 2022 04:29)        ALLERGIES & MEDICATIONS  --------------------------------------------------------------------------------  Allergies    No Known Drug Allergies  topical cleanser for dialysis access.   Exsept (Rash)    Intolerances      Standing Inpatient Medications  ceFAZolin   IVPB 2000 milliGRAM(s) IV Intermittent once  dextrose 5%. 1000 milliLiter(s) IV Continuous <Continuous>  dextrose 5%. 1000 milliLiter(s) IV Continuous <Continuous>  dextrose 50% Injectable 25 Gram(s) IV Push once  dextrose 50% Injectable 12.5 Gram(s) IV Push once  dextrose 50% Injectable 25 Gram(s) IV Push once  famotidine    Tablet 20 milliGRAM(s) Oral daily  glucagon  Injectable 1 milliGRAM(s) IntraMuscular once  influenza   Vaccine 0.5 milliLiter(s) IntraMuscular once  insulin lispro (ADMELOG) corrective regimen sliding scale   SubCutaneous three times a day before meals  methylPREDNISolone sodium succinate Injectable 125 milliGRAM(s) IV Push two times a day  mycophenolate mofetil 1000 milliGRAM(s) Oral <User Schedule>  NIFEdipine XL 30 milliGRAM(s) Oral once  nystatin    Suspension [id number] Unit(s) Swish and Swallow four times a day  polyethylene glycol 3350 17 Gram(s) Oral daily  senna 2 Tablet(s) Oral at bedtime  sodium chloride 0.9%. 1000 milliLiter(s) IV Continuous <Continuous>  sorbitol 70%/mineral oil/magnesium hydroxide/glycerin Enema 120 milliLiter(s) Rectal once  tacrolimus ER Tablet (ENVARSUS XR) 8 milliGRAM(s) Oral <User Schedule>  trimethoprim   80 mG/sulfamethoxazole 400 mG 1 Tablet(s) Oral daily    PRN Inpatient Medications  dextrose Oral Gel 15 Gram(s) Oral once PRN  ondansetron Injectable 4 milliGRAM(s) IV Push every 6 hours PRN  traMADol 25 milliGRAM(s) Oral every 4 hours PRN  traMADol 50 milliGRAM(s) Oral every 4 hours PRN      REVIEW OF SYSTEMS  --------------------------------------------------------------------------------  Gen: No weight changes, fatigue, fevers/chills, weakness  Skin: No rashes  Head/Eyes/Ears/Mouth: No headache; Normal hearing; Normal vision w/o blurriness; No sinus pain/discomfort, sore throat  Respiratory: No dyspnea, cough, wheezing, hemoptysis  CV: No chest pain, PND, orthopnea  GI: Some abdominal discomfort, no flatulence or bowel movements.   : No increased frequency, dysuria, hematuria, nocturia.   MSK: No joint pain/swelling; no back pain; no edema  Neuro: No dizziness/lightheadedness, weakness, seizures, numbness, tingling  Heme: No easy bruising or bleeding  Endo: No heat/cold intolerance  Psych: No significant nervousness, anxiety, stress, depression        VITALS/PHYSICAL EXAM  --------------------------------------------------------------------------------  T(C): 36.9 (06-23-22 @ 11:19), Max: 36.9 (06-23-22 @ 06:00)  HR: 66 (06-23-22 @ 11:19) (55 - 75)  BP: 160/94 (06-23-22 @ 11:19) (125/81 - 183/107)  RR: 18 (06-23-22 @ 11:19) (12 - 20)  SpO2: 97% (06-23-22 @ 11:19) (96% - 100%)  Wt(kg): --  Height (cm): 180.3 (06-22-22 @ 08:30)  Weight (kg): 84 (06-22-22 @ 08:30)  BMI (kg/m2): 25.8 (06-22-22 @ 08:30)  BSA (m2): 2.04 (06-22-22 @ 08:30)      06-22-22 @ 07:01  -  06-23-22 @ 07:00  --------------------------------------------------------  IN: 7910 mL / OUT: 6885 mL / NET: 1025 mL    06-23-22 @ 07:01  -  06-23-22 @ 11:57  --------------------------------------------------------  IN: 470 mL / OUT: 520 mL / NET: -50 mL      Physical Exam:  	Gen: Not in distress, well-appearing  	HEENT: no scleral icterus, moist oral mucosa. No thrush. Supple neck, no JVD  	Pulm: normal respiratory effort, lungs clear to auscultation bilaterally   	CV: regular rate and rhythm, S1 and S2 normal, no murmur   	Abd: normoactive bowel sounds, soft and non distended abdomen. Gas pains+ No guarding or rigidity, Oliveira+                   Transplant; incisional tenderness, some oozing of blood from incision site, ecchymosis extending to the R. flank.   	: No suprapubic tenderness                Extremities: No edema. Distal pulses 2+ bilaterally           Skin: warm no rash, no cyanosis   	Neuro: Alert and oriented to person, place and time. Normal speech. Normal affect.       LABS/STUDIES  --------------------------------------------------------------------------------              12.5   16.19 >-----------<  189      [06-23-22 @ 06:57]              41.0     137  |  103  |  45  ----------------------------<  131      [06-23-22 @ 06:55]  5.0   |  16  |  8.03        Ca     8.2     [06-23-22 @ 06:55]      Mg     2.2     [06-23-22 @ 06:55]      Phos  6.3     [06-23-22 @ 06:55]    TPro  6.0  /  Alb  3.6  /  TBili  0.4  /  DBili  x   /  AST  18  /  ALT  8   /  AlkPhos  59  [06-23-22 @ 06:55]    PT/INR: PT 12.3 , INR 1.07       [06-22-22 @ 18:41]  PTT: >200.0      [06-22-22 @ 16:34]      Creatinine Trend:  SCr 8.03 [06-23 @ 06:55]  SCr 8.49 [06-23 @ 00:29]  SCr 7.99 [06-22 @ 21:21]  SCr 9.25 [06-22 @ 16:34]  SCr 12.07 [06-22 @ 04:57]    Urinalysis - [12-26-19 @ 20:00]      Color COLORLESS / Appearance CLEAR / SG 1.010 / pH 8.5      Gluc NEGATIVE / Ketone NEGATIVE  / Bili NEGATIVE / Urobili NORMAL       Blood MODERATE / Protein 100 / Leuk Est TRACE / Nitrite NEGATIVE      RBC 11-25 / WBC 3-5 / Hyaline NEGATIVE / Gran  / Sq Epi OCC / Non Sq Epi  / Bacteria FEW      Iron 32, TIBC 199, %sat 16      [07-14-21 @ 06:16]  Ferritin 581      [07-14-21 @ 06:16]  PTH -- (Ca --)      [07-11-21 @ 03:26]   434  TSH 2.23      [07-01-21 @ 06:51]    HBsAb 2170.8      [06-22-22 @ 04:57]  HBsAg Nonreact      [06-22-22 @ 04:57]  HBcAb Nonreact      [06-22-22 @ 04:57]  HCV 0.14, Nonreact      [06-22-22 @ 04:57]  HIV Nonreact      [06-22-22 @ 04:57]      Tacrolimus  Cyclosporine  Sirolimus  Mycophenolate  BK PCR  CMV PCR  Parvo PCR  EBV PCR

## 2022-06-23 NOTE — PROVIDER CONTACT NOTE (OTHER) - ASSESSMENT
Pt AOx4, RLQ w/ old draining from hematoma. VS as per flow. Pt states relief from zofran but still feeling pain at RLQ site. Pt diaphoretic, asking for ice packs then hot packs.

## 2022-06-24 LAB
ALBUMIN SERPL ELPH-MCNC: 3.9 G/DL — SIGNIFICANT CHANGE UP (ref 3.3–5)
ALP SERPL-CCNC: 53 U/L — SIGNIFICANT CHANGE UP (ref 40–120)
ALT FLD-CCNC: 11 U/L — SIGNIFICANT CHANGE UP (ref 10–45)
ANION GAP SERPL CALC-SCNC: 13 MMOL/L — SIGNIFICANT CHANGE UP (ref 5–17)
AST SERPL-CCNC: 22 U/L — SIGNIFICANT CHANGE UP (ref 10–40)
BASOPHILS # BLD AUTO: 0.08 K/UL — SIGNIFICANT CHANGE UP (ref 0–0.2)
BASOPHILS NFR BLD AUTO: 0.6 % — SIGNIFICANT CHANGE UP (ref 0–2)
BILIRUB SERPL-MCNC: 0.2 MG/DL — SIGNIFICANT CHANGE UP (ref 0.2–1.2)
BUN SERPL-MCNC: 43 MG/DL — HIGH (ref 7–23)
CALCIUM SERPL-MCNC: 9.6 MG/DL — SIGNIFICANT CHANGE UP (ref 8.4–10.5)
CHLORIDE SERPL-SCNC: 103 MMOL/L — SIGNIFICANT CHANGE UP (ref 96–108)
CO2 SERPL-SCNC: 22 MMOL/L — SIGNIFICANT CHANGE UP (ref 22–31)
CREAT SERPL-MCNC: 4.6 MG/DL — HIGH (ref 0.5–1.3)
EGFR: 16 ML/MIN/1.73M2 — LOW
EOSINOPHIL # BLD AUTO: 0.01 K/UL — SIGNIFICANT CHANGE UP (ref 0–0.5)
EOSINOPHIL NFR BLD AUTO: 0.1 % — SIGNIFICANT CHANGE UP (ref 0–6)
GLUCOSE BLDC GLUCOMTR-MCNC: 104 MG/DL — HIGH (ref 70–99)
GLUCOSE BLDC GLUCOMTR-MCNC: 111 MG/DL — HIGH (ref 70–99)
GLUCOSE BLDC GLUCOMTR-MCNC: 85 MG/DL — SIGNIFICANT CHANGE UP (ref 70–99)
GLUCOSE BLDC GLUCOMTR-MCNC: 87 MG/DL — SIGNIFICANT CHANGE UP (ref 70–99)
GLUCOSE SERPL-MCNC: 117 MG/DL — HIGH (ref 70–99)
HCT VFR BLD CALC: 33.1 % — LOW (ref 39–50)
HGB BLD-MCNC: 10.3 G/DL — LOW (ref 13–17)
IMM GRANULOCYTES NFR BLD AUTO: 0.6 % — SIGNIFICANT CHANGE UP (ref 0–1.5)
LYMPHOCYTES # BLD AUTO: 0.65 K/UL — LOW (ref 1–3.3)
LYMPHOCYTES # BLD AUTO: 5.1 % — LOW (ref 13–44)
MAGNESIUM SERPL-MCNC: 2.5 MG/DL — SIGNIFICANT CHANGE UP (ref 1.6–2.6)
MCHC RBC-ENTMCNC: 24.6 PG — LOW (ref 27–34)
MCHC RBC-ENTMCNC: 31.1 GM/DL — LOW (ref 32–36)
MCV RBC AUTO: 79 FL — LOW (ref 80–100)
MONOCYTES # BLD AUTO: 1.77 K/UL — HIGH (ref 0–0.9)
MONOCYTES NFR BLD AUTO: 13.8 % — SIGNIFICANT CHANGE UP (ref 2–14)
NEUTROPHILS # BLD AUTO: 10.28 K/UL — HIGH (ref 1.8–7.4)
NEUTROPHILS NFR BLD AUTO: 79.8 % — HIGH (ref 43–77)
NRBC # BLD: 0 /100 WBCS — SIGNIFICANT CHANGE UP (ref 0–0)
PHOSPHATE SERPL-MCNC: 5 MG/DL — HIGH (ref 2.5–4.5)
PLATELET # BLD AUTO: 223 K/UL — SIGNIFICANT CHANGE UP (ref 150–400)
POTASSIUM SERPL-MCNC: 4.8 MMOL/L — SIGNIFICANT CHANGE UP (ref 3.5–5.3)
POTASSIUM SERPL-SCNC: 4.8 MMOL/L — SIGNIFICANT CHANGE UP (ref 3.5–5.3)
PROT SERPL-MCNC: 6 G/DL — SIGNIFICANT CHANGE UP (ref 6–8.3)
RBC # BLD: 4.19 M/UL — LOW (ref 4.2–5.8)
RBC # FLD: 15.9 % — HIGH (ref 10.3–14.5)
SODIUM SERPL-SCNC: 138 MMOL/L — SIGNIFICANT CHANGE UP (ref 135–145)
TACROLIMUS SERPL-MCNC: 4 NG/ML — SIGNIFICANT CHANGE UP
WBC # BLD: 12.87 K/UL — HIGH (ref 3.8–10.5)
WBC # FLD AUTO: 12.87 K/UL — HIGH (ref 3.8–10.5)

## 2022-06-24 PROCEDURE — 93010 ELECTROCARDIOGRAM REPORT: CPT

## 2022-06-24 PROCEDURE — 99232 SBSQ HOSP IP/OBS MODERATE 35: CPT | Mod: GC

## 2022-06-24 RX ORDER — SIMETHICONE 80 MG/1
80 TABLET, CHEWABLE ORAL ONCE
Refills: 0 | Status: COMPLETED | OUTPATIENT
Start: 2022-06-24 | End: 2022-06-24

## 2022-06-24 RX ORDER — SODIUM CHLORIDE 9 MG/ML
500 INJECTION INTRAMUSCULAR; INTRAVENOUS; SUBCUTANEOUS ONCE
Refills: 0 | Status: COMPLETED | OUTPATIENT
Start: 2022-06-24 | End: 2022-06-24

## 2022-06-24 RX ORDER — LABETALOL HCL 100 MG
10 TABLET ORAL ONCE
Refills: 0 | Status: COMPLETED | OUTPATIENT
Start: 2022-06-24 | End: 2022-06-24

## 2022-06-24 RX ORDER — HYDRALAZINE HCL 50 MG
10 TABLET ORAL ONCE
Refills: 0 | Status: COMPLETED | OUTPATIENT
Start: 2022-06-24 | End: 2022-06-24

## 2022-06-24 RX ORDER — CHLORHEXIDINE GLUCONATE 213 G/1000ML
1 SOLUTION TOPICAL DAILY
Refills: 0 | Status: DISCONTINUED | OUTPATIENT
Start: 2022-06-24 | End: 2022-06-27

## 2022-06-24 RX ORDER — TACROLIMUS 5 MG/1
10 CAPSULE ORAL ONCE
Refills: 0 | Status: COMPLETED | OUTPATIENT
Start: 2022-06-24 | End: 2022-06-24

## 2022-06-24 RX ORDER — NIFEDIPINE 30 MG
30 TABLET, EXTENDED RELEASE 24 HR ORAL DAILY
Refills: 0 | Status: DISCONTINUED | OUTPATIENT
Start: 2022-06-25 | End: 2022-06-27

## 2022-06-24 RX ORDER — PANTOPRAZOLE SODIUM 20 MG/1
40 TABLET, DELAYED RELEASE ORAL ONCE
Refills: 0 | Status: COMPLETED | OUTPATIENT
Start: 2022-06-24 | End: 2022-06-24

## 2022-06-24 RX ORDER — HYDROMORPHONE HYDROCHLORIDE 2 MG/ML
0.5 INJECTION INTRAMUSCULAR; INTRAVENOUS; SUBCUTANEOUS ONCE
Refills: 0 | Status: DISCONTINUED | OUTPATIENT
Start: 2022-06-24 | End: 2022-06-24

## 2022-06-24 RX ORDER — OXYCODONE AND ACETAMINOPHEN 5; 325 MG/1; MG/1
1 TABLET ORAL ONCE
Refills: 0 | Status: DISCONTINUED | OUTPATIENT
Start: 2022-06-24 | End: 2022-06-24

## 2022-06-24 RX ORDER — TACROLIMUS 5 MG/1
10 CAPSULE ORAL
Refills: 0 | Status: DISCONTINUED | OUTPATIENT
Start: 2022-06-25 | End: 2022-06-25

## 2022-06-24 RX ORDER — PANTOPRAZOLE SODIUM 20 MG/1
40 TABLET, DELAYED RELEASE ORAL
Refills: 0 | Status: DISCONTINUED | OUTPATIENT
Start: 2022-06-25 | End: 2022-06-25

## 2022-06-24 RX ORDER — LABETALOL HCL 100 MG
300 TABLET ORAL
Refills: 0 | Status: DISCONTINUED | OUTPATIENT
Start: 2022-06-24 | End: 2022-06-25

## 2022-06-24 RX ADMIN — Medication 10 MILLIGRAM(S): at 01:30

## 2022-06-24 RX ADMIN — TRAMADOL HYDROCHLORIDE 50 MILLIGRAM(S): 50 TABLET ORAL at 21:15

## 2022-06-24 RX ADMIN — MYCOPHENOLATE MOFETIL 1000 MILLIGRAM(S): 250 CAPSULE ORAL at 21:08

## 2022-06-24 RX ADMIN — Medication 500000 UNIT(S): at 12:28

## 2022-06-24 RX ADMIN — TRAMADOL HYDROCHLORIDE 50 MILLIGRAM(S): 50 TABLET ORAL at 20:54

## 2022-06-24 RX ADMIN — Medication 500000 UNIT(S): at 01:33

## 2022-06-24 RX ADMIN — MYCOPHENOLATE MOFETIL 1000 MILLIGRAM(S): 250 CAPSULE ORAL at 09:21

## 2022-06-24 RX ADMIN — Medication 30 MILLILITER(S): at 14:15

## 2022-06-24 RX ADMIN — SENNA PLUS 2 TABLET(S): 8.6 TABLET ORAL at 21:08

## 2022-06-24 RX ADMIN — OXYCODONE AND ACETAMINOPHEN 1 TABLET(S): 5; 325 TABLET ORAL at 06:40

## 2022-06-24 RX ADMIN — ONDANSETRON 4 MILLIGRAM(S): 8 TABLET, FILM COATED ORAL at 13:27

## 2022-06-24 RX ADMIN — ONDANSETRON 4 MILLIGRAM(S): 8 TABLET, FILM COATED ORAL at 06:10

## 2022-06-24 RX ADMIN — HYDROMORPHONE HYDROCHLORIDE 0.5 MILLIGRAM(S): 2 INJECTION INTRAMUSCULAR; INTRAVENOUS; SUBCUTANEOUS at 17:59

## 2022-06-24 RX ADMIN — Medication 500000 UNIT(S): at 06:10

## 2022-06-24 RX ADMIN — TRAMADOL HYDROCHLORIDE 50 MILLIGRAM(S): 50 TABLET ORAL at 12:26

## 2022-06-24 RX ADMIN — CHLORHEXIDINE GLUCONATE 1 APPLICATION(S): 213 SOLUTION TOPICAL at 12:32

## 2022-06-24 RX ADMIN — Medication 10 MILLIGRAM(S): at 07:34

## 2022-06-24 RX ADMIN — POLYETHYLENE GLYCOL 3350 17 GRAM(S): 17 POWDER, FOR SOLUTION ORAL at 12:28

## 2022-06-24 RX ADMIN — SIMETHICONE 80 MILLIGRAM(S): 80 TABLET, CHEWABLE ORAL at 12:26

## 2022-06-24 RX ADMIN — Medication 30 MILLIGRAM(S): at 07:53

## 2022-06-24 RX ADMIN — TRAMADOL HYDROCHLORIDE 50 MILLIGRAM(S): 50 TABLET ORAL at 13:15

## 2022-06-24 RX ADMIN — Medication 300 MILLIGRAM(S): at 12:27

## 2022-06-24 RX ADMIN — Medication 60 MILLIGRAM(S): at 06:10

## 2022-06-24 RX ADMIN — Medication 60 MILLIGRAM(S): at 17:15

## 2022-06-24 RX ADMIN — OXYCODONE AND ACETAMINOPHEN 1 TABLET(S): 5; 325 TABLET ORAL at 06:10

## 2022-06-24 RX ADMIN — Medication 500000 UNIT(S): at 17:13

## 2022-06-24 RX ADMIN — SODIUM CHLORIDE 1000 MILLILITER(S): 9 INJECTION INTRAMUSCULAR; INTRAVENOUS; SUBCUTANEOUS at 14:42

## 2022-06-24 RX ADMIN — TRAMADOL HYDROCHLORIDE 50 MILLIGRAM(S): 50 TABLET ORAL at 03:04

## 2022-06-24 RX ADMIN — TRAMADOL HYDROCHLORIDE 50 MILLIGRAM(S): 50 TABLET ORAL at 03:30

## 2022-06-24 RX ADMIN — TACROLIMUS 10 MILLIGRAM(S): 5 CAPSULE ORAL at 14:15

## 2022-06-24 RX ADMIN — HYDROMORPHONE HYDROCHLORIDE 0.5 MILLIGRAM(S): 2 INJECTION INTRAMUSCULAR; INTRAVENOUS; SUBCUTANEOUS at 18:15

## 2022-06-24 RX ADMIN — Medication 1 TABLET(S): at 12:27

## 2022-06-24 RX ADMIN — VALGANCICLOVIR 450 MILLIGRAM(S): 450 TABLET, FILM COATED ORAL at 12:29

## 2022-06-24 RX ADMIN — PANTOPRAZOLE SODIUM 40 MILLIGRAM(S): 20 TABLET, DELAYED RELEASE ORAL at 12:27

## 2022-06-24 NOTE — DIETITIAN INITIAL EVALUATION ADULT - PERTINENT LABORATORY DATA
(06/24) BUN 43, Cr 4.60, , Phos 5.0   Finger sticks: (06/24) 104 (06/23) 85 - 147    A1C with Estimated Average Glucose Result: 4.6 % (06-23-22 @ 06:57)

## 2022-06-24 NOTE — PROGRESS NOTE ADULT - PROBLEM SELECTOR PLAN 1
s/p DDRT (1a, 1v, 1u- no stent)- Simulect induction, cPRA 0%, CMV-/ EBV+.  CIT 17 hours. KDPI 14%, COD: Drug Intoxication, Terminal Cr:  0.79. Pt. is CMV-. Donor is CMV+. Both are EBV+. Monitor UOP and trend renal function. Will give lactulose and enema to promote BM. CT AP showing increased stool burden, and large sheyla-transplant hematoma. D/c Oliveira. Repeat UA. Ensure bowel movement with enema, mag citrate and Miralax. Now on Nifedipine 30mg and Labetalol 300mg BID for hypertension.

## 2022-06-24 NOTE — DIETITIAN INITIAL EVALUATION ADULT - PERSON TAUGHT/METHOD
Optimize PO and protein intake and help with nausea and constipation; post transplant nutrition therapy and food safety guidelines for transplant recipients/verbal instruction/written material/teach back - (Patient repeats in own words)/patient instructed/mother instructed

## 2022-06-24 NOTE — PROVIDER CONTACT NOTE (OTHER) - ASSESSMENT
Pt AOx4, asymptomatic, pt having abdominal discomfort due to constipation, gas pain, nausea and acid reflux.

## 2022-06-24 NOTE — PROVIDER CONTACT NOTE (OTHER) - ACTION/TREATMENT ORDERED:
Transplant providers at bedside, IV Labetalol given , nifedine XL 30 mg given and pt started on PO Labetalol, BP monitored throughout shift. VS as per flow. Pt is safely maintained. Transplant providers at bedside, IV Labetalol given , nifedipine XL 30 mg given and pt started on PO Labetalol, BP monitored throughout shift. VS as per flow. Pt is safely maintained.

## 2022-06-24 NOTE — DIETITIAN INITIAL EVALUATION ADULT - OTHER INFO
Pt reports ~30 pounds weight loss x 6 months PTA due to decreased PO intake, from 218 to 188 pounds. Weight as per flow sheets (06/22) 185 pounds.     Provided recommendations to optimize PO and protein intake and help with nausea and constipation, recommended small frequent meals by ordering nutrient-dense snacks and leaving non-perishable food away from tray for later consumption during the day or between meals, to start with protein, and sips of supplement throughout the day; reviewed foods with protein and menu order procedures in hospital; recommended not laying down during and after meals and food at room temperature; recommended fruits, vegetables, whole grains, and water.     Provided education on post transplant nutrition therapy and food safety guidelines for transplant recipients. Discussed importance of thoroughly washing all fresh fruits/vegetables, importance of avoiding uncooked/raw/unpasteurized foods, avoiding pre-made deli/buffet/salad bar meals. Foods recommended as healthy well balanced diet and importance of adequate protein intakes for proper post-surgical healing discussed. Reviewed recommendations to avoid grapefruit, pomegranate and star fruit while taking immunosuppressant medication. Reviewed recommendations for moderate intake of sodium and carbohydrates with transplant medications. Reviewed effect of steroids on BG levels and importance of limiting concentrated sweets. Pt was receptive and expressed understanding. All questions answered. Provided nutrition package including: USDA Food Safety for Transplant Recipients booklet; food safety and BG control handouts, fridge magnet with cooking temperatures, and RD information card.

## 2022-06-24 NOTE — PHARMACY EDUCATION NOTE - EDUCATION SUMMARY
Discharge immunosuppressant medications and prophylatic anti-infective agents reviewed with the patient. Outpatient medication schedule was discussed in detail including: medication name, indication, dose, administration times, treatment duration, side effects, drug interactions, and special instructions. Patient questions and concerns were answered and addressed. Patient demonstrated understanding.

## 2022-06-24 NOTE — DIETITIAN INITIAL EVALUATION ADULT - SIGNS/SYMPTOMS
PO intake <75% with 15.1% weight loss x ~6 months; Pt S/P kidney transplant recipient  Pt S/P kidney transplant recipient

## 2022-06-24 NOTE — DIETITIAN NUTRITION RISK NOTIFICATION - TREATMENT: THE FOLLOWING DIET HAS BEEN RECOMMENDED
Diet, Renal Restrictions:   For patients receiving Renal Replacement - No Protein Restr, No Conc K, No Conc Phos, Low Sodium  Supplement Feeding Modality:  Oral  Nepro Cans or Servings Per Day:  1       Frequency:  Daily (06-24-22 @ 10:30) [Active]

## 2022-06-24 NOTE — DIETITIAN INITIAL EVALUATION ADULT - REASON FOR ADMISSION
Pt 38 y/o M with PMH as per chart: HTN since age 17, ESRD on HD since (2015), hyperthyroidism, admitted for kidney transplant, S/P DDRT (06/22), allograft function with excellent UOP and solutes trending down.

## 2022-06-24 NOTE — DIETITIAN INITIAL EVALUATION ADULT - DIET TYPE
Defer diet/fluid consistencies to medical team/SLP recommendations. 1. Recommend regular + no concentrated phosphorus upon discharge. Recommend follow up visit with Transplant MD and outpatient RD for dietary modifications as warranted. 2. Recommend Nepro 1x daily (425 kcals, 19 g protein) to optimize kcal and protein intake. Spoke to transplant PA.

## 2022-06-24 NOTE — DIETITIAN INITIAL EVALUATION ADULT - ORAL INTAKE PTA/DIET HISTORY
Pt reports decreased appetite and PO intake x ~6 months PTA. Confirms NKFA. Reports following a renal diet at home, low in potassium, phosphorus, and salt. Reports taking renal Multivitamin PTA; denies drinking any nutritional supplement.

## 2022-06-24 NOTE — DIETITIAN INITIAL EVALUATION ADULT - NUTRITION DIAGNOSITC TERMINOLOGY #2
TELEPHONE CALL PROGRESS NOTE    1. Has the patient been in close contact with anyone known to have a positive test for COVID-19 or is under active investigation for COVID-19? No    2. Has the patient had any travel outside of the state of [place] or [place]?  Yes    3. Does the patient live in an area with a confirmed case of COVID-19? Yes    4. When was the onset of symptoms?  4 days    5.  Does the patient have a chronic health condition for which they are being actively treated for (e.g., Asthma, COPD, Heart Disease, Diabetes, Cancer) No    Current Patient Symptoms:  Symptom Response   Fever > 100.4F?   No   Headache?  Yes   Runny Nose?   Yes   Sneezing?   No   Sore Throat?  Yes   Myalgias?   No   Fatigue?   Yes   Cough?   No   If cough is present, is it dry?   No   Dyspnea?  No   Evidence of Respiratory Distress?  No     Plan of Care:  • Based on this questionnaire, does the medical provider feel there is probable cause to have the patient evaluated for COVID-19?  Yes  • Has the [place] Department of Health Services ([phone]) or [facility] ([phone]) been notified?  Yes  • Was OTC symptomatic control discussed with the patient, to include, but not limited to:  Acetaminophen, Ibuprofen, Pseudoephedrine, Guaifenesin, etc.?  No  • Was the patient referred to the nearest Emergency Room (if so, provider must contact ER directly to notify of patient arrival)?    Suspected covid 19    Patient was advised of current guidelines for testing as well as treatment plan for managing current symptoms.  Also advised to increase to liberal PO fluid intake plus utilization of home humidification.  Reinforced covering cough and good hand washing techniques.  Isolation precautions reviewed, as necessary.  Patient had no further questions by the end of the visit.    
Food & Nutrition Related Knowledge Deficit

## 2022-06-24 NOTE — PROGRESS NOTE ADULT - ATTENDING COMMENTS
37 year old male with HTN , ESRD was on home HD since 2015 admitted s/p DDRT (1a,1v,1u- no stent) with Simulect induction on 6/22/22.     1. s/p DDRT on 6/22/22- Allograft function with excellent UOP and solutes trending down well   2. IS meds- Simulect induction. Tac dosing for goal 8-10, Cellcept 1gm po bid and Steroid taper per protocol  3. HTN- BP is high . Start Labetalol 300 mg po bid and continue Nifedipine

## 2022-06-24 NOTE — PROVIDER CONTACT NOTE (OTHER) - ACTION/TREATMENT ORDERED:
CATHY Mercedes. at bedside, IV dilaudid ordered. Pt's HR down to 80s, BP as per flow. Pt is safely maintained.

## 2022-06-24 NOTE — DIETITIAN INITIAL EVALUATION ADULT - PHYSCIAL ASSESSMENT
Performed nutrition focused physical exam with pt's consent and noted no signs of muscle/fat loss in any area./overweight

## 2022-06-24 NOTE — DIETITIAN NUTRITION RISK NOTIFICATION - UPON NUTRITIONAL ASSESSMENT BY THE REGISTERED DIETITIAN YOUR PATIENT WAS DETERMINED TO MEET CRITERIA/HAS EVIDENCE OF THE FOLLOWING DIAGNOSIS:
OFFICE NOTE ESTABLISHED PATIENT    CC:  Brendan Katz is a 67 year old male presenting today for a a follow-up visit for diastolic CHF from pulmonary hypertension.    HPI:  Has been stable since prior visit with no progression of cardiac symptoms.  Continues to have arrhythmias preserved functional capacity with no changes in exercise limitations.  Tolerating medications well with no side effects.    PMHx:  Patient  has a past medical history of Acute dyspnea (4/11/2014), Atrial fibrillation (CMS/HCC), Colon polyp, Congestive heart failure (CMS/HCC), Coronary artery disease (2014), DMII (diabetes mellitus, type 2) (CMS/[facility]) (6/11/2018), MARTINEZ (dyspnea on exertion), Dyslipidemia, Dyspnea, Essential hypertension, benign, [place] (hard of hearing) (2019), Hypertension, Internal hemorrhoids, Morbid obesity with BMI of 40.0-44.9, adult (CMS/[facility]) (2020), Obstructive sleep apnea, Osteoarthritis, Pneumonia (2017), Pulmonary HTN (CMS/HCC) (2018), RAD (reactive airway disease), Sensorineural hearing loss (SNHL), and Sepsis, unspecified (8/10/2017).    SOCIAL:  Patient  reports that he has never smoked. He has never used smokeless tobacco. He reports previous alcohol use. He reports that he does not use drugs.    Allergies:  ALLERGIES:  No Known Allergies     Medications:  Current Outpatient Medications   Medication Sig   • atorvastatin (LIPITOR) 20 MG tablet Take 1 tablet by mouth daily.   • rivaroxaban (Xarelto) 20 MG Tab Take 1 tablet by mouth daily (with dinner).   • torsemide (DEMADEX) 20 MG tablet Take 3 tablets by mouth as directed. Take three tablets every other day, alternating with taking 2 tablets every other day.   • potassium CHLORIDE (KLOR-CON M) 20 MEQ kadi ER tablet Take 3 tablets by mouth as directed. Take 3 tablets every other day, alternating with taking 2 tablets every other day and extra when instructed per clinic.   • montelukast (SINGULAIR) 10 MG tablet Take 1 tablet by mouth nightly   •  Fluticasone-Umeclidin-Vilant 200-62.5-25 MCG/INH AEROSOL POWDER, BREATH ACTIVATED Inhale 1 puff into the lungs daily.   • treprostinil (Remodulin) 100 MG/20ML Solution injection (5 mg/mL) Decrease IV Remodulin by 1 ng/kg/min weekly to a new goal dose of 30 ng/kg/min. (Patient taking differently: 30 ng/kg/min. Decrease IV Remodulin by 1 ng/kg/min weekly to a new goal dose of 30 ng/kg/min.)   • metOLazone (ZAROXOLYN) 2.5 MG tablet Take 1 tablet by mouth as needed (for weight gain/swelling that does not respond to extra torsemide).   • Benralizumab 30 MG/ML Solution Auto-injector Inject 1 mL into the skin every 8 weeks. subcutaneous   • Tadalafil, PAH, 20 MG Tab Take 40 mg by mouth daily.   • albuterol (ACCUNEB) 1.25 MG/3ML nebulizer solution Take 3 mLs by nebulization every 6 hours as needed for Wheezing or Shortness of Breath.   • COMPRESSION STOCKING 15-20    • ProAir  (90 Base) MCG/ACT inhaler INHALE 2 PUFFS BY MOUTH EVERY 4 HOURS AS NEEDED FOR SHORTNESS OF BREATH FOR WHEEZING   • folic acid (FOLATE) 400 MCG tablet Take 400 mcg by mouth daily.   • Coenzyme Q10 200 MG Cap Take 1 capsule by mouth daily.    • cholecalciferol (VITAMIN D3) 1000 units tablet Take 500 Units by mouth daily.    • Omega 3 1000 MG capsule Take 1,000 mg by mouth daily.   • fluticasone (FLONASE) 50 MCG/ACT nasal spray Spray 2 sprays in each nostril daily.   • DISPENSE CPAP supplies.   • aspirin 81 MG chewable tablet Chew 81 mg by mouth daily.   • allopurinol (ZYLOPRIM) 100 MG tablet Take 1 tablet by mouth daily. If not helping with pain, may increase to 200  Mg daily next week but notify clinic of increase.     No current facility-administered medications for this visit.       ROS:  See epic    Vital signs:  Visit Vitals  /84 (BP Location: LUE - Left upper extremity, Patient Position: Sitting, Cuff Size: Large Adult)   Pulse 60   Resp 18   Ht 6' (1.829 m)   Wt (!) 149.7 kg (330 lb)   SpO2 95%   BMI 44.76 kg/m²    Body mass index is  44.76 kg/m².    Physical exam:  Constitutional:  Well nourished, no distress;   HENT:  No JVD; no bruits heard.   Respiratory:  Normal effort, good air entry; no rales or wheezing heard.  Cardiac:  S1, S2 regular, no S3 or S4; no murmur heard.  Vascular:  Radial 2+ b/l.  Abdomen:  No hepatojugular reflux.  Skin:  No edema.  Neurologic:  Alert and oriented x3.    Labs:   Lab Results   Component Value Date    SODIUM 137 04/18/2022    POTASSIUM 3.7 04/18/2022    MG 2.3 05/10/2019    BUN 45 (H) 04/18/2022    CREATININE 1.42 (H) 04/18/2022    WBC 6.6 10/25/2021    HCT 49.9 10/25/2021    HGB 15.9 10/25/2021     (L) 10/25/2021    INR 1.1 04/01/2019     (H) 10/14/2017    MMB 0.7 08/02/2011    RAPDTR <0.02 08/10/2017    GLUCOSE 143 (H) 04/18/2022    HGBA1C 5.5 07/14/2021    TSH 2.420 04/11/2014    CHOLESTEROL 145 11/30/2020    HDL 51 11/30/2020    CALCLDL 65 11/30/2020    TRIGLYCERIDE 147 11/30/2020     ASSESSMENT:   1. Acute on chronic diastolic congestive heart failure (CMS/HCC)        RECOMMENDATIONS:   1. Doing well since prior visit with no progression of cardiac symptoms.  Will continue current medications including dyslipidemia and anticoagulation with a follow-up visit in 1 year if no changes in clinical condition.    ORDERS:  Orders Placed This Encounter   • atorvastatin (LIPITOR) 20 MG tablet   • rivaroxaban (Xarelto) 20 MG Tab       Return in about 1 year (around 4/11/2023).    Florin Trejo MD, [facility]  CARDIOLOGY         Severe protein-calorie malnutrition

## 2022-06-24 NOTE — DIETITIAN INITIAL EVALUATION ADULT - LITERATURE/VIDEOS GIVEN
Provided nutrition package including: USDA Food Safety for Transplant Recipients booklet; food safety and BG control handouts, fridge magnet with cooking temperatures, and RD information card.

## 2022-06-24 NOTE — DIETITIAN INITIAL EVALUATION ADULT - PERTINENT MEDS FT
MEDICATIONS  (STANDING):  chlorhexidine 2% Cloths 1 Application(s) Topical daily  dextrose 5%. 1000 milliLiter(s) (50 mL/Hr) IV Continuous <Continuous>  dextrose 5%. 1000 milliLiter(s) (100 mL/Hr) IV Continuous <Continuous>  dextrose 50% Injectable 25 Gram(s) IV Push once  dextrose 50% Injectable 12.5 Gram(s) IV Push once  dextrose 50% Injectable 25 Gram(s) IV Push once  famotidine    Tablet 20 milliGRAM(s) Oral daily  glucagon  Injectable 1 milliGRAM(s) IntraMuscular once  influenza   Vaccine 0.5 milliLiter(s) IntraMuscular once  insulin lispro (ADMELOG) corrective regimen sliding scale   SubCutaneous three times a day before meals  methylPREDNISolone sodium succinate Injectable 60 milliGRAM(s) IV Push two times a day  mycophenolate mofetil 1000 milliGRAM(s) Oral <User Schedule>  nystatin    Suspension 120257 Unit(s) Swish and Swallow four times a day  polyethylene glycol 3350 17 Gram(s) Oral daily  senna 2 Tablet(s) Oral at bedtime  sodium chloride 0.9%. 1000 milliLiter(s) (70 mL/Hr) IV Continuous <Continuous>  trimethoprim   80 mG/sulfamethoxazole 400 mG 1 Tablet(s) Oral daily  valGANciclovir 450 milliGRAM(s) Oral <User Schedule>    MEDICATIONS  (PRN):  dextrose Oral Gel 15 Gram(s) Oral once PRN Blood Glucose LESS THAN 70 milliGRAM(s)/deciliter  ondansetron Injectable 4 milliGRAM(s) IV Push every 6 hours PRN Nausea and/or Vomiting  traMADol 25 milliGRAM(s) Oral every 4 hours PRN Mild Pain (1 - 3)  traMADol 50 milliGRAM(s) Oral every 4 hours PRN Moderate Pain (4 - 6)

## 2022-06-24 NOTE — DIETITIAN INITIAL EVALUATION ADULT - REASON INDICATOR FOR ASSESSMENT
Pt seen for post kidney transplant recipient nutrition evaluation per department protocol.   Information obtained from: medical record and pt. Mom at bedside.

## 2022-06-24 NOTE — DIETITIAN INITIAL EVALUATION ADULT - ENERGY INTAKE
Pt S/P kidney transplant recipient (06/22) - reports poor appetite and PO intake due to nausea and constipation, last BM (06/21). Reports vomiting episode 2 days ago (06/22). Denies difficulty chewing. Reports difficulty swallowing - refused changes in diet texture and requests swallow evaluation (spoke to transplant PA). Agreed to try Nepro. Urine output as per flow sheets (06/22) 6885 ml -> (06/23) 2715 ml -> (06/24) 0 ml so far.

## 2022-06-24 NOTE — DIETITIAN INITIAL EVALUATION ADULT - ETIOLOGY
Decreased appetite PTA and now nausea and constipation in setting of increased needs for surgical healing  limited prior education on post-transplant nutrition therapy and food safety guidelines

## 2022-06-24 NOTE — DIETITIAN INITIAL EVALUATION ADULT - ADD RECOMMEND
1. Will continue to monitor PO intake, weight, labs, skin, GI status, diet, and urine output as able. 2. Gently encourage PO intake and honor food preferences. 3. Consider Nephro-Nathaniel if medically feasible to optimize nutrient intake. 4. Provided recommendations to optimize PO and protein intake and help with nausea and constipation. 5. Provided education on post transplant nutrition therapy and food safety guidelines for transplant recipients with nutrition package before discharge - made aware RD remains available. 6. Malnutrition notification placed in chart.

## 2022-06-25 ENCOUNTER — TRANSCRIPTION ENCOUNTER (OUTPATIENT)
Age: 37
End: 2022-06-25

## 2022-06-25 LAB
ALBUMIN SERPL ELPH-MCNC: 4 G/DL — SIGNIFICANT CHANGE UP (ref 3.3–5)
ALP SERPL-CCNC: 53 U/L — SIGNIFICANT CHANGE UP (ref 40–120)
ALT FLD-CCNC: 14 U/L — SIGNIFICANT CHANGE UP (ref 10–45)
ANION GAP SERPL CALC-SCNC: 12 MMOL/L — SIGNIFICANT CHANGE UP (ref 5–17)
AST SERPL-CCNC: 25 U/L — SIGNIFICANT CHANGE UP (ref 10–40)
BILIRUB SERPL-MCNC: 0.3 MG/DL — SIGNIFICANT CHANGE UP (ref 0.2–1.2)
BUN SERPL-MCNC: 40 MG/DL — HIGH (ref 7–23)
CALCIUM SERPL-MCNC: 9.5 MG/DL — SIGNIFICANT CHANGE UP (ref 8.4–10.5)
CHLORIDE SERPL-SCNC: 102 MMOL/L — SIGNIFICANT CHANGE UP (ref 96–108)
CO2 SERPL-SCNC: 21 MMOL/L — LOW (ref 22–31)
CREAT SERPL-MCNC: 3.13 MG/DL — HIGH (ref 0.5–1.3)
EGFR: 25 ML/MIN/1.73M2 — LOW
GLUCOSE BLDC GLUCOMTR-MCNC: 107 MG/DL — HIGH (ref 70–99)
GLUCOSE BLDC GLUCOMTR-MCNC: 111 MG/DL — HIGH (ref 70–99)
GLUCOSE BLDC GLUCOMTR-MCNC: 113 MG/DL — HIGH (ref 70–99)
GLUCOSE BLDC GLUCOMTR-MCNC: 114 MG/DL — HIGH (ref 70–99)
GLUCOSE BLDC GLUCOMTR-MCNC: 115 MG/DL — HIGH (ref 70–99)
GLUCOSE SERPL-MCNC: 108 MG/DL — HIGH (ref 70–99)
HCT VFR BLD CALC: 27.7 % — LOW (ref 39–50)
HGB BLD-MCNC: 8.8 G/DL — LOW (ref 13–17)
MAGNESIUM SERPL-MCNC: 2.7 MG/DL — HIGH (ref 1.6–2.6)
MCHC RBC-ENTMCNC: 25 PG — LOW (ref 27–34)
MCHC RBC-ENTMCNC: 31.8 GM/DL — LOW (ref 32–36)
MCV RBC AUTO: 78.7 FL — LOW (ref 80–100)
NRBC # BLD: 0 /100 WBCS — SIGNIFICANT CHANGE UP (ref 0–0)
PHOSPHATE SERPL-MCNC: 3.6 MG/DL — SIGNIFICANT CHANGE UP (ref 2.5–4.5)
PLATELET # BLD AUTO: 229 K/UL — SIGNIFICANT CHANGE UP (ref 150–400)
POTASSIUM SERPL-MCNC: 4.6 MMOL/L — SIGNIFICANT CHANGE UP (ref 3.5–5.3)
POTASSIUM SERPL-SCNC: 4.6 MMOL/L — SIGNIFICANT CHANGE UP (ref 3.5–5.3)
PROT SERPL-MCNC: 6 G/DL — SIGNIFICANT CHANGE UP (ref 6–8.3)
RBC # BLD: 3.52 M/UL — LOW (ref 4.2–5.8)
RBC # FLD: 15.9 % — HIGH (ref 10.3–14.5)
SODIUM SERPL-SCNC: 135 MMOL/L — SIGNIFICANT CHANGE UP (ref 135–145)
TACROLIMUS SERPL-MCNC: 5.8 NG/ML — SIGNIFICANT CHANGE UP
WBC # BLD: 13.69 K/UL — HIGH (ref 3.8–10.5)
WBC # FLD AUTO: 13.69 K/UL — HIGH (ref 3.8–10.5)

## 2022-06-25 PROCEDURE — 99232 SBSQ HOSP IP/OBS MODERATE 35: CPT

## 2022-06-25 RX ORDER — TACROLIMUS 5 MG/1
2 CAPSULE ORAL ONCE
Refills: 0 | Status: COMPLETED | OUTPATIENT
Start: 2022-06-25 | End: 2022-06-25

## 2022-06-25 RX ORDER — PANTOPRAZOLE SODIUM 20 MG/1
40 TABLET, DELAYED RELEASE ORAL ONCE
Refills: 0 | Status: COMPLETED | OUTPATIENT
Start: 2022-06-25 | End: 2022-06-25

## 2022-06-25 RX ORDER — PHENAZOPYRIDINE HCL 100 MG
100 TABLET ORAL ONCE
Refills: 0 | Status: COMPLETED | OUTPATIENT
Start: 2022-06-25 | End: 2022-06-25

## 2022-06-25 RX ORDER — ONDANSETRON 8 MG/1
4 TABLET, FILM COATED ORAL ONCE
Refills: 0 | Status: COMPLETED | OUTPATIENT
Start: 2022-06-25 | End: 2022-06-25

## 2022-06-25 RX ORDER — LABETALOL HCL 100 MG
100 TABLET ORAL ONCE
Refills: 0 | Status: COMPLETED | OUTPATIENT
Start: 2022-06-25 | End: 2022-06-25

## 2022-06-25 RX ORDER — OXYCODONE HYDROCHLORIDE 5 MG/1
5 TABLET ORAL ONCE
Refills: 0 | Status: DISCONTINUED | OUTPATIENT
Start: 2022-06-25 | End: 2022-06-25

## 2022-06-25 RX ORDER — LABETALOL HCL 100 MG
400 TABLET ORAL
Refills: 0 | Status: DISCONTINUED | OUTPATIENT
Start: 2022-06-25 | End: 2022-06-27

## 2022-06-25 RX ORDER — PANTOPRAZOLE SODIUM 20 MG/1
40 TABLET, DELAYED RELEASE ORAL EVERY 12 HOURS
Refills: 0 | Status: DISCONTINUED | OUTPATIENT
Start: 2022-06-26 | End: 2022-06-27

## 2022-06-25 RX ORDER — TACROLIMUS 5 MG/1
12 CAPSULE ORAL
Refills: 0 | Status: DISCONTINUED | OUTPATIENT
Start: 2022-06-26 | End: 2022-06-26

## 2022-06-25 RX ORDER — SIMETHICONE 80 MG/1
80 TABLET, CHEWABLE ORAL THREE TIMES A DAY
Refills: 0 | Status: DISCONTINUED | OUTPATIENT
Start: 2022-06-25 | End: 2022-06-27

## 2022-06-25 RX ORDER — ATROPA BELLADONNA AND OPIUM 16.2; 6 MG/1; MG/1
1 SUPPOSITORY RECTAL ONCE
Refills: 0 | Status: DISCONTINUED | OUTPATIENT
Start: 2022-06-25 | End: 2022-06-25

## 2022-06-25 RX ADMIN — Medication 500000 UNIT(S): at 23:05

## 2022-06-25 RX ADMIN — SENNA PLUS 2 TABLET(S): 8.6 TABLET ORAL at 20:14

## 2022-06-25 RX ADMIN — Medication 30 MILLIGRAM(S): at 06:04

## 2022-06-25 RX ADMIN — Medication 30 MILLILITER(S): at 18:00

## 2022-06-25 RX ADMIN — Medication 500000 UNIT(S): at 00:35

## 2022-06-25 RX ADMIN — OXYCODONE HYDROCHLORIDE 5 MILLIGRAM(S): 5 TABLET ORAL at 02:53

## 2022-06-25 RX ADMIN — Medication 400 MILLIGRAM(S): at 18:08

## 2022-06-25 RX ADMIN — MYCOPHENOLATE MOFETIL 1000 MILLIGRAM(S): 250 CAPSULE ORAL at 09:10

## 2022-06-25 RX ADMIN — Medication 500000 UNIT(S): at 18:02

## 2022-06-25 RX ADMIN — MYCOPHENOLATE MOFETIL 1000 MILLIGRAM(S): 250 CAPSULE ORAL at 20:14

## 2022-06-25 RX ADMIN — Medication 30 MILLIGRAM(S): at 07:34

## 2022-06-25 RX ADMIN — Medication 30 MILLIGRAM(S): at 18:03

## 2022-06-25 RX ADMIN — PANTOPRAZOLE SODIUM 40 MILLIGRAM(S): 20 TABLET, DELAYED RELEASE ORAL at 18:01

## 2022-06-25 RX ADMIN — ONDANSETRON 4 MILLIGRAM(S): 8 TABLET, FILM COATED ORAL at 13:19

## 2022-06-25 RX ADMIN — PANTOPRAZOLE SODIUM 40 MILLIGRAM(S): 20 TABLET, DELAYED RELEASE ORAL at 07:23

## 2022-06-25 RX ADMIN — Medication 100 MILLIGRAM(S): at 10:13

## 2022-06-25 RX ADMIN — POLYETHYLENE GLYCOL 3350 17 GRAM(S): 17 POWDER, FOR SOLUTION ORAL at 13:18

## 2022-06-25 RX ADMIN — OXYCODONE HYDROCHLORIDE 5 MILLIGRAM(S): 5 TABLET ORAL at 03:23

## 2022-06-25 RX ADMIN — ATROPA BELLADONNA AND OPIUM 1 SUPPOSITORY(S): 16.2; 6 SUPPOSITORY RECTAL at 10:27

## 2022-06-25 RX ADMIN — ONDANSETRON 4 MILLIGRAM(S): 8 TABLET, FILM COATED ORAL at 05:57

## 2022-06-25 RX ADMIN — Medication 500000 UNIT(S): at 13:18

## 2022-06-25 RX ADMIN — ONDANSETRON 4 MILLIGRAM(S): 8 TABLET, FILM COATED ORAL at 06:45

## 2022-06-25 RX ADMIN — TRAMADOL HYDROCHLORIDE 50 MILLIGRAM(S): 50 TABLET ORAL at 01:05

## 2022-06-25 RX ADMIN — Medication 300 MILLIGRAM(S): at 06:39

## 2022-06-25 RX ADMIN — PANTOPRAZOLE SODIUM 40 MILLIGRAM(S): 20 TABLET, DELAYED RELEASE ORAL at 02:54

## 2022-06-25 RX ADMIN — Medication 100 MILLIGRAM(S): at 14:30

## 2022-06-25 RX ADMIN — CHLORHEXIDINE GLUCONATE 1 APPLICATION(S): 213 SOLUTION TOPICAL at 13:18

## 2022-06-25 RX ADMIN — TRAMADOL HYDROCHLORIDE 50 MILLIGRAM(S): 50 TABLET ORAL at 00:36

## 2022-06-25 RX ADMIN — Medication 1 TABLET(S): at 13:19

## 2022-06-25 RX ADMIN — TACROLIMUS 2 MILLIGRAM(S): 5 CAPSULE ORAL at 16:02

## 2022-06-25 RX ADMIN — ATROPA BELLADONNA AND OPIUM 1 SUPPOSITORY(S): 16.2; 6 SUPPOSITORY RECTAL at 10:46

## 2022-06-25 RX ADMIN — Medication 500000 UNIT(S): at 07:23

## 2022-06-25 RX ADMIN — SIMETHICONE 80 MILLIGRAM(S): 80 TABLET, CHEWABLE ORAL at 18:47

## 2022-06-25 RX ADMIN — TACROLIMUS 10 MILLIGRAM(S): 5 CAPSULE ORAL at 09:10

## 2022-06-25 RX ADMIN — SIMETHICONE 80 MILLIGRAM(S): 80 TABLET, CHEWABLE ORAL at 02:54

## 2022-06-25 RX ADMIN — SIMETHICONE 80 MILLIGRAM(S): 80 TABLET, CHEWABLE ORAL at 13:18

## 2022-06-25 RX ADMIN — Medication 30 MILLILITER(S): at 08:13

## 2022-06-25 NOTE — DISCHARGE NOTE PROVIDER - NSDCFUADDAPPT_GEN_ALL_CORE_FT
FOLLOW-UP:  1. Please call to make a follow-up appointment with  ( date  )  2. Please follow up with your primary care physician in one week regarding your hospitalization.   Follow up in clinic on 6/29 with lab check [phone]

## 2022-06-25 NOTE — PROVIDER CONTACT NOTE (OTHER) - SITUATION
patient has bladder spasms with urination, becomes diaphoretic, has chills, c/o increased RLQ and LLQ abd pain, tachycardic and hypertensive. with this episode, c/o SOB and left chest pressure

## 2022-06-25 NOTE — PROVIDER CONTACT NOTE (OTHER) - ASSESSMENT
pt c/o heart burn and belching. states he takes omeprazole and famotidine at home but isn't getting it here c/o heart burn, abd pain unrelieved by tramadol. as per Donaldo ANDERSEN bladder scan to confirm urine draining: found 9mLs in bladder

## 2022-06-25 NOTE — PROVIDER CONTACT NOTE (OTHER) - REASON
c/o heart burn
pt c/o SOB, nausea vomiting
pt not feeling well, feels nauseous and in pain
/115 at change of shift, providers at bedside
c/o chest pressure, SOB
pt's  sustaining, VS as per flow

## 2022-06-25 NOTE — DISCHARGE NOTE PROVIDER - NSDCMRMEDTOKEN_GEN_ALL_CORE_FT
calcitriol 0.25 mcg oral capsule: 1 cap(s) orally once a day  cloNIDine 0.1 mg oral tablet: 1 tab(s) orally now   cyanocobalamin 1000 mcg oral tablet: 1 tab(s) orally once a day  folic acid 1 mg oral tablet: 1 tab(s) orally once a day  isosorbide mononitrate 60 mg oral tablet, extended release: 1 tab(s) orally once a day (in the morning)  labetalol 300 mg oral tablet: 1.5 tab(s) orally 2 times a day  lanthanum 1000 mg oral tablet, chewable: 1 tab(s) orally 4 times a day  Metamucil 3.4 g/3.7 g oral powder for reconstitution: 1 Tsp dissolved in a glass of water orally once a day  polyethylene glycol 3350 oral powder for reconstitution: 17 gram(s) orally once a day (dissolve in 4-8 oz of liquid) as needed for constipation (do not take if having regular bowel movements or loose bowel movements)  Sensipar 30 mg oral tablet: 1 tab(s) orally once a day  sevelamer hydrochloride 800 mg oral tablet: 3 tab(s) orally 3 times a day   CellCept 500 mg oral tablet: 2 tab(s) orally 2 times a day  NIFEdipine 30 mg oral tablet, extended release: 1 tab(s) orally once a day  nystatin 100,000 units/mL oral suspension: 5 milliliter(s) orally 4 times a day  pantoprazole 40 mg oral delayed release tablet: 1 tab(s) orally every 12 hours  polyethylene glycol 3350 oral powder for reconstitution: 17 gram(s) orally once a day (dissolve in 4-8 oz of liquid) as needed for constipation (do not take if having regular bowel movements or loose bowel movements)  predniSONE: please follow your steroid taper sheet  senna leaf extract oral tablet: 2 tab(s) orally once a day (at bedtime)  sulfamethoxazole-trimethoprim 400 mg-80 mg oral tablet: 1 tab(s) orally once a day  tacrolimus 1 mg oral tablet, extended release: 10 tab(s) orally once a day  Trandate 200 mg oral tablet: 1 tab(s) orally 2 times a day   valGANciclovir 450 mg oral tablet: 1 tab(s) orally once a day   CellCept 500 mg oral tablet: 2 tab(s) orally 2 times a day  NIFEdipine 30 mg oral tablet, extended release: 1 tab(s) orally once a day  nystatin 100,000 units/mL oral suspension: 5 milliliter(s) orally 4 times a day  outpatient PT: as directed. Z94.0  pantoprazole 40 mg oral delayed release tablet: 1 tab(s) orally 2 times a day   polyethylene glycol 3350 oral powder for reconstitution: 17 gram(s) orally once a day (dissolve in 4-8 oz of liquid) as needed for constipation (do not take if having regular bowel movements or loose bowel movements)  predniSONE: please follow your steroid taper sheet  rolling walker: 1 each use as directed. Z94.0  senna leaf extract oral tablet: 2 tab(s) orally once a day (at bedtime)  sulfamethoxazole-trimethoprim 400 mg-80 mg oral tablet: 1 tab(s) orally once a day  tacrolimus 1 mg oral tablet, extended release: 10 tab(s) orally once a day  Trandate 200 mg oral tablet: 1 tab(s) orally 2 times a day   valGANciclovir 450 mg oral tablet: 1 tab(s) orally once a day   CellCept 500 mg oral tablet: 2 tab(s) orally 2 times a day  Envarsus XR 1 mg oral tablet, extended release: 8 tab(s) orally once a day (in the morning)  NIFEdipine 30 mg oral tablet, extended release: 1 tab(s) orally once a day  nystatin 100,000 units/mL oral suspension: 5 milliliter(s) orally 4 times a day  outpatient PT: as directed. Z94.0  pantoprazole 40 mg oral delayed release tablet: 1 tab(s) orally 2 times a day   predniSONE: please follow your steroid taper sheet  rolling walker: 1 each use as directed. Z94.0  senna leaf extract oral tablet: 2 tab(s) orally once a day (at bedtime)  sulfamethoxazole-trimethoprim 400 mg-80 mg oral tablet: 1 tab(s) orally once a day  Trandate 200 mg oral tablet: 1 tab(s) orally 2 times a day   valGANciclovir 450 mg oral tablet: 1 tab(s) orally once a day

## 2022-06-25 NOTE — DISCHARGE NOTE PROVIDER - NSDCFUSCHEDAPPT_GEN_ALL_CORE_FT
Saira Colindres  [facility]  NEPHRO [address] [place] [address]  Scheduled Appointment: 06/29/2022

## 2022-06-25 NOTE — PROVIDER CONTACT NOTE (OTHER) - DATE AND TIME:
25-Jun-2022 05:30
24-Jun-2022 19:31
24-Jun-2022 07:30
23-Jun-2022 09:59
25-Jun-2022 02:45
24-Jun-2022 20:45

## 2022-06-25 NOTE — DISCHARGE NOTE PROVIDER - HOSPITAL COURSE
38 y/o male w/ PMH sign for HTN since age 17 and ESRD on HD since 2015 via LUE AVF, hyperthyroidism, s/p DDRT to R side (1A/1V/1U no stent).     Post operatively he did well.    OR  CIT 17hrs   Donor:  36 yo, KDPI 14%, COD:  Drug Intoxication, Terminal Cr:  0.79  CMV +/-    D/C summary:    He(She) was evaluated by the multi-disciplinary team including surgeon, nephrologist, ACP, pharmacist, nutrition, social work, and nursing and deemed stable for discharge with the following plan:     He(She) received final dose of Simulect. He(She) was discharged home on Envarsus, MMF 1gm BID, oral Prednisone taper and prophylactic agents Bactrim, Nystatin and Valcyte 450mg daily.      38 y/o male w/ PMH sign for HTN since age 17 and ESRD on HD since 2015 via LUE AVF, hyperthyroidism, s/p DDRT to R side (1A/1V/1U no stent).     OR  CIT 17hrs   Donor:  34 yo, KDPI 14%, COD:  Drug Intoxication, Terminal Cr:  0.79  CMV +/-        s/p R DDRT under simulect induction on 6/22/22  - excellent graft function. downtrending Cr, d/c Cr was ******  - Renal US demonstrated   - Tolerated PO and resumed bowel function  - Developed hematuria which has improved   - experienced  bladder spasms as a result of underwood catheter-  - He received final dose of Simulect.     Evaluated by our daily multidisciplinary transplant team of surgeons, nephrologists, pharmacists, ACPs, PT, SW and deemed safe for d/c home  ENV----------, MMF 1g BID, standard steroid taper, standard PPx  f/u in clinic on=--------------    36 y/o male w/ PMH sign for HTN since age 17 and ESRD on HD since 2015 via LUE AVF, anuric, hyperthyroidism, s/p DDRT to R side (1A/1V/1U no stent).     OR  CIT 17hrs   Donor:  34 yo, KDPI 14%, COD:  Drug Intoxication, Terminal Cr:  0.79  CMV +/-      s/p R DDRT under Simulect induction on 6/22/22  - excellent graft function. downtrending Cr, d/c Cr was 2.04  - Renal doppler with perinephric hematoma, no concerns for bleeding  - Tolerated PO and resumed bowel function  - completed Simulect  - underwood was kept in place for 1 week (thickened bladder, atrophy)    Evaluated by our daily multidisciplinary transplant team of surgeons, nephrologists, pharmacists, ACPs, PT, SW and deemed safe for d/c home  ENV 10, MMF 1g BID, standard steroid taper, standard PPx  Underwood until clinic visit  f/u in clinic on 6/29 with labs    38 y/o male w/ PMH sign for HTN since age 17 and ESRD on HD since 2015 via LUE AVF, anuric, hyperthyroidism, s/p DDRT to R side (1A/1V/1U no stent).     OR  CIT 17hrs   Donor:  36 yo, KDPI 14%, COD:  Drug Intoxication, Terminal Cr:  0.79  CMV +/-      s/p R DDRT under Simulect induction on 6/22/22  - excellent graft function. downtrending Cr, d/c Cr was 2.04  - Renal doppler with perinephric hematoma, no concerns for bleeding  - Tolerated PO and resumed bowel function  - completed Simulect  - underwood was kept in place for 1 week (thickened bladder, atrophy)    Evaluated by our daily multidisciplinary transplant team of surgeons, nephrologists, pharmacists, ACPs, PT, SW and deemed safe for d/c home  ENV 8, MMF 1g BID, standard steroid taper, standard PPx  Underwood until clinic visit  f/u in clinic on 6/29 with labs

## 2022-06-25 NOTE — DISCHARGE NOTE PROVIDER - NSDCCPCAREPLAN_GEN_ALL_CORE_FT
PRINCIPAL DISCHARGE DIAGNOSIS  Diagnosis: Renal transplant recipient  Assessment and Plan of Treatment:   Renal txp recipient:   - Avoid heavy lifting anything over 5lbs for six weeks following your surgery date. Do NOT drive a car or operate machinery unless cleared by your transplant surgeon during your follow up visit. Avoid straining, use stool softeners as needed. Stop stool softeners if diarrhea develops.   - Call transplant clinic if you develop fever, increased abdominal pain, redness/swelling or bleeding around your incision site  - Call transplant clinic if you have difficulty urinating, notice decrease in urine output or blood in urine.   - Follow FOOD SAFETY instructions provided to you in your patient education guide booklet   - Bathing: Shower is allowed, you can let soap and water flow over your incision; do NOT rub the area. Bath is NOT allowed until your incision is healed and you have been cleared by your transplant surgeon.         SECONDARY DISCHARGE DIAGNOSES  Diagnosis: Immunosuppressive management encounter following kidney transplant  Assessment and Plan of Treatment:   Immunosuppression  - Transplant recipients are at risk for developing infection because immune system is lowered due to transplant medications.   - Avoid contact with sick people; practice good hand hygiene;   - Take medications as directed by your transplant team. Never stop taking medications unless instructed by your transplant physician.  - Do NOT double up medication dose if you missed your dose; call the transplant office for instructions.        Diagnosis: HTN (hypertension)  Assessment and Plan of Treatment:   HTN  Be sure to follow a low salt diet, avoid caffeine, reduce alcohol intake.  If you have been prescribed antihypertensive medications to control your blood pressure, be sure to take them every day as prescribed and do not miss any doses, the medications do not work if they are not taken consistently. Follow up with your Primary Care Doctor and have your Blood Pressure checked regularly.

## 2022-06-25 NOTE — PROVIDER CONTACT NOTE (OTHER) - SITUATION
pt c/o heart burn and belching. states he takes omeprazole and famotidine at home but isn't getting it here pt c/o heart burn and belching. states he takes omeprazole and famotidine at home but isn't getting it here; continuing to have loose brown BMs. c/o bladder spasm unrelieved pain from tramadol

## 2022-06-25 NOTE — DISCHARGE NOTE PROVIDER - CARE PROVIDER_API CALL
Ko Garcia (MD)  [facility], [address] [place] [address]  Phone: [phone]  Fax: [phone]  Follow Up Time:

## 2022-06-25 NOTE — DISCHARGE NOTE PROVIDER - NSDCFUADDINST_GEN_ALL_CORE_FT
Wound care: You allowed to shower. Allow warm water to run over the wound. Apply gently soap and wash the soap away by allowing warm water to run over the abdomen. Do not scrub. Pat dry with a clean towel.    Do not soak in tub.    If you notice minimal pink colored drainage from the wound, apply gauze and tape and change two times a day. If you notice  bloody drainage from the wound, please call the outpatient office or come to the emergency room immediately. Keep underwood catheter secure at all times. Record outputs daily. Empty as full.  Call with questions or concerns    Wound care: You allowed to shower. Allow warm water to run over the wound. Apply gently soap and wash the soap away by allowing warm water to run over the abdomen. Do not scrub. Pat dry with a clean towel.    Do not soak in tub.    If you notice minimal pink colored drainage from the wound, apply gauze and tape and change two times a day. If you notice  bloody drainage from the wound, please call the outpatient office or come to the emergency room immediately.

## 2022-06-25 NOTE — PROVIDER CONTACT NOTE (OTHER) - ASSESSMENT
sustained on tele, BP 170s, diaphoretic, dizzy, with chills, abd pain, now c/o SOB and chest pressure. spO2 97% on room air.

## 2022-06-26 LAB
ALBUMIN SERPL ELPH-MCNC: 3.6 G/DL — SIGNIFICANT CHANGE UP (ref 3.3–5)
ALP SERPL-CCNC: 50 U/L — SIGNIFICANT CHANGE UP (ref 40–120)
ALT FLD-CCNC: 13 U/L — SIGNIFICANT CHANGE UP (ref 10–45)
ANION GAP SERPL CALC-SCNC: 9 MMOL/L — SIGNIFICANT CHANGE UP (ref 5–17)
AST SERPL-CCNC: 20 U/L — SIGNIFICANT CHANGE UP (ref 10–40)
BASOPHILS # BLD AUTO: 0.04 K/UL — SIGNIFICANT CHANGE UP (ref 0–0.2)
BASOPHILS NFR BLD AUTO: 0.3 % — SIGNIFICANT CHANGE UP (ref 0–2)
BILIRUB SERPL-MCNC: 0.3 MG/DL — SIGNIFICANT CHANGE UP (ref 0.2–1.2)
BUN SERPL-MCNC: 33 MG/DL — HIGH (ref 7–23)
CALCIUM SERPL-MCNC: 9.2 MG/DL — SIGNIFICANT CHANGE UP (ref 8.4–10.5)
CHLORIDE SERPL-SCNC: 104 MMOL/L — SIGNIFICANT CHANGE UP (ref 96–108)
CO2 SERPL-SCNC: 23 MMOL/L — SIGNIFICANT CHANGE UP (ref 22–31)
CREAT SERPL-MCNC: 2.35 MG/DL — HIGH (ref 0.5–1.3)
EGFR: 35 ML/MIN/1.73M2 — LOW
EOSINOPHIL # BLD AUTO: 0.18 K/UL — SIGNIFICANT CHANGE UP (ref 0–0.5)
EOSINOPHIL NFR BLD AUTO: 1.2 % — SIGNIFICANT CHANGE UP (ref 0–6)
GLUCOSE BLDC GLUCOMTR-MCNC: 101 MG/DL — HIGH (ref 70–99)
GLUCOSE BLDC GLUCOMTR-MCNC: 103 MG/DL — HIGH (ref 70–99)
GLUCOSE BLDC GLUCOMTR-MCNC: 109 MG/DL — HIGH (ref 70–99)
GLUCOSE BLDC GLUCOMTR-MCNC: 113 MG/DL — HIGH (ref 70–99)
GLUCOSE SERPL-MCNC: 106 MG/DL — HIGH (ref 70–99)
HCT VFR BLD CALC: 23.2 % — LOW (ref 39–50)
HCT VFR BLD CALC: 27.5 % — LOW (ref 39–50)
HGB BLD-MCNC: 7.4 G/DL — LOW (ref 13–17)
HGB BLD-MCNC: 8.6 G/DL — LOW (ref 13–17)
IMM GRANULOCYTES NFR BLD AUTO: 0.4 % — SIGNIFICANT CHANGE UP (ref 0–1.5)
LYMPHOCYTES # BLD AUTO: 1.02 K/UL — SIGNIFICANT CHANGE UP (ref 1–3.3)
LYMPHOCYTES # BLD AUTO: 6.6 % — LOW (ref 13–44)
MAGNESIUM SERPL-MCNC: 2.4 MG/DL — SIGNIFICANT CHANGE UP (ref 1.6–2.6)
MCHC RBC-ENTMCNC: 25.1 PG — LOW (ref 27–34)
MCHC RBC-ENTMCNC: 25.1 PG — LOW (ref 27–34)
MCHC RBC-ENTMCNC: 31.3 GM/DL — LOW (ref 32–36)
MCHC RBC-ENTMCNC: 31.9 GM/DL — LOW (ref 32–36)
MCV RBC AUTO: 78.6 FL — LOW (ref 80–100)
MCV RBC AUTO: 80.4 FL — SIGNIFICANT CHANGE UP (ref 80–100)
MONOCYTES # BLD AUTO: 2.16 K/UL — HIGH (ref 0–0.9)
MONOCYTES NFR BLD AUTO: 13.9 % — SIGNIFICANT CHANGE UP (ref 2–14)
NEUTROPHILS # BLD AUTO: 12.1 K/UL — HIGH (ref 1.8–7.4)
NEUTROPHILS NFR BLD AUTO: 77.6 % — HIGH (ref 43–77)
NRBC # BLD: 0 /100 WBCS — SIGNIFICANT CHANGE UP (ref 0–0)
NRBC # BLD: 0 /100 WBCS — SIGNIFICANT CHANGE UP (ref 0–0)
PHOSPHATE SERPL-MCNC: 1.6 MG/DL — LOW (ref 2.5–4.5)
PLATELET # BLD AUTO: 222 K/UL — SIGNIFICANT CHANGE UP (ref 150–400)
PLATELET # BLD AUTO: 245 K/UL — SIGNIFICANT CHANGE UP (ref 150–400)
POTASSIUM SERPL-MCNC: 4.3 MMOL/L — SIGNIFICANT CHANGE UP (ref 3.5–5.3)
POTASSIUM SERPL-SCNC: 4.3 MMOL/L — SIGNIFICANT CHANGE UP (ref 3.5–5.3)
PROT SERPL-MCNC: 5.7 G/DL — LOW (ref 6–8.3)
RBC # BLD: 2.95 M/UL — LOW (ref 4.2–5.8)
RBC # BLD: 3.42 M/UL — LOW (ref 4.2–5.8)
RBC # FLD: 15.7 % — HIGH (ref 10.3–14.5)
RBC # FLD: 16 % — HIGH (ref 10.3–14.5)
SODIUM SERPL-SCNC: 136 MMOL/L — SIGNIFICANT CHANGE UP (ref 135–145)
TACROLIMUS SERPL-MCNC: 10 NG/ML — SIGNIFICANT CHANGE UP
WBC # BLD: 15.56 K/UL — HIGH (ref 3.8–10.5)
WBC # BLD: 16.69 K/UL — HIGH (ref 3.8–10.5)
WBC # FLD AUTO: 15.56 K/UL — HIGH (ref 3.8–10.5)
WBC # FLD AUTO: 16.69 K/UL — HIGH (ref 3.8–10.5)

## 2022-06-26 PROCEDURE — 99232 SBSQ HOSP IP/OBS MODERATE 35: CPT

## 2022-06-26 RX ORDER — ERYTHROPOIETIN 10000 [IU]/ML
10000 INJECTION, SOLUTION INTRAVENOUS; SUBCUTANEOUS ONCE
Refills: 0 | Status: COMPLETED | OUTPATIENT
Start: 2022-06-26 | End: 2022-06-26

## 2022-06-26 RX ORDER — TACROLIMUS 5 MG/1
10 CAPSULE ORAL
Refills: 0 | Status: DISCONTINUED | OUTPATIENT
Start: 2022-06-27 | End: 2022-06-27

## 2022-06-26 RX ORDER — FAMOTIDINE 10 MG/ML
20 INJECTION INTRAVENOUS ONCE
Refills: 0 | Status: COMPLETED | OUTPATIENT
Start: 2022-06-26 | End: 2022-06-26

## 2022-06-26 RX ADMIN — BASILIXIMAB 100 MILLIGRAM(S): 20 INJECTION, POWDER, FOR SOLUTION INTRAVENOUS at 08:13

## 2022-06-26 RX ADMIN — ONDANSETRON 4 MILLIGRAM(S): 8 TABLET, FILM COATED ORAL at 04:53

## 2022-06-26 RX ADMIN — Medication 30 MILLIGRAM(S): at 06:18

## 2022-06-26 RX ADMIN — Medication 20 MILLIGRAM(S): at 18:06

## 2022-06-26 RX ADMIN — Medication 63.75 MILLIMOLE(S): at 09:19

## 2022-06-26 RX ADMIN — TRAMADOL HYDROCHLORIDE 50 MILLIGRAM(S): 50 TABLET ORAL at 13:30

## 2022-06-26 RX ADMIN — SIMETHICONE 80 MILLIGRAM(S): 80 TABLET, CHEWABLE ORAL at 11:39

## 2022-06-26 RX ADMIN — Medication 500000 UNIT(S): at 08:56

## 2022-06-26 RX ADMIN — Medication 500000 UNIT(S): at 11:38

## 2022-06-26 RX ADMIN — Medication 400 MILLIGRAM(S): at 06:18

## 2022-06-26 RX ADMIN — SIMETHICONE 80 MILLIGRAM(S): 80 TABLET, CHEWABLE ORAL at 18:09

## 2022-06-26 RX ADMIN — TRAMADOL HYDROCHLORIDE 50 MILLIGRAM(S): 50 TABLET ORAL at 07:06

## 2022-06-26 RX ADMIN — TRAMADOL HYDROCHLORIDE 50 MILLIGRAM(S): 50 TABLET ORAL at 17:30

## 2022-06-26 RX ADMIN — TRAMADOL HYDROCHLORIDE 50 MILLIGRAM(S): 50 TABLET ORAL at 06:19

## 2022-06-26 RX ADMIN — TRAMADOL HYDROCHLORIDE 50 MILLIGRAM(S): 50 TABLET ORAL at 16:34

## 2022-06-26 RX ADMIN — Medication 400 MILLIGRAM(S): at 18:11

## 2022-06-26 RX ADMIN — CHLORHEXIDINE GLUCONATE 1 APPLICATION(S): 213 SOLUTION TOPICAL at 11:39

## 2022-06-26 RX ADMIN — PANTOPRAZOLE SODIUM 40 MILLIGRAM(S): 20 TABLET, DELAYED RELEASE ORAL at 18:06

## 2022-06-26 RX ADMIN — MYCOPHENOLATE MOFETIL 1000 MILLIGRAM(S): 250 CAPSULE ORAL at 08:56

## 2022-06-26 RX ADMIN — MYCOPHENOLATE MOFETIL 1000 MILLIGRAM(S): 250 CAPSULE ORAL at 20:16

## 2022-06-26 RX ADMIN — SENNA PLUS 2 TABLET(S): 8.6 TABLET ORAL at 20:16

## 2022-06-26 RX ADMIN — Medication 20 MILLIGRAM(S): at 06:19

## 2022-06-26 RX ADMIN — SIMETHICONE 80 MILLIGRAM(S): 80 TABLET, CHEWABLE ORAL at 04:53

## 2022-06-26 RX ADMIN — Medication 30 MILLILITER(S): at 07:45

## 2022-06-26 RX ADMIN — Medication 1 TABLET(S): at 11:39

## 2022-06-26 RX ADMIN — TACROLIMUS 12 MILLIGRAM(S): 5 CAPSULE ORAL at 08:57

## 2022-06-26 RX ADMIN — POLYETHYLENE GLYCOL 3350 17 GRAM(S): 17 POWDER, FOR SOLUTION ORAL at 11:39

## 2022-06-26 RX ADMIN — PANTOPRAZOLE SODIUM 40 MILLIGRAM(S): 20 TABLET, DELAYED RELEASE ORAL at 04:53

## 2022-06-26 RX ADMIN — FAMOTIDINE 20 MILLIGRAM(S): 10 INJECTION INTRAVENOUS at 08:13

## 2022-06-26 RX ADMIN — Medication 30 MILLILITER(S): at 18:09

## 2022-06-26 RX ADMIN — TRAMADOL HYDROCHLORIDE 50 MILLIGRAM(S): 50 TABLET ORAL at 12:32

## 2022-06-26 RX ADMIN — ERYTHROPOIETIN 10000 UNIT(S): 10000 INJECTION, SOLUTION INTRAVENOUS; SUBCUTANEOUS at 10:48

## 2022-06-26 RX ADMIN — Medication 500000 UNIT(S): at 18:05

## 2022-06-27 ENCOUNTER — TRANSCRIPTION ENCOUNTER (OUTPATIENT)
Age: 37
End: 2022-06-27

## 2022-06-27 VITALS
HEART RATE: 87 BPM | OXYGEN SATURATION: 100 % | DIASTOLIC BLOOD PRESSURE: 79 MMHG | RESPIRATION RATE: 20 BRPM | TEMPERATURE: 100 F | SYSTOLIC BLOOD PRESSURE: 132 MMHG

## 2022-06-27 LAB
ALBUMIN SERPL ELPH-MCNC: 3.6 G/DL — SIGNIFICANT CHANGE UP (ref 3.3–5)
ALP SERPL-CCNC: 54 U/L — SIGNIFICANT CHANGE UP (ref 40–120)
ALT FLD-CCNC: 15 U/L — SIGNIFICANT CHANGE UP (ref 10–45)
ANION GAP SERPL CALC-SCNC: 10 MMOL/L — SIGNIFICANT CHANGE UP (ref 5–17)
AST SERPL-CCNC: 19 U/L — SIGNIFICANT CHANGE UP (ref 10–40)
BASOPHILS # BLD AUTO: 0.02 K/UL — SIGNIFICANT CHANGE UP (ref 0–0.2)
BASOPHILS # BLD AUTO: 0.03 K/UL — SIGNIFICANT CHANGE UP (ref 0–0.2)
BASOPHILS NFR BLD AUTO: 0.2 % — SIGNIFICANT CHANGE UP (ref 0–2)
BASOPHILS NFR BLD AUTO: 0.2 % — SIGNIFICANT CHANGE UP (ref 0–2)
BILIRUB SERPL-MCNC: 0.4 MG/DL — SIGNIFICANT CHANGE UP (ref 0.2–1.2)
BLD GP AB SCN SERPL QL: NEGATIVE — SIGNIFICANT CHANGE UP
BUN SERPL-MCNC: 24 MG/DL — HIGH (ref 7–23)
CALCIUM SERPL-MCNC: 9 MG/DL — SIGNIFICANT CHANGE UP (ref 8.4–10.5)
CHLORIDE SERPL-SCNC: 105 MMOL/L — SIGNIFICANT CHANGE UP (ref 96–108)
CO2 SERPL-SCNC: 21 MMOL/L — LOW (ref 22–31)
CREAT SERPL-MCNC: 2.04 MG/DL — HIGH (ref 0.5–1.3)
EGFR: 42 ML/MIN/1.73M2 — LOW
EOSINOPHIL # BLD AUTO: 0.03 K/UL — SIGNIFICANT CHANGE UP (ref 0–0.5)
EOSINOPHIL # BLD AUTO: 0.04 K/UL — SIGNIFICANT CHANGE UP (ref 0–0.5)
EOSINOPHIL NFR BLD AUTO: 0.2 % — SIGNIFICANT CHANGE UP (ref 0–6)
EOSINOPHIL NFR BLD AUTO: 0.3 % — SIGNIFICANT CHANGE UP (ref 0–6)
GLUCOSE BLDC GLUCOMTR-MCNC: 121 MG/DL — HIGH (ref 70–99)
GLUCOSE BLDC GLUCOMTR-MCNC: 97 MG/DL — SIGNIFICANT CHANGE UP (ref 70–99)
GLUCOSE SERPL-MCNC: 108 MG/DL — HIGH (ref 70–99)
HCT VFR BLD CALC: 21.8 % — LOW (ref 39–50)
HCT VFR BLD CALC: 23.8 % — LOW (ref 39–50)
HGB BLD-MCNC: 7.1 G/DL — LOW (ref 13–17)
HGB BLD-MCNC: 7.5 G/DL — LOW (ref 13–17)
IMM GRANULOCYTES NFR BLD AUTO: 1 % — SIGNIFICANT CHANGE UP (ref 0–1.5)
IMM GRANULOCYTES NFR BLD AUTO: 1.1 % — SIGNIFICANT CHANGE UP (ref 0–1.5)
LYMPHOCYTES # BLD AUTO: 0.8 K/UL — LOW (ref 1–3.3)
LYMPHOCYTES # BLD AUTO: 0.84 K/UL — LOW (ref 1–3.3)
LYMPHOCYTES # BLD AUTO: 6 % — LOW (ref 13–44)
LYMPHOCYTES # BLD AUTO: 6.2 % — LOW (ref 13–44)
MAGNESIUM SERPL-MCNC: 2.2 MG/DL — SIGNIFICANT CHANGE UP (ref 1.6–2.6)
MCHC RBC-ENTMCNC: 25.1 PG — LOW (ref 27–34)
MCHC RBC-ENTMCNC: 25.4 PG — LOW (ref 27–34)
MCHC RBC-ENTMCNC: 31.5 GM/DL — LOW (ref 32–36)
MCHC RBC-ENTMCNC: 32.1 GM/DL — SIGNIFICANT CHANGE UP (ref 32–36)
MCV RBC AUTO: 79 FL — LOW (ref 80–100)
MCV RBC AUTO: 79.6 FL — LOW (ref 80–100)
MONOCYTES # BLD AUTO: 1.21 K/UL — HIGH (ref 0–0.9)
MONOCYTES # BLD AUTO: 1.25 K/UL — HIGH (ref 0–0.9)
MONOCYTES NFR BLD AUTO: 8.9 % — SIGNIFICANT CHANGE UP (ref 2–14)
MONOCYTES NFR BLD AUTO: 9.4 % — SIGNIFICANT CHANGE UP (ref 2–14)
NEUTROPHILS # BLD AUTO: 10.64 K/UL — HIGH (ref 1.8–7.4)
NEUTROPHILS # BLD AUTO: 11.68 K/UL — HIGH (ref 1.8–7.4)
NEUTROPHILS NFR BLD AUTO: 83 % — HIGH (ref 43–77)
NEUTROPHILS NFR BLD AUTO: 83.5 % — HIGH (ref 43–77)
NRBC # BLD: 0 /100 WBCS — SIGNIFICANT CHANGE UP (ref 0–0)
NRBC # BLD: 0 /100 WBCS — SIGNIFICANT CHANGE UP (ref 0–0)
PHOSPHATE SERPL-MCNC: 1.4 MG/DL — LOW (ref 2.5–4.5)
PLATELET # BLD AUTO: 221 K/UL — SIGNIFICANT CHANGE UP (ref 150–400)
PLATELET # BLD AUTO: 259 K/UL — SIGNIFICANT CHANGE UP (ref 150–400)
POTASSIUM SERPL-MCNC: 5 MMOL/L — SIGNIFICANT CHANGE UP (ref 3.5–5.3)
POTASSIUM SERPL-SCNC: 5 MMOL/L — SIGNIFICANT CHANGE UP (ref 3.5–5.3)
PROT SERPL-MCNC: 5.9 G/DL — LOW (ref 6–8.3)
RBC # BLD: 2.76 M/UL — LOW (ref 4.2–5.8)
RBC # BLD: 2.99 M/UL — LOW (ref 4.2–5.8)
RBC # FLD: 16 % — HIGH (ref 10.3–14.5)
RBC # FLD: 16.3 % — HIGH (ref 10.3–14.5)
RH IG SCN BLD-IMP: POSITIVE — SIGNIFICANT CHANGE UP
SODIUM SERPL-SCNC: 136 MMOL/L — SIGNIFICANT CHANGE UP (ref 135–145)
TACROLIMUS SERPL-MCNC: 15.5 NG/ML — SIGNIFICANT CHANGE UP
WBC # BLD: 12.83 K/UL — HIGH (ref 3.8–10.5)
WBC # BLD: 13.99 K/UL — HIGH (ref 3.8–10.5)
WBC # FLD AUTO: 12.83 K/UL — HIGH (ref 3.8–10.5)
WBC # FLD AUTO: 13.99 K/UL — HIGH (ref 3.8–10.5)

## 2022-06-27 PROCEDURE — 85025 COMPLETE CBC W/AUTO DIFF WBC: CPT

## 2022-06-27 PROCEDURE — 93005 ELECTROCARDIOGRAM TRACING: CPT

## 2022-06-27 PROCEDURE — 80048 BASIC METABOLIC PNL TOTAL CA: CPT

## 2022-06-27 PROCEDURE — 82803 BLOOD GASES ANY COMBINATION: CPT

## 2022-06-27 PROCEDURE — 87522 HEPATITIS C REVRS TRNSCRPJ: CPT

## 2022-06-27 PROCEDURE — 84100 ASSAY OF PHOSPHORUS: CPT

## 2022-06-27 PROCEDURE — U0005: CPT

## 2022-06-27 PROCEDURE — 86900 BLOOD TYPING SEROLOGIC ABO: CPT

## 2022-06-27 PROCEDURE — U0003: CPT

## 2022-06-27 PROCEDURE — 86803 HEPATITIS C AB TEST: CPT

## 2022-06-27 PROCEDURE — 80197 ASSAY OF TACROLIMUS: CPT

## 2022-06-27 PROCEDURE — 80053 COMPREHEN METABOLIC PANEL: CPT

## 2022-06-27 PROCEDURE — 76776 US EXAM K TRANSPL W/DOPPLER: CPT

## 2022-06-27 PROCEDURE — 82947 ASSAY GLUCOSE BLOOD QUANT: CPT

## 2022-06-27 PROCEDURE — C1769: CPT

## 2022-06-27 PROCEDURE — 83735 ASSAY OF MAGNESIUM: CPT

## 2022-06-27 PROCEDURE — 84295 ASSAY OF SERUM SODIUM: CPT

## 2022-06-27 PROCEDURE — 82565 ASSAY OF CREATININE: CPT

## 2022-06-27 PROCEDURE — 86850 RBC ANTIBODY SCREEN: CPT

## 2022-06-27 PROCEDURE — 87340 HEPATITIS B SURFACE AG IA: CPT

## 2022-06-27 PROCEDURE — 86706 HEP B SURFACE ANTIBODY: CPT

## 2022-06-27 PROCEDURE — 85027 COMPLETE CBC AUTOMATED: CPT

## 2022-06-27 PROCEDURE — 85610 PROTHROMBIN TIME: CPT

## 2022-06-27 PROCEDURE — 83605 ASSAY OF LACTIC ACID: CPT

## 2022-06-27 PROCEDURE — 82330 ASSAY OF CALCIUM: CPT

## 2022-06-27 PROCEDURE — 99232 SBSQ HOSP IP/OBS MODERATE 35: CPT | Mod: GC

## 2022-06-27 PROCEDURE — 86901 BLOOD TYPING SEROLOGIC RH(D): CPT

## 2022-06-27 PROCEDURE — 97161 PT EVAL LOW COMPLEX 20 MIN: CPT

## 2022-06-27 PROCEDURE — 86704 HEP B CORE ANTIBODY TOTAL: CPT

## 2022-06-27 PROCEDURE — 82962 GLUCOSE BLOOD TEST: CPT

## 2022-06-27 PROCEDURE — 83036 HEMOGLOBIN GLYCOSYLATED A1C: CPT

## 2022-06-27 PROCEDURE — 82435 ASSAY OF BLOOD CHLORIDE: CPT

## 2022-06-27 PROCEDURE — 85018 HEMOGLOBIN: CPT

## 2022-06-27 PROCEDURE — 71045 X-RAY EXAM CHEST 1 VIEW: CPT

## 2022-06-27 PROCEDURE — 87389 HIV-1 AG W/HIV-1&-2 AB AG IA: CPT

## 2022-06-27 PROCEDURE — 36415 COLL VENOUS BLD VENIPUNCTURE: CPT

## 2022-06-27 PROCEDURE — 84132 ASSAY OF SERUM POTASSIUM: CPT

## 2022-06-27 PROCEDURE — 85014 HEMATOCRIT: CPT

## 2022-06-27 PROCEDURE — 74176 CT ABD & PELVIS W/O CONTRAST: CPT

## 2022-06-27 PROCEDURE — C9399: CPT

## 2022-06-27 PROCEDURE — 85730 THROMBOPLASTIN TIME PARTIAL: CPT

## 2022-06-27 RX ORDER — LANTHANUM CARBONATE 750 MG/1
1 TABLET, CHEWABLE ORAL
Qty: 0 | Refills: 0 | DISCHARGE

## 2022-06-27 RX ORDER — LABETALOL HCL 100 MG
1 TABLET ORAL
Qty: 60 | Refills: 5
Start: 2022-06-27 | End: 2022-12-23

## 2022-06-27 RX ORDER — SENNA PLUS 8.6 MG/1
2 TABLET ORAL
Qty: 0 | Refills: 0 | DISCHARGE
Start: 2022-06-27

## 2022-06-27 RX ORDER — SEVELAMER CARBONATE 2400 MG/1
3 POWDER, FOR SUSPENSION ORAL
Qty: 0 | Refills: 0 | DISCHARGE

## 2022-06-27 RX ORDER — VALGANCICLOVIR 450 MG/1
1 TABLET, FILM COATED ORAL
Qty: 0 | Refills: 0 | DISCHARGE
Start: 2022-06-27

## 2022-06-27 RX ORDER — NIFEDIPINE 30 MG
1 TABLET, EXTENDED RELEASE 24 HR ORAL
Qty: 0 | Refills: 0 | DISCHARGE
Start: 2022-06-27

## 2022-06-27 RX ORDER — TACROLIMUS 5 MG/1
10 CAPSULE ORAL
Qty: 0 | Refills: 0 | DISCHARGE
Start: 2022-06-27

## 2022-06-27 RX ORDER — SODIUM,POTASSIUM PHOSPHATES 278-250MG
2 POWDER IN PACKET (EA) ORAL ONCE
Refills: 0 | Status: COMPLETED | OUTPATIENT
Start: 2022-06-27 | End: 2022-06-27

## 2022-06-27 RX ORDER — ISOSORBIDE MONONITRATE 60 MG/1
1 TABLET, EXTENDED RELEASE ORAL
Qty: 0 | Refills: 0 | DISCHARGE

## 2022-06-27 RX ORDER — PANTOPRAZOLE SODIUM 20 MG/1
1 TABLET, DELAYED RELEASE ORAL
Qty: 60 | Refills: 5
Start: 2022-06-27 | End: 2022-12-23

## 2022-06-27 RX ORDER — PANTOPRAZOLE SODIUM 20 MG/1
1 TABLET, DELAYED RELEASE ORAL
Qty: 0 | Refills: 0 | DISCHARGE
Start: 2022-06-27

## 2022-06-27 RX ORDER — PSYLLIUM SEED (WITH DEXTROSE)
5 POWDER (GRAM) ORAL
Qty: 0 | Refills: 0 | DISCHARGE

## 2022-06-27 RX ORDER — CINACALCET 30 MG/1
1 TABLET, FILM COATED ORAL
Qty: 0 | Refills: 0 | DISCHARGE

## 2022-06-27 RX ORDER — LABETALOL HCL 100 MG
1.5 TABLET ORAL
Qty: 0 | Refills: 0 | DISCHARGE

## 2022-06-27 RX ORDER — TACROLIMUS 5 MG/1
8 CAPSULE ORAL
Qty: 0 | Refills: 0 | DISCHARGE
Start: 2022-06-27

## 2022-06-27 RX ORDER — NYSTATIN 500MM UNIT
5 POWDER (EA) MISCELLANEOUS
Qty: 0 | Refills: 0 | DISCHARGE
Start: 2022-06-27

## 2022-06-27 RX ORDER — CALCITRIOL 0.5 UG/1
1 CAPSULE ORAL
Qty: 0 | Refills: 0 | DISCHARGE

## 2022-06-27 RX ADMIN — SIMETHICONE 80 MILLIGRAM(S): 80 TABLET, CHEWABLE ORAL at 10:33

## 2022-06-27 RX ADMIN — MYCOPHENOLATE MOFETIL 1000 MILLIGRAM(S): 250 CAPSULE ORAL at 08:03

## 2022-06-27 RX ADMIN — TACROLIMUS 10 MILLIGRAM(S): 5 CAPSULE ORAL at 08:04

## 2022-06-27 RX ADMIN — Medication 30 MILLIGRAM(S): at 05:51

## 2022-06-27 RX ADMIN — Medication 1 TABLET(S): at 12:10

## 2022-06-27 RX ADMIN — Medication 10 MILLIGRAM(S): at 05:51

## 2022-06-27 RX ADMIN — PANTOPRAZOLE SODIUM 40 MILLIGRAM(S): 20 TABLET, DELAYED RELEASE ORAL at 05:51

## 2022-06-27 RX ADMIN — Medication 500000 UNIT(S): at 12:09

## 2022-06-27 RX ADMIN — Medication 2 PACKET(S): at 12:09

## 2022-06-27 RX ADMIN — Medication 400 MILLIGRAM(S): at 05:51

## 2022-06-27 RX ADMIN — Medication 500000 UNIT(S): at 00:00

## 2022-06-27 RX ADMIN — VALGANCICLOVIR 450 MILLIGRAM(S): 450 TABLET, FILM COATED ORAL at 12:10

## 2022-06-27 RX ADMIN — CHLORHEXIDINE GLUCONATE 1 APPLICATION(S): 213 SOLUTION TOPICAL at 12:10

## 2022-06-27 RX ADMIN — Medication 500000 UNIT(S): at 05:51

## 2022-06-27 NOTE — PROGRESS NOTE ADULT - PROBLEM SELECTOR PLAN 2
s/p Simulect induction. Envarsus titration to achieve level between 8-10. MMF 1gm BID. Steroid Taper protocol. C/w Bactrim, Nystatin and famotidine pf prophylaxis. C/w Valcyte for CMV for prophylaxis and titrate to 900mg BID once renal function improves given Donor kidney is CMV+ and patient is CMV-.     If you have any questions, please feel free to contact me  Rodney Santiago  Nephrology Fellow  [phone]; Prefer [employer] TEAMS  (After 5pm or on weekends please page the on-call fellow).
s/p Simulect induction. Envarsus titration to achieve level between 8-10. MMF 1gm BID. Steroid Taper protocol. C/w Bactrim, Nystatin and famotidine pf prophylaxis. C/w Valcyte for CMV for prophylaxis and titrate to 900mg BID once renal function improves given Donor kidney is CMV+ and patient is CMV-.     If you have any questions, please feel free to contact me  Rodney Santiago  Nephrology Fellow  [phone]; Prefer [employer] TEAMS  (After 5pm or on weekends please page the on-call fellow).

## 2022-06-27 NOTE — PROGRESS NOTE ADULT - NUTRITIONAL ASSESSMENT
This patient has been assessed with a concern for Malnutrition and has been determined to have a diagnosis/diagnoses of Severe protein-calorie malnutrition.    This patient is being managed with:   Diet Renal Restrictions-  For patients receiving Renal Replacement - No Protein Restr No Conc K No Conc Phos Low Sodium  Supplement Feeding Modality:  Oral  Nepro Cans or Servings Per Day:  1       Frequency:  Daily  Entered: Jun 24 2022 10:31AM    

## 2022-06-27 NOTE — PHYSICAL THERAPY INITIAL EVALUATION ADULT - PRECAUTIONS/LIMITATIONS, REHAB EVAL
US Trans Kidney 6/22: Elevated velocities at the right external iliac artery/transplant renal artery anastomosis which likely is related to this patient's postoperative state i.e vascular spasm/edema. No definitive evidence of tardus parvus waveforms or distal decreased resistive indices. Mild dilatation of an extrarenal pelvis with mild urothelial thickening. CT Abd post op 6/23: Large peritransplant pelvic hematoma as described may result in compression of the transplant ureter and mild hydronephrosis of the transplant kidney, Small superficial hematoma along the anterior right lower rectus abdominis muscle./surgical precautions US Trans Kidney 6/22: Elevated velocities at the right external iliac artery/transplant renal artery anastomosis which likely is related to this patient's postoperative state i.e vascular spasm/edema. No definitive evidence of tardus parvus waveforms or distal decreased resistive indices. Mild dilatation of an extrarenal pelvis with mild urothelial thickening. CT Abd post op 6/23: Large peritransplant pelvic hematoma as described may result in compression of the transplant ureter and mild hydronephrosis of the transplant kidney, Small superficial hematoma along the anterior right lower rectus abdominis muscle./fall precautions/surgical precautions

## 2022-06-27 NOTE — DISCHARGE NOTE NURSING/CASE MANAGEMENT/SOCIAL WORK - PATIENT PORTAL LINK FT
You can access the FollowMyHealth Patient Portal offered by [facility] by registering at the following website: [email or web address]. By joining [facility]’s FollowMyHealth portal, you will also be able to view your health information using other applications (apps) compatible with our system.

## 2022-06-27 NOTE — PROGRESS NOTE ADULT - PROVIDER SPECIALTY LIST ADULT
Pharmacy
Transplant Surgery
Transplant Nephrology
Transplant Surgery
Transplant Nephrology
Transplant Surgery
Transplant Surgery
Transplant Nephrology
Transplant Nephrology

## 2022-06-27 NOTE — DISCHARGE NOTE NURSING/CASE MANAGEMENT/SOCIAL WORK - NSDCFUADDAPPT_GEN_ALL_CORE_FT
FOLLOW-UP:  1. Please call to make a follow-up appointment with  ( date  )  2. Please follow up with your primary care physician in one week regarding your hospitalization.

## 2022-06-27 NOTE — PROGRESS NOTE ADULT - ASSESSMENT
37 year old male with HTN , ESRD was on home HD since 2015 admitted s/p DDRT (1a,1v,1u- no stent) with Simulect induction on 6/22/22.     1. s/p DDRT on 6/22/22- Allograft function with excellent UOP and solutes trending down well   2. IS meds- Simulect induction. Tac dosing for goal 8-10, Cellcept 1gm po bid and Steroid taper per protocol. ppx with bactrim, nystatin and valcyte ( D+/R+) increase to 900 mg po daily when renal function improves further.    3. HTN- BP is high . Increase Labetalol to 400 mg po bid and if BP still high increase Nifedipine to 60 mg po bid 
Pt. is a 37 y.o. M w/ PMHx of HTN (since age 17), and ESRD (on home HD since 2015) via LUE AVF, hyperthyroidism, now admitted for renal transplantation. Now S/p DDRT(1a,1v,1u- no stent) with Simulect induction on 6/22/22. 
36 y/o male w/ PMH sign for HTN since age 17 and ESRD on HD since 2015 via LUE AVF, hyperthyroidism,  admitted s/p DDRT to R side (1A/1V/1U no stent) under Simulect induction      s/p R DDRT (POD#3)  - excellent graft function. downtrending Cr  - downtrending H/h- epo 10k x1 today  - repeat H/H this afternoon   - ADAT  - I/Os  - Keep underwood x 1 week  - Pain control: tramadol prn  - increase Bowel regimen prn  - SCDs/IS/OOB  - s/p belladona suppository x 1 prn bladder spasms may repeat prn   - s/p pyridium 100 mg x 1 prn bladder spasms may repeat prn       Immunosuppression  - Simulect #2 on 6/25 or 6/26  - FK per level, MMF 1/1, SST  - PPx: bactrim/nystatin/ppi.   - CMV +/- increase valcyte once renal function improves    HTN  -Labetalol/Nifedipine, adjust prn    DISPO  -anticipate d/c home potentially Mon
36 y/o male w/ PMH sign for HTN since age 17 and ESRD on HD since 2015 via LUE AVF, hyperthyroidism,  admitted s/p DDRT to R side (1A/1V/1U no stent) under Simulect induction    s/p R DDRT (POD#1)  - excellent graft function. downtrending Cr, no concerns for bleeding at this time  - Renal diet  - stop replacements, continue IVF until tolerates PO  - Pain control: tramadol prn  - Bowel regimen  - SCDs/IS/OOB    Immunosuppression  - Simulect #2 on 6/25 or 6/26  - FK per level, MMF 1/1, SST  - PPx: bactrim/nystatin/ppi.   - CMV +/- increase valcyte once renal function improves    HTN  -restart home meds prn    DISPO  -anticipate d/c home Sun/Mon
37 year old male with HTN , ESRD was on home HD since 2015 admitted s/p DDRT (1a,1v,1u- no stent) with Simulect induction on 6/22/22.     1. s/p DDRT on 6/22/22- Allograft function with excellent UOP and solutes trending down well.  2. IS meds- Simulect induction. Tac dosing for goal 8-10, Cellcept 1gm po bid and Steroid taper per protocol. ppx with bactrim, nystatin and valcyte ( D+/R+) increase to 900 mg po daily when renal function improves further.    3. HTN- controlled on Labetalol 400 mg po bid and Nifedipine 30 mg po bid   4, Anemia- Hb is trending down. Repeat CBC  later today . PRBC to keep Hb > 7 and start on Procrit 10K units weekly. 
38 y/o male w/ PMH sign for HTN since age 17 and ESRD on HD since 2015 via LUE AVF, hyperthyroidism,  now admitted for potential DDRT. He feels well offers no complaints denies h/a, dizziness, c/p, palp, sob, recent travel or sick contacts.  Admitted for DDRT  s/p DDRT to R side (1A/1V/1U no stent)    [] s/p DDRT   - good u/o  - renal doppler with good perfusion  - Renal diet  - IVF/Replacements  - Pain control: tramadol prn  - Bowel regime  - SCDs/IS/OOB    [] Immuno:   - FK 5mg x 1  - Env 8mg to start in am, MMF 1/1, SST, Simulect 6/26  - PPX: bactrim/nystatin/valcyte. CMV +/- increase valcyte once renal function improves    [] HTN  - monitor  - at home on clonidine and labetalol 
Pt. is a 37 y.o. M w/ PMHx of HTN (since age 17), and ESRD (on home HD since 2015) via LUE AVF, hyperthyroidism, now admitted for renal transplantation. Now S/p DDRT(1a,1v,1u- no stent) with Simulect induction on 6/22/22. 
36 y/o male w/ PMH sign for HTN since age 17 and ESRD on HD since 2015 via LUE AVF, hyperthyroidism,  admitted s/p DDRT to R side (1A/1V/1U no stent) under Simulect induction      s/p R DDRT (POD#2)  - excellent graft function. downtrending Cr, no concerns for bleeding at this time  - Renal diet  - Pain control: tramadol prn  - increase Bowel regimen prn  - SCDs/IS/OOB  - belladona suppository x 1 prn bladder spasms   - pyridium 100 mg x 1 prn bladder spasms      Immunosuppression  - Simulect #2 on 6/25 or 6/26  - FK per level, MMF 1/1, SST  - PPx: bactrim/nystatin/ppi.   - CMV +/- increase valcyte once renal function improves    HTN  -Labetalol/Nifedipine, adjust prn    DISPO  -anticipate d/c home Sun/Mon
38 y/o male with h/o HTN since age 17 and ESRD on HD since 2015 via LUE AVF, anuric,   admitted s/p DDRT to R side (1A/1V/1U no stent) under Simulect induction    s/p R DDRT (POD#5)  - good graft function. downtrending Cr, good UO  - h/h stable, no concerns for bleeding at this time  - ADAT  - I/Os  - Keep yasmine, will determine d/c in clinic   - bowel regimen  - SCDs/IS/OOB    Immunosuppression  - Simulect completed  - Envarsus 8, MMF 1/1, SST  - PPx: bactrim/nystatin/ PPI BID  - CMV +/- increase valcyte once renal function improves    HTN  -Labetalol/Nifedipine, adjust prn    DISPO  -d/c home today  -f/u in clinic on 6/29 at 9A with labs and Dr. Garcia  -d/c with yasmine
38 y/o male w/ PMH sign for HTN since age 17 and ESRD on HD since 2015 via LUE AVF, hyperthyroidism,  admitted s/p DDRT to R side (1A/1V/1U no stent) under Simulect induction    s/p R DDRT (POD#2)  - excellent graft function. downtrending Cr, no concerns for bleeding at this time  - Renal diet  - stop IVF  - Pain control: tramadol prn  - increase Bowel regimen prn  - SCDs/IS/OOB    Immunosuppression  - Simulect #2 on 6/25 or 6/26  - FK per level, MMF 1/1, SST  - PPx: bactrim/nystatin/ppi.   - CMV +/- increase valcyte once renal function improves    HTN  -Labetalol/Nifedipine, adjust prn    DISPO  -anticipate d/c home Sun/Mon

## 2022-06-27 NOTE — PROGRESS NOTE ADULT - PROBLEM SELECTOR PLAN 1
s/p DDRT (1a, 1v, 1u- no stent)- Simulect induction, cPRA 0%, CMV-/ EBV+.  CIT 17 hours. KDPI 14%, COD: Drug Intoxication, Terminal Cr:  0.79. Pt. is CMV-. Donor is CMV+. Both are EBV+. Monitor UOP and trend renal function. SCr. now trending to 2.0. Will give lactulose and enema to promote BM. CT AP showing increased stool burden, and large sheyla-transplant hematoma. D/c Oliveira. Repeat UA. Ensure bowel movement with enema, mag citrate and Miralax. Now on Nifedipine 30mg and Labetalol 400mg BID for hypertension. On Procrit 10k units/ week. s/p DDRT (1a, 1v, 1u- no stent)- Simulect induction, cPRA 0%, CMV-/ EBV+.  CIT 17 hours. KDPI 14%, COD: Drug Intoxication, Terminal Cr:  0.79. Pt. is CMV-. Donor is CMV+. Both are EBV+. Monitor UOP and trend renal function. SCr. now trending to 2.0. Will give lactulose and enema to promote BM. CT AP showing increased stool burden, and large sheyla-transplant hematoma. Repeat UA. Ensure bowel movement with enema, mag citrate and Miralax. Now on Nifedipine 30mg and Labetalol 400mg BID for hypertension. On Procrit 10k units/ week.

## 2022-06-27 NOTE — PROGRESS NOTE ADULT - TIME BILLING
donor Kidney Recipient with functioning allograft, downtrending creatinine  Simulect induction  Anemia, HTN  Reviewed clinical, lab, imaging data , donor info, op note and post op course  Suggestions:  ASHLEY for anemia  Monitor allograft function recovery  Reviewed immunosuppression  Continue current immunosuppression, Tacrolimus trough level 8-10 ng/ml  On discharge f/u in transplant clinic  D/w transplant team.  I was present during and reviewed clinical and lab data as well as assessment and plan as documented by the house staff as noted. Please contact if any additional questions with any change in clinical condition or on availability of any additional information or reports.

## 2022-06-27 NOTE — PROGRESS NOTE ADULT - PROBLEM SELECTOR PROBLEM 2
Immunosuppressive management encounter following kidney transplant
Immunosuppressive management encounter following kidney transplant

## 2022-06-27 NOTE — PROGRESS NOTE ADULT - SUBJECTIVE AND OBJECTIVE BOX
[facility] DIVISION OF KIDNEY DISEASES AND HYPERTENSION -- FOLLOW UP NOTE  --------------------------------------------------------------------------------  HPI:  Pt. is a 37 y.o. M w/ PMHx of HTN (since age 17), and ESRD (on home HD since 2015) via LUE AVF, hyperthyroidism, now admitted for renal transplantation. cPRA 0%, CMV-/ EBV+. Pt. is POD #1 from DDRT (1a, 1v, 1u- no stent)- Simulect induction, CIT 17 hours     Donor:  Age:  34 yo, KDPI 14%, COD:  Drug Intoxication, Terminal Cr:  0.79  CMV- positive EBV-positive  HepBcAb- negative  Hepatitis C-JAKE- negative  Hepatitis C ab- negative    Pt. seen this AM. Good UOP. Pt. endorses some constipation and gas pains. Still with some abdominal pain, dysuria and chills. Pt. was hypertensive overnight, attributable to pain? was given IVPush of labetalol and hydralazine.       PAST HISTORY  --------------------------------------------------------------------------------  No significant changes to PMH, PSH, FHx, SHx, unless otherwise noted    ALLERGIES & MEDICATIONS  --------------------------------------------------------------------------------  Allergies    No Known Drug Allergies  topical cleanser for dialysis access.   Exsept (Rash)    Intolerances      Standing Inpatient Medications  chlorhexidine 2% Cloths 1 Application(s) Topical daily  dextrose 5%. 1000 milliLiter(s) IV Continuous <Continuous>  dextrose 5%. 1000 milliLiter(s) IV Continuous <Continuous>  dextrose 50% Injectable 25 Gram(s) IV Push once  dextrose 50% Injectable 12.5 Gram(s) IV Push once  dextrose 50% Injectable 25 Gram(s) IV Push once  famotidine    Tablet 20 milliGRAM(s) Oral daily  glucagon  Injectable 1 milliGRAM(s) IntraMuscular once  influenza   Vaccine 0.5 milliLiter(s) IntraMuscular once  insulin lispro (ADMELOG) corrective regimen sliding scale   SubCutaneous three times a day before meals  methylPREDNISolone sodium succinate Injectable 60 milliGRAM(s) IV Push two times a day  mycophenolate mofetil 1000 milliGRAM(s) Oral <User Schedule>  nystatin    Suspension 788623 Unit(s) Swish and Swallow four times a day  polyethylene glycol 3350 17 Gram(s) Oral daily  senna 2 Tablet(s) Oral at bedtime  trimethoprim   80 mG/sulfamethoxazole 400 mG 1 Tablet(s) Oral daily  valGANciclovir 450 milliGRAM(s) Oral <User Schedule>    PRN Inpatient Medications  dextrose Oral Gel 15 Gram(s) Oral once PRN  ondansetron Injectable 4 milliGRAM(s) IV Push every 6 hours PRN  traMADol 25 milliGRAM(s) Oral every 4 hours PRN  traMADol 50 milliGRAM(s) Oral every 4 hours PRN      REVIEW OF SYSTEMS  --------------------------------------------------------------------------------  Gen: No fevers; chills+  Respiratory: No dyspnea, cough,   CV: No chest pain, PND, orthopnea  GI: Some abdominal discomfort, Constipated. No diarrhea, nausea, or vomiting  Transplant: No pain  : Dysuria +  MSK: No edema  Neuro: No dizziness/lightheadedness    All other systems were reviewed and are negative, except as noted.    VITALS/PHYSICAL EXAM  --------------------------------------------------------------------------------  T(C): 36.4 (06-24-22 @ 05:49), Max: 36.9 (06-23-22 @ 11:19)  HR: 77 (06-24-22 @ 10:30) (65 - 96)  BP: 159/97 (06-24-22 @ 10:30) (159/97 - 212/115)  RR: 18 (06-24-22 @ 05:49) (18 - 18)  SpO2: 99% (06-24-22 @ 07:58) (97% - 100%)  Wt(kg): --        06-23-22 @ 07:01  -  06-24-22 @ 07:00  --------------------------------------------------------  IN: 2340 mL / OUT: 2850 mL / NET: -510 mL    06-24-22 @ 07:01  -  06-24-22 @ 11:14  --------------------------------------------------------  IN: 450 mL / OUT: 310 mL / NET: 140 mL        Physical Exam:  	Gen: Not in distress, well-appearing  	HEENT: no scleral icterus, moist oral mucosa. No thrush. Supple neck, no JVD  	Pulm: normal respiratory effort, lungs clear to auscultation bilaterally   	CV: regular rate and rhythm, S1 and S2 normal, no murmur   	Abd: normoactive bowel sounds, soft and non distended abdomen. Gas pains+ No guarding or rigidity, Oliveira+                   Transplant; incisional tenderness, some oozing of blood from incision site, ecchymosis extending to the R. flank.   	: No suprapubic tenderness                Extremities: No edema. Distal pulses 2+ bilaterally           Skin: warm no rash, no cyanosis   	Neuro: Alert and oriented to person, place and time. Normal speech. Normal affect.     LABS/STUDIES  --------------------------------------------------------------------------------              10.3   12.87 >-----------<  223      [06-24-22 @ 06:24]              33.1     138  |  103  |  43  ----------------------------<  117      [06-24-22 @ 06:24]  4.8   |  22  |  4.60        Ca     9.6     [06-24-22 @ 06:24]      Mg     2.5     [06-24-22 @ 06:24]      Phos  5.0     [06-24-22 @ 06:24]    TPro  6.0  /  Alb  3.9  /  TBili  0.2  /  DBili  x   /  AST  22  /  ALT  11  /  AlkPhos  53  [06-24-22 @ 06:24]    PT/INR: PT 12.3 , INR 1.07       [06-22-22 @ 18:41]  PTT: >200.0      [06-22-22 @ 16:34]      Creatinine Trend:  SCr 4.60 [06-24 @ 06:24]  SCr 6.54 [06-23 @ 13:46]  SCr 8.03 [06-23 @ 06:55]  SCr 8.49 [06-23 @ 00:29]  SCr 7.99 [06-22 @ 21:21]    Tacrolimus (), Serum: 11.8 ng/mL (06-23 @ 06:57)    Iron 32, TIBC 199, %sat 16      [07-14-21 @ 06:16]  Ferritin 581      [07-14-21 @ 06:16]  PTH -- (Ca --)      [07-11-21 @ 03:26]   434  TSH 2.23      [07-01-21 @ 06:51]    HBsAb 2170.8      [06-22-22 @ 04:57]  HBsAg Nonreact      [06-22-22 @ 04:57]  HBcAb Nonreact      [06-22-22 @ 04:57]  HCV 0.14, Nonreact      [06-22-22 @ 04:57]  HIV Nonreact      [06-22-22 @ 04:57]      
Transplant Surgery - POST OP CHECK   --------------------------------------------------------------  DDRT     Date:  6/22/22       POD# 0    HPI: 38 y/o male w/ PMH sign for HTN since age 17 and ESRD on HD since 2015 via LUE AVF, hyperthyroidism,  now admitted for potential DDRT. He feels well offers no complaints denies h/a, dizziness, c/p, palp, sob, recent travel or sick contacts.  Admitted for DDRT    s/p DDRT to R side (1A/1V/1U no stent)  CIT 17hrs     Recipient info:   Nephrologist: Dr. Betito Dodson  Covid vaccine: Moderna x3 booster 1/2022  CPRA:    2/16/22 PRA 0 %  ABO:      O positive  CMV:  negative  EBV Status:  positive  Last HD:  6/21/22    Donor:  Age:  36 yo, KDPI 14%, COD:  Drug Intoxication, Terminal Cr:  0.79  CMV- positive EBV-positive  HepBcAb- negative  Hepatitis C-JAKE- negative  Hepatitis C ab- negative      Interval Events:  - s/p DDRT with good u/o  - post op labs: h/h stable 12/38, K 4.8  - renal doppler with patent vessels     Immunosuppression   Induction: Simulect, next dose 6/26                                           Maintenance: FK 5mg today, Env 8mg to start in am; MMF 1/1, SST  Ongoing monitoring for signs of rejection.    Potential Discharge date: pending clinical improvement   Education:  Medications  Plan of care:  See Below    MEDICATIONS  (STANDING):  basiliximab  IVPB 20 milliGRAM(s) IV Intermittent once  ceFAZolin   IVPB 2000 milliGRAM(s) IV Intermittent once  dextrose 5%. 1000 milliLiter(s) (50 mL/Hr) IV Continuous <Continuous>  dextrose 5%. 1000 milliLiter(s) (100 mL/Hr) IV Continuous <Continuous>  dextrose 50% Injectable 25 Gram(s) IV Push once  dextrose 50% Injectable 12.5 Gram(s) IV Push once  dextrose 50% Injectable 25 Gram(s) IV Push once  glucagon  Injectable 1 milliGRAM(s) IntraMuscular once  influenza   Vaccine 0.5 milliLiter(s) IntraMuscular once  insulin lispro (ADMELOG) corrective regimen sliding scale   SubCutaneous three times a day before meals  mycophenolate mofetil 1000 milliGRAM(s) Oral <User Schedule>  senna 2 Tablet(s) Oral at bedtime  sodium chloride 0.9%. 1000 milliLiter(s) (70 mL/Hr) IV Continuous <Continuous>  sodium chloride 0.9%. 1000 milliLiter(s) (50 mL/Hr) IV Continuous <Continuous>    MEDICATIONS  (PRN):  dextrose Oral Gel 15 Gram(s) Oral once PRN Blood Glucose LESS THAN 70 milliGRAM(s)/deciliter  ondansetron Injectable 4 milliGRAM(s) IV Push every 6 hours PRN Nausea and/or Vomiting  traMADol 25 milliGRAM(s) Oral every 4 hours PRN Mild Pain (1 - 3)  traMADol 50 milliGRAM(s) Oral every 4 hours PRN Moderate Pain (4 - 6)      PAST MEDICAL & SURGICAL HISTORY:  ESRD on hemodialysis  at home 5 x week  HTN (hypertension)  ESRD (end stage renal disease) on dialysis  HTN (hypertension)  Hyperthyroidism  AV fistula  L forearm  Elective surgical procedure  RACW permacath for HD, removed 5 years ago    Vital Signs Last 24 Hrs  T(C): 36.6 (22 Jun 2022 17:00), Max: 37 (22 Jun 2022 04:18)  T(F): 97.9 (22 Jun 2022 17:00), Max: 98.6 (22 Jun 2022 04:18)  HR: 60 (22 Jun 2022 18:45) (56 - 90)  BP: 169/100 (22 Jun 2022 18:45) (129/80 - 173/105)  BP(mean): 129 (22 Jun 2022 18:45) (100 - 129)  RR: 15 (22 Jun 2022 18:45) (12 - 18)  SpO2: 99% (22 Jun 2022 18:45) (96% - 100%)    I&O's Summary    22 Jun 2022 07:01  -  22 Jun 2022 19:09  --------------------------------------------------------  IN: 3895 mL / OUT: 3895 mL / NET: 0 mL                         12.2   10.95 )-----------( 139      ( 22 Jun 2022 17:42 )             38.3     06-22    133<L>  |  97  |  46<H>  ----------------------------<  184<H>  4.8   |  16<L>  |  9.25<H>    Ca    6.9<L>      22 Jun 2022 16:34  Phos  8.3     06-22  Mg     2.0     06-22    TPro  7.1  /  Alb  4.8  /  TBili  0.5  /  DBili  x   /  AST  11  /  ALT  10  /  AlkPhos  64  06-22    Review of systems  Gen: No weight changes, fatigue, fevers/chills, weakness  Skin: No rashes  Head/Eyes/Ears/Mouth: No headache; Normal hearing; Normal vision w/o blurriness; No sinus pain/discomfort, sore throat  Respiratory: No dyspnea, cough, wheezing, hemoptysis  CV: No chest pain, PND, orthopnea  GI: Mild abdominal pain at surgical incision site; denies diarrhea, constipation, nausea, vomiting, melena, hematochezia  : No increased frequency, dysuria, hematuria, nocturia  MSK: No joint pain/swelling; no back pain; no edema  Neuro: No dizziness/lightheadedness, weakness, seizures, numbness, tingling  Heme: No easy bruising or bleeding  Endo: No heat/cold intolerance  Psych: No significant nervousness, anxiety, stress, depression  All other systems were reviewed and are negative, except as noted.      PHYSICAL EXAM:  Constitutional: Well developed / well nourished  Eyes: Anicteric, PERRLA  ENMT: nc/at  Neck: supple  Respiratory: CTA B/L  Cardiovascular: RRR  Gastrointestinal: Soft, non distended, mild tenderness at the incision site; incision c/d/i;   Genitourinary: Urinary catheter in place  Extremities: SCD's in place and working bilaterally,   Vascular: Palpable dp pulses bilaterally  Neurological: A&O x3  Skin: no rashes, ulcerations or lesions;  Musculoskeletal: Moving all extremities  Psychiatric: Responsive    
Transplant Surgery Multidisciplinary Progress Note  --------------------------------------------------------------  R DDRT      6/22/22       POD#1    HPI: 36 y/o male w/ PMH sign for HTN since age 17 and ESRD on HD since 2015 via LUE AVF, hyperthyroidism, admitted s/p R DDRT under Simulect induction, anuric    s/p DDRT to R side (1A/1V/1U no stent)  CIT 17hrs     Recipient  CPRA:    2/16/22 PRA 0 %  ABO:      O positive  CMV:  negative  EBV Status:  positive  Last HD:  6/21/22    Donor:  Age:  34 yo, KDPI 14%, COD:  Drug Intoxication, Terminal Cr:  0.79  CMV- positive   EBV-positive        Interval Events:  -Afebrile, VSS  -o/n UO decreased, imaging revealed a small perinephric hematoma with good blood flow. 24H UO: ~7L  -pain controlled, tolerating PO  -OOB  -awaiting bowel function    Immunosuppression   Induction: Simulect, next dose 6/26                                           Maintenance: Envarsus per level, MMF 1/1, SST  Ongoing monitoring for signs of rejection.    Potential Discharge date: pending clinical improvement   Education:  Medications  Plan of care:  See Below          MEDICATIONS  (STANDING):  ceFAZolin   IVPB 2000 milliGRAM(s) IV Intermittent once  dextrose 5%. 1000 milliLiter(s) (100 mL/Hr) IV Continuous <Continuous>  dextrose 5%. 1000 milliLiter(s) (50 mL/Hr) IV Continuous <Continuous>  dextrose 50% Injectable 25 Gram(s) IV Push once  dextrose 50% Injectable 12.5 Gram(s) IV Push once  dextrose 50% Injectable 25 Gram(s) IV Push once  famotidine    Tablet 20 milliGRAM(s) Oral daily  glucagon  Injectable 1 milliGRAM(s) IntraMuscular once  influenza   Vaccine 0.5 milliLiter(s) IntraMuscular once  insulin lispro (ADMELOG) corrective regimen sliding scale   SubCutaneous three times a day before meals  methylPREDNISolone sodium succinate Injectable 125 milliGRAM(s) IV Push two times a day  mycophenolate mofetil 1000 milliGRAM(s) Oral <User Schedule>  NIFEdipine XL 30 milliGRAM(s) Oral once  nystatin    Suspension 319664 Unit(s) Swish and Swallow four times a day  polyethylene glycol 3350 17 Gram(s) Oral daily  senna 2 Tablet(s) Oral at bedtime  sodium chloride 0.9%. 1000 milliLiter(s) (70 mL/Hr) IV Continuous <Continuous>  sorbitol 70%/mineral oil/magnesium hydroxide/glycerin Enema 120 milliLiter(s) Rectal once  trimethoprim   80 mG/sulfamethoxazole 400 mG 1 Tablet(s) Oral daily    MEDICATIONS  (PRN):  dextrose Oral Gel 15 Gram(s) Oral once PRN Blood Glucose LESS THAN 70 milliGRAM(s)/deciliter  ondansetron Injectable 4 milliGRAM(s) IV Push every 6 hours PRN Nausea and/or Vomiting  traMADol 25 milliGRAM(s) Oral every 4 hours PRN Mild Pain (1 - 3)  traMADol 50 milliGRAM(s) Oral every 4 hours PRN Moderate Pain (4 - 6)        Vital Signs Last 24 Hrs  T(C): 36.4 (23 Jun 2022 13:00), Max: 36.9 (23 Jun 2022 06:00)  T(F): 97.5 (23 Jun 2022 13:00), Max: 98.4 (23 Jun 2022 06:00)  HR: 65 (23 Jun 2022 13:00) (55 - 75)  BP: 162/94 (23 Jun 2022 13:00) (125/81 - 183/107)  BP(mean): 121 (23 Jun 2022 11:19) (100 - 137)  RR: 18 (23 Jun 2022 13:00) (12 - 20)  SpO2: 98% (23 Jun 2022 13:00) (96% - 100%)    I&O's Summary    22 Jun 2022 07:01  -  23 Jun 2022 07:00  --------------------------------------------------------  IN: 7910 mL / OUT: 6885 mL / NET: 1025 mL    23 Jun 2022 07:01  -  23 Jun 2022 14:34  --------------------------------------------------------  IN: 610 mL / OUT: 765 mL / NET: -155 mL                              11.6   11.43 )-----------( 226      ( 23 Jun 2022 13:46 )             37.5     06-23    139  |  103  |  46<H>  ----------------------------<  139<H>  4.7   |  20<L>  |  6.54<H>    Ca    8.9      23 Jun 2022 13:46  Phos  6.3     06-23  Mg     2.2     06-23    TPro  6.2  /  Alb  3.8  /  TBili  0.3  /  DBili  x   /  AST  15  /  ALT  10  /  AlkPhos  56  06-23    Tacrolimus (), Serum: 11.8 ng/mL (06-23 @ 06:57)          Review of systems  Gen: No weight changes, fatigue, fevers/chills, weakness  Skin: No rashes  Head/Eyes/Ears/Mouth: No headache; Normal hearing; Normal vision w/o blurriness; No sinus pain/discomfort, sore throat  Respiratory: No dyspnea, cough, wheezing, hemoptysis  CV: No chest pain, PND, orthopnea  GI: Mild abdominal pain at surgical incision site; denies diarrhea, constipation, nausea, vomiting, melena, hematochezia  : No increased frequency, dysuria, hematuria, nocturia  MSK: No joint pain/swelling; no back pain; no edema  Neuro: No dizziness/lightheadedness, weakness, seizures, numbness, tingling  Heme: No easy bruising or bleeding  Endo: No heat/cold intolerance  Psych: No significant nervousness, anxiety, stress, depression  All other systems were reviewed and are negative, except as noted.      PHYSICAL EXAM:  Constitutional: Well developed / well nourished  Eyes: Anicteric, PERRLA  ENMT: nc/at  Neck: supple  Respiratory: CTA B/L  Cardiovascular: RRR  Gastrointestinal: Soft, non distended, mild tenderness at the incision site; RLQ incision c/d/i, ecchymosis extending to R flank. no drainage. no peritonitis.  Genitourinary: Urinary catheter in place, good UO  Extremities: SCD's in place and working bilaterally,   Vascular: Palpable dp pulses bilaterally  Neurological: A&O x3  Skin: no rashes, ulcerations or lesions;  Musculoskeletal: Moving all extremities  Psychiatric: Responsive    
Transplant Surgery Multidisciplinary Progress Note  --------------------------------------------------------------  R DDRT      6/22/22       POD#2    Pt seen with the multidisciplinary transplant team of surgeon: Dr. Garcia, nephrologist: Dr. Saira Colindres/Jack, pharmacist: CATHY Bazan, unit RN. Rest of disciplines not in attendance to be notified of plan    HPI: 36 y/o male w/ PMH sign for HTN since age 17 and ESRD on HD since 2015 via LUE AVF, hyperthyroidism, admitted s/p R DDRT under Simulect induction, anuric    s/p DDRT to R side (1A/1V/1U no stent)  CIT 17hrs   Donor:  34 yo, KDPI 14%, COD:  Drug Intoxication, Terminal Cr:  0.79  CMV +/-      Interval Events:  -Afebrile, hypertensive. rest of VSS  -associated abdominal pain 2/2 chronic constipation, now having bowel function after increased regimen  -RLQ incision minimal tenderness  -ambulatory with RN this AM  -good graft function: UO 2.7L via underwood    Immunosuppression   Induction: Simulect, next dose 6/26                                           Maintenance: Envarsus per level, MMF 1/1, SST  Ongoing monitoring for signs of rejection.    Potential Discharge date: this weekend  Education:  Medications  Plan of care:  See Below        MEDICATIONS  (STANDING):  chlorhexidine 2% Cloths 1 Application(s) Topical daily  dextrose 5%. 1000 milliLiter(s) (50 mL/Hr) IV Continuous <Continuous>  dextrose 5%. 1000 milliLiter(s) (100 mL/Hr) IV Continuous <Continuous>  dextrose 50% Injectable 25 Gram(s) IV Push once  dextrose 50% Injectable 12.5 Gram(s) IV Push once  dextrose 50% Injectable 25 Gram(s) IV Push once  glucagon  Injectable 1 milliGRAM(s) IntraMuscular once  influenza   Vaccine 0.5 milliLiter(s) IntraMuscular once  insulin lispro (ADMELOG) corrective regimen sliding scale   SubCutaneous three times a day before meals  labetalol 300 milliGRAM(s) Oral two times a day  methylPREDNISolone sodium succinate Injectable 60 milliGRAM(s) IV Push two times a day  mycophenolate mofetil 1000 milliGRAM(s) Oral <User Schedule>  nystatin    Suspension 161212 Unit(s) Swish and Swallow four times a day  pantoprazole    Tablet 40 milliGRAM(s) Oral before breakfast  polyethylene glycol 3350 17 Gram(s) Oral daily  senna 2 Tablet(s) Oral at bedtime  trimethoprim   80 mG/sulfamethoxazole 400 mG 1 Tablet(s) Oral daily  valGANciclovir 450 milliGRAM(s) Oral <User Schedule>    MEDICATIONS  (PRN):  dextrose Oral Gel 15 Gram(s) Oral once PRN Blood Glucose LESS THAN 70 milliGRAM(s)/deciliter  ondansetron Injectable 4 milliGRAM(s) IV Push every 6 hours PRN Nausea and/or Vomiting  traMADol 25 milliGRAM(s) Oral every 4 hours PRN Mild Pain (1 - 3)  traMADol 50 milliGRAM(s) Oral every 4 hours PRN Moderate Pain (4 - 6)        Vital Signs Last 24 Hrs  T(C): 36.7 (24 Jun 2022 12:17), Max: 36.9 (23 Jun 2022 21:00)  T(F): 98 (24 Jun 2022 12:17), Max: 98.4 (23 Jun 2022 21:00)  HR: 85 (24 Jun 2022 12:17) (65 - 96)  BP: 174/108 (24 Jun 2022 12:17) (159/97 - 212/115)  BP(mean): 123 (24 Jun 2022 10:30) (123 - 155)  RR: 18 (24 Jun 2022 12:17) (18 - 18)  SpO2: 99% (24 Jun 2022 12:17) (97% - 100%)    I&O's Summary    23 Jun 2022 07:01  -  24 Jun 2022 07:00  --------------------------------------------------------  IN: 2340 mL / OUT: 2850 mL / NET: -510 mL    24 Jun 2022 07:01  -  24 Jun 2022 12:45  --------------------------------------------------------  IN: 450 mL / OUT: 390 mL / NET: 60 mL                              10.3   12.87 )-----------( 223      ( 24 Jun 2022 06:24 )             33.1     06-24    138  |  103  |  43<H>  ----------------------------<  117<H>  4.8   |  22  |  4.60<H>    Ca    9.6      24 Jun 2022 06:24  Phos  5.0     06-24  Mg     2.5     06-24    TPro  6.0  /  Alb  3.9  /  TBili  0.2  /  DBili  x   /  AST  22  /  ALT  11  /  AlkPhos  53  06-24    Tacrolimus (), Serum: 4.0 ng/mL (06-24 @ 06:24)          Review of systems  Gen: No weight changes, fatigue, fevers/chills, weakness  Skin: No rashes  Head/Eyes/Ears/Mouth: No headache; Normal hearing; Normal vision w/o blurriness; No sinus pain/discomfort, sore throat  Respiratory: No dyspnea, cough, wheezing, hemoptysis  CV: No chest pain, PND, orthopnea  GI: Mild abdominal pain at surgical incision site; denies diarrhea,  nausea, vomiting, melena, hematochezia, +constipation  : No increased frequency, dysuria, hematuria, nocturia  MSK: No joint pain/swelling; no back pain; no edema  Neuro: No dizziness/lightheadedness, weakness, seizures, numbness, tingling  Heme: No easy bruising or bleeding  Endo: No heat/cold intolerance  Psych: No significant nervousness, anxiety, stress, depression  All other systems were reviewed and are negative, except as noted.      PHYSICAL EXAM:  Constitutional: Well developed / well nourished  Eyes: Anicteric, PERRLA  ENMT: nc/at  Neck: supple  Respiratory: CTA B/L  Cardiovascular: RRR  Gastrointestinal: Soft, non distended, mild tenderness at the incision site; RLQ incision with some ss drainage, no fluctuance. ecchymosis stable. no drainage. no peritonitis. no flank pain today  Genitourinary: Urinary catheter in place, good UO  Extremities: SCD's in place and working bilaterally, no edema. L AVF palpable  Vascular: Palpable dp pulses bilaterally  Neurological: A&O x3  Skin: no rashes, ulcerations or lesions;  Musculoskeletal: Moving all extremities  Psychiatric: Responsive    
Transplant Surgery Multidisciplinary Progress Note  --------------------------------------------------------------  R DDRT      6/22/22       POD#5    Pt seen with the multidisciplinary transplant team of surgeon: Dr. Murray, Nephrologist: Dr. Krause/CATHY Santiago, unit RN. Rest of disciplines not in attendance to be notified of plan    HPI: 36 y/o male w/ PMH sign for HTN since age 17 and ESRD on HD since 2015 via LUE AVF, hyperthyroidism, admitted s/p R DDRT under Simulect induction, anuric    s/p DDRT to R side (1A/1V/1U no stent)  CIT 17hrs   Donor:  KDPI 14%, COD:  Drug Intoxication, Terminal Cr:  0.79  CMV +/-      Interval Events:  - Afebrile, VSS  - overall with much improvement  - ambulatory, having bowel function  - good graft function. downtrending Cr, good UO  - GERD symptoms improving on Protonix BID        Immunosuppression   Induction: Simulect completed                                Maintenance: Envarsus per level, MMF 1/1, SST  Ongoing monitoring for signs of rejection.    Potential Discharge date: today  Education:  Medications  Plan of care:  See Below        MEDICATIONS  (STANDING):  chlorhexidine 2% Cloths 1 Application(s) Topical daily  dextrose 5%. 1000 milliLiter(s) (100 mL/Hr) IV Continuous <Continuous>  dextrose 5%. 1000 milliLiter(s) (50 mL/Hr) IV Continuous <Continuous>  dextrose 50% Injectable 25 Gram(s) IV Push once  dextrose 50% Injectable 12.5 Gram(s) IV Push once  dextrose 50% Injectable 25 Gram(s) IV Push once  glucagon  Injectable 1 milliGRAM(s) IntraMuscular once  influenza   Vaccine 0.5 milliLiter(s) IntraMuscular once  insulin lispro (ADMELOG) corrective regimen sliding scale   SubCutaneous three times a day before meals  labetalol 400 milliGRAM(s) Oral two times a day  mycophenolate mofetil 1000 milliGRAM(s) Oral <User Schedule>  NIFEdipine XL 30 milliGRAM(s) Oral daily  nystatin    Suspension 028182 Unit(s) Swish and Swallow four times a day  pantoprazole    Tablet 40 milliGRAM(s) Oral every 12 hours  polyethylene glycol 3350 17 Gram(s) Oral daily  predniSONE   Tablet 10 milliGRAM(s) Oral two times a day  senna 2 Tablet(s) Oral at bedtime  tacrolimus ER Tablet (ENVARSUS XR) 10 milliGRAM(s) Oral <User Schedule>  trimethoprim   80 mG/sulfamethoxazole 400 mG 1 Tablet(s) Oral daily  valGANciclovir 450 milliGRAM(s) Oral <User Schedule>    MEDICATIONS  (PRN):  aluminum hydroxide/magnesium hydroxide/simethicone Suspension 30 milliLiter(s) Oral every 6 hours PRN Dyspepsia  dextrose Oral Gel 15 Gram(s) Oral once PRN Blood Glucose LESS THAN 70 milliGRAM(s)/deciliter  ondansetron Injectable 4 milliGRAM(s) IV Push every 6 hours PRN Nausea and/or Vomiting  simethicone 80 milliGRAM(s) Chew three times a day PRN Heartburn  traMADol 25 milliGRAM(s) Oral every 4 hours PRN Mild Pain (1 - 3)  traMADol 50 milliGRAM(s) Oral every 4 hours PRN Moderate Pain (4 - 6)          Vital Signs Last 24 Hrs  T(C): 37.7 (27 Jun 2022 13:02), Max: 37.7 (27 Jun 2022 13:02)  T(F): 99.9 (27 Jun 2022 13:02), Max: 99.9 (27 Jun 2022 13:02)  HR: 87 (27 Jun 2022 13:02) (73 - 94)  BP: 132/79 (27 Jun 2022 13:02) (119/65 - 145/74)  BP(mean): 96 (26 Jun 2022 21:00) (92 - 96)  RR: 20 (27 Jun 2022 13:02) (18 - 20)  SpO2: 100% (27 Jun 2022 13:02) (100% - 100%)    I&O's Summary    26 Jun 2022 07:01  -  27 Jun 2022 07:00  --------------------------------------------------------  IN: 1380 mL / OUT: 3350 mL / NET: -1970 mL    27 Jun 2022 07:01  -  27 Jun 2022 16:03  --------------------------------------------------------  IN: 460 mL / OUT: 460 mL / NET: 0 mL                              7.5    13.99 )-----------( 259      ( 27 Jun 2022 11:50 )             23.8     06-27    136  |  105  |  24<H>  ----------------------------<  108<H>  5.0   |  21<L>  |  2.04<H>    Ca    9.0      27 Jun 2022 06:22  Phos  1.4     06-27  Mg     2.2     06-27    TPro  5.9<L>  /  Alb  3.6  /  TBili  0.4  /  DBili  x   /  AST  19  /  ALT  15  /  AlkPhos  54  06-27    Tacrolimus (), Serum: 15.5 ng/mL (06-27 @ 06:22)                  Review of systems  Gen: No weight changes, fatigue, fevers/chills, weakness  Skin: No rashes  Head/Eyes/Ears/Mouth: No headache; Normal hearing; Normal vision w/o blurriness; No sinus pain/discomfort, sore throat  Respiratory: No dyspnea, cough, wheezing, hemoptysis  CV: No chest pain, PND, orthopnea  GI +abdominal pain at surgical incision site/ flank pain; denies diarrhea,  nausea, vomiting, melena, hematochezia,   : Pos sensation to void/ bladder spasms No increased frequency, dysuria, hematuria, nocturia  MSK: No joint pain/swelling; no back pain; no edema  Neuro: No dizziness/lightheadedness, weakness, seizures, numbness, tingling  Heme: No easy bruising or bleeding  Endo: No heat/cold intolerance  Psych: No significant nervousness, anxiety, stress, depression  All other systems were reviewed and are negative, except as noted.      PHYSICAL EXAM:  Constitutional: Well developed / well nourished  Eyes: Anicteric, PERRLA  ENMT: nc/at  Neck: supple  Respiratory: CTA B/L  Cardiovascular: RRR  Gastrointestinal: Soft, non distended, mild tenderness at the incision site; RLQ incision with improving ss drainage. no fluctuance. ecchymosis improving  Genitourinary: Urinary catheter in place, good UO, clearing  Extremities: SCD's in place and working bilaterally, no edema. L AVF palpable  Vascular: Palpable dp pulses bilaterally  Neurological: A&O x3  Skin: no rashes, ulcerations or lesions;  Musculoskeletal: Moving all extremities  Psychiatric: Responsive    
 BOUBACAR WRIGHT is a 37y Male s/p DDRT on 6/22/22. PMH is significant for HTN     NKDA  CMV +/-    Transplant Medications  Induction  -Basiliximab 20 mg POD 0 (given in OR) and POD 4  -Methylprednisolone taper (switch to PO prednisone on POD 4)            POD 0: 500 mg IV in OR            POD 1: 125 mg IV Q12H            POD 2: 60 mg IV Q12H            POD 3: 30 mg IV Q12H        Maintenance Immunosuppression  -Tacrolimus (Envarsus) 0.14 mg/kg daily (Adjust for goal trough: 8-10)  -Mycophenolate 1,000 mg PO Q12H  -Prednisone             POD 4: 20 mg PO Q12H            POD 5: 10 mg PO Q12H            POD 6: 5 mg PO Q12H            POD 7-: 5 mg daily     Anti-infection   -Bactrim SS tablet (frequency based on renal function)  -Valganciclovir (dose based on CMV serostatus and frequency based on renal function)  -Nystatin swish and swallow 5 mL four times daily    Surgical prophylaxis pre- and intra-operative dosing  -Cefazolin    Prophylaxis  -GI ppx: famotidine 20 mg daily  -Bowel ppx: senna  -DVT: sequential compression device  -Pain:            Mild: Acetaminophen 650 mg every 6 hours PRN           Moderate: Tramadol 25 mg every 4 hours PRN (adjust for renal function)           Severe: Tramadol 50 mg every 4 hours PRN (adjust for renal function)    Home Medications:  calcitriol 0.25 mcg oral capsule: 1 cap(s) orally once a day (25 Jun 2021 13:29)  cyanocobalamin 1000 mcg oral tablet: 1 tab(s) orally once a day (15 Jul 2021 15:52)  folic acid 1 mg oral tablet: 1 tab(s) orally once a day (15 Jul 2021 15:52)  isosorbide mononitrate 60 mg oral tablet, extended release: 1 tab(s) orally once a day (in the morning) (25 Jun 2021 13:29)  labetalol 300 mg oral tablet: 1.5 tab(s) orally 2 times a day (25 Jun 2021 13:29)  lanthanum 1000 mg oral tablet, chewable: 1 tab(s) orally 4 times a day (25 Jun 2021 13:29)  Metamucil 3.4 g/3.7 g oral powder for reconstitution: 1 Tsp dissolved in a glass of water orally once a day (15 Jul 2021 18:43)  polyethylene glycol 3350 oral powder for reconstitution: 17 gram(s) orally once a day (dissolve in 4-8 oz of liquid) as needed for constipation (do not take if having regular bowel movements or loose bowel movements) (15 Jul 2021 18:43)  Sensipar 30 mg oral tablet: 1 tab(s) orally once a day (25 Jun 2021 13:29)  sevelamer hydrochloride 800 mg oral tablet: 3 tab(s) orally 3 times a day (25 Jun 2021 13:29)      Outpatient medication reconciliation reviewed and will be re-started appropriately.  Plan discussed with multidisciplinary team.   
[facility] DIVISION OF KIDNEY DISEASES AND HYPERTENSION -- FOLLOW UP NOTE  --------------------------------------------------------------------------------  Chief Complaint:    24 hour events/subjective:  Patient was seen and examined at bedside  no overnight events       PAST HISTORY  --------------------------------------------------------------------------------  No significant changes to PMH, PSH, FHx, SHx, unless otherwise noted    ALLERGIES & MEDICATIONS  --------------------------------------------------------------------------------  Allergies    No Known Drug Allergies  topical cleanser for dialysis access.   Exsept (Rash)    Intolerances      Standing Inpatient Medications  chlorhexidine 2% Cloths 1 Application(s) Topical daily  dextrose 5%. 1000 milliLiter(s) IV Continuous <Continuous>  dextrose 5%. 1000 milliLiter(s) IV Continuous <Continuous>  dextrose 50% Injectable 25 Gram(s) IV Push once  dextrose 50% Injectable 12.5 Gram(s) IV Push once  dextrose 50% Injectable 25 Gram(s) IV Push once  glucagon  Injectable 1 milliGRAM(s) IntraMuscular once  influenza   Vaccine 0.5 milliLiter(s) IntraMuscular once  insulin lispro (ADMELOG) corrective regimen sliding scale   SubCutaneous three times a day before meals  labetalol 300 milliGRAM(s) Oral two times a day  methylPREDNISolone sodium succinate Injectable 30 milliGRAM(s) IV Push two times a day  mycophenolate mofetil 1000 milliGRAM(s) Oral <User Schedule>  NIFEdipine XL 30 milliGRAM(s) Oral daily  nystatin    Suspension 575789 Unit(s) Swish and Swallow four times a day  pantoprazole    Tablet 40 milliGRAM(s) Oral before breakfast  polyethylene glycol 3350 17 Gram(s) Oral daily  senna 2 Tablet(s) Oral at bedtime  tacrolimus ER Tablet (ENVARSUS XR) 10 milliGRAM(s) Oral <User Schedule>  trimethoprim   80 mG/sulfamethoxazole 400 mG 1 Tablet(s) Oral daily  valGANciclovir 450 milliGRAM(s) Oral <User Schedule>    PRN Inpatient Medications  dextrose Oral Gel 15 Gram(s) Oral once PRN  ondansetron Injectable 4 milliGRAM(s) IV Push every 6 hours PRN  simethicone 80 milliGRAM(s) Chew three times a day PRN  traMADol 25 milliGRAM(s) Oral every 4 hours PRN  traMADol 50 milliGRAM(s) Oral every 4 hours PRN      REVIEW OF SYSTEMS  --------------------------------------------------------------------------------  Gen: No fatigue, fevers/chills, weakness  Skin: No rashes  Head/Eyes/Ears/Mouth: No headache;No sore throat  Respiratory: No dyspnea, cough,   CV: No chest pain, PND, orthopnea  GI: No abdominal pain, diarrhea, constipation, nausea, vomiting  Transplant: No pain  : No increased frequency, dysuria, hematuria, nocturia  MSK: No joint pain/swelling; no back pain; no edema  Neuro: No dizziness/lightheadedness, weakness, seizures, numbness, tingling  Psych: No significant nervousness, anxiety, stress, depression    All other systems were reviewed and are negative, except as noted.    VITALS/PHYSICAL EXAM  --------------------------------------------------------------------------------  T(C): 36.4 (06-25-22 @ 08:37), Max: 37 (06-24-22 @ 22:23)  HR: 91 (06-25-22 @ 09:12) (74 - 126)  BP: 176/105 (06-25-22 @ 09:12) (136/87 - 192/105)  RR: 20 (06-25-22 @ 08:37) (17 - 25)  SpO2: 100% (06-25-22 @ 09:12) (95% - 100%)  Wt(kg): --        06-24-22 @ 07:01  -  06-25-22 @ 07:00  --------------------------------------------------------  IN: 2050 mL / OUT: 1778 mL / NET: 272 mL    06-25-22 @ 07:01  -  06-25-22 @ 10:41  --------------------------------------------------------  IN: 0 mL / OUT: 300 mL / NET: -300 mL      Physical Exam:  	Gen: NAD  	HEENT: PERRL, supple neck, clear oropharynx  	Pulm: CTA B/L  	CV: RRR, S1S2; no rub  	Back: No spinal or CVA tenderness; no sacral edema  	Abd: +BS, soft, nontender/nondistended                      Transplant: No tenderness, swelling  	: No suprapubic tenderness  	UE: Warm, FROM; no edema; no asterixis  	LE: Warm, FROM; no edema  	Neuro: No focal deficits  	Psych: Normal affect and mood  	Skin: Warm, without rashes      LABS/STUDIES  --------------------------------------------------------------------------------              8.8    13.69 >-----------<  229      [06-25-22 @ 06:49]              27.7     135  |  102  |  40  ----------------------------<  108      [06-25-22 @ 06:48]  4.6   |  21  |  3.13        Ca     9.5     [06-25-22 @ 06:48]      Mg     2.7     [06-25-22 @ 06:48]      Phos  3.6     [06-25-22 @ 06:48]    TPro  6.0  /  Alb  4.0  /  TBili  0.3  /  DBili  x   /  AST  25  /  ALT  14  /  AlkPhos  53  [06-25-22 @ 06:48]          Creatinine Trend:  SCr 3.13 [06-25 @ 06:48]  SCr 4.60 [06-24 @ 06:24]  SCr 6.54 [06-23 @ 13:46]  SCr 8.03 [06-23 @ 06:55]  SCr 8.49 [06-23 @ 00:29]    Tacrolimus (), Serum: 4.0 ng/mL (06-24 @ 06:24)  Tacrolimus (), Serum: 11.8 ng/mL (06-23 @ 06:57)          Iron 32, TIBC 199, %sat 16      [07-14-21 @ 06:16]  Ferritin 581      [07-14-21 @ 06:16]  PTH -- (Ca --)      [07-11-21 @ 03:26]   434  TSH 2.23      [07-01-21 @ 06:51]    HBsAb 2170.8      [06-22-22 @ 04:57]  HBsAg Nonreact      [06-22-22 @ 04:57]  HBcAb Nonreact      [06-22-22 @ 04:57]  HCV 0.14, Nonreact      [06-22-22 @ 04:57]  HIV Nonreact      [06-22-22 @ 04:57]      
[facility] DIVISION OF KIDNEY DISEASES AND HYPERTENSION -- FOLLOW UP NOTE  --------------------------------------------------------------------------------  Chief Complaint:    24 hour events/subjective:  Patient was seen and examined at bedside  No overnight events       PAST HISTORY  --------------------------------------------------------------------------------  No significant changes to PMH, PSH, FHx, SHx, unless otherwise noted    ALLERGIES & MEDICATIONS  --------------------------------------------------------------------------------  Allergies    No Known Drug Allergies  topical cleanser for dialysis access.   Exsept (Rash)    Intolerances      Standing Inpatient Medications  chlorhexidine 2% Cloths 1 Application(s) Topical daily  dextrose 5%. 1000 milliLiter(s) IV Continuous <Continuous>  dextrose 5%. 1000 milliLiter(s) IV Continuous <Continuous>  dextrose 50% Injectable 25 Gram(s) IV Push once  dextrose 50% Injectable 12.5 Gram(s) IV Push once  dextrose 50% Injectable 25 Gram(s) IV Push once  epoetin elizabeth-epbx (RETACRIT) Injectable 12253 Unit(s) SubCutaneous once  glucagon  Injectable 1 milliGRAM(s) IntraMuscular once  influenza   Vaccine 0.5 milliLiter(s) IntraMuscular once  insulin lispro (ADMELOG) corrective regimen sliding scale   SubCutaneous three times a day before meals  labetalol 400 milliGRAM(s) Oral two times a day  mycophenolate mofetil 1000 milliGRAM(s) Oral <User Schedule>  NIFEdipine XL 30 milliGRAM(s) Oral daily  nystatin    Suspension 496758 Unit(s) Swish and Swallow four times a day  pantoprazole    Tablet 40 milliGRAM(s) Oral every 12 hours  polyethylene glycol 3350 17 Gram(s) Oral daily  predniSONE   Tablet 20 milliGRAM(s) Oral two times a day  senna 2 Tablet(s) Oral at bedtime  tacrolimus ER Tablet (ENVARSUS XR) 12 milliGRAM(s) Oral <User Schedule>  trimethoprim   80 mG/sumethoxazole 400 mG 1 Tablet(s) Oral daily  valGANciclovir 450 milliGRAM(s) Oral <User Schedule>    PRN Inpatient Medications  aluminum hydroxide/magnesium hydroxide/simethicone Suspension 30 milliLiter(s) Oral every 6 hours PRN  dextrose Oral Gel 15 Gram(s) Oral once PRN  ondansetron Injectable 4 milliGRAM(s) IV Push every 6 hours PRN  simethicone 80 milliGRAM(s) Chew three times a day PRN  traMADol 25 milliGRAM(s) Oral every 4 hours PRN  traMADol 50 milliGRAM(s) Oral every 4 hours PRN      REVIEW OF SYSTEMS  --------------------------------------------------------------------------------  Gen: No fatigue, fevers/chills, weakness  Skin: No rashes  Head/Eyes/Ears/Mouth: No headache;No sore throat  Respiratory: No dyspnea, cough,   CV: No chest pain, PND, orthopnea  GI: No abdominal pain, diarrhea, constipation, nausea, vomiting  Transplant: No pain  : No increased frequency, dysuria, hematuria, nocturia  MSK: No joint pain/swelling; no back pain; no edema  Neuro: No dizziness/lightheadedness, weakness, seizures, numbness, tingling  Psych: No significant nervousness, anxiety, stress, depression    All other systems were reviewed and are negative, except as noted.    VITALS/PHYSICAL EXAM  --------------------------------------------------------------------------------  T(C): 36.8 (06-26-22 @ 09:00), Max: 36.8 (06-26-22 @ 01:00)  HR: 89 (06-26-22 @ 09:00) (70 - 94)  BP: 157/84 (06-26-22 @ 09:00) (138/77 - 157/84)  RR: 18 (06-26-22 @ 09:00) (18 - 22)  SpO2: 100% (06-26-22 @ 09:00) (98% - 100%)  Wt(kg): --        06-25-22 @ 07:01  -  06-26-22 @ 07:00  --------------------------------------------------------  IN: 840 mL / OUT: 2740 mL / NET: -1900 mL    06-26-22 @ 07:01  -  06-26-22 @ 10:45  --------------------------------------------------------  IN: 0 mL / OUT: 490 mL / NET: -490 mL      Physical Exam:  	Gen: NAD  	HEENT: PERRL, supple neck, clear oropharynx  	Pulm: CTA B/L  	CV: RRR, S1S2; no rub  	Back: No spinal or CVA tenderness; no sacral edema  	Abd: +BS, soft, nontender/nondistended                      Transplant: No tenderness, swelling  	: No suprapubic tenderness  	UE: Warm, FROM; no edema; no asterixis  	LE: Warm, FROM; no edema  	Neuro: No focal deficits  	Psych: Normal affect and mood  	Skin: Warm, without rashes      LABS/STUDIES  --------------------------------------------------------------------------------              7.4    15.56 >-----------<  222      [06-26-22 @ 06:32]              23.2     136  |  104  |  33  ----------------------------<  106      [06-26-22 @ 06:29]  4.3   |  23  |  2.35        Ca     9.2     [06-26-22 @ 06:29]      Mg     2.4     [06-26-22 @ 06:29]      Phos  1.6     [06-26-22 @ 06:29]    TPro  5.7  /  Alb  3.6  /  TBili  0.3  /  DBili  x   /  AST  20  /  ALT  13  /  AlkPhos  50  [06-26-22 @ 06:29]        Creatinine Trend:  SCr 2.35 [06-26 @ 06:29]  SCr 3.13 [06-25 @ 06:48]  SCr 4.60 [06-24 @ 06:24]  SCr 6.54 [06-23 @ 13:46]  SCr 8.03 [06-23 @ 06:55]    Tacrolimus (), Serum: 5.8 ng/mL (06-25 @ 06:49)  Tacrolimus (), Serum: 4.0 ng/mL (06-24 @ 06:24)  Tacrolimus (), Serum: 11.8 ng/mL (06-23 @ 06:57)      Iron 32, TIBC 199, %sat 16      [07-14-21 @ 06:16]  Ferritin 581      [07-14-21 @ 06:16]  PTH -- (Ca --)      [07-11-21 @ 03:26]   434  TSH 2.23      [07-01-21 @ 06:51]    HBsAb 2170.8      [06-22-22 @ 04:57]  HBsAg Nonreact      [06-22-22 @ 04:57]  HBcAb Nonreact      [06-22-22 @ 04:57]  HCV 0.14, Nonreact      [06-22-22 @ 04:57]  HIV Nonreact      [06-22-22 @ 04:57]      
Transplant Surgery Multidisciplinary Progress Note  --------------------------------------------------------------  R DDRT      6/22/22       POD#4    Pt seen with the multidisciplinary transplant team of surgeon: Dr. Garcia, nephrologist: Dr. Saira Colindres and NP Liana Adair, unit RN. Rest of disciplines not in attendance to be notified of plan    HPI: 38 y/o male w/ PMH sign for HTN since age 17 and ESRD on HD since 2015 via LUE AVF, hyperthyroidism, admitted s/p R DDRT under Simulect induction, anuric    s/p DDRT to R side (1A/1V/1U no stent)  CIT 17hrs   Donor:  34 yo, KDPI 14%, COD:  Drug Intoxication, Terminal Cr:  0.79  CMV +/-      Interval Events:  - Afebrile, hypertensive labetalol increased  - Improvement in bladder spasms- s/p belladonna supp x1/ pyridium x1  - hematuria improving   - Downtrending Sr. Cr 2.3 <-3.1 <- 4.6 <- 6.5  - reports significant GERD symptoms- protonix inc to bid        Immunosuppression   Induction: Simulect 2nd dose today                                          Maintenance: Envarsus per level, MMF 1/1, SST  Ongoing monitoring for signs of rejection.    Potential Discharge date: pending clinical improvement   Education:  Medications  Plan of care:  See Below      MEDICATIONS  (STANDING):  chlorhexidine 2% Cloths 1 Application(s) Topical daily  dextrose 5%. 1000 milliLiter(s) (50 mL/Hr) IV Continuous <Continuous>  dextrose 5%. 1000 milliLiter(s) (100 mL/Hr) IV Continuous <Continuous>  dextrose 50% Injectable 25 Gram(s) IV Push once  dextrose 50% Injectable 12.5 Gram(s) IV Push once  dextrose 50% Injectable 25 Gram(s) IV Push once  glucagon  Injectable 1 milliGRAM(s) IntraMuscular once  influenza   Vaccine 0.5 milliLiter(s) IntraMuscular once  insulin lispro (ADMELOG) corrective regimen sliding scale   SubCutaneous three times a day before meals  labetalol 400 milliGRAM(s) Oral two times a day  mycophenolate mofetil 1000 milliGRAM(s) Oral <User Schedule>  NIFEdipine XL 30 milliGRAM(s) Oral daily  nystatin    Suspension 345481 Unit(s) Swish and Swallow four times a day  pantoprazole    Tablet 40 milliGRAM(s) Oral every 12 hours  polyethylene glycol 3350 17 Gram(s) Oral daily  predniSONE   Tablet 20 milliGRAM(s) Oral two times a day  senna 2 Tablet(s) Oral at bedtime  tacrolimus ER Tablet (ENVARSUS XR) 12 milliGRAM(s) Oral <User Schedule>  trimethoprim   80 mG/sulfamethoxazole 400 mG 1 Tablet(s) Oral daily  valGANciclovir 450 milliGRAM(s) Oral <User Schedule>    MEDICATIONS  (PRN):  aluminum hydroxide/magnesium hydroxide/simethicone Suspension 30 milliLiter(s) Oral every 6 hours PRN Dyspepsia  dextrose Oral Gel 15 Gram(s) Oral once PRN Blood Glucose LESS THAN 70 milliGRAM(s)/deciliter  ondansetron Injectable 4 milliGRAM(s) IV Push every 6 hours PRN Nausea and/or Vomiting  simethicone 80 milliGRAM(s) Chew three times a day PRN Heartburn  traMADol 25 milliGRAM(s) Oral every 4 hours PRN Mild Pain (1 - 3)  traMADol 50 milliGRAM(s) Oral every 4 hours PRN Moderate Pain (4 - 6)      PAST MEDICAL & SURGICAL HISTORY:  ESRD on hemodialysis  at home 5 x week      HTN (hypertension)      ESRD (end stage renal disease) on dialysis      HTN (hypertension)      Bell&#x27;s palsy      Hyperthyroidism      AV fistula  L forearm      Elective surgical procedure  RACW permacath for HD, removed 5 years ago          Vital Signs Last 24 Hrs  T(C): 37.2 (26 Jun 2022 12:30), Max: 37.2 (26 Jun 2022 12:30)  T(F): 99 (26 Jun 2022 12:30), Max: 99 (26 Jun 2022 12:30)  HR: 84 (26 Jun 2022 12:30) (70 - 89)  BP: 158/87 (26 Jun 2022 12:30) (138/77 - 158/87)  BP(mean): 116 (26 Jun 2022 06:00) (107 - 116)  RR: 18 (26 Jun 2022 12:30) (18 - 18)  SpO2: 100% (26 Jun 2022 12:30) (99% - 100%)    I&O's Summary    25 Jun 2022 07:01  -  26 Jun 2022 07:00  --------------------------------------------------------  IN: 840 mL / OUT: 2850 mL / NET: -2010 mL    26 Jun 2022 07:01  -  26 Jun 2022 13:03  --------------------------------------------------------  IN: 660 mL / OUT: 895 mL / NET: -235 mL          LABS:                        7.4    15.56 )-----------( 222      ( 26 Jun 2022 06:32 )             23.2     06-26    136  |  104  |  33<H>  ----------------------------<  106<H>  4.3   |  23  |  2.35<H>    Ca    9.2      26 Jun 2022 06:29  Phos  1.6     06-26  Mg     2.4     06-26    TPro  5.7<L>  /  Alb  3.6  /  TBili  0.3  /  DBili  x   /  AST  20  /  ALT  13  /  AlkPhos  50  06-26        CAPILLARY BLOOD GLUCOSE      POCT Blood Glucose.: 109 mg/dL (26 Jun 2022 12:27)  POCT Blood Glucose.: 101 mg/dL (26 Jun 2022 08:49)  POCT Blood Glucose.: 115 mg/dL (25 Jun 2022 21:28)  POCT Blood Glucose.: 114 mg/dL (25 Jun 2022 21:06)  POCT Blood Glucose.: 111 mg/dL (25 Jun 2022 17:30)  POCT Blood Glucose.: 107 mg/dL (25 Jun 2022 13:16)    LIVER FUNCTIONS - ( 26 Jun 2022 06:29 )  Alb: 3.6 g/dL / Pro: 5.7 g/dL / ALK PHOS: 50 U/L / ALT: 13 U/L / AST: 20 U/L / GGT: x             Tacrolimus (), Serum: 5.8 ng/mL (06-25 @ 06:49)      Cultures:      Review of systems  Gen: No weight changes, fatigue, fevers/chills, weakness  Skin: No rashes  Head/Eyes/Ears/Mouth: No headache; Normal hearing; Normal vision w/o blurriness; No sinus pain/discomfort, sore throat  Respiratory: No dyspnea, cough, wheezing, hemoptysis  CV: No chest pain, PND, orthopnea  GI Pos abdominal pain at surgical incision site/ flank pain; denies diarrhea,  nausea, vomiting, melena, hematochezia, +constipation  : Pos sensation to void/ bladder spasms No increased frequency, dysuria, hematuria, nocturia  MSK: No joint pain/swelling; no back pain; no edema  Neuro: No dizziness/lightheadedness, weakness, seizures, numbness, tingling  Heme: No easy bruising or bleeding  Endo: No heat/cold intolerance  Psych: No significant nervousness, anxiety, stress, depression  All other systems were reviewed and are negative, except as noted.      PHYSICAL EXAM:  Constitutional: Well developed / well nourished  Eyes: Anicteric, PERRLA  ENMT: nc/at  Neck: supple  Respiratory: CTA B/L  Cardiovascular: RRR  Gastrointestinal: Soft, non distended, mild tenderness at the incision site; RLQ incision with some bloody drainage from superficial hematoma site, no fluctuance. ecchymosis stable.     Genitourinary: Urinary catheter in place, good UO, hematuria resolving  Extremities: SCD's in place and working bilaterally, no edema. L AVF palpable  Vascular: Palpable dp pulses bilaterally  Neurological: A&O x3  Skin: no rashes, ulcerations or lesions;  Musculoskeletal: Moving all extremities  Psychiatric: Responsive    
Transplant Surgery Multidisciplinary Progress Note  --------------------------------------------------------------  R DDRT      6/22/22       POD#3    Pt seen with the multidisciplinary transplant team of surgeon: Dr. Garcia, nephrologist: Dr. Saira Colindres and NP Liana Adair, unit RN. Rest of disciplines not in attendance to be notified of plan    HPI: 36 y/o male w/ PMH sign for HTN since age 17 and ESRD on HD since 2015 via LUE AVF, hyperthyroidism, admitted s/p R DDRT under Simulect induction, anuric    s/p DDRT to R side (1A/1V/1U no stent)  CIT 17hrs   Donor:  34 yo, KDPI 14%, COD:  Drug Intoxication, Terminal Cr:  0.79  CMV +/-      Interval Events:  -Afebrile, hypertensive. rest of VSS  - labetalol/ nifedepine started for HTN  - Reports severe bladder spasms unrelieved w/ pain medications  - Oliveira w/ hematuria  - Downtrending Sr. Cr 3.1 <- 4.6 <- 6.5        Immunosuppression   Induction: Simulect, next dose 6/26                                           Maintenance: Envarsus per level, MMF 1/1, SST  Ongoing monitoring for signs of rejection.    Potential Discharge date: this weekend  Education:  Medications  Plan of care:  See Below      MEDICATIONS  (STANDING):  chlorhexidine 2% Cloths 1 Application(s) Topical daily  dextrose 5%. 1000 milliLiter(s) (100 mL/Hr) IV Continuous <Continuous>  dextrose 5%. 1000 milliLiter(s) (50 mL/Hr) IV Continuous <Continuous>  dextrose 50% Injectable 25 Gram(s) IV Push once  dextrose 50% Injectable 12.5 Gram(s) IV Push once  dextrose 50% Injectable 25 Gram(s) IV Push once  glucagon  Injectable 1 milliGRAM(s) IntraMuscular once  influenza   Vaccine 0.5 milliLiter(s) IntraMuscular once  insulin lispro (ADMELOG) corrective regimen sliding scale   SubCutaneous three times a day before meals  labetalol 400 milliGRAM(s) Oral two times a day  methylPREDNISolone sodium succinate Injectable 30 milliGRAM(s) IV Push two times a day  mycophenolate mofetil 1000 milliGRAM(s) Oral <User Schedule>  NIFEdipine XL 30 milliGRAM(s) Oral daily  nystatin    Suspension 526911 Unit(s) Swish and Swallow four times a day  pantoprazole    Tablet 40 milliGRAM(s) Oral before breakfast  polyethylene glycol 3350 17 Gram(s) Oral daily  senna 2 Tablet(s) Oral at bedtime  tacrolimus ER Tablet (ENVARSUS XR) 10 milliGRAM(s) Oral <User Schedule>  trimethoprim   80 mG/sulfamethoxazole 400 mG 1 Tablet(s) Oral daily  valGANciclovir 450 milliGRAM(s) Oral <User Schedule>    MEDICATIONS  (PRN):  dextrose Oral Gel 15 Gram(s) Oral once PRN Blood Glucose LESS THAN 70 milliGRAM(s)/deciliter  ondansetron Injectable 4 milliGRAM(s) IV Push every 6 hours PRN Nausea and/or Vomiting  phenazopyridine 100 milliGRAM(s) Oral once PRN bladder spasms  simethicone 80 milliGRAM(s) Chew three times a day PRN Heartburn  traMADol 25 milliGRAM(s) Oral every 4 hours PRN Mild Pain (1 - 3)  traMADol 50 milliGRAM(s) Oral every 4 hours PRN Moderate Pain (4 - 6)      PAST MEDICAL & SURGICAL HISTORY:  ESRD on hemodialysis  at home 5 x week      HTN (hypertension)      ESRD (end stage renal disease) on dialysis      HTN (hypertension)      Bell&#x27;s palsy      Hyperthyroidism      AV fistula  L forearm      Elective surgical procedure  RACW permacath for HD, removed 5 years ago          Vital Signs Last 24 Hrs  T(C): 36.6 (25 Jun 2022 12:06), Max: 37 (24 Jun 2022 22:23)  T(F): 97.9 (25 Jun 2022 12:06), Max: 98.6 (24 Jun 2022 22:23)  HR: 94 (25 Jun 2022 12:06) (74 - 126)  BP: 150/69 (25 Jun 2022 12:06) (136/87 - 192/105)  BP(mean): 99 (25 Jun 2022 12:06) (99 - 143)  RR: 20 (25 Jun 2022 12:06) (17 - 25)  SpO2: 98% (25 Jun 2022 12:06) (95% - 100%)    I&O's Summary    24 Jun 2022 07:01  -  25 Jun 2022 07:00  --------------------------------------------------------  IN: 2050 mL / OUT: 1778 mL / NET: 272 mL    25 Jun 2022 07:01  -  25 Jun 2022 13:05  --------------------------------------------------------  IN: 360 mL / OUT: 435 mL / NET: -75 mL          LABS:                        8.8    13.69 )-----------( 229      ( 25 Jun 2022 06:49 )             27.7     06-25    135  |  102  |  40<H>  ----------------------------<  108<H>  4.6   |  21<L>  |  3.13<H>    Ca    9.5      25 Jun 2022 06:48  Phos  3.6     06-25  Mg     2.7     06-25    TPro  6.0  /  Alb  4.0  /  TBili  0.3  /  DBili  x   /  AST  25  /  ALT  14  /  AlkPhos  53  06-25        CAPILLARY BLOOD GLUCOSE      POCT Blood Glucose.: 113 mg/dL (25 Jun 2022 08:35)  POCT Blood Glucose.: 111 mg/dL (24 Jun 2022 21:55)  POCT Blood Glucose.: 85 mg/dL (24 Jun 2022 17:30)    LIVER FUNCTIONS - ( 25 Jun 2022 06:48 )  Alb: 4.0 g/dL / Pro: 6.0 g/dL / ALK PHOS: 53 U/L / ALT: 14 U/L / AST: 25 U/L / GGT: x             Tacrolimus (), Serum: 4.0 ng/mL (06-24 @ 06:24)      Cultures:        Review of systems  Gen: No weight changes, fatigue, fevers/chills, weakness  Skin: No rashes  Head/Eyes/Ears/Mouth: No headache; Normal hearing; Normal vision w/o blurriness; No sinus pain/discomfort, sore throat  Respiratory: No dyspnea, cough, wheezing, hemoptysis  CV: No chest pain, PND, orthopnea  GI Pos abdominal pain at surgical incision site/ flank pain; denies diarrhea,  nausea, vomiting, melena, hematochezia, +constipation  : Pos sensation to void, No increased frequency, dysuria, hematuria, nocturia  MSK: No joint pain/swelling; no back pain; no edema  Neuro: No dizziness/lightheadedness, weakness, seizures, numbness, tingling  Heme: No easy bruising or bleeding  Endo: No heat/cold intolerance  Psych: No significant nervousness, anxiety, stress, depression  All other systems were reviewed and are negative, except as noted.      PHYSICAL EXAM:  Constitutional: Well developed / well nourished  Eyes: Anicteric, PERRLA  ENMT: nc/at  Neck: supple  Respiratory: CTA B/L  Cardiovascular: RRR  Gastrointestinal: Soft, non distended, mild tenderness at the incision site; RLQ incision with some ss drainage, no fluctuance. ecchymosis stable. pos bloody drainage.    Genitourinary: Urinary catheter in place, good UO, hematuria  Extremities: SCD's in place and working bilaterally, no edema. L AVF palpable  Vascular: Palpable dp pulses bilaterally  Neurological: A&O x3  Skin: no rashes, ulcerations or lesions;  Musculoskeletal: Moving all extremities  Psychiatric: Responsive    
[facility] DIVISION OF KIDNEY DISEASES AND HYPERTENSION -- FOLLOW UP NOTE  --------------------------------------------------------------------------------  HPI:  Pt. is a 37 y.o. M w/ PMHx of HTN (since age 17), and ESRD (on home HD since 2015) via LUE AVF, hyperthyroidism, now admitted for renal transplantation. cPRA 0%, CMV-/ EBV+. Pt. is POD #1 from DDRT (1a, 1v, 1u- no stent)- Simulect induction, CIT 17 hours     Donor:  Age:  34 yo, KDPI 14%, COD:  Drug Intoxication, Terminal Cr:  0.79  CMV- positive EBV-positive  HepBcAb- negative  Hepatitis C-JAKE- negative  Hepatitis C ab- negative    Pt. seen this AM. Pt. with some slight oozing from dressing site. No pain on urination. Potentially for discharge today. Hgb. stable in the low 7 range.     PAST HISTORY  --------------------------------------------------------------------------------  No significant changes to PMH, PSH, FHx, SHx, unless otherwise noted    ALLERGIES & MEDICATIONS  --------------------------------------------------------------------------------  Allergies    No Known Drug Allergies  topical cleanser for dialysis access.   Exsept (Rash)    Intolerances      Standing Inpatient Medications  chlorhexidine 2% Cloths 1 Application(s) Topical daily  dextrose 5%. 1000 milliLiter(s) IV Continuous <Continuous>  dextrose 5%. 1000 milliLiter(s) IV Continuous <Continuous>  dextrose 50% Injectable 25 Gram(s) IV Push once  dextrose 50% Injectable 12.5 Gram(s) IV Push once  dextrose 50% Injectable 25 Gram(s) IV Push once  glucagon  Injectable 1 milliGRAM(s) IntraMuscular once  influenza   Vaccine 0.5 milliLiter(s) IntraMuscular once  insulin lispro (ADMELOG) corrective regimen sliding scale   SubCutaneous three times a day before meals  labetalol 400 milliGRAM(s) Oral two times a day  mycophenolate mofetil 1000 milliGRAM(s) Oral <User Schedule>  NIFEdipine XL 30 milliGRAM(s) Oral daily  nystatin    Suspension 990464 Unit(s) Swish and Swallow four times a day  pantoprazole    Tablet 40 milliGRAM(s) Oral every 12 hours  polyethylene glycol 3350 17 Gram(s) Oral daily  predniSONE   Tablet 10 milliGRAM(s) Oral two times a day  senna 2 Tablet(s) Oral at bedtime  tacrolimus ER Tablet (ENVARSUS XR) 10 milliGRAM(s) Oral <User Schedule>  trimethoprim   80 mG/sulfamethoxazole 400 mG 1 Tablet(s) Oral daily  valGANciclovir 450 milliGRAM(s) Oral <User Schedule>    PRN Inpatient Medications  aluminum hydroxide/magnesium hydroxide/simethicone Suspension 30 milliLiter(s) Oral every 6 hours PRN  dextrose Oral Gel 15 Gram(s) Oral once PRN  ondansetron Injectable 4 milliGRAM(s) IV Push every 6 hours PRN  simethicone 80 milliGRAM(s) Chew three times a day PRN  traMADol 25 milliGRAM(s) Oral every 4 hours PRN  traMADol 50 milliGRAM(s) Oral every 4 hours PRN      REVIEW OF SYSTEMS  --------------------------------------------------------------------------------  Gen: No fevers/chills   Respiratory: No dyspnea, cough,   CV: No chest pain, PND, orthopnea  GI: Some abdominal discomfort, Constipated. No diarrhea, nausea, or vomiting  Transplant: No pain  : Oliveira+  MSK: No edema  Neuro: No dizziness/lightheadedness    All other systems were reviewed and are negative, except as noted.    VITALS/PHYSICAL EXAM  --------------------------------------------------------------------------------  T(C): 37.7 (06-27-22 @ 13:02), Max: 37.7 (06-27-22 @ 13:02)  HR: 87 (06-27-22 @ 13:02) (73 - 94)  BP: 132/79 (06-27-22 @ 13:02) (119/65 - 145/74)  RR: 20 (06-27-22 @ 13:02) (18 - 20)  SpO2: 100% (06-27-22 @ 13:02) (100% - 100%)  Wt(kg): --        06-26-22 @ 07:01  -  06-27-22 @ 07:00  --------------------------------------------------------  IN: 1380 mL / OUT: 3350 mL / NET: -1970 mL    06-27-22 @ 07:01  -  06-27-22 @ 13:17  --------------------------------------------------------  IN: 460 mL / OUT: 460 mL / NET: 0 mL    Physical Exam:  	Gen: Not in distress, well-appearing  	HEENT: no scleral icterus, moist oral mucosa. No thrush. Supple neck, no JVD  	Pulm: normal respiratory effort, lungs clear to auscultation bilaterally   	CV: regular rate and rhythm, S1 and S2 normal, no murmur   	Abd: normoactive bowel sounds, soft and non distended abdomen. No guarding or rigidity, Oliveira+                   Transplant; non tender, some oozing of blood from incision site   	: No suprapubic tenderness                Extremities: No edema. Distal pulses 2+ bilaterally           Skin: warm no rash, no cyanosis   	Neuro: Alert and oriented to person, place and time. Normal speech. Normal affect.     LABS/STUDIES  --------------------------------------------------------------------------------              7.5    13.99 >-----------<  259      [06-27-22 @ 11:50]              23.8     136  |  105  |  24  ----------------------------<  108      [06-27-22 @ 06:22]  5.0   |  21  |  2.04        Ca     9.0     [06-27-22 @ 06:22]      Mg     2.2     [06-27-22 @ 06:22]      Phos  1.4     [06-27-22 @ 06:22]    TPro  5.9  /  Alb  3.6  /  TBili  0.4  /  DBili  x   /  AST  19  /  ALT  15  /  AlkPhos  54  [06-27-22 @ 06:22]    Creatinine Trend:  SCr 2.04 [06-27 @ 06:22]  SCr 2.35 [06-26 @ 06:29]  SCr 3.13 [06-25 @ 06:48]  SCr 4.60 [06-24 @ 06:24]  SCr 6.54 [06-23 @ 13:46]    Tacrolimus (), Serum: 10.0 ng/mL (06-26 @ 06:32)  Tacrolimus (), Serum: 5.8 ng/mL (06-25 @ 06:49)  Tacrolimus (), Serum: 4.0 ng/mL (06-24 @ 06:24)  Tacrolimus (), Serum: 11.8 ng/mL (06-23 @ 06:57)                Iron 32, TIBC 199, %sat 16      [07-14-21 @ 06:16]  Ferritin 581      [07-14-21 @ 06:16]  PTH -- (Ca --)      [07-11-21 @ 03:26]   434  TSH 2.23      [07-01-21 @ 06:51]    HBsAb 2170.8      [06-22-22 @ 04:57]  HBsAg Nonreact      [06-22-22 @ 04:57]  HBcAb Nonreact      [06-22-22 @ 04:57]  HCV 0.14, Nonreact      [06-22-22 @ 04:57]  HIV Nonreact      [06-22-22 @ 04:57]

## 2022-06-27 NOTE — PHYSICAL THERAPY INITIAL EVALUATION ADULT - PERTINENT HX OF CURRENT PROBLEM, REHAB EVAL
36 y/o male w/ PMH sign for HTN since age 17 and ESRD on HD since 2015 via LUE AVF, hyperthyroidism,  admitted on 6/21 for potential DDRT. Pt is now s/p DDRT to R side on 6/22.

## 2022-06-27 NOTE — PHYSICAL THERAPY INITIAL EVALUATION ADULT - ADDITIONAL COMMENTS
Pt was independent with all ADLs prior to admission, lives in a private home with his mother, 4 steps to enter with kamran handrail and no steps inside the house.

## 2022-06-27 NOTE — PHYSICAL THERAPY INITIAL EVALUATION ADULT - PLANNED THERAPY INTERVENTIONS, PT EVAL
GOAL: Pt will negotiate up/down 4 steps with kamran handrail ascending independently in 2 weeks/balance training/bed mobility training/gait training/strengthening/transfer training

## 2022-06-27 NOTE — DISCHARGE NOTE NURSING/CASE MANAGEMENT/SOCIAL WORK - NSDCPEFALRISK_GEN_ALL_CORE
For information on Fall & Injury Prevention, visit: [email or web address] OR  [email or web address] OR  https://www.cdc.gov/steadi/patient.html

## 2022-06-29 ENCOUNTER — LABORATORY RESULT (OUTPATIENT)
Age: 37
End: 2022-06-29

## 2022-06-29 ENCOUNTER — APPOINTMENT (OUTPATIENT)
Dept: NEPHROLOGY | Facility: CLINIC | Age: 37
End: 2022-06-29

## 2022-06-29 VITALS
HEIGHT: 71 IN | DIASTOLIC BLOOD PRESSURE: 90 MMHG | WEIGHT: 174 LBS | HEART RATE: 89 BPM | BODY MASS INDEX: 24.36 KG/M2 | OXYGEN SATURATION: 100 % | RESPIRATION RATE: 16 BRPM | TEMPERATURE: 97.6 F | SYSTOLIC BLOOD PRESSURE: 148 MMHG

## 2022-06-29 LAB
ALBUMIN SERPL ELPH-MCNC: 4.2 G/DL
ALP BLD-CCNC: 64 U/L
ALT SERPL-CCNC: 31 U/L
ANION GAP SERPL CALC-SCNC: 10 MMOL/L
APPEARANCE: ABNORMAL
AST SERPL-CCNC: 26 U/L
BACTERIA: NEGATIVE
BILIRUB SERPL-MCNC: 0.7 MG/DL
BILIRUBIN URINE: NEGATIVE
BLOOD URINE: ABNORMAL
BUN SERPL-MCNC: 27 MG/DL
CALCIUM SERPL-MCNC: 9.8 MG/DL
CHLORIDE SERPL-SCNC: 105 MMOL/L
CO2 SERPL-SCNC: 22 MMOL/L
COLOR: ABNORMAL
CREAT SERPL-MCNC: 2.31 MG/DL
CREAT SPEC-SCNC: 169 MG/DL
CREAT/PROT UR: 2.1 RATIO
EGFR: 36 ML/MIN/1.73M2
GLUCOSE QUALITATIVE U: NEGATIVE
GLUCOSE SERPL-MCNC: 101 MG/DL
HYALINE CASTS: 5 /LPF
KETONES URINE: NEGATIVE
LDH SERPL-CCNC: 252 U/L
LEUKOCYTE ESTERASE URINE: ABNORMAL
MAGNESIUM SERPL-MCNC: 1.7 MG/DL
MICROSCOPIC-UA: NORMAL
NITRITE URINE: NEGATIVE
PH URINE: 6.5
PHOSPHATE SERPL-MCNC: 1.8 MG/DL
POTASSIUM SERPL-SCNC: 5.8 MMOL/L
PROT SERPL-MCNC: 6.3 G/DL
PROT UR-MCNC: 352 MG/DL
PROTEIN URINE: ABNORMAL
RED BLOOD CELLS URINE: 606 /HPF
SODIUM SERPL-SCNC: 137 MMOL/L
SPECIFIC GRAVITY URINE: 1.02
SQUAMOUS EPITHELIAL CELLS: 0 /HPF
TACROLIMUS SERPL-MCNC: 10.4 NG/ML
URATE SERPL-MCNC: 3.9 MG/DL
UROBILINOGEN URINE: NORMAL
WHITE BLOOD CELLS URINE: 111 /HPF

## 2022-06-29 PROCEDURE — 99214 OFFICE O/P EST MOD 30 MIN: CPT

## 2022-06-29 NOTE — PLAN
[FreeTextEntry1] : 37 year old male with HTN , ESRD was on home HD since 2015 admitted s/p DDRT\par (1a,1v,1u- no stent) with Simulect induction on 6/22/22.\par Donor: 37 year old, KDPI 14%, COD: Drug Intoxication, Terminal Cr: 0.79, CIT 17hrs. CMV +/-\par \par 1. s/p DDRT on 6/22/22- Allograft function is excellent \par 2. IS meds- Simulect induction. Tac dosing for goal 8-10, Cellcept 1gm po bid and Prednisone 5 mg po daily . Ppx with bactrim, nystatin and valcyte 900 mg po daily (CMV +/+)\par 3. HTN- on Labetalol 400 mg po bid and Nifedipine 30 mg po bid\par 4, Anemia- on Procrit 10K units weekly.\par \par

## 2022-06-29 NOTE — HISTORY OF PRESENT ILLNESS
[FreeTextEntry1] : 37 year old male with HTN, Hyperthyroidism and ESRD on HD since 2015 s/p R DDRT on 6/22/22 (1A/1V/1U no stent) presents for a follow up visit. \par \par Donor: 36 yo, KDPI 14%, COD: Drug Intoxication, Terminal Cr: 0.79, CIT 17hrs\par CMV +/-\par \par Denies any fever, chills, dysuria, LE edema, cough, sob, chest pain , nausea, vomiting , diarrhea, constipation or any other active complaints. \par UOP is excellent\par BP is wnl

## 2022-06-30 LAB
BASOPHILS # BLD AUTO: 0.09 K/UL
BASOPHILS NFR BLD AUTO: 0.6 %
EOSINOPHIL # BLD AUTO: 0.33 K/UL
EOSINOPHIL NFR BLD AUTO: 2.2 %
HCT VFR BLD CALC: 25.5 %
HGB BLD-MCNC: 7.9 G/DL
IMM GRANULOCYTES NFR BLD AUTO: 1.7 %
LYMPHOCYTES # BLD AUTO: 1.41 K/UL
LYMPHOCYTES NFR BLD AUTO: 9.5 %
MAN DIFF?: NORMAL
MCHC RBC-ENTMCNC: 24.9 PG
MCHC RBC-ENTMCNC: 31 GM/DL
MCV RBC AUTO: 80.4 FL
MONOCYTES # BLD AUTO: 1.99 K/UL
MONOCYTES NFR BLD AUTO: 13.4 %
NEUTROPHILS # BLD AUTO: 10.83 K/UL
NEUTROPHILS NFR BLD AUTO: 72.6 %
PLATELET # BLD AUTO: 363 K/UL
RBC # BLD: 3.17 M/UL
RBC # FLD: 16.6 %
WBC # FLD AUTO: 14.9 K/UL

## 2022-07-06 ENCOUNTER — APPOINTMENT (OUTPATIENT)
Dept: TRANSPLANT | Facility: CLINIC | Age: 37
End: 2022-07-06

## 2022-07-06 VITALS
BODY MASS INDEX: 23.56 KG/M2 | RESPIRATION RATE: 16 BRPM | WEIGHT: 168.31 LBS | TEMPERATURE: 97 F | HEIGHT: 71 IN | OXYGEN SATURATION: 100 % | HEART RATE: 98 BPM | DIASTOLIC BLOOD PRESSURE: 95 MMHG | SYSTOLIC BLOOD PRESSURE: 138 MMHG

## 2022-07-06 DIAGNOSIS — Z01.818 ENCOUNTER FOR OTHER PREPROCEDURAL EXAMINATION: ICD-10-CM

## 2022-07-06 PROCEDURE — 99214 OFFICE O/P EST MOD 30 MIN: CPT | Mod: 24

## 2022-07-06 RX ORDER — NIFEDIPINE 30 MG/1
30 TABLET, FILM COATED, EXTENDED RELEASE ORAL
Qty: 30 | Refills: 5 | Status: COMPLETED | COMMUNITY
Start: 2022-06-23 | End: 2022-07-06

## 2022-07-06 RX ORDER — EPOETIN ALFA 10000 [IU]/ML
10000 SOLUTION INTRAVENOUS; SUBCUTANEOUS
Qty: 4 | Refills: 1 | Status: DISCONTINUED | COMMUNITY
Start: 2022-07-06 | End: 2022-07-06

## 2022-07-06 NOTE — PHYSICAL EXAM

## 2022-07-06 NOTE — PLAN
[FreeTextEntry1] : GRAFT - appears to have good function.  last Cr 2.3.  No edema.\par \par IMMUNOSUPPRESSION - tac adjust for goal 8-10, MMF 1gm BID, pred 5\par \par PPX - Valcyte (high risk dosing), bactrim, nystatin.\par \par HTN- on Labetalol 400 mg po bid, d/c Nifedipine.\par \par ANEMIA- start Procrit 10K units weekly. was not on it previously.\par \par

## 2022-07-06 NOTE — HISTORY OF PRESENT ILLNESS
[ Donor] :  donor [Basiliximab] : basiliximab [Steroids] : steroids [Positive/Negative] : Donor Positive/Recipient Negative [Terminal Creatinine: ____] : Terminal Creatinine: [unfilled] [KDPI: ____] : Kidney Donor Profile Index: [unfilled] [] : No [TextBox_7] : 6/22/22 [FreeTextEntry1] : 7/6: has had some constipation causing abd pain.  Resolved after BM.  otherwise feels well.  BP has been well controlled.

## 2022-07-07 ENCOUNTER — NON-APPOINTMENT (OUTPATIENT)
Age: 37
End: 2022-07-07

## 2022-07-07 LAB
ALBUMIN SERPL ELPH-MCNC: 4.4 G/DL
ALP BLD-CCNC: 87 U/L
ALT SERPL-CCNC: 29 U/L
ANION GAP SERPL CALC-SCNC: 16 MMOL/L
APPEARANCE: CLEAR
AST SERPL-CCNC: 27 U/L
BACTERIA: NEGATIVE
BASOPHILS # BLD AUTO: 0.19 K/UL
BASOPHILS NFR BLD AUTO: 1.3 %
BILIRUB SERPL-MCNC: 0.7 MG/DL
BILIRUBIN URINE: NEGATIVE
BLOOD URINE: ABNORMAL
BUN SERPL-MCNC: 19 MG/DL
CALCIUM SERPL-MCNC: 9.8 MG/DL
CHLORIDE SERPL-SCNC: 104 MMOL/L
CMV DNA SPEC QL NAA+PROBE: NOT DETECTED IU/ML
CMVPCR LOG: NOT DETECTED LOG10IU/ML
CO2 SERPL-SCNC: 18 MMOL/L
COLOR: YELLOW
CREAT SERPL-MCNC: 1.9 MG/DL
CREAT SPEC-SCNC: 116 MG/DL
CREAT/PROT UR: 0.5 RATIO
EGFR: 45 ML/MIN/1.73M2
EOSINOPHIL # BLD AUTO: 0.22 K/UL
EOSINOPHIL NFR BLD AUTO: 1.5 %
GLUCOSE QUALITATIVE U: NEGATIVE
GLUCOSE SERPL-MCNC: 93 MG/DL
HCT VFR BLD CALC: 28.3 %
HGB BLD-MCNC: 8.9 G/DL
HYALINE CASTS: 1 /LPF
IMM GRANULOCYTES NFR BLD AUTO: 1 %
KETONES URINE: NEGATIVE
LEUKOCYTE ESTERASE URINE: ABNORMAL
LYMPHOCYTES # BLD AUTO: 1.07 K/UL
LYMPHOCYTES NFR BLD AUTO: 7.4 %
MAGNESIUM SERPL-MCNC: 1.7 MG/DL
MAN DIFF?: NORMAL
MCHC RBC-ENTMCNC: 25 PG
MCHC RBC-ENTMCNC: 31.4 GM/DL
MCV RBC AUTO: 79.5 FL
MICROSCOPIC-UA: NORMAL
MONOCYTES # BLD AUTO: 0.81 K/UL
MONOCYTES NFR BLD AUTO: 5.6 %
NEUTROPHILS # BLD AUTO: 12.05 K/UL
NEUTROPHILS NFR BLD AUTO: 83.2 %
NITRITE URINE: NEGATIVE
PH URINE: 6
PHOSPHATE SERPL-MCNC: 2.1 MG/DL
PLATELET # BLD AUTO: 557 K/UL
POTASSIUM SERPL-SCNC: 5.3 MMOL/L
PROT SERPL-MCNC: 6.9 G/DL
PROT UR-MCNC: 61 MG/DL
PROTEIN URINE: ABNORMAL
RBC # BLD: 3.56 M/UL
RBC # FLD: 17.9 %
RED BLOOD CELLS URINE: 6 /HPF
SODIUM SERPL-SCNC: 138 MMOL/L
SPECIFIC GRAVITY URINE: 1.02
SQUAMOUS EPITHELIAL CELLS: 1 /HPF
TACROLIMUS SERPL-MCNC: <2 NG/ML
URATE SERPL-MCNC: 3.8 MG/DL
UROBILINOGEN URINE: NORMAL
WBC # FLD AUTO: 14.49 K/UL
WHITE BLOOD CELLS URINE: 19 /HPF

## 2022-07-11 ENCOUNTER — APPOINTMENT (OUTPATIENT)
Dept: NEPHROLOGY | Facility: CLINIC | Age: 37
End: 2022-07-11

## 2022-07-11 VITALS
RESPIRATION RATE: 16 BRPM | HEART RATE: 68 BPM | DIASTOLIC BLOOD PRESSURE: 86 MMHG | HEIGHT: 71 IN | OXYGEN SATURATION: 98 % | SYSTOLIC BLOOD PRESSURE: 136 MMHG | BODY MASS INDEX: 23.52 KG/M2 | WEIGHT: 168 LBS | TEMPERATURE: 97.3 F

## 2022-07-11 DIAGNOSIS — K59.09 OTHER CONSTIPATION: ICD-10-CM

## 2022-07-11 PROCEDURE — 99214 OFFICE O/P EST MOD 30 MIN: CPT

## 2022-07-11 RX ORDER — TACROLIMUS 1 MG/1
1 TABLET, EXTENDED RELEASE ORAL
Qty: 90 | Refills: 11 | Status: DISCONTINUED | COMMUNITY
Start: 2022-06-23 | End: 2022-07-11

## 2022-07-11 NOTE — HISTORY OF PRESENT ILLNESS
[FreeTextEntry1] : 37 year old male with HTN, Hyperthyroidism and ESRD on HD since 2015 s/p R DDRT on 6/22/22 (1A/1V/1U no stent) presents for a follow up visit. \par \par Donor: 34 yo, KDPI 14%, COD: Drug Intoxication, Terminal Cr: 0.79, CIT 17hrs\par CMV +/-\par \par Denies any fever, chills, dysuria, LE edema, cough, sob, chest pain , nausea, vomiting , diarrhea, constipation or any other active complaints. \par UOP is excellent\par BP is wnl \par \par Has seen surgeon.\par \par Has constipation, restarted on Senna.\par \par Now is adherent with medications (Had missed Tacrolimus on last visit)\par \par Accompanied by his aunt, JesLouise midwife

## 2022-07-11 NOTE — REASON FOR VISIT
[Follow-Up] : a follow-up visit [Family Member] : family member [FreeTextEntry1] : Post kidney transplant follow up- No acute symptoms except for frequency

## 2022-07-11 NOTE — PHYSICAL EXAM
[General Appearance - Alert] : alert [General Appearance - In No Acute Distress] : in no acute distress [Sclera] : the sclera and conjunctiva were normal [PERRL With Normal Accommodation] : pupils were equal in size, round, and reactive to light [Extraocular Movements] : extraocular movements were intact [Outer Ear] : the ears and nose were normal in appearance [Oropharynx] : the oropharynx was normal [Neck Appearance] : the appearance of the neck was normal [Neck Cervical Mass (___cm)] : no neck mass was observed [Jugular Venous Distention Increased] : there was no jugular-venous distention [Thyroid Diffuse Enlargement] : the thyroid was not enlarged [Thyroid Nodule] : there were no palpable thyroid nodules [Auscultation Breath Sounds / Voice Sounds] : lungs were clear to auscultation bilaterally [Heart Rate And Rhythm] : heart rate was normal and rhythm regular [Heart Sounds] : normal S1 and S2 [Heart Sounds Gallop] : no gallops [Murmurs] : no murmurs [Heart Sounds Pericardial Friction Rub] : no pericardial rub [Full Pulse] : the pedal pulses are present [Edema] : there was no peripheral edema [Bowel Sounds] : normal bowel sounds [Abdomen Soft] : soft [Abdomen Tenderness] : non-tender [Abdomen Mass (___ Cm)] : no abdominal mass palpated [FreeTextEntry1] : RLQ mild induration, no tenderness [Cervical Lymph Nodes Enlarged Posterior Bilaterally] : posterior cervical [Cervical Lymph Nodes Enlarged Anterior Bilaterally] : anterior cervical [Supraclavicular Lymph Nodes Enlarged Bilaterally] : supraclavicular [Axillary Lymph Nodes Enlarged Bilaterally] : axillary [Inguinal Lymph Nodes Enlarged Bilaterally] : inguinal [Abnormal Walk] : normal gait [Nail Clubbing] : no clubbing  or cyanosis of the fingernails [Musculoskeletal - Swelling] : no joint swelling seen [Motor Tone] : muscle strength and tone were normal [___ (cm) Fistula] : [unfilled] (cm) fistula [Skin Color & Pigmentation] : normal skin color and pigmentation [Skin Turgor] : normal skin turgor [] : no rash [Deep Tendon Reflexes (DTR)] : deep tendon reflexes were 2+ and symmetric [Sensation] : the sensory exam was normal to light touch and pinprick [No Focal Deficits] : no focal deficits [Oriented To Time, Place, And Person] : oriented to person, place, and time [Impaired Insight] : insight and judgment were intact [Affect] : the affect was normal [Normal] : normal

## 2022-07-11 NOTE — ASSESSMENT
[FreeTextEntry1] : 37 year old male with HTN , ESRD was on home HD since 2015 admitted s/p DDRT\par (1a,1v,1u- no stent) with Simulect induction on 6/22/22.\par Donor: 37 year old, KDPI 14%, COD: Drug Intoxication, Terminal Cr: 0.79, CIT 17hrs. CMV +/-\par \par 1. s/p DDRT on 6/22/22- Allograft function continues to be good.\par 2. IS meds- Simulect induction. Tac dosing (currently at 8 mg/d) for goal 8-10, Cellcept 1gm po bid and Prednisone 5 mg po daily . Ppx with bactrim, nystatin and valcyte 900 mg po daily (CMV +/+)\par 3. HTN- on Labetalol 400 mg po bid and Nifedipine 30 mg po bid\par 4, Anemia- on Procrit 10K units weekly. Has not taken it. Hemoglobin is stable.\par 5. Constipation: Advised to continuee Senna/colace\par \par

## 2022-07-12 ENCOUNTER — NON-APPOINTMENT (OUTPATIENT)
Age: 37
End: 2022-07-12

## 2022-07-12 LAB
ALBUMIN SERPL ELPH-MCNC: 4.2 G/DL
ALP BLD-CCNC: 87 U/L
ALT SERPL-CCNC: 29 U/L
ANION GAP SERPL CALC-SCNC: 11 MMOL/L
APPEARANCE: CLEAR
AST SERPL-CCNC: 24 U/L
BACTERIA: NEGATIVE
BASOPHILS # BLD AUTO: 0.15 K/UL
BASOPHILS NFR BLD AUTO: 1.8 %
BILIRUB SERPL-MCNC: 0.7 MG/DL
BILIRUBIN URINE: NEGATIVE
BLOOD URINE: ABNORMAL
BUN SERPL-MCNC: 22 MG/DL
CALCIUM SERPL-MCNC: 9.7 MG/DL
CHLORIDE SERPL-SCNC: 109 MMOL/L
CO2 SERPL-SCNC: 20 MMOL/L
COLOR: YELLOW
CREAT SERPL-MCNC: 1.74 MG/DL
CREAT SPEC-SCNC: 181 MG/DL
CREAT/PROT UR: 1.2 RATIO
EGFR: 51 ML/MIN/1.73M2
EOSINOPHIL # BLD AUTO: 0.28 K/UL
EOSINOPHIL NFR BLD AUTO: 3.3 %
GLUCOSE QUALITATIVE U: NEGATIVE
GLUCOSE SERPL-MCNC: 108 MG/DL
HCT VFR BLD CALC: 28.5 %
HGB BLD-MCNC: 8.8 G/DL
HYALINE CASTS: 1 /LPF
IMM GRANULOCYTES NFR BLD AUTO: 0.8 %
KETONES URINE: NEGATIVE
LEUKOCYTE ESTERASE URINE: NEGATIVE
LYMPHOCYTES # BLD AUTO: 0.91 K/UL
LYMPHOCYTES NFR BLD AUTO: 10.7 %
MAGNESIUM SERPL-MCNC: 1.4 MG/DL
MAN DIFF?: NORMAL
MCHC RBC-ENTMCNC: 25 PG
MCHC RBC-ENTMCNC: 30.9 GM/DL
MCV RBC AUTO: 81 FL
MICROSCOPIC-UA: NORMAL
MONOCYTES # BLD AUTO: 0.55 K/UL
MONOCYTES NFR BLD AUTO: 6.5 %
NEUTROPHILS # BLD AUTO: 6.52 K/UL
NEUTROPHILS NFR BLD AUTO: 76.9 %
NITRITE URINE: NEGATIVE
PH URINE: 6
PHOSPHATE SERPL-MCNC: 1.9 MG/DL
PLATELET # BLD AUTO: 396 K/UL
POTASSIUM SERPL-SCNC: 5.2 MMOL/L
PROT SERPL-MCNC: 6.5 G/DL
PROT UR-MCNC: 221 MG/DL
PROTEIN URINE: ABNORMAL
RBC # BLD: 3.52 M/UL
RBC # FLD: 18.1 %
RED BLOOD CELLS URINE: 13 /HPF
SODIUM SERPL-SCNC: 140 MMOL/L
SPECIFIC GRAVITY URINE: 1.02
SQUAMOUS EPITHELIAL CELLS: 1 /HPF
TACROLIMUS SERPL-MCNC: 4.8 NG/ML
URATE SERPL-MCNC: 4.3 MG/DL
UROBILINOGEN URINE: NORMAL
WBC # FLD AUTO: 8.48 K/UL
WHITE BLOOD CELLS URINE: 14 /HPF

## 2022-07-20 ENCOUNTER — NON-APPOINTMENT (OUTPATIENT)
Age: 37
End: 2022-07-20

## 2022-07-20 ENCOUNTER — APPOINTMENT (OUTPATIENT)
Dept: TRANSPLANT | Facility: CLINIC | Age: 37
End: 2022-07-20

## 2022-07-20 VITALS
BODY MASS INDEX: 24.22 KG/M2 | HEART RATE: 89 BPM | OXYGEN SATURATION: 100 % | TEMPERATURE: 97.6 F | WEIGHT: 173 LBS | SYSTOLIC BLOOD PRESSURE: 134 MMHG | HEIGHT: 71 IN | DIASTOLIC BLOOD PRESSURE: 84 MMHG | RESPIRATION RATE: 16 BRPM

## 2022-07-20 LAB
ALBUMIN SERPL ELPH-MCNC: 3.8 G/DL
ALP BLD-CCNC: 96 U/L
ALT SERPL-CCNC: 19 U/L
ANION GAP SERPL CALC-SCNC: 9 MMOL/L
AST SERPL-CCNC: 15 U/L
BASOPHILS # BLD AUTO: 0.09 K/UL
BASOPHILS NFR BLD AUTO: 1.6 %
BILIRUB SERPL-MCNC: 0.4 MG/DL
BUN SERPL-MCNC: 16 MG/DL
CALCIUM SERPL-MCNC: 9.2 MG/DL
CHLORIDE SERPL-SCNC: 108 MMOL/L
CO2 SERPL-SCNC: 21 MMOL/L
CREAT SERPL-MCNC: 1.66 MG/DL
CREAT SPEC-SCNC: 190 MG/DL
CREAT/PROT UR: 0.2 RATIO
EGFR: 53 ML/MIN/1.73M2
EOSINOPHIL # BLD AUTO: 0.2 K/UL
EOSINOPHIL NFR BLD AUTO: 3.5 %
GLUCOSE SERPL-MCNC: 94 MG/DL
HCT VFR BLD CALC: 28 %
HGB BLD-MCNC: 8.8 G/DL
HIV1+2 AB SPEC QL IA.RAPID: NONREACTIVE
IMM GRANULOCYTES NFR BLD AUTO: 0.5 %
LYMPHOCYTES # BLD AUTO: 0.97 K/UL
LYMPHOCYTES NFR BLD AUTO: 17.1 %
MAGNESIUM SERPL-MCNC: 1.4 MG/DL
MAN DIFF?: NORMAL
MCHC RBC-ENTMCNC: 25.7 PG
MCHC RBC-ENTMCNC: 31.4 GM/DL
MCV RBC AUTO: 81.6 FL
MONOCYTES # BLD AUTO: 0.32 K/UL
MONOCYTES NFR BLD AUTO: 5.6 %
NEUTROPHILS # BLD AUTO: 4.06 K/UL
NEUTROPHILS NFR BLD AUTO: 71.7 %
PHOSPHATE SERPL-MCNC: 1.4 MG/DL
PLATELET # BLD AUTO: 439 K/UL
POTASSIUM SERPL-SCNC: 5.3 MMOL/L
PROT SERPL-MCNC: 6.1 G/DL
PROT UR-MCNC: 36 MG/DL
RBC # BLD: 3.43 M/UL
RBC # FLD: 17.5 %
SODIUM SERPL-SCNC: 138 MMOL/L
TACROLIMUS SERPL-MCNC: 8.9 NG/ML
WBC # FLD AUTO: 5.67 K/UL

## 2022-07-20 PROCEDURE — 99214 OFFICE O/P EST MOD 30 MIN: CPT

## 2022-07-20 RX ORDER — SEVELAMER CARBONATE 800 MG/1
800 TABLET, FILM COATED ORAL
Refills: 0 | Status: DISCONTINUED | COMMUNITY
End: 2022-07-20

## 2022-07-20 RX ORDER — LORATADINE 10 MG
17 TABLET,DISINTEGRATING ORAL DAILY
Refills: 0 | Status: ACTIVE | COMMUNITY
Start: 2022-07-20

## 2022-07-20 RX ORDER — CALCITRIOL 0.5 UG/1
0.5 CAPSULE, LIQUID FILLED ORAL
Refills: 0 | Status: DISCONTINUED | COMMUNITY
Start: 2018-04-03 | End: 2022-07-20

## 2022-07-20 RX ORDER — FAMOTIDINE 20 MG/1
20 TABLET, FILM COATED ORAL
Qty: 30 | Refills: 11 | Status: DISCONTINUED | COMMUNITY
Start: 2022-06-23 | End: 2022-07-20

## 2022-07-20 RX ORDER — FAMOTIDINE 20 MG/1
20 TABLET, FILM COATED ORAL
Refills: 0 | Status: DISCONTINUED | COMMUNITY
Start: 2019-07-24 | End: 2022-07-20

## 2022-07-20 RX ORDER — SIMETHICONE 180 MG
CAPSULE ORAL
Refills: 0 | Status: DISCONTINUED | COMMUNITY
End: 2022-07-20

## 2022-07-20 RX ORDER — PSYLLIUM SEED (WITH DEXTROSE)
POWDER (GRAM) ORAL
Refills: 0 | Status: ACTIVE | COMMUNITY
Start: 2022-07-20

## 2022-07-20 RX ORDER — CINACALCET HYDROCHLORIDE 30 MG/1
30 TABLET, COATED ORAL
Refills: 0 | Status: DISCONTINUED | COMMUNITY
Start: 2019-07-24 | End: 2022-07-20

## 2022-07-20 RX ORDER — PREDNISONE 5 MG/1
5 TABLET ORAL
Qty: 42 | Refills: 0 | Status: DISCONTINUED | COMMUNITY
Start: 2022-06-23 | End: 2022-07-20

## 2022-07-20 NOTE — ASSESSMENT
[FreeTextEntry1] : post ddrt 6/22/22\par 1) allograft - funciton good, await labs today\par 2) IS - simulect, env 12mg (levels today) cellcept 1g bid, pred 5\par 3) ppx - bactrim, nystatin, valcyte\par 4) bp controlled 130-150s, on labetalol 400mg bid, nifedipine 30mg bid\par 5) anemia - procrit 20523k weekly\par 6) constipation - continued stool softeners as required\par

## 2022-07-20 NOTE — HISTORY OF PRESENT ILLNESS
[FreeTextEntry4] : Transplant Type:  donor \par Date of Surgery: 22 \par Induction Agent(s): basiliximab, steroids \par CMV Status: Donor Positive/Recipient Negative \par Ureteral Stent: No \par Donor Characteristics: \par Terminal Creatinine: 0.79 \par Kidney Donor Profile Index: 14 \par  [de-identified] : doing well\par no fevers, chills, dysuria, edema, cough\par has been constipated, but on senna, miralax, metamucil\par good activity level and diet\par urinating ~2x/h\par

## 2022-07-21 LAB
APPEARANCE: CLEAR
BACTERIA: NEGATIVE
BILIRUBIN URINE: NEGATIVE
BLOOD URINE: NEGATIVE
COLOR: YELLOW
GLUCOSE QUALITATIVE U: NEGATIVE
HBV SURFACE AG SER QL: NONREACTIVE
HCV AB SER QL: NONREACTIVE
HCV RNA SERPL NAA+PROBE-LOG IU: NOT DETECTED LOGIU/ML
HCV S/CO RATIO: 0.14 S/CO
HEPB DNA PCR INT: NOT DETECTED
HEPB DNA PCR LOG: NOT DETECTED LOGIU/ML
HEPC RNA INTERP: NOT DETECTED
HIV1 RNA # SERPL NAA+PROBE: NORMAL
HIV1 RNA # SERPL NAA+PROBE: NORMAL COPIES/ML
HYALINE CASTS: 1 /LPF
KETONES URINE: NEGATIVE
LEUKOCYTE ESTERASE URINE: ABNORMAL
MICROSCOPIC-UA: NORMAL
NITRITE URINE: NEGATIVE
PH URINE: 6
PROTEIN URINE: ABNORMAL
RED BLOOD CELLS URINE: 3 /HPF
SPECIFIC GRAVITY URINE: 1.03
SQUAMOUS EPITHELIAL CELLS: 1 /HPF
UROBILINOGEN URINE: NORMAL
VIRAL LOAD INTERP: NORMAL
VIRAL LOAD LOG: NORMAL LG COP/ML
WHITE BLOOD CELLS URINE: 11 /HPF

## 2022-07-27 ENCOUNTER — APPOINTMENT (OUTPATIENT)
Dept: NEPHROLOGY | Facility: CLINIC | Age: 37
End: 2022-07-27

## 2022-07-27 VITALS
SYSTOLIC BLOOD PRESSURE: 131 MMHG | OXYGEN SATURATION: 100 % | TEMPERATURE: 97.1 F | HEIGHT: 71 IN | BODY MASS INDEX: 23.52 KG/M2 | RESPIRATION RATE: 16 BRPM | DIASTOLIC BLOOD PRESSURE: 87 MMHG | WEIGHT: 168 LBS | HEART RATE: 78 BPM

## 2022-07-27 PROCEDURE — 99214 OFFICE O/P EST MOD 30 MIN: CPT

## 2022-07-27 NOTE — PLAN
[FreeTextEntry1] : 37 year old male with HTN , ESRD was on home HD since 2015 admitted s/p DDRT on 6/2/22 (1a,1v,1u- no stent) with Simulect induction on 6/22/22.\par Donor: 35 year old, KDPI 14%, COD: Drug Intoxication, Terminal Cr: 0.79, CIT 17hrs. CMV +/-\par \par 1. s/p DDRT on 6/22/22- Allograft function is good. most recent Cr was 1.6 \par 2. IS meds- Simulect induction. on Envarsus 12 mg po , goal 8-10, Cellcept 1 gm po bid and Prednisone 5 mg po daily . Ppx with bactrim, nystatin and valcyte 900 mg po daily (CMV +/+)\par 3. HTN- on  Nifedipine 30 mg po bid\par 4, Anemia- on Procrit 10K units weekly.\par \par

## 2022-07-27 NOTE — HISTORY OF PRESENT ILLNESS
[FreeTextEntry1] : 37 year old male with HTN, Hyperthyroidism and ESRD on HD since 2015 s/p R DDRT on 6/22/22 (1A/1V/1U no stent) presents for a follow up visit. \par \par Donor: 34 yo, KDPI 14%, COD: Drug Intoxication, Terminal Cr: 0.79, CIT 17hrs\par CMV +/-\par \par Denies any fever, chills, dysuria, LE edema, cough, sob, chest pain , nausea, vomiting , diarrhea, constipation or any other active complaints. \par UOP is excellent and BP is wnl

## 2022-07-28 ENCOUNTER — NON-APPOINTMENT (OUTPATIENT)
Age: 37
End: 2022-07-28

## 2022-07-28 LAB
ALBUMIN SERPL ELPH-MCNC: 4.2 G/DL
ALP BLD-CCNC: 110 U/L
ALT SERPL-CCNC: 14 U/L
ANION GAP SERPL CALC-SCNC: 12 MMOL/L
APPEARANCE: CLEAR
AST SERPL-CCNC: 13 U/L
BACTERIA: NEGATIVE
BASOPHILS # BLD AUTO: 0.07 K/UL
BASOPHILS NFR BLD AUTO: 1.2 %
BILIRUB SERPL-MCNC: 0.4 MG/DL
BILIRUBIN URINE: NEGATIVE
BLOOD URINE: NEGATIVE
BUN SERPL-MCNC: 17 MG/DL
CALCIUM SERPL-MCNC: 10 MG/DL
CHLORIDE SERPL-SCNC: 104 MMOL/L
CMV DNA SPEC QL NAA+PROBE: NOT DETECTED IU/ML
CMVPCR LOG: NOT DETECTED LOG10IU/ML
CO2 SERPL-SCNC: 21 MMOL/L
COLOR: YELLOW
CREAT SERPL-MCNC: 1.6 MG/DL
CREAT SPEC-SCNC: 161 MG/DL
CREAT/PROT UR: 0.2 RATIO
EGFR: 56 ML/MIN/1.73M2
EOSINOPHIL # BLD AUTO: 0.07 K/UL
EOSINOPHIL NFR BLD AUTO: 1.2 %
GLUCOSE QUALITATIVE U: NEGATIVE
GLUCOSE SERPL-MCNC: 93 MG/DL
HCT VFR BLD CALC: 32.3 %
HGB BLD-MCNC: 10.2 G/DL
HYALINE CASTS: 1 /LPF
IMM GRANULOCYTES NFR BLD AUTO: 1 %
KETONES URINE: NEGATIVE
LEUKOCYTE ESTERASE URINE: NEGATIVE
LYMPHOCYTES # BLD AUTO: 1.12 K/UL
LYMPHOCYTES NFR BLD AUTO: 18.9 %
MAGNESIUM SERPL-MCNC: 1.4 MG/DL
MAN DIFF?: NORMAL
MCHC RBC-ENTMCNC: 24.9 PG
MCHC RBC-ENTMCNC: 31.6 GM/DL
MCV RBC AUTO: 79 FL
MICROSCOPIC-UA: NORMAL
MONOCYTES # BLD AUTO: 0.36 K/UL
MONOCYTES NFR BLD AUTO: 6.1 %
NEUTROPHILS # BLD AUTO: 4.24 K/UL
NEUTROPHILS NFR BLD AUTO: 71.6 %
NITRITE URINE: NEGATIVE
PH URINE: 6.5
PHOSPHATE SERPL-MCNC: 2 MG/DL
PLATELET # BLD AUTO: 490 K/UL
POTASSIUM SERPL-SCNC: 4.8 MMOL/L
PROT SERPL-MCNC: 6.9 G/DL
PROT UR-MCNC: 28 MG/DL
PROTEIN URINE: NORMAL
RBC # BLD: 4.09 M/UL
RBC # FLD: 17 %
RED BLOOD CELLS URINE: 1 /HPF
SODIUM SERPL-SCNC: 137 MMOL/L
SPECIFIC GRAVITY URINE: 1.02
SQUAMOUS EPITHELIAL CELLS: 0 /HPF
TACROLIMUS SERPL-MCNC: 8.3 NG/ML
URATE SERPL-MCNC: 3.8 MG/DL
UROBILINOGEN URINE: NORMAL
WBC # FLD AUTO: 5.92 K/UL
WHITE BLOOD CELLS URINE: 3 /HPF

## 2022-07-29 LAB — BKV DNA SPEC QL NAA+PROBE: NOT DETECTED IU/ML

## 2022-08-10 ENCOUNTER — APPOINTMENT (OUTPATIENT)
Dept: NEPHROLOGY | Facility: CLINIC | Age: 37
End: 2022-08-10

## 2022-08-10 VITALS
SYSTOLIC BLOOD PRESSURE: 144 MMHG | HEIGHT: 71 IN | HEART RATE: 97 BPM | OXYGEN SATURATION: 100 % | TEMPERATURE: 97.2 F | BODY MASS INDEX: 22.54 KG/M2 | RESPIRATION RATE: 16 BRPM | WEIGHT: 161 LBS | DIASTOLIC BLOOD PRESSURE: 97 MMHG

## 2022-08-10 PROCEDURE — 99215 OFFICE O/P EST HI 40 MIN: CPT

## 2022-08-10 NOTE — PLAN
[FreeTextEntry1] : 37 year old male with HTN , ESRD was on home HD since 2015 admitted s/p DDRT on 6/2/22 (1a,1v,1u- no stent) with Simulect induction on 6/22/22.\par Donor: 35 year old, KDPI 14%, COD: Drug Intoxication, Terminal Cr: 0.79, CIT 17hrs. CMV +/-\par \par 1. s/p DDRT on 6/22/22- Allograft function is good. most recent Cr was 1.6 \par 2. IS meds- Simulect induction. on Envarsus 12 mg po , goal 8-10, Cellcept 1 gm po bid and Prednisone 5 mg po daily . Ppx with bactrim, nystatin and valcyte 900 mg po daily (CMV +/+)\par 3. HTN- on  Nifedipine 30 mg po bid\par 4, Anemia- Hb is stable. was on Procrit , now d/c \par \par

## 2022-08-10 NOTE — HISTORY OF PRESENT ILLNESS
[FreeTextEntry1] : 37 year old male with HTN, Hyperthyroidism and ESRD on HD since 2015 s/p R DDRT on 6/22/22 (1A/1V/1U no stent) presents for a follow up visit. \par \par Donor: 36 yo, KDPI 14%, COD: Drug Intoxication, Terminal Cr: 0.79, CIT 17hrs\par CMV +/-\par \par Denies any fever, chills, dysuria, LE edema, cough, sob, chest pain , nausea, vomiting , diarrhea, constipation or any other active complaints.  UOP is excellent and BP is wnl

## 2022-08-11 ENCOUNTER — NON-APPOINTMENT (OUTPATIENT)
Age: 37
End: 2022-08-11

## 2022-08-11 LAB
ALBUMIN SERPL ELPH-MCNC: 4.3 G/DL
ALP BLD-CCNC: 110 U/L
ALT SERPL-CCNC: 12 U/L
ANION GAP SERPL CALC-SCNC: 12 MMOL/L
APPEARANCE: CLEAR
AST SERPL-CCNC: 15 U/L
BACTERIA: NEGATIVE
BASOPHILS # BLD AUTO: 0.05 K/UL
BASOPHILS NFR BLD AUTO: 0.7 %
BILIRUB SERPL-MCNC: 0.2 MG/DL
BILIRUBIN URINE: NEGATIVE
BLOOD URINE: NEGATIVE
BUN SERPL-MCNC: 16 MG/DL
CALCIUM SERPL-MCNC: 9.8 MG/DL
CHLORIDE SERPL-SCNC: 103 MMOL/L
CMV DNA SPEC QL NAA+PROBE: NOT DETECTED IU/ML
CMVPCR LOG: NOT DETECTED LOG10IU/ML
CO2 SERPL-SCNC: 21 MMOL/L
COLOR: NORMAL
CREAT SERPL-MCNC: 1.6 MG/DL
CREAT SPEC-SCNC: 97 MG/DL
CREAT/PROT UR: 0.2 RATIO
EGFR: 56 ML/MIN/1.73M2
EOSINOPHIL # BLD AUTO: 0.07 K/UL
EOSINOPHIL NFR BLD AUTO: 0.9 %
GLUCOSE QUALITATIVE U: NEGATIVE
GLUCOSE SERPL-MCNC: 89 MG/DL
HCT VFR BLD CALC: 33.4 %
HGB BLD-MCNC: 10.4 G/DL
HYALINE CASTS: 0 /LPF
IMM GRANULOCYTES NFR BLD AUTO: 1.3 %
KETONES URINE: NEGATIVE
LEUKOCYTE ESTERASE URINE: NEGATIVE
LYMPHOCYTES # BLD AUTO: 1.24 K/UL
LYMPHOCYTES NFR BLD AUTO: 16.2 %
MAGNESIUM SERPL-MCNC: 1.6 MG/DL
MAN DIFF?: NORMAL
MCHC RBC-ENTMCNC: 24.8 PG
MCHC RBC-ENTMCNC: 31.1 GM/DL
MCV RBC AUTO: 79.5 FL
MICROSCOPIC-UA: NORMAL
MONOCYTES # BLD AUTO: 0.48 K/UL
MONOCYTES NFR BLD AUTO: 6.3 %
NEUTROPHILS # BLD AUTO: 5.73 K/UL
NEUTROPHILS NFR BLD AUTO: 74.6 %
NITRITE URINE: NEGATIVE
PH URINE: 6.5
PHOSPHATE SERPL-MCNC: 2.2 MG/DL
PLATELET # BLD AUTO: 444 K/UL
POTASSIUM SERPL-SCNC: 4.3 MMOL/L
PROT SERPL-MCNC: 6.9 G/DL
PROT UR-MCNC: 20 MG/DL
PROTEIN URINE: NORMAL
RBC # BLD: 4.2 M/UL
RBC # FLD: 16 %
RED BLOOD CELLS URINE: 1 /HPF
SODIUM SERPL-SCNC: 137 MMOL/L
SPECIFIC GRAVITY URINE: 1.02
SQUAMOUS EPITHELIAL CELLS: 0 /HPF
TACROLIMUS SERPL-MCNC: 8 NG/ML
URATE SERPL-MCNC: 4.1 MG/DL
UROBILINOGEN URINE: NORMAL
WBC # FLD AUTO: 7.67 K/UL
WHITE BLOOD CELLS URINE: 1 /HPF

## 2022-08-12 LAB — BKV DNA SPEC QL NAA+PROBE: NOT DETECTED IU/ML

## 2022-09-14 ENCOUNTER — APPOINTMENT (OUTPATIENT)
Dept: NEPHROLOGY | Facility: CLINIC | Age: 37
End: 2022-09-14

## 2022-09-14 VITALS
BODY MASS INDEX: 24.5 KG/M2 | DIASTOLIC BLOOD PRESSURE: 92 MMHG | HEIGHT: 71 IN | RESPIRATION RATE: 17 BRPM | WEIGHT: 175 LBS | OXYGEN SATURATION: 100 % | TEMPERATURE: 97.1 F | SYSTOLIC BLOOD PRESSURE: 143 MMHG | HEART RATE: 102 BPM

## 2022-09-14 PROCEDURE — 99214 OFFICE O/P EST MOD 30 MIN: CPT

## 2022-09-14 NOTE — PLAN
[FreeTextEntry1] : 37 year old male with HTN , ESRD was on home HD since 2015 admitted s/p DDRT on 6/2/22 (1a,1v,1u- no stent) with Simulect induction on 6/22/22.\par Donor: 35 year old, KDPI 14%, COD: Drug Intoxication, Terminal Cr: 0.79, CIT 17hrs. CMV +/-\par \par 1. s/p DDRT on 6/22/22- Allograft function is good. most recent Cr was 1.6 \par 2. IS meds- Simulect induction. on Envarsus 16 mg po , goal 8-10, Cellcept 1 gm po bid and Prednisone 5 mg po daily . Ppx with bactrim, nystatin and valcyte 900 mg po daily (CMV +/-)\par 3. HTN- on  Nifedipine 30 mg po bid\par 4, Anemia- Hb is stable. was on Procrit , now d/c \par \par

## 2022-09-14 NOTE — HISTORY OF PRESENT ILLNESS
[FreeTextEntry1] : 37 year old male with HTN, Hyperthyroidism and ESRD on HD since 2015 s/p R DDRT on 6/22/22 (1A/1V/1U no stent) presents for a follow up visit. \par \par Donor: 34 yo, KDPI 14%, COD: Drug Intoxication, Terminal Cr: 0.79, CIT 17hrs\par  CMV +/-\par \par Denies any fever, chills, dysuria, LE edema, cough, sob, chest pain , nausea, vomiting , diarrhea, constipation or any other active complaints.  UOP is excellent and BP is wnl

## 2022-09-15 ENCOUNTER — NON-APPOINTMENT (OUTPATIENT)
Age: 37
End: 2022-09-15

## 2022-09-15 LAB
ALBUMIN SERPL ELPH-MCNC: 4.6 G/DL
ALP BLD-CCNC: 103 U/L
ALT SERPL-CCNC: 14 U/L
ANION GAP SERPL CALC-SCNC: 13 MMOL/L
APPEARANCE: CLEAR
AST SERPL-CCNC: 13 U/L
BACTERIA: NEGATIVE
BASOPHILS # BLD AUTO: 0.05 K/UL
BASOPHILS NFR BLD AUTO: 0.8 %
BILIRUB SERPL-MCNC: 0.2 MG/DL
BILIRUBIN URINE: NEGATIVE
BLOOD URINE: NEGATIVE
BUN SERPL-MCNC: 20 MG/DL
CALCIUM SERPL-MCNC: 10 MG/DL
CHLORIDE SERPL-SCNC: 105 MMOL/L
CO2 SERPL-SCNC: 22 MMOL/L
COLOR: YELLOW
CREAT SERPL-MCNC: 1.83 MG/DL
CREAT SPEC-SCNC: 195 MG/DL
CREAT/PROT UR: 0.2 RATIO
EGFR: 48 ML/MIN/1.73M2
EOSINOPHIL # BLD AUTO: 0.08 K/UL
EOSINOPHIL NFR BLD AUTO: 1.3 %
GLUCOSE QUALITATIVE U: NEGATIVE
GLUCOSE SERPL-MCNC: 97 MG/DL
HCT VFR BLD CALC: 39.2 %
HGB BLD-MCNC: 11.6 G/DL
HYALINE CASTS: 0 /LPF
IMM GRANULOCYTES NFR BLD AUTO: 1.5 %
KETONES URINE: NEGATIVE
LEUKOCYTE ESTERASE URINE: NEGATIVE
LYMPHOCYTES # BLD AUTO: 1.17 K/UL
LYMPHOCYTES NFR BLD AUTO: 19.7 %
MAGNESIUM SERPL-MCNC: 1.7 MG/DL
MAN DIFF?: NORMAL
MCHC RBC-ENTMCNC: 23.8 PG
MCHC RBC-ENTMCNC: 29.6 GM/DL
MCV RBC AUTO: 80.5 FL
MICROSCOPIC-UA: NORMAL
MONOCYTES # BLD AUTO: 0.43 K/UL
MONOCYTES NFR BLD AUTO: 7.3 %
NEUTROPHILS # BLD AUTO: 4.11 K/UL
NEUTROPHILS NFR BLD AUTO: 69.4 %
NITRITE URINE: NEGATIVE
PH URINE: 6.5
PHOSPHATE SERPL-MCNC: 2.8 MG/DL
PLATELET # BLD AUTO: 312 K/UL
POTASSIUM SERPL-SCNC: 4.8 MMOL/L
PROT SERPL-MCNC: 7 G/DL
PROT UR-MCNC: 33 MG/DL
PROTEIN URINE: ABNORMAL
RBC # BLD: 4.87 M/UL
RBC # FLD: 15.3 %
RED BLOOD CELLS URINE: 1 /HPF
SODIUM SERPL-SCNC: 140 MMOL/L
SPECIFIC GRAVITY URINE: 1.02
SQUAMOUS EPITHELIAL CELLS: 0 /HPF
TACROLIMUS SERPL-MCNC: 13.2 NG/ML
URATE SERPL-MCNC: 5.3 MG/DL
UROBILINOGEN URINE: NORMAL
WBC # FLD AUTO: 5.93 K/UL
WHITE BLOOD CELLS URINE: 2 /HPF

## 2022-09-19 LAB — BKV DNA SPEC QL NAA+PROBE: NOT DETECTED IU/ML

## 2022-09-29 ENCOUNTER — LABORATORY RESULT (OUTPATIENT)
Age: 37
End: 2022-09-29

## 2022-09-29 ENCOUNTER — APPOINTMENT (OUTPATIENT)
Dept: NEPHROLOGY | Facility: CLINIC | Age: 37
End: 2022-09-29
Payer: MEDICARE

## 2022-09-29 VITALS
OXYGEN SATURATION: 100 % | SYSTOLIC BLOOD PRESSURE: 144 MMHG | HEART RATE: 88 BPM | BODY MASS INDEX: 24.08 KG/M2 | TEMPERATURE: 97.3 F | RESPIRATION RATE: 16 BRPM | WEIGHT: 172 LBS | HEIGHT: 71 IN | DIASTOLIC BLOOD PRESSURE: 98 MMHG

## 2022-09-29 PROCEDURE — 99214 OFFICE O/P EST MOD 30 MIN: CPT

## 2022-09-29 NOTE — PLAN
[FreeTextEntry1] : 37 year old male with HTN , ESRD was on home HD since 2015 admitted s/p DDRT on 6/2/22 (1a,1v,1u- no stent) with Simulect induction on 6/22/22.\par Donor: 35 year old, KDPI 14%, COD: Drug Intoxication, Terminal Cr: 0.79, CIT 17 hrs. CMV +/-\par \par 1. s/p DDRT on 6/22/22- Allograft function is good with baseline Cr ~ 1.6 . Cr was 1.8 last visit likely sec to high tac level. will f/u labs today. \par 2. IS meds- Simulect induction. on Envarsus 14 mg po , goal 8-10, Cellcept 1 gm po bid and Prednisone 5 mg po daily . Ppx with bactrim, nystatin and valcyte 900 mg po daily (CMV +/-)\par 3. HTN- on  Nifedipine 30 mg po bid\par 4, Anemia- Hb is stable. was on Procrit , now d/c \par \par

## 2022-09-30 ENCOUNTER — NON-APPOINTMENT (OUTPATIENT)
Age: 37
End: 2022-09-30

## 2022-09-30 LAB
ALBUMIN SERPL ELPH-MCNC: 4.9 G/DL
ALP BLD-CCNC: 110 U/L
ALT SERPL-CCNC: 13 U/L
ANION GAP SERPL CALC-SCNC: 13 MMOL/L
APPEARANCE: CLEAR
AST SERPL-CCNC: 19 U/L
BACTERIA: NEGATIVE
BASOPHILS # BLD AUTO: 0.04 K/UL
BASOPHILS NFR BLD AUTO: 0.9 %
BILIRUB SERPL-MCNC: 0.3 MG/DL
BILIRUBIN URINE: NEGATIVE
BLOOD URINE: NEGATIVE
BUN SERPL-MCNC: 20 MG/DL
CALCIUM SERPL-MCNC: 9.8 MG/DL
CHLORIDE SERPL-SCNC: 105 MMOL/L
CMV DNA SPEC QL NAA+PROBE: NOT DETECTED IU/ML
CMVPCR LOG: NOT DETECTED LOG10IU/ML
CO2 SERPL-SCNC: 21 MMOL/L
COLOR: YELLOW
CREAT SERPL-MCNC: 1.91 MG/DL
CREAT SPEC-SCNC: 273 MG/DL
CREAT/PROT UR: 0.2 RATIO
EGFR: 45 ML/MIN/1.73M2
EOSINOPHIL # BLD AUTO: 0.08 K/UL
EOSINOPHIL NFR BLD AUTO: 1.7 %
GLUCOSE QUALITATIVE U: NEGATIVE
GLUCOSE SERPL-MCNC: 86 MG/DL
HCT VFR BLD CALC: 42.8 %
HGB BLD-MCNC: 12.8 G/DL
HYALINE CASTS: 2 /LPF
KETONES URINE: NEGATIVE
LEUKOCYTE ESTERASE URINE: NEGATIVE
LYMPHOCYTES # BLD AUTO: 1.09 K/UL
LYMPHOCYTES NFR BLD AUTO: 22.6 %
MAGNESIUM SERPL-MCNC: 1.5 MG/DL
MAN DIFF?: NORMAL
MCHC RBC-ENTMCNC: 23.9 PG
MCHC RBC-ENTMCNC: 29.9 GM/DL
MCV RBC AUTO: 80 FL
MICROSCOPIC-UA: NORMAL
MONOCYTES # BLD AUTO: 0.29 K/UL
MONOCYTES NFR BLD AUTO: 6.1 %
NEUTROPHILS # BLD AUTO: 3.31 K/UL
NEUTROPHILS NFR BLD AUTO: 61.7 %
NITRITE URINE: NEGATIVE
PH URINE: 6
PHOSPHATE SERPL-MCNC: 2.5 MG/DL
PLATELET # BLD AUTO: 308 K/UL
POTASSIUM SERPL-SCNC: 4.1 MMOL/L
PROT SERPL-MCNC: 7 G/DL
PROT UR-MCNC: 54 MG/DL
PROTEIN URINE: ABNORMAL
RBC # BLD: 5.35 M/UL
RBC # FLD: 14.7 %
RED BLOOD CELLS URINE: 1 /HPF
SODIUM SERPL-SCNC: 139 MMOL/L
SPECIFIC GRAVITY URINE: 1.03
SQUAMOUS EPITHELIAL CELLS: 1 /HPF
TACROLIMUS SERPL-MCNC: 14.5 NG/ML
URATE SERPL-MCNC: 5.5 MG/DL
UROBILINOGEN URINE: NORMAL
WBC # FLD AUTO: 4.82 K/UL
WHITE BLOOD CELLS URINE: 3 /HPF

## 2022-10-03 LAB — BKV DNA SPEC QL NAA+PROBE: NOT DETECTED IU/ML

## 2022-10-05 ENCOUNTER — APPOINTMENT (OUTPATIENT)
Dept: TRANSPLANT | Facility: CLINIC | Age: 37
End: 2022-10-05

## 2022-11-01 ENCOUNTER — APPOINTMENT (OUTPATIENT)
Dept: NEPHROLOGY | Facility: CLINIC | Age: 37
End: 2022-11-01

## 2022-11-01 ENCOUNTER — LABORATORY RESULT (OUTPATIENT)
Age: 37
End: 2022-11-01

## 2022-11-01 VITALS
DIASTOLIC BLOOD PRESSURE: 91 MMHG | BODY MASS INDEX: 24.92 KG/M2 | TEMPERATURE: 96.9 F | RESPIRATION RATE: 17 BRPM | HEIGHT: 71 IN | WEIGHT: 178 LBS | OXYGEN SATURATION: 100 % | SYSTOLIC BLOOD PRESSURE: 146 MMHG | HEART RATE: 101 BPM

## 2022-11-01 PROCEDURE — 99214 OFFICE O/P EST MOD 30 MIN: CPT

## 2022-11-01 NOTE — PLAN
[FreeTextEntry1] : 37 year old male with HTN , ESRD was on home HD since 2015 admitted s/p DDRT on 6/2/22 (1a,1v,1u- no stent) with Simulect induction on 6/22/22.\par Donor: 35 year old, KDPI 14%, COD: Drug Intoxication, Terminal Cr: 0.79, CIT 17 hrs. CMV +/-\par \par 1. s/p DDRT on 6/22/22- Allograft function is good with baseline Cr ~ 1.6 . Cr was 1.9 last visit likely sec to high tac level. will f/u labs today. \par 2. IS meds- Simulect induction. on Envarsus 12 mg po , goal 8-10, Cellcept 1 gm po bid and Prednisone 5 mg po daily . Ppx with bactrim, nystatin and valcyte 900 mg po daily (CMV +/-)\par 3. HTN- on  Nifedipine 30 mg po bid\par 4, Anemia- Hb is stable. was on Procrit , now d/c \par \par

## 2022-11-02 ENCOUNTER — NON-APPOINTMENT (OUTPATIENT)
Age: 37
End: 2022-11-02

## 2022-11-02 LAB
ALBUMIN SERPL ELPH-MCNC: 4.4 G/DL
ALP BLD-CCNC: 120 U/L
ALT SERPL-CCNC: 21 U/L
ANION GAP SERPL CALC-SCNC: 10 MMOL/L
APPEARANCE: CLEAR
AST SERPL-CCNC: 21 U/L
BACTERIA: NEGATIVE
BASOPHILS # BLD AUTO: 0.21 K/UL
BASOPHILS NFR BLD AUTO: 4.4 %
BILIRUB SERPL-MCNC: 0.2 MG/DL
BILIRUBIN URINE: NEGATIVE
BKV DNA SPEC QL NAA+PROBE: NOT DETECTED IU/ML
BLOOD URINE: NEGATIVE
BUN SERPL-MCNC: 20 MG/DL
CALCIUM SERPL-MCNC: 9.9 MG/DL
CHLORIDE SERPL-SCNC: 104 MMOL/L
CMV DNA SPEC QL NAA+PROBE: NOT DETECTED IU/ML
CMVPCR LOG: NOT DETECTED LOG10IU/ML
CO2 SERPL-SCNC: 26 MMOL/L
COLOR: NORMAL
CREAT SERPL-MCNC: 1.54 MG/DL
CREAT SPEC-SCNC: 201 MG/DL
CREAT/PROT UR: 0.2 RATIO
EGFR: 58 ML/MIN/1.73M2
EOSINOPHIL # BLD AUTO: 0.13 K/UL
EOSINOPHIL NFR BLD AUTO: 2.6 %
GLUCOSE QUALITATIVE U: NORMAL
GLUCOSE SERPL-MCNC: 98 MG/DL
HCT VFR BLD CALC: 39.9 %
HGB BLD-MCNC: 12 G/DL
HYALINE CASTS: 1 /LPF
KETONES URINE: NORMAL
LEUKOCYTE ESTERASE URINE: NEGATIVE
LYMPHOCYTES # BLD AUTO: 1.19 K/UL
LYMPHOCYTES NFR BLD AUTO: 24.3 %
MAGNESIUM SERPL-MCNC: 1.5 MG/DL
MAN DIFF?: NORMAL
MCHC RBC-ENTMCNC: 24.2 PG
MCHC RBC-ENTMCNC: 30.1 GM/DL
MCV RBC AUTO: 80.6 FL
MICROSCOPIC-UA: NORMAL
MONOCYTES # BLD AUTO: 0.34 K/UL
MONOCYTES NFR BLD AUTO: 7 %
NEUTROPHILS # BLD AUTO: 2.88 K/UL
NEUTROPHILS NFR BLD AUTO: 59.1 %
NITRITE URINE: NEGATIVE
PH URINE: 6.5
PHOSPHATE SERPL-MCNC: 2.4 MG/DL
PLATELET # BLD AUTO: 248 K/UL
POTASSIUM SERPL-SCNC: 4.1 MMOL/L
PROT SERPL-MCNC: 6.8 G/DL
PROT UR-MCNC: 38 MG/DL
PROTEIN URINE: ABNORMAL
RBC # BLD: 4.95 M/UL
RBC # FLD: 14.7 %
RED BLOOD CELLS URINE: 5 /HPF
SODIUM SERPL-SCNC: 140 MMOL/L
SPECIFIC GRAVITY URINE: 1.03
SQUAMOUS EPITHELIAL CELLS: 0 /HPF
TACROLIMUS SERPL-MCNC: 11 NG/ML
URATE SERPL-MCNC: 4.9 MG/DL
UROBILINOGEN URINE: ABNORMAL
WBC # FLD AUTO: 4.88 K/UL
WHITE BLOOD CELLS URINE: 1 /HPF

## 2022-11-27 NOTE — ED ADULT TRIAGE NOTE - IDEAL BODY WEIGHT(KG)
ED Observation Progress Note  [facility]  Note Date: 11/27/2022    Tamra Jaimes MRN: [id number]   Age: 13 year old YOB: 2008     Interval History   Tamra Jaimes is a 13 year old female with a history of depression, anxiety and diabetes who is in the ED awaiting safe outpatient treatment.  At approximately 11:50 AM, I was told by the nurse that the patient is complaining about body aches.  I ordered Tylenol p.o.  I also ordered a COVID and influenza swab.  The patient is influenza A negative, RSV negative and COVID-19 negative.    Physical Exam   /71   Pulse 98   Temp 98.2  F (36.8  C)   Resp 16   SpO2 97%   Physical Exam  General: . Appears stated age.   HENT: MMM, no oropharyngeal lesions  Eyes: PERRL, normal sclerae   Cardio: Regular rate, extremities well perfused  Resp: Normal work of breathing, normal respiratory rate  Neuro: alert and fully oriented. CN II-XII grossly intact. Grossly normal strength and sensation in all extremities.   MSK: no deformities. Grossly normal ROM.  Integumentary/Skin: no rash visualized, normal color  Psych: cooperative affect  Results     Assessments & Plan (with Medical Decision Making)   Tamra Jaimes is a 13 year old female admitted to ED Observation status awaiting placement.    On this date, the patient did not require medications for agitation, and did not require restraints/seclusion for patient and/or provider safety.     Notable events and plan updates today: none    The patient's condition is such that further monitoring for psychiatric stabilization and/or coordination of a safe disposition is still indicated. The observation plan includes serial assessments of psychiatric condition, potential administration of medications if indicated, further disposition pending the patient's psychiatric course during the monitoring period.     --  Cris Robertson MD  [facility] EMERGENCY DEPARTMENT  11/27/2022         Cris Robertson MD  11/27/22 [id number]     [id number]

## 2022-12-09 NOTE — ASU PREOP CHECKLIST - PATIENT'S PERSONAL PROPERTY REMOVED
none
Additional Notes: patient unable to provide complete med list at this time
Detail Level: Detailed
Quality 130: Documentation Of Current Medications In The Medical Record: Documentation of a medical reason(s) for not documenting, updating, or reviewing the patientâs current medications list (e.g., patient is in an urgent or emergent medical situation)

## 2023-01-11 ENCOUNTER — LABORATORY RESULT (OUTPATIENT)
Age: 38
End: 2023-01-11

## 2023-01-11 ENCOUNTER — APPOINTMENT (OUTPATIENT)
Dept: NEPHROLOGY | Facility: CLINIC | Age: 38
End: 2023-01-11
Payer: MEDICARE

## 2023-01-11 ENCOUNTER — NON-APPOINTMENT (OUTPATIENT)
Age: 38
End: 2023-01-11

## 2023-01-11 VITALS
BODY MASS INDEX: 25.2 KG/M2 | SYSTOLIC BLOOD PRESSURE: 139 MMHG | WEIGHT: 180 LBS | RESPIRATION RATE: 17 BRPM | HEART RATE: 109 BPM | HEIGHT: 71 IN | DIASTOLIC BLOOD PRESSURE: 73 MMHG | OXYGEN SATURATION: 99 % | TEMPERATURE: 97.7 F

## 2023-01-11 LAB
ALBUMIN SERPL ELPH-MCNC: 4.8 G/DL
ALP BLD-CCNC: 118 U/L
ALT SERPL-CCNC: 16 U/L
ANION GAP SERPL CALC-SCNC: 12 MMOL/L
APPEARANCE: CLEAR
AST SERPL-CCNC: 20 U/L
BACTERIA: NEGATIVE
BILIRUB SERPL-MCNC: 0.2 MG/DL
BILIRUBIN URINE: NEGATIVE
BLOOD URINE: NEGATIVE
BUN SERPL-MCNC: 18 MG/DL
CALCIUM SERPL-MCNC: 9.9 MG/DL
CHLORIDE SERPL-SCNC: 103 MMOL/L
CO2 SERPL-SCNC: 25 MMOL/L
COLOR: NORMAL
CREAT SERPL-MCNC: 1.72 MG/DL
CREAT SPEC-SCNC: 110 MG/DL
CREAT/PROT UR: 0.3 RATIO
EGFR: 51 ML/MIN/1.73M2
GLUCOSE QUALITATIVE U: NEGATIVE
GLUCOSE SERPL-MCNC: 98 MG/DL
HYALINE CASTS: 0 /LPF
KETONES URINE: NEGATIVE
LEUKOCYTE ESTERASE URINE: NEGATIVE
MAGNESIUM SERPL-MCNC: 1.5 MG/DL
MICROSCOPIC-UA: NORMAL
NITRITE URINE: NEGATIVE
PH URINE: 6.5
PHOSPHATE SERPL-MCNC: 2.5 MG/DL
POTASSIUM SERPL-SCNC: 4.5 MMOL/L
PROT SERPL-MCNC: 7.3 G/DL
PROT UR-MCNC: 36 MG/DL
PROTEIN URINE: ABNORMAL
RED BLOOD CELLS URINE: 1 /HPF
SODIUM SERPL-SCNC: 140 MMOL/L
SPECIFIC GRAVITY URINE: 1.02
SQUAMOUS EPITHELIAL CELLS: 0 /HPF
TACROLIMUS SERPL-MCNC: 9.4 NG/ML
UROBILINOGEN URINE: NORMAL
WHITE BLOOD CELLS URINE: 1 /HPF

## 2023-01-11 PROCEDURE — 99214 OFFICE O/P EST MOD 30 MIN: CPT

## 2023-01-11 NOTE — HISTORY OF PRESENT ILLNESS
[FreeTextEntry1] : 37 year old male with HTN, Hyperthyroidism and ESRD on HD since 2015 s/p R DDRT on 6/22/22 (1A/1V/1U no stent) presents for a follow up visit. \par \par Donor: 36 yo, KDPI 14%, COD: Drug Intoxication, Terminal Cr: 0.79, CIT 17hrs\par  CMV +/-\par \par Denies any fever, chills, dysuria, LE edema, cough, sob, chest pain , nausea, vomiting , diarrhea, constipation or any other active complaints.  \par  BP is wnl

## 2023-01-11 NOTE — PLAN
[FreeTextEntry1] : 37 year old male with HTN , ESRD was on home HD since 2015 admitted s/p DDRT on 6/2/22 (1a,1v,1u- no stent) with Simulect induction on 6/22/22.\par Donor: 37 year old, KDPI 14%, COD: Drug Intoxication, Terminal Cr: 0.79, CIT 17 hrs. CMV +/-\par \par 1. s/p DDRT on 6/22/22- Allograft function is good with baseline Cr ~ 1.6 .\par 2. IS meds- Simulect induction. on Envarsus 11 mg po , goal 8-10, Cellcept 1 gm po bid and Prednisone 5 mg po daily . Ppx with bactrim  and valcyte 900 mg po daily (CMV +/-) X 9 month ( till april 2023) \par 3. HTN- on  Nifedipine 30 mg po bid\par 4, Anemia- Hb is stable. was on Procrit which was d/c \par \par

## 2023-01-12 LAB
BASOPHILS # BLD AUTO: 0.11 K/UL
BASOPHILS NFR BLD AUTO: 2.6 %
EOSINOPHIL # BLD AUTO: 0.11 K/UL
EOSINOPHIL NFR BLD AUTO: 2.6 %
HCT VFR BLD CALC: 41.8 %
HGB BLD-MCNC: 12.7 G/DL
LYMPHOCYTES # BLD AUTO: 1 K/UL
LYMPHOCYTES NFR BLD AUTO: 22.8 %
MAN DIFF?: NORMAL
MCHC RBC-ENTMCNC: 24.4 PG
MCHC RBC-ENTMCNC: 30.4 GM/DL
MCV RBC AUTO: 80.4 FL
MONOCYTES # BLD AUTO: 0.31 K/UL
MONOCYTES NFR BLD AUTO: 7 %
NEUTROPHILS # BLD AUTO: 2.66 K/UL
NEUTROPHILS NFR BLD AUTO: 59.7 %
PLATELET # BLD AUTO: 283 K/UL
RBC # BLD: 5.2 M/UL
RBC # FLD: 14.6 %
WBC # FLD AUTO: 4.39 K/UL

## 2023-01-13 LAB — BKV DNA SPEC QL NAA+PROBE: NOT DETECTED IU/ML

## 2023-02-17 ENCOUNTER — APPOINTMENT (OUTPATIENT)
Dept: NEPHROLOGY | Facility: CLINIC | Age: 38
End: 2023-02-17

## 2023-03-24 ENCOUNTER — NON-APPOINTMENT (OUTPATIENT)
Age: 38
End: 2023-03-24

## 2023-03-30 ENCOUNTER — NON-APPOINTMENT (OUTPATIENT)
Age: 38
End: 2023-03-30

## 2023-04-04 ENCOUNTER — APPOINTMENT (OUTPATIENT)
Dept: TRANSPLANT | Facility: CLINIC | Age: 38
End: 2023-04-04

## 2023-04-18 ENCOUNTER — LABORATORY RESULT (OUTPATIENT)
Age: 38
End: 2023-04-18

## 2023-04-18 ENCOUNTER — APPOINTMENT (OUTPATIENT)
Dept: NEPHROLOGY | Facility: CLINIC | Age: 38
End: 2023-04-18
Payer: MEDICARE

## 2023-04-18 VITALS
RESPIRATION RATE: 17 BRPM | WEIGHT: 180 LBS | HEIGHT: 71 IN | HEART RATE: 90 BPM | DIASTOLIC BLOOD PRESSURE: 80 MMHG | SYSTOLIC BLOOD PRESSURE: 147 MMHG | BODY MASS INDEX: 25.2 KG/M2 | TEMPERATURE: 98 F | OXYGEN SATURATION: 99 %

## 2023-04-18 LAB
ALBUMIN SERPL ELPH-MCNC: 4.6 G/DL
ALP BLD-CCNC: 90 U/L
ALT SERPL-CCNC: 27 U/L
ANION GAP SERPL CALC-SCNC: 13 MMOL/L
APPEARANCE: CLEAR
AST SERPL-CCNC: 24 U/L
BILIRUB SERPL-MCNC: 0.3 MG/DL
BILIRUBIN URINE: NEGATIVE
BLOOD URINE: NEGATIVE
BUN SERPL-MCNC: 17 MG/DL
CALCIUM SERPL-MCNC: 9.8 MG/DL
CHLORIDE SERPL-SCNC: 102 MMOL/L
CMV DNA SPEC QL NAA+PROBE: NOT DETECTED IU/ML
CMVPCR LOG: NOT DETECTED LOG10IU/ML
CO2 SERPL-SCNC: 24 MMOL/L
COLOR: NORMAL
CREAT SERPL-MCNC: 1.96 MG/DL
CREAT SPEC-SCNC: 181 MG/DL
CREAT/PROT UR: 0.2 RATIO
EGFR: 44 ML/MIN/1.73M2
GLUCOSE QUALITATIVE U: NEGATIVE
GLUCOSE SERPL-MCNC: 97 MG/DL
KETONES URINE: NEGATIVE
LEUKOCYTE ESTERASE URINE: NEGATIVE
MAGNESIUM SERPL-MCNC: 1.4 MG/DL
NITRITE URINE: NEGATIVE
PH URINE: 6.5
PHOSPHATE SERPL-MCNC: 3 MG/DL
POTASSIUM SERPL-SCNC: 3.8 MMOL/L
PROT SERPL-MCNC: 6.6 G/DL
PROT UR-MCNC: 33 MG/DL
PROTEIN URINE: ABNORMAL
SODIUM SERPL-SCNC: 140 MMOL/L
SPECIFIC GRAVITY URINE: 1.02
TACROLIMUS SERPL-MCNC: 12.6 NG/ML
UROBILINOGEN URINE: NORMAL

## 2023-04-18 PROCEDURE — 99214 OFFICE O/P EST MOD 30 MIN: CPT

## 2023-04-18 NOTE — PLAN
[FreeTextEntry1] : 37 year old male with HTN , ESRD was on home HD since 2015 admitted s/p DDRT on 6/2/22 (1a,1v,1u- no stent) with Simulect induction on 6/22/22.\par Donor: 37 year old, KDPI 14%, COD: Drug Intoxication, Terminal Cr: 0.79, CIT 17 hrs. CMV +/-\par \par 1. s/p DDRT on 6/22/22- Allograft function is good with baseline Cr ~ 1.6 .\par 2. IS meds- Simulect induction. on Envarsus 11 mg po , goal 8-10, Cellcept 1 gm po bid and Prednisone 5 mg po daily . Ppx with bactrim  . completed valcyte 900 mg po daily (CMV +/-) X 9 months \par 3. HTN- on  Nifedipine 30 mg po bid\par 4, Anemia- Hb is stable. was on Procrit which was d/c \par \par

## 2023-04-18 NOTE — PHYSICAL EXAM
[General Appearance - Alert] : alert [General Appearance - In No Acute Distress] : in no acute distress [Sclera] : the sclera and conjunctiva were normal [PERRL With Normal Accommodation] : pupils were equal in size, round, and reactive to light [Extraocular Movements] : extraocular movements were intact [Outer Ear] : the ears and nose were normal in appearance [Oropharynx] : the oropharynx was normal [Neck Appearance] : the appearance of the neck was normal [Neck Cervical Mass (___cm)] : no neck mass was observed [Jugular Venous Distention Increased] : there was no jugular-venous distention [Thyroid Diffuse Enlargement] : the thyroid was not enlarged [Thyroid Nodule] : there were no palpable thyroid nodules [Auscultation Breath Sounds / Voice Sounds] : lungs were clear to auscultation bilaterally [Heart Sounds] : normal S1 and S2 [Heart Rate And Rhythm] : heart rate was normal and rhythm regular [Murmurs] : no murmurs [Heart Sounds Gallop] : no gallops [Heart Sounds Pericardial Friction Rub] : no pericardial rub [Full Pulse] : the pedal pulses are present [Edema] : there was no peripheral edema [Bowel Sounds] : normal bowel sounds [Abdomen Soft] : soft [Abdomen Tenderness] : non-tender [Abdomen Mass (___ Cm)] : no abdominal mass palpated [Abnormal Walk] : normal gait [Nail Clubbing] : no clubbing  or cyanosis of the fingernails [Musculoskeletal - Swelling] : no joint swelling seen [Motor Tone] : muscle strength and tone were normal [Skin Color & Pigmentation] : normal skin color and pigmentation [Skin Turgor] : normal skin turgor [] : no rash [Normal] : normal [Deep Tendon Reflexes (DTR)] : deep tendon reflexes were 2+ and symmetric [Sensation] : the sensory exam was normal to light touch and pinprick [No Focal Deficits] : no focal deficits [Oriented To Time, Place, And Person] : oriented to person, place, and time [Impaired Insight] : insight and judgment were intact [Affect] : the affect was normal

## 2023-04-20 LAB
BASOPHILS # BLD AUTO: 0.04 K/UL
BASOPHILS NFR BLD AUTO: 0.9 %
BKV DNA SPEC QL NAA+PROBE: NOT DETECTED IU/ML
EOSINOPHIL # BLD AUTO: 0.04 K/UL
EOSINOPHIL NFR BLD AUTO: 0.9 %
ESTIMATED AVERAGE GLUCOSE: 111 MG/DL
HBA1C MFR BLD HPLC: 5.5 %
HCT VFR BLD CALC: 38.4 %
HGB BLD-MCNC: 11.8 G/DL
LYMPHOCYTES # BLD AUTO: 0.85 K/UL
LYMPHOCYTES NFR BLD AUTO: 17.2 %
MAN DIFF?: NORMAL
MCHC RBC-ENTMCNC: 23.8 PG
MCHC RBC-ENTMCNC: 30.7 GM/DL
MCV RBC AUTO: 77.6 FL
MONOCYTES # BLD AUTO: 0.72 K/UL
MONOCYTES NFR BLD AUTO: 14.6 %
NEUTROPHILS # BLD AUTO: 3.04 K/UL
NEUTROPHILS NFR BLD AUTO: 59.5 %
PLATELET # BLD AUTO: 248 K/UL
RBC # BLD: 4.95 M/UL
RBC # FLD: 14.1 %
WBC # FLD AUTO: 4.96 K/UL

## 2023-05-02 ENCOUNTER — APPOINTMENT (OUTPATIENT)
Dept: TRANSPLANT | Facility: CLINIC | Age: 38
End: 2023-05-02

## 2023-05-10 ENCOUNTER — APPOINTMENT (OUTPATIENT)
Dept: NEPHROLOGY | Facility: CLINIC | Age: 38
End: 2023-05-10
Payer: MEDICARE

## 2023-05-10 ENCOUNTER — LABORATORY RESULT (OUTPATIENT)
Age: 38
End: 2023-05-10

## 2023-05-10 VITALS
DIASTOLIC BLOOD PRESSURE: 90 MMHG | BODY MASS INDEX: 25.9 KG/M2 | WEIGHT: 185 LBS | TEMPERATURE: 97 F | OXYGEN SATURATION: 100 % | SYSTOLIC BLOOD PRESSURE: 136 MMHG | RESPIRATION RATE: 16 BRPM | HEIGHT: 71 IN | HEART RATE: 87 BPM

## 2023-05-10 PROCEDURE — 99214 OFFICE O/P EST MOD 30 MIN: CPT

## 2023-05-10 NOTE — PLAN
[FreeTextEntry1] : 37 year old male with HTN , ESRD was on home HD since 2015 admitted s/p DDRT on 6/2/22 (1a,1v,1u- no stent) with Simulect induction on 6/22/22.\par Donor: 37 year old, KDPI 14%, COD: Drug Intoxication, Terminal Cr: 0.79, CIT 17 hrs. CMV +/-\par \par 1. s/p DDRT on 6/22/22- Allograft function is good with baseline Cr ~ 1.6 .\par 2. IS meds- Simulect induction. on Envarsus 5 mg po daily  goal 6-8, Cellcept 1 gm po bid and Prednisone 5 mg po daily . Ppx with bactrim  . completed valcyte 900 mg po daily (CMV +/-) X 9 months \par 3. HTN- controlled on  Nifedipine 30 mg po bid\par 4, Anemia- Hb is stable. \par \par will f/u primary nephrologist, Dr Betito Dodson

## 2023-05-10 NOTE — HISTORY OF PRESENT ILLNESS
[FreeTextEntry1] : 37 year old male with HTN, Hyperthyroidism and ESRD on HD since 2015 s/p R DDRT on 6/22/22 (1A/1V/1U no stent) presents for a follow up visit. \par \par Donor: 34 yo, KDPI 14%, COD: Drug Intoxication, Terminal Cr: 0.79, CIT 17hrs\par  CMV +/-\par \par Denies any fever, chills, dysuria, LE edema, cough, sob, chest pain , nausea, vomiting , diarrhea, constipation or any other active complaints.  \par

## 2023-05-11 ENCOUNTER — NON-APPOINTMENT (OUTPATIENT)
Age: 38
End: 2023-05-11

## 2023-05-11 LAB
ALBUMIN SERPL ELPH-MCNC: 4.8 G/DL
ALP BLD-CCNC: 99 U/L
ALT SERPL-CCNC: 22 U/L
ANION GAP SERPL CALC-SCNC: 13 MMOL/L
APPEARANCE: CLEAR
AST SERPL-CCNC: 22 U/L
BACTERIA: NEGATIVE /HPF
BASOPHILS # BLD AUTO: 0 K/UL
BASOPHILS NFR BLD AUTO: 0 %
BILIRUB SERPL-MCNC: 0.4 MG/DL
BILIRUBIN URINE: NEGATIVE
BLOOD URINE: NEGATIVE
BUN SERPL-MCNC: 19 MG/DL
CALCIUM SERPL-MCNC: 10.2 MG/DL
CAST: 1 /LPF
CHLORIDE SERPL-SCNC: 101 MMOL/L
CMV DNA SPEC QL NAA+PROBE: NOT DETECTED IU/ML
CMVPCR LOG: NOT DETECTED LOG10IU/ML
CO2 SERPL-SCNC: 23 MMOL/L
COLOR: YELLOW
CREAT SERPL-MCNC: 1.9 MG/DL
CREAT SPEC-SCNC: 196 MG/DL
CREAT/PROT UR: 0.2 RATIO
EGFR: 45 ML/MIN/1.73M2
EOSINOPHIL # BLD AUTO: 0 K/UL
EOSINOPHIL NFR BLD AUTO: 0 %
EPITHELIAL CELLS: 0 /HPF
GLUCOSE QUALITATIVE U: NEGATIVE MG/DL
GLUCOSE SERPL-MCNC: 107 MG/DL
HCT VFR BLD CALC: 42.5 %
HGB BLD-MCNC: 12.6 G/DL
KETONES URINE: NEGATIVE MG/DL
LEUKOCYTE ESTERASE URINE: NEGATIVE
LYMPHOCYTES # BLD AUTO: 0.79 K/UL
LYMPHOCYTES NFR BLD AUTO: 18.3 %
MAGNESIUM SERPL-MCNC: 1.4 MG/DL
MAN DIFF?: NORMAL
MCHC RBC-ENTMCNC: 23.6 PG
MCHC RBC-ENTMCNC: 29.6 GM/DL
MCV RBC AUTO: 79.6 FL
MICROSCOPIC-UA: NORMAL
MONOCYTES # BLD AUTO: 0.64 K/UL
MONOCYTES NFR BLD AUTO: 14.8 %
NEUTROPHILS # BLD AUTO: 2.67 K/UL
NEUTROPHILS NFR BLD AUTO: 61.7 %
NITRITE URINE: NEGATIVE
PH URINE: 6
PHOSPHATE SERPL-MCNC: 2.8 MG/DL
PLATELET # BLD AUTO: 227 K/UL
POTASSIUM SERPL-SCNC: 3.8 MMOL/L
PROT SERPL-MCNC: 7.2 G/DL
PROT UR-MCNC: 40 MG/DL
PROTEIN URINE: 30 MG/DL
RBC # BLD: 5.34 M/UL
RBC # FLD: 14.3 %
RED BLOOD CELLS URINE: 0 /HPF
SODIUM SERPL-SCNC: 137 MMOL/L
SPECIFIC GRAVITY URINE: 1.02
TACROLIMUS SERPL-MCNC: 36.7 NG/ML
URATE SERPL-MCNC: 5.5 MG/DL
UROBILINOGEN URINE: 0.2 MG/DL
WBC # FLD AUTO: 4.32 K/UL
WHITE BLOOD CELLS URINE: 1 /HPF

## 2023-05-15 LAB — BKV DNA SPEC QL NAA+PROBE: NOT DETECTED IU/ML

## 2023-05-23 ENCOUNTER — LABORATORY RESULT (OUTPATIENT)
Age: 38
End: 2023-05-23

## 2023-05-23 ENCOUNTER — APPOINTMENT (OUTPATIENT)
Dept: TRANSPLANT | Facility: CLINIC | Age: 38
End: 2023-05-23

## 2023-05-24 ENCOUNTER — NON-APPOINTMENT (OUTPATIENT)
Age: 38
End: 2023-05-24

## 2023-05-24 ENCOUNTER — APPOINTMENT (OUTPATIENT)
Dept: TRANSPLANT | Facility: CLINIC | Age: 38
End: 2023-05-24

## 2023-05-24 LAB
ALBUMIN SERPL ELPH-MCNC: 4.6 G/DL
ALP BLD-CCNC: 92 U/L
ALT SERPL-CCNC: 18 U/L
ANION GAP SERPL CALC-SCNC: 12 MMOL/L
APPEARANCE: CLEAR
AST SERPL-CCNC: 18 U/L
BACTERIA: NEGATIVE /HPF
BILIRUB SERPL-MCNC: 0.5 MG/DL
BILIRUBIN URINE: NEGATIVE
BKV DNA SPEC QL NAA+PROBE: NOT DETECTED IU/ML
BLOOD URINE: NEGATIVE
BUN SERPL-MCNC: 28 MG/DL
CALCIUM SERPL-MCNC: 9.8 MG/DL
CAST: 0 /LPF
CHLORIDE SERPL-SCNC: 106 MMOL/L
CMV DNA SPEC QL NAA+PROBE: NOT DETECTED IU/ML
CMVPCR LOG: NOT DETECTED LOG10IU/ML
CO2 SERPL-SCNC: 22 MMOL/L
COLOR: YELLOW
CREAT SERPL-MCNC: 2.35 MG/DL
CREAT SPEC-SCNC: 182 MG/DL
CREAT/PROT UR: 0.2 RATIO
EGFR: 35 ML/MIN/1.73M2
EPITHELIAL CELLS: 0 /HPF
GLUCOSE QUALITATIVE U: NEGATIVE MG/DL
GLUCOSE SERPL-MCNC: 109 MG/DL
KETONES URINE: NEGATIVE MG/DL
LEUKOCYTE ESTERASE URINE: NEGATIVE
MAGNESIUM SERPL-MCNC: 1.2 MG/DL
MICROSCOPIC-UA: NORMAL
NITRITE URINE: NEGATIVE
PH URINE: 6.5
PHOSPHATE SERPL-MCNC: 2.6 MG/DL
POTASSIUM SERPL-SCNC: 4.2 MMOL/L
PROT SERPL-MCNC: 6.9 G/DL
PROT UR-MCNC: 31 MG/DL
PROTEIN URINE: 30 MG/DL
RED BLOOD CELLS URINE: 1 /HPF
SODIUM SERPL-SCNC: 140 MMOL/L
SPECIFIC GRAVITY URINE: 1.02
TACROLIMUS SERPL-MCNC: 24 NG/ML
URATE SERPL-MCNC: 6.8 MG/DL
UROBILINOGEN URINE: 0.2 MG/DL
WHITE BLOOD CELLS URINE: 0 /HPF

## 2023-05-30 ENCOUNTER — LABORATORY RESULT (OUTPATIENT)
Age: 38
End: 2023-05-30

## 2023-05-30 ENCOUNTER — APPOINTMENT (OUTPATIENT)
Dept: TRANSPLANT | Facility: CLINIC | Age: 38
End: 2023-05-30

## 2023-05-31 ENCOUNTER — NON-APPOINTMENT (OUTPATIENT)
Age: 38
End: 2023-05-31

## 2023-05-31 LAB
ALBUMIN SERPL ELPH-MCNC: 5 G/DL
ALP BLD-CCNC: 102 U/L
ALT SERPL-CCNC: 16 U/L
ANION GAP SERPL CALC-SCNC: 14 MMOL/L
APPEARANCE: CLEAR
AST SERPL-CCNC: 21 U/L
BACTERIA: NEGATIVE /HPF
BILIRUB SERPL-MCNC: 0.6 MG/DL
BILIRUBIN URINE: NEGATIVE
BLOOD URINE: NEGATIVE
BUN SERPL-MCNC: 24 MG/DL
CALCIUM SERPL-MCNC: 10.3 MG/DL
CAST: 0 /LPF
CHLORIDE SERPL-SCNC: 103 MMOL/L
CMV DNA SPEC QL NAA+PROBE: NOT DETECTED IU/ML
CMVPCR LOG: NOT DETECTED LOG10IU/ML
CO2 SERPL-SCNC: 24 MMOL/L
COLOR: YELLOW
CREAT SERPL-MCNC: 2.07 MG/DL
CREAT SPEC-SCNC: 207 MG/DL
CREAT/PROT UR: 0.3 RATIO
EGFR: 41 ML/MIN/1.73M2
EPITHELIAL CELLS: 0 /HPF
GLUCOSE QUALITATIVE U: NEGATIVE MG/DL
GLUCOSE SERPL-MCNC: 99 MG/DL
KETONES URINE: NEGATIVE MG/DL
LEUKOCYTE ESTERASE URINE: NEGATIVE
MAGNESIUM SERPL-MCNC: 1.7 MG/DL
MICROSCOPIC-UA: NORMAL
NITRITE URINE: NEGATIVE
PH URINE: 6
PHOSPHATE SERPL-MCNC: 2.8 MG/DL
POTASSIUM SERPL-SCNC: 4.1 MMOL/L
PROT SERPL-MCNC: 7.2 G/DL
PROT UR-MCNC: 52 MG/DL
PROTEIN URINE: 100 MG/DL
RED BLOOD CELLS URINE: 2 /HPF
SODIUM SERPL-SCNC: 141 MMOL/L
SPECIFIC GRAVITY URINE: 1.02
TACROLIMUS SERPL-MCNC: <2 NG/ML
UROBILINOGEN URINE: 0.2 MG/DL
WHITE BLOOD CELLS URINE: 1 /HPF

## 2023-06-01 LAB — BKV DNA SPEC QL NAA+PROBE: NOT DETECTED IU/ML

## 2023-06-02 ENCOUNTER — APPOINTMENT (OUTPATIENT)
Dept: TRANSPLANT | Facility: CLINIC | Age: 38
End: 2023-06-02

## 2023-06-06 ENCOUNTER — APPOINTMENT (OUTPATIENT)
Dept: TRANSPLANT | Facility: CLINIC | Age: 38
End: 2023-06-06

## 2023-06-08 ENCOUNTER — LABORATORY RESULT (OUTPATIENT)
Age: 38
End: 2023-06-08

## 2023-06-08 ENCOUNTER — NON-APPOINTMENT (OUTPATIENT)
Age: 38
End: 2023-06-08

## 2023-06-08 LAB
ALBUMIN SERPL ELPH-MCNC: 4.7 G/DL
ALP BLD-CCNC: 94 U/L
ALT SERPL-CCNC: 14 U/L
ANION GAP SERPL CALC-SCNC: 14 MMOL/L
APPEARANCE: CLEAR
AST SERPL-CCNC: 21 U/L
BACTERIA: NEGATIVE /HPF
BILIRUB SERPL-MCNC: 0.3 MG/DL
BILIRUBIN URINE: NEGATIVE
BLOOD URINE: NEGATIVE
BUN SERPL-MCNC: 18 MG/DL
CALCIUM SERPL-MCNC: 9.7 MG/DL
CAST: 0 /LPF
CHLORIDE SERPL-SCNC: 105 MMOL/L
CO2 SERPL-SCNC: 21 MMOL/L
COLOR: YELLOW
CREAT SERPL-MCNC: 1.85 MG/DL
CREAT SPEC-SCNC: 89 MG/DL
CREAT/PROT UR: 0.3 RATIO
EGFR: 47 ML/MIN/1.73M2
EPITHELIAL CELLS: 0 /HPF
GLUCOSE QUALITATIVE U: NEGATIVE MG/DL
GLUCOSE SERPL-MCNC: 111 MG/DL
KETONES URINE: NEGATIVE MG/DL
LEUKOCYTE ESTERASE URINE: NEGATIVE
MAGNESIUM SERPL-MCNC: 1.7 MG/DL
MICROSCOPIC-UA: NORMAL
NITRITE URINE: NEGATIVE
PH URINE: 6.5
PHOSPHATE SERPL-MCNC: 2.1 MG/DL
POTASSIUM SERPL-SCNC: 4.9 MMOL/L
PROT SERPL-MCNC: 6.9 G/DL
PROT UR-MCNC: 24 MG/DL
PROTEIN URINE: 30 MG/DL
RED BLOOD CELLS URINE: 0 /HPF
SODIUM SERPL-SCNC: 140 MMOL/L
SPECIFIC GRAVITY URINE: 1.01
TACROLIMUS SERPL-MCNC: 8.5 NG/ML
URATE SERPL-MCNC: 5.8 MG/DL
UROBILINOGEN URINE: 0.2 MG/DL
WHITE BLOOD CELLS URINE: 0 /HPF

## 2023-06-12 LAB
BKV DNA SPEC QL NAA+PROBE: NOT DETECTED IU/ML
CMV DNA SPEC QL NAA+PROBE: NOT DETECTED IU/ML
CMVPCR LOG: NOT DETECTED LOG10IU/ML

## 2023-06-14 ENCOUNTER — LABORATORY RESULT (OUTPATIENT)
Age: 38
End: 2023-06-14

## 2023-06-14 ENCOUNTER — APPOINTMENT (OUTPATIENT)
Dept: NEPHROLOGY | Facility: CLINIC | Age: 38
End: 2023-06-14
Payer: MEDICARE

## 2023-06-14 VITALS
DIASTOLIC BLOOD PRESSURE: 97 MMHG | HEART RATE: 86 BPM | BODY MASS INDEX: 25.48 KG/M2 | HEIGHT: 71 IN | RESPIRATION RATE: 14 BRPM | TEMPERATURE: 97.9 F | OXYGEN SATURATION: 100 % | SYSTOLIC BLOOD PRESSURE: 146 MMHG | WEIGHT: 182 LBS

## 2023-06-14 PROCEDURE — 99214 OFFICE O/P EST MOD 30 MIN: CPT

## 2023-06-14 RX ORDER — TACROLIMUS 5 MG/1
5 CAPSULE ORAL
Qty: 360 | Refills: 3 | Status: DISCONTINUED | COMMUNITY
Start: 2023-04-17 | End: 2023-06-14

## 2023-06-14 RX ORDER — VALGANCICLOVIR HYDROCHLORIDE 450 MG/1
450 TABLET ORAL DAILY
Qty: 60 | Refills: 8 | Status: DISCONTINUED | COMMUNITY
Start: 2022-06-23 | End: 2023-06-14

## 2023-06-14 RX ORDER — TACROLIMUS 4 MG/1
4 TABLET, EXTENDED RELEASE ORAL DAILY
Qty: 180 | Refills: 3 | Status: DISCONTINUED | COMMUNITY
Start: 2022-06-23 | End: 2023-06-14

## 2023-06-14 RX ORDER — ERYTHROPOIETIN 10000 [IU]/ML
10000 INJECTION, SOLUTION INTRAVENOUS; SUBCUTANEOUS
Qty: 4 | Refills: 1 | Status: DISCONTINUED | COMMUNITY
Start: 2022-07-06 | End: 2023-06-14

## 2023-06-14 RX ORDER — TACROLIMUS 1 MG/1
1 CAPSULE ORAL
Qty: 180 | Refills: 3 | Status: DISCONTINUED | COMMUNITY
Start: 2023-04-17 | End: 2023-06-14

## 2023-06-14 RX ORDER — LABETALOL HYDROCHLORIDE 200 MG/1
200 TABLET, FILM COATED ORAL TWICE DAILY
Qty: 60 | Refills: 11 | Status: DISCONTINUED | COMMUNITY
Start: 2022-07-20 | End: 2023-06-14

## 2023-06-14 RX ORDER — DIBASIC SODIUM PHOSPHATE, MONOBASIC POTASSIUM PHOSPHATE AND MONOBASIC SODIUM PHOSPHATE 852; 155; 130 MG/1; MG/1; MG/1
155-852-130 TABLET ORAL
Qty: 60 | Refills: 1 | Status: DISCONTINUED | COMMUNITY
Start: 2022-07-20 | End: 2023-06-14

## 2023-06-14 RX ORDER — OMEGA-3/DHA/EPA/FISH OIL 300-1000MG
400 CAPSULE ORAL
Qty: 60 | Refills: 1 | Status: DISCONTINUED | COMMUNITY
Start: 2022-07-20 | End: 2023-06-14

## 2023-06-14 RX ORDER — NYSTATIN 100000 [USP'U]/ML
100000 SUSPENSION ORAL 4 TIMES DAILY
Qty: 2 | Refills: 2 | Status: DISCONTINUED | COMMUNITY
Start: 2022-06-23 | End: 2023-06-14

## 2023-06-14 NOTE — HISTORY OF PRESENT ILLNESS
[FreeTextEntry1] : 37 year old male with HTN, Hyperthyroidism and ESRD on HD since 2015 s/p R DDRT on 6/22/22 (1A/1V/1U no stent) presents for a follow up visit. \par \par Donor: 36 yo, KDPI 14%, COD: Drug Intoxication, Terminal Cr: 0.79, CIT 17hrs\par  CMV +/-\par \par Denies any fever, chills, dysuria, LE edema, cough, sob, chest pain , nausea, vomiting , diarrhea, constipation or any other active complaints.  \par

## 2023-06-14 NOTE — PLAN
[FreeTextEntry1] : 37 year old male with HTN , ESRD was on home HD since 2015 admitted s/p DDRT on 6/2/22 (1a,1v,1u- no stent) with Simulect induction on 6/22/22.\par Donor: 37 year old, KDPI 14%, COD: Drug Intoxication, Terminal Cr: 0.79, CIT 17 hrs. CMV +/-\par \par 1. s/p DDRT on 6/22/22- Allograft function is good with baseline Cr ~ 1.7 . Cr has increased to 2.3 previously, likley related to really high tac levels. Now most recent Cr was 1.8 . \par 2. IS meds- Simulect induction. on Envarsus 5 mg po daily  goal 6-8, Cellcept 1 gm po bid and Prednisone 5 mg po daily . Ppx with bactrim  . completed valcyte 900 mg po daily (CMV +/-) X 9 months \par 3. HTN- controlled on  Nifedipine 30 mg po bid\par 4, Anemia- Hb is stable at 12\par \par will f/u primary nephrologist, Dr Betito Dodson

## 2023-06-15 ENCOUNTER — NON-APPOINTMENT (OUTPATIENT)
Age: 38
End: 2023-06-15

## 2023-06-15 LAB
ALBUMIN SERPL ELPH-MCNC: 4.7 G/DL
ALP BLD-CCNC: 91 U/L
ALT SERPL-CCNC: 16 U/L
ANION GAP SERPL CALC-SCNC: 15 MMOL/L
APPEARANCE: CLEAR
AST SERPL-CCNC: 20 U/L
BACTERIA: NEGATIVE /HPF
BILIRUB SERPL-MCNC: 0.4 MG/DL
BILIRUBIN URINE: NEGATIVE
BLOOD URINE: NEGATIVE
BUN SERPL-MCNC: 22 MG/DL
CALCIUM SERPL-MCNC: 9.7 MG/DL
CAST: 1 /LPF
CHLORIDE SERPL-SCNC: 104 MMOL/L
CO2 SERPL-SCNC: 20 MMOL/L
COLOR: YELLOW
CREAT SERPL-MCNC: 2.13 MG/DL
EGFR: 39 ML/MIN/1.73M2
EPITHELIAL CELLS: 1 /HPF
GLUCOSE QUALITATIVE U: NEGATIVE MG/DL
GLUCOSE SERPL-MCNC: 104 MG/DL
KETONES URINE: NEGATIVE MG/DL
LEUKOCYTE ESTERASE URINE: NEGATIVE
MICROSCOPIC-UA: NORMAL
NITRITE URINE: NEGATIVE
PH URINE: 6
POTASSIUM SERPL-SCNC: 4.2 MMOL/L
PROT SERPL-MCNC: 6.9 G/DL
PROTEIN URINE: 30 MG/DL
RED BLOOD CELLS URINE: 2 /HPF
SODIUM SERPL-SCNC: 139 MMOL/L
SPECIFIC GRAVITY URINE: 1.02
TACROLIMUS SERPL-MCNC: 16.8 NG/ML
UROBILINOGEN URINE: 0.2 MG/DL
WHITE BLOOD CELLS URINE: 1 /HPF

## 2023-06-26 ENCOUNTER — LABORATORY RESULT (OUTPATIENT)
Age: 38
End: 2023-06-26

## 2023-06-28 ENCOUNTER — APPOINTMENT (OUTPATIENT)
Dept: TRANSPLANT | Facility: CLINIC | Age: 38
End: 2023-06-28

## 2023-06-28 LAB
ALBUMIN SERPL ELPH-MCNC: 4.8 G/DL
ALP BLD-CCNC: 95 U/L
ALT SERPL-CCNC: 15 U/L
ANION GAP SERPL CALC-SCNC: 10 MMOL/L
APPEARANCE: CLEAR
AST SERPL-CCNC: 20 U/L
BACTERIA: NEGATIVE /HPF
BILIRUB SERPL-MCNC: 0.3 MG/DL
BILIRUBIN URINE: NEGATIVE
BLOOD URINE: NEGATIVE
BUN SERPL-MCNC: 22 MG/DL
CALCIUM SERPL-MCNC: 9.7 MG/DL
CAST: 0 /LPF
CHLORIDE SERPL-SCNC: 105 MMOL/L
CMV DNA SPEC QL NAA+PROBE: NOT DETECTED IU/ML
CMVPCR LOG: NOT DETECTED LOG10IU/ML
CO2 SERPL-SCNC: 23 MMOL/L
COLOR: YELLOW
CREAT SERPL-MCNC: 2.02 MG/DL
CREAT SPEC-SCNC: 145 MG/DL
CREAT/PROT UR: 0.2 RATIO
EGFR: 42 ML/MIN/1.73M2
EPITHELIAL CELLS: 0 /HPF
GLUCOSE QUALITATIVE U: NEGATIVE MG/DL
GLUCOSE SERPL-MCNC: 118 MG/DL
KETONES URINE: NEGATIVE MG/DL
LEUKOCYTE ESTERASE URINE: NEGATIVE
MAGNESIUM SERPL-MCNC: 1.6 MG/DL
MICROSCOPIC-UA: NORMAL
NITRITE URINE: NEGATIVE
PH URINE: 6.5
PHOSPHATE SERPL-MCNC: 2.4 MG/DL
POTASSIUM SERPL-SCNC: 4.3 MMOL/L
PROT SERPL-MCNC: 6.9 G/DL
PROT UR-MCNC: 34 MG/DL
PROTEIN URINE: 30 MG/DL
RED BLOOD CELLS URINE: 1 /HPF
SODIUM SERPL-SCNC: 138 MMOL/L
SPECIFIC GRAVITY URINE: 1.02
TACROLIMUS SERPL-MCNC: 11.8 NG/ML
UROBILINOGEN URINE: 0.2 MG/DL
WHITE BLOOD CELLS URINE: 0 /HPF

## 2023-06-28 RX ORDER — TACROLIMUS 1 MG/1
1 TABLET, EXTENDED RELEASE ORAL DAILY
Qty: 30 | Refills: 11 | Status: DISCONTINUED | COMMUNITY
Start: 2023-06-15 | End: 2023-06-28

## 2023-06-29 LAB — BKV DNA SPEC QL NAA+PROBE: NOT DETECTED IU/ML

## 2023-07-12 ENCOUNTER — APPOINTMENT (OUTPATIENT)
Dept: NEPHROLOGY | Facility: CLINIC | Age: 38
End: 2023-07-12

## 2023-08-09 RX ORDER — PREDNISONE 5 MG/1
5 TABLET ORAL
Qty: 30 | Refills: 11 | Status: ACTIVE | COMMUNITY
Start: 2022-06-23 | End: 1900-01-01

## 2023-08-21 NOTE — PHYSICAL THERAPY INITIAL EVALUATION ADULT - NAME OF CLINICIAN
Pt called requesting follow up appt with PCP. Pt was seen at ED 08/19 for acute cystitis. Pt states he is feeling fine now but was advised to see PCP in 7 days . Future OV was scheduled using next day visit with PCP 08/28.    FYI- Pt has wellness visit with doctor Idania 10/05 and wants to know if he should keep that appt since he has to drive 40 miles to get to the clinic.     Ok to keep 08/28/2023 appt and have Wellness visit /ED follow up the same day?    No call back is needed if he should keep separate appts.     Future Appointments 8/21/2023 - 2/17/2024        Date Visit Type Length Department Provider     8/28/2023 10:00 AM OFFICE VISIT 30 min  INTERNAL MEDICINE Zhou Mreaz MD    Location Instructions:     [facility] is in the [facility] at [address] in [place]. This just east of the [address] [place] [address] exit off of [address]. Free parking is available; access the lot from [address] [place] [address] or [facility].               9/28/2023 10:30 AM LAB 15 min  LABORATORY [facility]    Location Instructions:     The clinic is located at [address]; in the [facility] in [place].&nbsp; We offer free parking in our on-site lot.              10/5/2023  2:30 PM ANNUAL WELLNESS 30 min  INTERNAL MEDICINE Hawk Emerson MD    Location Instructions:     [facility] is in the [facility] at [address] in [place]. This just east of the [address] [place] [address] exit off of [address]. Free parking is available; access the lot from [address] [place] [address] or [facility].                       RN Yuliet

## 2023-08-23 ENCOUNTER — APPOINTMENT (OUTPATIENT)
Dept: TRANSPLANT | Facility: CLINIC | Age: 38
End: 2023-08-23

## 2023-08-24 LAB
ALBUMIN SERPL ELPH-MCNC: 4.8 G/DL
ALP BLD-CCNC: 92 U/L
ALT SERPL-CCNC: 13 U/L
ANION GAP SERPL CALC-SCNC: 13 MMOL/L
APPEARANCE: CLEAR
AST SERPL-CCNC: 16 U/L
BACTERIA: NEGATIVE /HPF
BILIRUB SERPL-MCNC: 0.4 MG/DL
BILIRUBIN URINE: NEGATIVE
BLOOD URINE: ABNORMAL
BUN SERPL-MCNC: 19 MG/DL
CALCIUM SERPL-MCNC: 9.7 MG/DL
CAST: 0 /LPF
CHLORIDE SERPL-SCNC: 100 MMOL/L
CO2 SERPL-SCNC: 24 MMOL/L
COLOR: YELLOW
CREAT SERPL-MCNC: 1.92 MG/DL
CREAT SPEC-SCNC: 127 MG/DL
CREAT/PROT UR: 0.3 RATIO
EGFR: 45 ML/MIN/1.73M2
EPITHELIAL CELLS: 0 /HPF
GLUCOSE QUALITATIVE U: NEGATIVE MG/DL
GLUCOSE SERPL-MCNC: 104 MG/DL
KETONES URINE: NEGATIVE MG/DL
LEUKOCYTE ESTERASE URINE: NEGATIVE
MAGNESIUM SERPL-MCNC: 1.9 MG/DL
MICROSCOPIC-UA: NORMAL
NITRITE URINE: NEGATIVE
PH URINE: 6.5
PHOSPHATE SERPL-MCNC: 2.7 MG/DL
POTASSIUM SERPL-SCNC: 3.8 MMOL/L
PROT SERPL-MCNC: 6.8 G/DL
PROT UR-MCNC: 38 MG/DL
PROTEIN URINE: 30 MG/DL
RED BLOOD CELLS URINE: 1 /HPF
SODIUM SERPL-SCNC: 137 MMOL/L
SPECIFIC GRAVITY URINE: 1.01
TACROLIMUS SERPL-MCNC: 4.7 NG/ML
URATE SERPL-MCNC: 5.7 MG/DL
UROBILINOGEN URINE: 0.2 MG/DL
WHITE BLOOD CELLS URINE: 0 /HPF

## 2023-12-13 ENCOUNTER — APPOINTMENT (OUTPATIENT)
Dept: TRANSPLANT | Facility: CLINIC | Age: 38
End: 2023-12-13

## 2023-12-14 LAB
ALBUMIN SERPL ELPH-MCNC: 5 G/DL
ALP BLD-CCNC: 128 U/L
ALT SERPL-CCNC: 16 U/L
ANION GAP SERPL CALC-SCNC: 13 MMOL/L
APPEARANCE: CLEAR
AST SERPL-CCNC: 20 U/L
BACTERIA: NEGATIVE /HPF
BASOPHILS # BLD AUTO: 0.04 K/UL
BASOPHILS NFR BLD AUTO: 0.5 %
BILIRUB SERPL-MCNC: 0.6 MG/DL
BILIRUBIN URINE: NEGATIVE
BKV DNA SPEC QL NAA+PROBE: NOT DETECTED IU/ML
BLOOD URINE: NEGATIVE
BUN SERPL-MCNC: 16 MG/DL
CALCIUM SERPL-MCNC: 10.2 MG/DL
CAST: 0 /LPF
CHLORIDE SERPL-SCNC: 99 MMOL/L
CMV DNA SPEC QL NAA+PROBE: NOT DETECTED IU/ML
CMVPCR LOG: NOT DETECTED LOG10IU/ML
CO2 SERPL-SCNC: 25 MMOL/L
COLOR: YELLOW
CREAT SERPL-MCNC: 1.68 MG/DL
CREAT SPEC-SCNC: 159 MG/DL
CREAT/PROT UR: 0.7 RATIO
EGFR: 52 ML/MIN/1.73M2
EOSINOPHIL # BLD AUTO: 0.02 K/UL
EOSINOPHIL NFR BLD AUTO: 0.3 %
EPITHELIAL CELLS: 0 /HPF
GLUCOSE QUALITATIVE U: NEGATIVE MG/DL
GLUCOSE SERPL-MCNC: 117 MG/DL
HCT VFR BLD CALC: 45.1 %
HGB BLD-MCNC: 13.3 G/DL
IMM GRANULOCYTES NFR BLD AUTO: 1.5 %
KETONES URINE: NEGATIVE MG/DL
LEUKOCYTE ESTERASE URINE: NEGATIVE
LYMPHOCYTES # BLD AUTO: 0.81 K/UL
LYMPHOCYTES NFR BLD AUTO: 10.7 %
MAGNESIUM SERPL-MCNC: 1.9 MG/DL
MAN DIFF?: NORMAL
MCHC RBC-ENTMCNC: 22.7 PG
MCHC RBC-ENTMCNC: 29.5 GM/DL
MCV RBC AUTO: 77 FL
MICROSCOPIC-UA: NORMAL
MONOCYTES # BLD AUTO: 0.77 K/UL
MONOCYTES NFR BLD AUTO: 10.2 %
NEUTROPHILS # BLD AUTO: 5.8 K/UL
NEUTROPHILS NFR BLD AUTO: 76.8 %
NITRITE URINE: NEGATIVE
PH URINE: 6
PHOSPHATE SERPL-MCNC: 2.9 MG/DL
PLATELET # BLD AUTO: 293 K/UL
POTASSIUM SERPL-SCNC: 3.9 MMOL/L
PROT SERPL-MCNC: 7.4 G/DL
PROT UR-MCNC: 108 MG/DL
PROTEIN URINE: 100 MG/DL
RBC # BLD: 5.86 M/UL
RBC # FLD: 15.9 %
RED BLOOD CELLS URINE: 2 /HPF
SODIUM SERPL-SCNC: 138 MMOL/L
SPECIFIC GRAVITY URINE: 1.02
TACROLIMUS SERPL-MCNC: 3.3 NG/ML
URATE SERPL-MCNC: 5.2 MG/DL
UROBILINOGEN URINE: 0.2 MG/DL
WBC # FLD AUTO: 7.55 K/UL
WHITE BLOOD CELLS URINE: 1 /HPF

## 2024-01-07 ENCOUNTER — INPATIENT (INPATIENT)
Facility: HOSPITAL | Age: 39
LOS: 5 days | Discharge: HOME | DRG: 854 | End: 2024-01-13
Attending: INTERNAL MEDICINE | Admitting: HOSPITALIST
Payer: MEDICARE

## 2024-01-07 VITALS
WEIGHT: 214.07 LBS | TEMPERATURE: 100 F | DIASTOLIC BLOOD PRESSURE: 94 MMHG | RESPIRATION RATE: 20 BRPM | HEIGHT: 70 IN | SYSTOLIC BLOOD PRESSURE: 144 MMHG | OXYGEN SATURATION: 98 % | HEART RATE: 97 BPM

## 2024-01-07 DIAGNOSIS — Z41.9 ENCOUNTER FOR PROCEDURE FOR PURPOSES OTHER THAN REMEDYING HEALTH STATE, UNSPECIFIED: Chronic | ICD-10-CM

## 2024-01-07 DIAGNOSIS — I77.0 ARTERIOVENOUS FISTULA, ACQUIRED: Chronic | ICD-10-CM

## 2024-01-07 LAB
APTT BLD: 36.3 SEC — HIGH (ref 24.5–35.6)
APTT BLD: 36.3 SEC — HIGH (ref 24.5–35.6)
GAS PNL BLDV: SIGNIFICANT CHANGE UP
GAS PNL BLDV: SIGNIFICANT CHANGE UP
INR BLD: 1.1 RATIO — SIGNIFICANT CHANGE UP (ref 0.85–1.18)
INR BLD: 1.1 RATIO — SIGNIFICANT CHANGE UP (ref 0.85–1.18)
PROTHROM AB SERPL-ACNC: 12.1 SEC — SIGNIFICANT CHANGE UP (ref 9.5–13)
PROTHROM AB SERPL-ACNC: 12.1 SEC — SIGNIFICANT CHANGE UP (ref 9.5–13)

## 2024-01-07 PROCEDURE — 99285 EMERGENCY DEPT VISIT HI MDM: CPT

## 2024-01-07 PROCEDURE — 71046 X-RAY EXAM CHEST 2 VIEWS: CPT | Mod: 26

## 2024-01-07 RX ORDER — PIPERACILLIN AND TAZOBACTAM 4; .5 G/20ML; G/20ML
3.38 INJECTION, POWDER, LYOPHILIZED, FOR SOLUTION INTRAVENOUS ONCE
Refills: 0 | Status: COMPLETED | OUTPATIENT
Start: 2024-01-07 | End: 2024-01-07

## 2024-01-07 RX ORDER — ACETAMINOPHEN 500 MG
1000 TABLET ORAL ONCE
Refills: 0 | Status: COMPLETED | OUTPATIENT
Start: 2024-01-07 | End: 2024-01-07

## 2024-01-07 RX ADMIN — Medication 400 MILLIGRAM(S): at 23:43

## 2024-01-07 RX ADMIN — PIPERACILLIN AND TAZOBACTAM 200 GRAM(S): 4; .5 INJECTION, POWDER, LYOPHILIZED, FOR SOLUTION INTRAVENOUS at 23:59

## 2024-01-08 ENCOUNTER — APPOINTMENT (OUTPATIENT)
Dept: TRANSPLANT | Facility: CLINIC | Age: 39
End: 2024-01-08

## 2024-01-08 DIAGNOSIS — Z94.0 KIDNEY TRANSPLANT STATUS: ICD-10-CM

## 2024-01-08 DIAGNOSIS — I10 ESSENTIAL (PRIMARY) HYPERTENSION: ICD-10-CM

## 2024-01-08 DIAGNOSIS — R50.9 FEVER, UNSPECIFIED: ICD-10-CM

## 2024-01-08 DIAGNOSIS — N17.9 ACUTE KIDNEY FAILURE, UNSPECIFIED: ICD-10-CM

## 2024-01-08 DIAGNOSIS — R65.10 SYSTEMIC INFLAMMATORY RESPONSE SYNDROME (SIRS) OF NON-INFECTIOUS ORIGIN WITHOUT ACUTE ORGAN DYSFUNCTION: ICD-10-CM

## 2024-01-08 LAB
ALBUMIN SERPL ELPH-MCNC: 4.1 G/DL — SIGNIFICANT CHANGE UP (ref 3.3–5)
ALBUMIN SERPL ELPH-MCNC: 4.1 G/DL — SIGNIFICANT CHANGE UP (ref 3.3–5)
ALP SERPL-CCNC: 118 U/L — SIGNIFICANT CHANGE UP (ref 40–120)
ALP SERPL-CCNC: 118 U/L — SIGNIFICANT CHANGE UP (ref 40–120)
ALT FLD-CCNC: 47 U/L — HIGH (ref 10–45)
ALT FLD-CCNC: 47 U/L — HIGH (ref 10–45)
ANION GAP SERPL CALC-SCNC: 13 MMOL/L — SIGNIFICANT CHANGE UP (ref 5–17)
ANION GAP SERPL CALC-SCNC: 13 MMOL/L — SIGNIFICANT CHANGE UP (ref 5–17)
ANION GAP SERPL CALC-SCNC: 15 MMOL/L — SIGNIFICANT CHANGE UP (ref 5–17)
ANION GAP SERPL CALC-SCNC: 15 MMOL/L — SIGNIFICANT CHANGE UP (ref 5–17)
APPEARANCE UR: CLEAR — SIGNIFICANT CHANGE UP
APPEARANCE UR: CLEAR — SIGNIFICANT CHANGE UP
AST SERPL-CCNC: 38 U/L — SIGNIFICANT CHANGE UP (ref 10–40)
AST SERPL-CCNC: 38 U/L — SIGNIFICANT CHANGE UP (ref 10–40)
BACTERIA # UR AUTO: NEGATIVE /HPF — SIGNIFICANT CHANGE UP
BACTERIA # UR AUTO: NEGATIVE /HPF — SIGNIFICANT CHANGE UP
BASE EXCESS BLDV CALC-SCNC: -2.9 MMOL/L — LOW (ref -2–3)
BASE EXCESS BLDV CALC-SCNC: -2.9 MMOL/L — LOW (ref -2–3)
BASOPHILS # BLD AUTO: 0.03 K/UL — SIGNIFICANT CHANGE UP (ref 0–0.2)
BASOPHILS # BLD AUTO: 0.03 K/UL — SIGNIFICANT CHANGE UP (ref 0–0.2)
BASOPHILS NFR BLD AUTO: 1 % — SIGNIFICANT CHANGE UP (ref 0–2)
BASOPHILS NFR BLD AUTO: 1 % — SIGNIFICANT CHANGE UP (ref 0–2)
BILIRUB SERPL-MCNC: 0.5 MG/DL — SIGNIFICANT CHANGE UP (ref 0.2–1.2)
BILIRUB SERPL-MCNC: 0.5 MG/DL — SIGNIFICANT CHANGE UP (ref 0.2–1.2)
BILIRUB UR-MCNC: NEGATIVE — SIGNIFICANT CHANGE UP
BILIRUB UR-MCNC: NEGATIVE — SIGNIFICANT CHANGE UP
BUN SERPL-MCNC: 16 MG/DL — SIGNIFICANT CHANGE UP (ref 7–23)
CA-I SERPL-SCNC: 1.21 MMOL/L — SIGNIFICANT CHANGE UP (ref 1.15–1.33)
CA-I SERPL-SCNC: 1.21 MMOL/L — SIGNIFICANT CHANGE UP (ref 1.15–1.33)
CALCIUM SERPL-MCNC: 9 MG/DL — SIGNIFICANT CHANGE UP (ref 8.4–10.5)
CALCIUM SERPL-MCNC: 9 MG/DL — SIGNIFICANT CHANGE UP (ref 8.4–10.5)
CALCIUM SERPL-MCNC: 9.8 MG/DL — SIGNIFICANT CHANGE UP (ref 8.4–10.5)
CALCIUM SERPL-MCNC: 9.8 MG/DL — SIGNIFICANT CHANGE UP (ref 8.4–10.5)
CAST: 1 /LPF — SIGNIFICANT CHANGE UP (ref 0–4)
CAST: 1 /LPF — SIGNIFICANT CHANGE UP (ref 0–4)
CHLORIDE BLDV-SCNC: 95 MMOL/L — LOW (ref 96–108)
CHLORIDE BLDV-SCNC: 95 MMOL/L — LOW (ref 96–108)
CHLORIDE SERPL-SCNC: 96 MMOL/L — SIGNIFICANT CHANGE UP (ref 96–108)
CHLORIDE SERPL-SCNC: 96 MMOL/L — SIGNIFICANT CHANGE UP (ref 96–108)
CHLORIDE SERPL-SCNC: 97 MMOL/L — SIGNIFICANT CHANGE UP (ref 96–108)
CHLORIDE SERPL-SCNC: 97 MMOL/L — SIGNIFICANT CHANGE UP (ref 96–108)
CO2 BLDV-SCNC: 22 MMOL/L — SIGNIFICANT CHANGE UP (ref 22–26)
CO2 BLDV-SCNC: 22 MMOL/L — SIGNIFICANT CHANGE UP (ref 22–26)
CO2 SERPL-SCNC: 20 MMOL/L — LOW (ref 22–31)
CO2 SERPL-SCNC: 20 MMOL/L — LOW (ref 22–31)
CO2 SERPL-SCNC: 21 MMOL/L — LOW (ref 22–31)
CO2 SERPL-SCNC: 21 MMOL/L — LOW (ref 22–31)
COD CRY URNS QL: PRESENT
COD CRY URNS QL: PRESENT
COLOR SPEC: SIGNIFICANT CHANGE UP
COLOR SPEC: SIGNIFICANT CHANGE UP
CREAT SERPL-MCNC: 2.01 MG/DL — HIGH (ref 0.5–1.3)
CREAT SERPL-MCNC: 2.01 MG/DL — HIGH (ref 0.5–1.3)
CREAT SERPL-MCNC: 2.08 MG/DL — HIGH (ref 0.5–1.3)
CREAT SERPL-MCNC: 2.08 MG/DL — HIGH (ref 0.5–1.3)
DIFF PNL FLD: NEGATIVE — SIGNIFICANT CHANGE UP
DIFF PNL FLD: NEGATIVE — SIGNIFICANT CHANGE UP
EGFR: 41 ML/MIN/1.73M2 — LOW
EGFR: 41 ML/MIN/1.73M2 — LOW
EGFR: 42 ML/MIN/1.73M2 — LOW
EGFR: 42 ML/MIN/1.73M2 — LOW
EOSINOPHIL # BLD AUTO: 0.05 K/UL — SIGNIFICANT CHANGE UP (ref 0–0.5)
EOSINOPHIL # BLD AUTO: 0.05 K/UL — SIGNIFICANT CHANGE UP (ref 0–0.5)
EOSINOPHIL NFR BLD AUTO: 1.9 % — SIGNIFICANT CHANGE UP (ref 0–6)
EOSINOPHIL NFR BLD AUTO: 1.9 % — SIGNIFICANT CHANGE UP (ref 0–6)
GAS PNL BLDV: 127 MMOL/L — LOW (ref 136–145)
GAS PNL BLDV: 127 MMOL/L — LOW (ref 136–145)
GIANT PLATELETS BLD QL SMEAR: PRESENT — SIGNIFICANT CHANGE UP
GIANT PLATELETS BLD QL SMEAR: PRESENT — SIGNIFICANT CHANGE UP
GLUCOSE BLDV-MCNC: 103 MG/DL — HIGH (ref 70–99)
GLUCOSE BLDV-MCNC: 103 MG/DL — HIGH (ref 70–99)
GLUCOSE SERPL-MCNC: 107 MG/DL — HIGH (ref 70–99)
GLUCOSE SERPL-MCNC: 107 MG/DL — HIGH (ref 70–99)
GLUCOSE SERPL-MCNC: 90 MG/DL — SIGNIFICANT CHANGE UP (ref 70–99)
GLUCOSE SERPL-MCNC: 90 MG/DL — SIGNIFICANT CHANGE UP (ref 70–99)
GLUCOSE UR QL: NEGATIVE MG/DL — SIGNIFICANT CHANGE UP
GLUCOSE UR QL: NEGATIVE MG/DL — SIGNIFICANT CHANGE UP
HCO3 BLDV-SCNC: 21 MMOL/L — LOW (ref 22–29)
HCO3 BLDV-SCNC: 21 MMOL/L — LOW (ref 22–29)
HCT VFR BLD CALC: 44.7 % — SIGNIFICANT CHANGE UP (ref 39–50)
HCT VFR BLD CALC: 44.7 % — SIGNIFICANT CHANGE UP (ref 39–50)
HCT VFR BLDA CALC: 43 % — SIGNIFICANT CHANGE UP (ref 39–51)
HCT VFR BLDA CALC: 43 % — SIGNIFICANT CHANGE UP (ref 39–51)
HGB BLD CALC-MCNC: 14.3 G/DL — SIGNIFICANT CHANGE UP (ref 12.6–17.4)
HGB BLD CALC-MCNC: 14.3 G/DL — SIGNIFICANT CHANGE UP (ref 12.6–17.4)
HGB BLD-MCNC: 13.9 G/DL — SIGNIFICANT CHANGE UP (ref 13–17)
HGB BLD-MCNC: 13.9 G/DL — SIGNIFICANT CHANGE UP (ref 13–17)
KETONES UR-MCNC: ABNORMAL MG/DL
KETONES UR-MCNC: ABNORMAL MG/DL
LACTATE BLDV-MCNC: 1.6 MMOL/L — SIGNIFICANT CHANGE UP (ref 0.5–2)
LACTATE BLDV-MCNC: 1.6 MMOL/L — SIGNIFICANT CHANGE UP (ref 0.5–2)
LEUKOCYTE ESTERASE UR-ACNC: NEGATIVE — SIGNIFICANT CHANGE UP
LEUKOCYTE ESTERASE UR-ACNC: NEGATIVE — SIGNIFICANT CHANGE UP
LYMPHOCYTES # BLD AUTO: 0.39 K/UL — LOW (ref 1–3.3)
LYMPHOCYTES # BLD AUTO: 0.39 K/UL — LOW (ref 1–3.3)
LYMPHOCYTES # BLD AUTO: 14.7 % — SIGNIFICANT CHANGE UP (ref 13–44)
LYMPHOCYTES # BLD AUTO: 14.7 % — SIGNIFICANT CHANGE UP (ref 13–44)
MAGNESIUM SERPL-MCNC: 2 MG/DL — SIGNIFICANT CHANGE UP (ref 1.6–2.6)
MAGNESIUM SERPL-MCNC: 2 MG/DL — SIGNIFICANT CHANGE UP (ref 1.6–2.6)
MANUAL SMEAR VERIFICATION: SIGNIFICANT CHANGE UP
MANUAL SMEAR VERIFICATION: SIGNIFICANT CHANGE UP
MCHC RBC-ENTMCNC: 22.7 PG — LOW (ref 27–34)
MCHC RBC-ENTMCNC: 22.7 PG — LOW (ref 27–34)
MCHC RBC-ENTMCNC: 31.1 GM/DL — LOW (ref 32–36)
MCHC RBC-ENTMCNC: 31.1 GM/DL — LOW (ref 32–36)
MCV RBC AUTO: 72.9 FL — LOW (ref 80–100)
MCV RBC AUTO: 72.9 FL — LOW (ref 80–100)
MONOCYTES # BLD AUTO: 0.39 K/UL — SIGNIFICANT CHANGE UP (ref 0–0.9)
MONOCYTES # BLD AUTO: 0.39 K/UL — SIGNIFICANT CHANGE UP (ref 0–0.9)
MONOCYTES NFR BLD AUTO: 14.7 % — HIGH (ref 2–14)
MONOCYTES NFR BLD AUTO: 14.7 % — HIGH (ref 2–14)
NEUTROPHILS # BLD AUTO: 1.73 K/UL — LOW (ref 1.8–7.4)
NEUTROPHILS # BLD AUTO: 1.73 K/UL — LOW (ref 1.8–7.4)
NEUTROPHILS NFR BLD AUTO: 65.7 % — SIGNIFICANT CHANGE UP (ref 43–77)
NEUTROPHILS NFR BLD AUTO: 65.7 % — SIGNIFICANT CHANGE UP (ref 43–77)
NITRITE UR-MCNC: NEGATIVE — SIGNIFICANT CHANGE UP
NITRITE UR-MCNC: NEGATIVE — SIGNIFICANT CHANGE UP
PCO2 BLDV: 33 MMHG — LOW (ref 42–55)
PCO2 BLDV: 33 MMHG — LOW (ref 42–55)
PH BLDV: 7.41 — SIGNIFICANT CHANGE UP (ref 7.32–7.43)
PH BLDV: 7.41 — SIGNIFICANT CHANGE UP (ref 7.32–7.43)
PH UR: 5.5 — SIGNIFICANT CHANGE UP (ref 5–8)
PH UR: 5.5 — SIGNIFICANT CHANGE UP (ref 5–8)
PHOSPHATE SERPL-MCNC: 2.1 MG/DL — LOW (ref 2.5–4.5)
PHOSPHATE SERPL-MCNC: 2.1 MG/DL — LOW (ref 2.5–4.5)
PLAT MORPH BLD: NORMAL — SIGNIFICANT CHANGE UP
PLAT MORPH BLD: NORMAL — SIGNIFICANT CHANGE UP
PLATELET # BLD AUTO: 136 K/UL — LOW (ref 150–400)
PLATELET # BLD AUTO: 136 K/UL — LOW (ref 150–400)
PO2 BLDV: 49 MMHG — HIGH (ref 25–45)
PO2 BLDV: 49 MMHG — HIGH (ref 25–45)
POTASSIUM BLDV-SCNC: 3.2 MMOL/L — LOW (ref 3.5–5.1)
POTASSIUM BLDV-SCNC: 3.2 MMOL/L — LOW (ref 3.5–5.1)
POTASSIUM SERPL-MCNC: 3.4 MMOL/L — LOW (ref 3.5–5.3)
POTASSIUM SERPL-MCNC: 3.4 MMOL/L — LOW (ref 3.5–5.3)
POTASSIUM SERPL-MCNC: 3.5 MMOL/L — SIGNIFICANT CHANGE UP (ref 3.5–5.3)
POTASSIUM SERPL-MCNC: 3.5 MMOL/L — SIGNIFICANT CHANGE UP (ref 3.5–5.3)
POTASSIUM SERPL-SCNC: 3.4 MMOL/L — LOW (ref 3.5–5.3)
POTASSIUM SERPL-SCNC: 3.4 MMOL/L — LOW (ref 3.5–5.3)
POTASSIUM SERPL-SCNC: 3.5 MMOL/L — SIGNIFICANT CHANGE UP (ref 3.5–5.3)
POTASSIUM SERPL-SCNC: 3.5 MMOL/L — SIGNIFICANT CHANGE UP (ref 3.5–5.3)
PROT SERPL-MCNC: 7.1 G/DL — SIGNIFICANT CHANGE UP (ref 6–8.3)
PROT SERPL-MCNC: 7.1 G/DL — SIGNIFICANT CHANGE UP (ref 6–8.3)
PROT UR-MCNC: 100 MG/DL
PROT UR-MCNC: 100 MG/DL
RAPID RVP RESULT: SIGNIFICANT CHANGE UP
RAPID RVP RESULT: SIGNIFICANT CHANGE UP
RBC # BLD: 6.13 M/UL — HIGH (ref 4.2–5.8)
RBC # BLD: 6.13 M/UL — HIGH (ref 4.2–5.8)
RBC # FLD: 14.2 % — SIGNIFICANT CHANGE UP (ref 10.3–14.5)
RBC # FLD: 14.2 % — SIGNIFICANT CHANGE UP (ref 10.3–14.5)
RBC BLD AUTO: SIGNIFICANT CHANGE UP
RBC BLD AUTO: SIGNIFICANT CHANGE UP
RBC CASTS # UR COMP ASSIST: 6 /HPF — HIGH (ref 0–4)
RBC CASTS # UR COMP ASSIST: 6 /HPF — HIGH (ref 0–4)
REVIEW: SIGNIFICANT CHANGE UP
REVIEW: SIGNIFICANT CHANGE UP
SAO2 % BLDV: 82.9 % — SIGNIFICANT CHANGE UP (ref 67–88)
SAO2 % BLDV: 82.9 % — SIGNIFICANT CHANGE UP (ref 67–88)
SARS-COV-2 RNA SPEC QL NAA+PROBE: SIGNIFICANT CHANGE UP
SARS-COV-2 RNA SPEC QL NAA+PROBE: SIGNIFICANT CHANGE UP
SODIUM SERPL-SCNC: 131 MMOL/L — LOW (ref 135–145)
SP GR SPEC: >1.03 — HIGH (ref 1–1.03)
SP GR SPEC: >1.03 — HIGH (ref 1–1.03)
SQUAMOUS # UR AUTO: 1 /HPF — SIGNIFICANT CHANGE UP (ref 0–5)
SQUAMOUS # UR AUTO: 1 /HPF — SIGNIFICANT CHANGE UP (ref 0–5)
TACROLIMUS SERPL-MCNC: 5.7 NG/ML — SIGNIFICANT CHANGE UP
TACROLIMUS SERPL-MCNC: 5.7 NG/ML — SIGNIFICANT CHANGE UP
UROBILINOGEN FLD QL: 1 MG/DL — SIGNIFICANT CHANGE UP (ref 0.2–1)
UROBILINOGEN FLD QL: 1 MG/DL — SIGNIFICANT CHANGE UP (ref 0.2–1)
VARIANT LYMPHS # BLD: 2 % — SIGNIFICANT CHANGE UP (ref 0–6)
VARIANT LYMPHS # BLD: 2 % — SIGNIFICANT CHANGE UP (ref 0–6)
WBC # BLD: 2.64 K/UL — LOW (ref 3.8–10.5)
WBC # BLD: 2.64 K/UL — LOW (ref 3.8–10.5)
WBC # FLD AUTO: 2.64 K/UL — LOW (ref 3.8–10.5)
WBC # FLD AUTO: 2.64 K/UL — LOW (ref 3.8–10.5)
WBC UR QL: 0 /HPF — SIGNIFICANT CHANGE UP (ref 0–5)
WBC UR QL: 0 /HPF — SIGNIFICANT CHANGE UP (ref 0–5)

## 2024-01-08 PROCEDURE — 99223 1ST HOSP IP/OBS HIGH 75: CPT

## 2024-01-08 PROCEDURE — 76776 US EXAM K TRANSPL W/DOPPLER: CPT | Mod: 26,RT

## 2024-01-08 PROCEDURE — 99255 IP/OBS CONSLTJ NEW/EST HI 80: CPT

## 2024-01-08 PROCEDURE — 99222 1ST HOSP IP/OBS MODERATE 55: CPT | Mod: GC

## 2024-01-08 RX ORDER — SODIUM CHLORIDE 9 MG/ML
500 INJECTION INTRAMUSCULAR; INTRAVENOUS; SUBCUTANEOUS ONCE
Refills: 0 | Status: COMPLETED | OUTPATIENT
Start: 2024-01-08 | End: 2024-01-08

## 2024-01-08 RX ORDER — ACETAMINOPHEN 500 MG
650 TABLET ORAL EVERY 6 HOURS
Refills: 0 | Status: DISCONTINUED | OUTPATIENT
Start: 2024-01-08 | End: 2024-01-13

## 2024-01-08 RX ORDER — PANTOPRAZOLE SODIUM 20 MG/1
40 TABLET, DELAYED RELEASE ORAL
Refills: 0 | Status: DISCONTINUED | OUTPATIENT
Start: 2024-01-08 | End: 2024-01-13

## 2024-01-08 RX ORDER — DOCUSATE SODIUM 100 MG
0 CAPSULE ORAL
Refills: 0 | DISCHARGE

## 2024-01-08 RX ORDER — HEPARIN SODIUM 5000 [USP'U]/ML
5000 INJECTION INTRAVENOUS; SUBCUTANEOUS EVERY 8 HOURS
Refills: 0 | Status: DISCONTINUED | OUTPATIENT
Start: 2024-01-08 | End: 2024-01-10

## 2024-01-08 RX ORDER — CEFEPIME 1 G/1
2000 INJECTION, POWDER, FOR SOLUTION INTRAMUSCULAR; INTRAVENOUS EVERY 12 HOURS
Refills: 0 | Status: DISCONTINUED | OUTPATIENT
Start: 2024-01-08 | End: 2024-01-10

## 2024-01-08 RX ORDER — MYCOPHENOLATE MOFETIL 250 MG/1
2 CAPSULE ORAL
Qty: 0 | Refills: 0 | DISCHARGE

## 2024-01-08 RX ORDER — NIFEDIPINE 30 MG
1 TABLET, EXTENDED RELEASE 24 HR ORAL
Refills: 0 | DISCHARGE

## 2024-01-08 RX ORDER — TACROLIMUS 5 MG/1
4 CAPSULE ORAL DAILY
Refills: 0 | Status: DISCONTINUED | OUTPATIENT
Start: 2024-01-08 | End: 2024-01-13

## 2024-01-08 RX ORDER — NIFEDIPINE 30 MG
90 TABLET, EXTENDED RELEASE 24 HR ORAL DAILY
Refills: 0 | Status: DISCONTINUED | OUTPATIENT
Start: 2024-01-08 | End: 2024-01-13

## 2024-01-08 RX ORDER — TACROLIMUS 5 MG/1
1 CAPSULE ORAL
Refills: 0 | DISCHARGE

## 2024-01-08 RX ADMIN — HEPARIN SODIUM 5000 UNIT(S): 5000 INJECTION INTRAVENOUS; SUBCUTANEOUS at 17:29

## 2024-01-08 RX ADMIN — PANTOPRAZOLE SODIUM 40 MILLIGRAM(S): 20 TABLET, DELAYED RELEASE ORAL at 12:10

## 2024-01-08 RX ADMIN — Medication 63.75 MILLIMOLE(S): at 17:29

## 2024-01-08 RX ADMIN — HEPARIN SODIUM 5000 UNIT(S): 5000 INJECTION INTRAVENOUS; SUBCUTANEOUS at 22:09

## 2024-01-08 RX ADMIN — CEFEPIME 100 MILLIGRAM(S): 1 INJECTION, POWDER, FOR SOLUTION INTRAMUSCULAR; INTRAVENOUS at 17:28

## 2024-01-08 RX ADMIN — Medication 1 TABLET(S): at 11:32

## 2024-01-08 RX ADMIN — Medication 5 MILLIGRAM(S): at 12:10

## 2024-01-08 RX ADMIN — SODIUM CHLORIDE 500 MILLILITER(S): 9 INJECTION INTRAMUSCULAR; INTRAVENOUS; SUBCUTANEOUS at 11:33

## 2024-01-08 NOTE — CONSULT NOTE ADULT - SUBJECTIVE AND OBJECTIVE BOX
Patient is a 38y old  Male who presents with a chief complaint of fever in immunocompromised patient (08 Jan 2024 09:48)      HPI:   38 y.o male with PMHx of HTN, ESRD s/p 7/2022 presenting intermittent high fevers since 5 days  Tmax 101.6. Patient was seen at urgent care Friday not given antibiotics? fever subsided somniferously without antipyretics, subsequently fever reoccurred yesterday prompting patient to come to the hospital. His only other complaint besides the fever is tooth pain. States he has dental work done for a broken tooth recently, he denies any tooth/ gun drainage. He denies any URI symptoms, D/N/V, sick contacts, dysuria, abd pain and recent travel.     In the ED s/p zosyn and dental consulted  (08 Jan 2024 09:48)     prior hospital charts reviewed [  ]  primary team notes reviewed [  ]  other consultant notes reviewed [  ]    PAST MEDICAL & SURGICAL HISTORY:  ESRD on hemodialysis  at home 5 x week      HTN (hypertension)      ESRD (end stage renal disease) on dialysis      HTN (hypertension)      Bell's palsy      Hyperthyroidism      AV fistula  L forearm      Elective surgical procedure  RACW permacath for HD, removed 5 years ago          Allergies  topical cleanser for dialysis access.   Exsept (Rash)  No Known Drug Allergies    ANTIMICROBIALS (past 90 days)  MEDICATIONS  (STANDING):    piperacillin/tazobactam IVPB...   200 mL/Hr IV Intermittent (01-07-24 @ 23:59)    trimethoprim   80 mG/sulfamethoxazole 400 mG   1 Tablet(s) Oral (01-08-24 @ 11:32)      ANTIMICROBIALS:    cefepime   IVPB 2000 every 12 hours  trimethoprim   80 mG/sulfamethoxazole 400 mG 1 daily    OTHER MEDS: MEDICATIONS  (STANDING):  acetaminophen     Tablet .. 650 every 6 hours PRN  heparin   Injectable 5000 every 8 hours  NIFEdipine XL 90 daily  pantoprazole    Tablet 40 before breakfast  predniSONE   Tablet 5 daily  tacrolimus ER Tablet (ENVARSUS XR) 4 daily    SOCIAL HISTORY:   hx smoking  non-smoker    FAMILY HISTORY:  Family history of hypertension (Father, Mother)  Father and mother      REVIEW OF SYSTEMS  [  ] ROS unobtainable because:    [  ] All other systems negative except as noted below:	    Constitutional:  [ ] fever [ ] chills  [ ] weight loss  [ ] weakness  Skin:  [ ] rash [ ] phlebitis	  Eyes: [ ] icterus [ ] pain  [ ] discharge	  ENMT: [ ] sore throat  [ ] thrush [ ] ulcers [ ] exudates  Respiratory: [ ] dyspnea [ ] hemoptysis [ ] cough [ ] sputum	  Cardiovascular:  [ ] chest pain [ ] palpitations [ ] edema	  Gastrointestinal:  [ ] nausea [ ] vomiting [ ] diarrhea [ ] constipation [ ] pain	  Genitourinary:  [ ] dysuria [ ] frequency [ ] hematuria [ ] discharge [ ] flank pain  [ ] incontinence  Musculoskeletal:  [ ] myalgias [ ] arthralgias [ ] arthritis  [ ] back pain  Neurological:  [ ] headache [ ] seizures  [ ] confusion/altered mental status  Psychiatric:  [ ] anxiety [ ] depression	  Hematology/Lymphatics:  [ ] lymphadenopathy  Endocrine:  [ ] adrenal [ ] thyroid  Allergic/Immunologic:	 [ ] transplant [ ] seasonal    Vital Signs Last 24 Hrs  T(F): 98.6 (01-08-24 @ 07:35), Max: 100 (01-07-24 @ 21:41)  Vital Signs Last 24 Hrs  HR: 89 (01-08-24 @ 07:35) (86 - 97)  BP: 121/85 (01-08-24 @ 07:35) (119/88 - 144/94)  RR: 18 (01-08-24 @ 07:35)  SpO2: 97% (01-08-24 @ 07:35) (97% - 100%)  Wt(kg): --    PHYSICAL EXAMINATION:  General: Alert and Awake, NAD  HEENT: PERRL, EOMI, No subconjunctival hemorrhages, Oropharynx Clear, MMM  Neck: Supple, No KANDY  Cardiac: RRR, No M/R/G  Resp: CTAB, No Wh/Rh/Ra  Abdomen: NBS, NT/ND, No HSM, No rigidity or guarding  MSK: No LE edema. No stigmata of IE. No evidence of phlebitis. No evidence of synovitis.  : No underwood  Skin: No rashes or lesions. Skin is warm and dry to the touch.   Neuro: Alert and Awake. CN 2-12 Grossly intact. Moves all four extremities spontaneously.  Psych: Calm, Pleasant, Cooperative                          13.9   2.64  )-----------( 136      ( 07 Jan 2024 23:40 )             44.7     01-08    131<L>  |  97  |  16  ----------------------------<  90  3.5   |  21<L>  |  2.01<H>    Ca    9.0      08 Jan 2024 09:38  Phos  2.1     01-08  Mg     2.0     01-08    TPro  7.1  /  Alb  4.1  /  TBili  0.5  /  DBili  x   /  AST  38  /  ALT  47<H>  /  AlkPhos  118  01-07    Urinalysis Basic - ( 08 Jan 2024 09:38 )    Color: x / Appearance: x / SG: x / pH: x  Gluc: 90 mg/dL / Ketone: x  / Bili: x / Urobili: x   Blood: x / Protein: x / Nitrite: x   Leuk Esterase: x / RBC: x / WBC x   Sq Epi: x / Non Sq Epi: x / Bacteria: x    MICROBIOLOGY:          Rapid RVP Result: NotDetec (01-07 @ 23:42)        RADIOLOGY:    <The imaging below has been reviewed and visualized by me independently. Findings as detailed in report below>     Patient is a 38y old  Male who presents with a chief complaint of fever in immunocompromised patient (08 Jan 2024 09:48)      HPI:  38 y.o male with PMHx of HTN, ESRD s/p 7/2022 presenting intermittent high fevers since 5 days  Tmax 101.6. Patient was seen at urgent care Friday not given antibiotics? fever subsided somniferously without antipyretics, subsequently fever reoccurred yesterday prompting patient to come to the hospital. His only other complaint besides the fever is tooth pain. States he has dental work done for a broken tooth recently, he denies any tooth/ gun drainage. He denies any URI symptoms, sick contacts, dysuria, & recent travel.   In the ED s/p zosyn and dental consulted.     On assessment patient is endorsing weakness, mild pelvic/bladder pain and right inguinal, nausea with dry heaves, chronic lower back pain, DURAN.      prior hospital charts reviewed [  ]  primary team notes reviewed [x  ]  other consultant notes reviewed [  x]    PAST MEDICAL & SURGICAL HISTORY:  ESRD on hemodialysis  at home 5 x week      HTN (hypertension)      ESRD (end stage renal disease) on dialysis      HTN (hypertension)      Bell's palsy      Hyperthyroidism      AV fistula  L forearm      Elective surgical procedure  RACW permacath for HD, removed 5 years ago          Allergies  topical cleanser for dialysis access.   Exsept (Rash)  No Known Drug Allergies    ANTIMICROBIALS (past 90 days)  MEDICATIONS  (STANDING):    piperacillin/tazobactam IVPB...   200 mL/Hr IV Intermittent (01-07-24 @ 23:59)    trimethoprim   80 mG/sulfamethoxazole 400 mG   1 Tablet(s) Oral (01-08-24 @ 11:32)      ANTIMICROBIALS:    cefepime   IVPB 2000 every 12 hours  trimethoprim   80 mG/sulfamethoxazole 400 mG 1 daily    OTHER MEDS: MEDICATIONS  (STANDING):  acetaminophen     Tablet .. 650 every 6 hours PRN  heparin   Injectable 5000 every 8 hours  NIFEdipine XL 90 daily  pantoprazole    Tablet 40 before breakfast  predniSONE   Tablet 5 daily  tacrolimus ER Tablet (ENVARSUS XR) 4 daily    SOCIAL HISTORY:   hx smoking  non-smoker    FAMILY HISTORY:  Family history of hypertension (Father, Mother)  Father and mother      REVIEW OF SYSTEMS  [  ] ROS unobtainable because:    [  X] All other systems negative except as noted below:	    Constitutional:  [ ] fever [ ] chills  [ ] weight loss  [X ] weakness  Skin:  [ ] rash [ ] phlebitis	  Eyes: [ ] icterus [ ] pain  [ ] discharge	  ENMT: [ ] sore throat  [ ] thrush [ ] ulcers [ ] exudates  Respiratory: [ ] dyspnea [ ] hemoptysis [ ] cough [ ] sputum	  Cardiovascular:  [ ] chest pain [ ] palpitations [ ] edema	  Gastrointestinal:  [ X] nausea [ ] vomiting [ ] diarrhea [ ] constipation [ ] pain	  Genitourinary:  [ ] dysuria [ ] frequency [ ] hematuria [ ] discharge [ ] flank pain  [ ] incontinence  Musculoskeletal:  [ ] myalgias [ ] arthralgias [ ] arthritis  [X ] back pain  Neurological:  [ ] headache [ ] seizures  [ ] confusion/altered mental status  Psychiatric:  [ ] anxiety [ ] depression	  Hematology/Lymphatics:  [ ] lymphadenopathy  Endocrine:  [ ] adrenal [ ] thyroid  Allergic/Immunologic:	 [ ] transplant [ ] seasonal    Vital Signs Last 24 Hrs  T(F): 98.6 (01-08-24 @ 07:35), Max: 100 (01-07-24 @ 21:41)  Vital Signs Last 24 Hrs  HR: 89 (01-08-24 @ 07:35) (86 - 97)  BP: 121/85 (01-08-24 @ 07:35) (119/88 - 144/94)  RR: 18 (01-08-24 @ 07:35)  SpO2: 97% (01-08-24 @ 07:35) (97% - 100%)  Wt(kg): --    PHYSICAL EXAMINATION:  General: Alert and Awake, NAD  HEENT: PERRL, EOMI, No subconjunctival hemorrhages, Oropharynx Clear, MMM  Neck: Supple, No KANDY  Cardiac: RRR, No M/R/G  Resp: CTAB, No Wh/Rh/Ra  Abdomen: NBS, NT/ND, No HSM, No rigidity or guarding  MSK: No LE edema. No stigmata of IE. No evidence of phlebitis. No evidence of synovitis.  : No Oliveira  Skin: No rashes or lesions. Skin is warm and dry to the touch.   Neuro: Alert and Awake. CN 2-12 Grossly intact. Moves all four extremities spontaneously.  Psych: Calm, Pleasant, Cooperative                          13.9   2.64  )-----------( 136      ( 07 Jan 2024 23:40 )             44.7     01-08    131<L>  |  97  |  16  ----------------------------<  90  3.5   |  21<L>  |  2.01<H>    Ca    9.0      08 Jan 2024 09:38  Phos  2.1     01-08  Mg     2.0     01-08    TPro  7.1  /  Alb  4.1  /  TBili  0.5  /  DBili  x   /  AST  38  /  ALT  47<H>  /  AlkPhos  118  01-07    Urinalysis Basic - ( 08 Jan 2024 09:38 )    Color: x / Appearance: x / SG: x / pH: x  Gluc: 90 mg/dL / Ketone: x  / Bili: x / Urobili: x   Blood: x / Protein: x / Nitrite: x   Leuk Esterase: x / RBC: x / WBC x   Sq Epi: x / Non Sq Epi: x / Bacteria: x    MICROBIOLOGY:          Rapid RVP Result: NotDetec (01-07 @ 23:42)        RADIOLOGY:    <The imaging below has been reviewed and visualized by me independently. Findings as detailed in report below>     Patient is a 38y old  Male who presents with a chief complaint of fever in immunocompromised patient (08 Jan 2024 09:48)      HPI:  38 y.o male with PMHx of HTN, ESRD s/p 7/2022 presenting intermittent high fevers since 5 days  Tmax 101.6. Patient was seen at urgent care Friday not given antibiotics? fever subsided somniferously without antipyretics, subsequently fever reoccurred yesterday prompting patient to come to the hospital. His only other complaint besides the fever is tooth pain. States he has dental work done for a broken tooth recently, he denies any tooth/ gun drainage. He denies any URI symptoms, sick contacts, dysuria, & recent travel.   In the ED s/p zosyn and dental consulted.     On assessment patient is endorsing weakness, mild pelvic/bladder pain and right inguinal, nausea with dry heaves, chronic lower back pain, DURAN.      prior hospital charts reviewed [  ]  primary team notes reviewed [x  ]  other consultant notes reviewed [  x]    PAST MEDICAL & SURGICAL HISTORY:  ESRD on hemodialysis  at home 5 x week      HTN (hypertension)      ESRD (end stage renal disease) on dialysis      HTN (hypertension)      Bell's palsy      Hyperthyroidism      AV fistula  L forearm      Elective surgical procedure  RACW permacath for HD, removed 5 years ago          Allergies  topical cleanser for dialysis access.   Exsept (Rash)  No Known Drug Allergies    ANTIMICROBIALS (past 90 days)  MEDICATIONS  (STANDING):    piperacillin/tazobactam IVPB...   200 mL/Hr IV Intermittent (01-07-24 @ 23:59)    trimethoprim   80 mG/sulfamethoxazole 400 mG   1 Tablet(s) Oral (01-08-24 @ 11:32)      ANTIMICROBIALS:    cefepime   IVPB 2000 every 12 hours  trimethoprim   80 mG/sulfamethoxazole 400 mG 1 daily    OTHER MEDS: MEDICATIONS  (STANDING):  acetaminophen     Tablet .. 650 every 6 hours PRN  heparin   Injectable 5000 every 8 hours  NIFEdipine XL 90 daily  pantoprazole    Tablet 40 before breakfast  predniSONE   Tablet 5 daily  tacrolimus ER Tablet (ENVARSUS XR) 4 daily    SOCIAL HISTORY:   hx smoking  non-smoker    FAMILY HISTORY:  Family history of hypertension (Father, Mother)  Father and mother      REVIEW OF SYSTEMS  [  ] ROS unobtainable because:    [  X] All other systems negative except as noted below:	    Constitutional:  [ ] fever [ ] chills  [ ] weight loss  [X ] weakness  Skin:  [ ] rash [ ] phlebitis	  Eyes: [ ] icterus [ ] pain  [ ] discharge	  ENMT: [ ] sore throat  [ ] thrush [ ] ulcers [ ] exudates  Respiratory: [ ] dyspnea [ ] hemoptysis [ ] cough [ ] sputum	  Cardiovascular:  [ ] chest pain [ ] palpitations [ ] edema	  Gastrointestinal:  [ X] nausea [ ] vomiting [ ] diarrhea [ ] constipation [ ] pain	  Genitourinary:  [ ] dysuria [ ] frequency [ ] hematuria [ ] discharge [ ] flank pain  [ ] incontinence  Musculoskeletal:  [ ] myalgias [ ] arthralgias [ ] arthritis  [X ] back pain  Neurological:  [ ] headache [ ] seizures  [ ] confusion/altered mental status  Psychiatric:  [ ] anxiety [ ] depression	  Hematology/Lymphatics:  [ ] lymphadenopathy  Endocrine:  [ ] adrenal [ ] thyroid  Allergic/Immunologic:	 [ ] transplant [ ] seasonal    Vital Signs Last 24 Hrs  T(F): 98.6 (01-08-24 @ 07:35), Max: 100 (01-07-24 @ 21:41)  Vital Signs Last 24 Hrs  HR: 89 (01-08-24 @ 07:35) (86 - 97)  BP: 121/85 (01-08-24 @ 07:35) (119/88 - 144/94)  RR: 18 (01-08-24 @ 07:35)  SpO2: 97% (01-08-24 @ 07:35) (97% - 100%)  Wt(kg): --    PHYSICAL EXAMINATION:  General: Alert and Awake, NAD  HEENT: PERRL, EOMI, No subconjunctival hemorrhages, Oropharynx Clear, MMM  Neck: Supple, No KANDY  Cardiac: RRR, No M/R/G  Resp: CTAB, No Wh/Rh/Ra  Abdomen: NBS, NT/ND, No HSM, No rigidity or guarding  MSK: No LE edema. No stigmata of IE. No evidence of phlebitis. No evidence of synovitis.  : No Oliveira  Skin: No rashes or lesions. Skin is warm and dry to the touch.   Neuro: Alert and Awake. CN 2-12 Grossly intact. Moves all four extremities spontaneously.  Psych: Calm, Pleasant, Cooperative                          13.9   2.64  )-----------( 136      ( 07 Jan 2024 23:40 )             44.7     01-08    131<L>  |  97  |  16  ----------------------------<  90  3.5   |  21<L>  |  2.01<H>    Ca    9.0      08 Jan 2024 09:38  Phos  2.1     01-08  Mg     2.0     01-08    TPro  7.1  /  Alb  4.1  /  TBili  0.5  /  DBili  x   /  AST  38  /  ALT  47<H>  /  AlkPhos  118  01-07    Urinalysis Basic - ( 08 Jan 2024 09:38 )    Color: x / Appearance: x / SG: x / pH: x  Gluc: 90 mg/dL / Ketone: x  / Bili: x / Urobili: x   Blood: x / Protein: x / Nitrite: x   Leuk Esterase: x / RBC: x / WBC x   Sq Epi: x / Non Sq Epi: x / Bacteria: x    MICROBIOLOGY:          Rapid RVP Result: NotDetec (01-07 @ 23:42)        RADIOLOGY:    <The imaging below has been reviewed and visualized by me independently. Findings as detailed in report below>    < from: US Trans Kidney w/ Doppler, Right (01.08.24 @ 15:35) >  IMPRESSION:  Status post renal transplant in the right lower quadrant. No   hydronephrosis. Mildly increased cortical echogenicity.    The transplant vasculature is patent as detailed above. Mildly  increased   velocity at the renal transplant artery anastomosis, improved compared to   prior examination.    Mild circumferential wall thickening of the urinary bladder. Suggest   clinical and laboratory correlation to exclude infection/cystitis.    < end of copied text >

## 2024-01-08 NOTE — CONSULT NOTE ADULT - ATTENDING COMMENTS
Allograft function with MIREILLE , Cr is 2.1. baseline  Cr 1.6-1.7  IVF hydration. bladder scan to r/o retention. Txp USG  Fever w/u including  BC, UC RVP, Covid . consult Txp ID   Check BK VL, CMV and Adenovirus  Hold cellcept for now. continue Envarsus and prednisone.

## 2024-01-08 NOTE — H&P ADULT - NSHPADDITIONALINFOADULT_GEN_ALL_CORE
time spent reviewing prior charts, meds, discussing plan with patient= 80 min     d/w Medicine ACP Yvette

## 2024-01-08 NOTE — ED ADULT NURSE REASSESSMENT NOTE - NS ED NURSE REASSESS COMMENT FT1
Handoff received from previous RN, pt AxO3, resting comfortably in bed, speaking full sentences. Currently asymptomatic, afebrile, VSS. Updated on POC- awaiting transplant team consult, no acute distress at this time.

## 2024-01-08 NOTE — CONSULT NOTE ADULT - ASSESSMENT
38 y.o. M w/ PMHx of HTN (since age 17), and ESRD (on home HD since 2015) via LUE AVF, hyperthyroidism s/p DDRT on 6/2022 presenting to [facility] for fevers at home.     Transplant Hx: cPRA 0%, CMV-/ EBV+. Pt. is POD #1 from DDRT (1a, 1v, 1u- no stent)- Simulect induction, CIT 17 hours. Donor: Age:  36 yo, KDPI 14%, COD:  Drug Intoxication, Terminal Cr:  0.79. CMV- positive EBV-positive. HepBcAb- negative. Hepatitis C-JAKE- negative. Hepatitis C ab- negative    1. S/P DDRT 6/2022 ([facility], Dr. Garcia, Transplant Neph Dr. MAXIME Colindres), currently admitted for fevers. SCr of 2 on admission (baseline is around 2). Monitor labs and urine output. Avoid any potential nephrotoxins. Dose medications as per eGFR.    2. Immunosuppression- Currently on immunosuppression (Envarsus 4mg daily,  mg BID and prednisone 5 mg PO OD) for kidney transplant. He was told to recently increase his Envarsus to 5mg daily for low tacro level but he could not afford the 1mg pill, thus has remained on 4mg daily. Please check serum tacrolimus level (trough) 30 mins prior to am dose. Hold home MMF for now given infectious process. Continue home prednisone.     3. Fevers- Etiology remains somewhat unclear. CXR and UA without significant findings from infectious standpoint. Placed on IV Zosyn empirically. Recommend Transplant ID consult.  38 y.o. M w/ PMHx of HTN (since age 17), and ESRD (on home HD since 2015) via LUE AVF, hyperthyroidism s/p DDRT on 6/2022 presenting to [facility] for fevers at home.     Transplant Hx: cPRA 0%, CMV-/ EBV+. Pt. is POD #1 from DDRT (1a, 1v, 1u- no stent)- Simulect induction, CIT 17 hours. Donor: Age:  36 yo, KDPI 14%, COD:  Drug Intoxication, Terminal Cr:  0.79. CMV- positive EBV-positive. HepBcAb- negative. Hepatitis C-JAKE- negative. Hepatitis C ab- negative    1. S/P DDRT 6/2022 ([facility], Dr. Garcia, Transplant Neph Dr. MAXIME Colindres), currently admitted for fevers. SCr of 2 on admission (baseline is around 2). Monitor labs and urine output. Avoid any potential nephrotoxins. Dose medications as per eGFR.    2. Immunosuppression- Currently on immunosuppression (Envarsus 4mg daily,  mg BID and prednisone 5 mg PO OD) for kidney transplant. He was told to recently increase his Envarsus to 5mg daily for low tacro level but he could not afford the 1mg pill, thus has remained on 4mg daily. Please check serum tacrolimus level (trough) 30 mins prior to am dose. Hold home MMF for now given infectious process. Continue home prednisone.     3. Fevers- Etiology remains somewhat unclear. CXR and UA without significant findings from infectious standpoint. Placed on IV Zosyn empirically. Recommend Transplant ID consult.

## 2024-01-08 NOTE — H&P ADULT - NSHPLABSRESULTS_GEN_ALL_CORE
Labs personally reviewed:                          13.9   2.64  )-----------( 136      ( 07 Jan 2024 23:40 )             44.7       01-07    131<L>  |  96  |  16  ----------------------------<  107<H>  3.4<L>   |  20<L>  |  2.08<H>    Ca    9.8      07 Jan 2024 23:40    TPro  7.1  /  Alb  4.1  /  TBili  0.5  /  DBili  x   /  AST  38  /  ALT  47<H>  /  AlkPhos  118  01-07      PT/INR - ( 07 Jan 2024 23:40 )   PT: 12.1 sec;   INR: 1.10 ratio         PTT - ( 07 Jan 2024 23:40 )  PTT:36.3 sec      Imaging personally reviewed:  CXR: clear    RVP: neg

## 2024-01-08 NOTE — ED ADULT NURSE NOTE - OBJECTIVE STATEMENT
ARIC Daugherty received 1/8/24  0700- breathing spontaneous and unlabored, skin war rojelio dry, VSS, resting in bed, family at bedside, endorsing pain relief.

## 2024-01-08 NOTE — ED PROVIDER NOTE - INDEPENDENTLY INTERPRETED
Yes

## 2024-01-08 NOTE — ED PROVIDER NOTE - PHYSICAL EXAMINATION
General: Alert and Orientated x 3. No apparent distress.  Head: Normocephalic and atraumatic.  Eyes: PERRLA with EOMI.  Oral:   Multiple cracked teeth.   Worse in teeth 10/11/18.  No apical abscesses.  No edema.  Neck: Supple. Trachea midline.   Cardiac: Normal S1 and S2 w/ RRR. No murmurs appreciated. No JVD appreciated.  Pulmonary: CTA bilaterally. No increased WOB. No wheezes or crackles.  Abdominal: Soft, non-tender. (+) bowel sounds appreciated in all 4 quadrants. No hepatosplenomegaly.   Neurologic: No focal sensory or motor deficits.  Musculoskeletal: Strength appropriate in all 4 extremities for age with no limited ROM.  Skin: Color appropriate for race. Intact, warm, and well-perfused.  Psychiatric: Appropriate mood and affect. No apparent risk to self or others.

## 2024-01-08 NOTE — ED PROVIDER NOTE - PROGRESS NOTE DETAILS
Dolores Granados MD (PGY3):   Renal transplant consulted, rec tacro level and holding mycophenolate.   Dental consulted will see patient in AM. trey- discussed with hospitalist and the patient was admitted to medicine for further evaluation and treatment/management

## 2024-01-08 NOTE — ED PROVIDER NOTE - OBJECTIVE STATEMENT
Patient is a 38-year-old male with history of end-stage renal disease and hypertension status post DDRT  22/2022 presenting to ED with intermittent fever since Wednesday.  Tmax 101.6.   No URI symptoms.   Some decreased urination,  no pain on urination.  No diarrhea.  Had recent dental work about a month and a half ago due to poor dentition.  No facial swelling.  No travel, sick contacts, bug bites.

## 2024-01-08 NOTE — ED ADULT NURSE REASSESSMENT NOTE - NS ED NURSE REASSESS COMMENT FT1
1219 [facility]s [facility] given [facility]

## 2024-01-08 NOTE — CONSULT NOTE ADULT - NS ATTEND AMEND GEN_ALL_CORE FT
38 y.o male with PMHx of HTN, ESRD s/p 7/2022 presenting with intermittent fevers  Agree with empiric Cefepime   Would check CMV PCR  Follow up on transplant kidney US  Follow up on prelim BCx and UCx    I will continue to follow. Please feel free to contact me with any further questions.    Eb Prather M.D.  [facility] Division of Infectious Disease  8AM-5PM Monday - Friday: Available on [employer] Teams  After Hours and Holidays (or if no response on [employer] Teams): Please contact the Infectious Diseases Office at [phone] 38 y.o male with PMHx of HTN, ESRD s/p 7/2022 presenting with intermittent fevers  Agree with empiric Cefepime   Would check CMV PCR  Follow up on transplant kidney US  Follow up on prelim BCx and UCx    I will continue to follow. Please feel free to contact me with any further questions.    Eb Prather M.D.  [facility] Division of Infectious Disease  8AM-5PM Monday - Friday: Available on [employer] Teams  After Hours and Holidays (or if no response on [employer] Teams): Please contact the Infectious Diseases Office at [phone]

## 2024-01-08 NOTE — H&P ADULT - ASSESSMENT
38 y.o male with PMHx of HTN, ESRD s/p 7/2022 presenting intermittent high fevers since 5 days admitted for further work up

## 2024-01-08 NOTE — ED ADULT NURSE REASSESSMENT NOTE - NS ED NURSE REASSESS COMMENT FT1
[id number] Break coverage for [facility] and fluids given as ordered and Pharmacy called for IV medication sodium Phosphate Dominick [id number] Break coverage for [facility] and fluids given as ordered and Pharmacy called for IV medication sodium Phosphate Dominick

## 2024-01-08 NOTE — H&P ADULT - PROBLEM SELECTOR PLAN 2
c/w prednisone  c/w Tacrolimus  f/u Tac level (sent by ED)   hold Cellcept pending infectious work up   ED consulted transplant nephrology

## 2024-01-08 NOTE — CONSULT NOTE ADULT - ASSESSMENT
38 y.o male with PMHx of HTN, ESRD s/p 7/2022 presenting intermittent high fevers since 5 days  Tmax 101.6. Patient was seen at urgent care Friday not given antibiotics? fever subsided somniferously without antipyretics, subsequently fever reoccurred yesterday prompting patient to come to the hospital. His only other complaint besides the fever is tooth pain. States he has dental work done for a broken tooth recently, he denies any tooth/ gun drainage. He denies any URI symptoms, sick contacts, dysuria, & recent travel.   In the ED s/p zosyn and dental consulted.     On assessment patient is endorsing weakness, mild pelvic/bladder pain and right inguinal, nausea with dry heaves, chronic lower back pain, DURAN.      #DDRT Recipient  - Continue Bactrim       #Fevers  #Neutropenia  -Afebrile   -WBC 2.6  -RVP + COVID19 - negative  -UA - 0wbc, negative bacteria  - CXR - Clear lungs  -S/P Zosyn x1      RECOMMENDATIONS:   Curve temperatures  Continue to monitor cbc  Would benefit from dental consult  Follow up urine cultures  Follow up blood cultures  Would send CMV PCR  Continue cefepime and follow culture data   38 y.o male with PMHx of HTN, ESRD s/p 7/2022 presenting intermittent high fevers since 5 days  Tmax 101.6. Patient was seen at urgent care Friday not given antibiotics? fever subsided somniferously without antipyretics, subsequently fever reoccurred yesterday prompting patient to come to the hospital. His only other complaint besides the fever is tooth pain. States he has dental work done for a broken tooth recently, he denies any tooth/ gun drainage. He denies any URI symptoms, sick contacts, dysuria, & recent travel.   In the ED s/p zosyn and dental consulted.     On assessment patient is endorsing weakness, mild pelvic/bladder pain and right inguinal, nausea with dry heaves, chronic lower back pain, DURAN.    #DDRT Recipient  - Continue Bactrim     #Fevers  #Neutropenia  -Afebrile   -WBC 2.6  -RVP + COVID19 - negative  -UA - 0wbc, negative bacteria  - CXR - Clear lungs  -S/P Zosyn x1    RECOMMENDATIONS:   Curve temperatures  Continue to monitor cbc  Would benefit from dental consult  Follow up urine cultures  Follow up blood cultures  Would send CMV PCR  Continue cefepime and follow culture data

## 2024-01-08 NOTE — H&P ADULT - HISTORY OF PRESENT ILLNESS
38 y.o male with PMHx of HTN, ESRD s/p 7/2022 presenting intermittent high fevers since 5 days  Tmax 101.6. Patient was seen at urgent care Friday not given antibiotics? fever subsided somniferously without antipyretics, subsequently fever reoccurred yesterday prompting patient to come to the hospital. His only other complaint besides the fever is tooth pain. States he has dental work done for a broken tooth recently, he denies any tooth/ gun drainage. He denies any URI symptoms, D/N/V, sick contacts, dysuria, abd pain and recent travel.     In the ED s/p clindamycin and dental consulted   38 y.o male with PMHx of HTN, ESRD s/p 7/2022 presenting intermittent high fevers since 5 days  Tmax 101.6. Patient was seen at urgent care Friday not given antibiotics? fever subsided somniferously without antipyretics, subsequently fever reoccurred yesterday prompting patient to come to the hospital. His only other complaint besides the fever is tooth pain. States he has dental work done for a broken tooth recently, he denies any tooth/ gun drainage. He denies any URI symptoms, D/N/V, sick contacts, dysuria, abd pain and recent travel.     In the ED s/p zosyn and dental consulted

## 2024-01-08 NOTE — CONSULT NOTE ADULT - SUBJECTIVE AND OBJECTIVE BOX
[facility] DIVISION OF KIDNEY DISEASES AND HYPERTENSION -- [phone]  -- INITIAL CONSULT NOTE  --------------------------------------------------------------------------------  HPI: Pt. is a 38 y.o. M w/ PMHx of HTN (since age 17), and ESRD (on home HD since 2015) via LUE AVF, hyperthyroidism s/p DDRT on 6/2022 presenting to [facility] for fevers at home.     Transplant Hx: cPRA 0%, CMV-/ EBV+. Pt. is POD #1 from DDRT (1a, 1v, 1u- no stent)- Simulect induction, CIT 17 hours. Donor: Age:  34 yo, KDPI 14%, COD:  Drug Intoxication, Terminal Cr:  0.79. CMV- positive EBV-positive. HepBcAb- negative. Hepatitis C-JAKE- negative. Hepatitis C ab- negative    Pt. seen and examined in the ED. He states he is feeling better since coming to the hospital. He admits to fevers on/off since this past wednesday. Had occasional shortness of breath but denied overt cough. He has had nausea but no vomiting. He denied burning with urination or fevers. SCr of 2 in the ED, he was unsure of his baseline but per o/p review it appears his baseline SCr is around 2.     PAST HISTORY  --------------------------------------------------------------------------------  PAST MEDICAL & SURGICAL HISTORY:  ESRD on hemodialysis now s/p DDRT 6/2022  HTN (hypertension)  HTN (hypertension)  Bell's palsy  Hyperthyroidism  AV fistula  L forearm  Elective surgical procedure  RACW permacath for HD, removed 5 years ago    FAMILY HISTORY:  Family history of hypertension (Father, Mother)  Father and mother    PAST SOCIAL HISTORY: Denied illicit drugs     ALLERGIES & MEDICATIONS  --------------------------------------------------------------------------------  Allergies  topical cleanser for dialysis access.   Exsept (Rash)  No Known Drug Allergies    Intolerances    Standing Inpatient Medications  cefepime   IVPB 2000 milliGRAM(s) IV Intermittent every 12 hours  heparin   Injectable 5000 Unit(s) SubCutaneous every 8 hours  NIFEdipine XL 90 milliGRAM(s) Oral daily  pantoprazole    Tablet 40 milliGRAM(s) Oral before breakfast  predniSONE   Tablet 5 milliGRAM(s) Oral daily  sodium phosphate 15 milliMole(s)/250 mL IVPB 15 milliMole(s) IV Intermittent once  tacrolimus ER Tablet (ENVARSUS XR) 4 milliGRAM(s) Oral daily  trimethoprim   80 mG/sulfamethoxazole 400 mG 1 Tablet(s) Oral daily    PRN Inpatient Medications  acetaminophen     Tablet .. 650 milliGRAM(s) Oral every 6 hours PRN    REVIEW OF SYSTEMS  --------------------------------------------------------------------------------  Gen: +Fevers   Head/Eyes/Ears: No HA  Respiratory: No dyspnea, cough  CV: No chest pain  GI: No abdominal pain, diarrhea  : No dysuria, hematuria  MSK: No  edema  Skin: No rashes  Heme: No easy bruising or bleeding    All other systems were reviewed and are negative, except as noted.    VITALS/PHYSICAL EXAM  --------------------------------------------------------------------------------  T(C): 37 (01-08-24 @ 07:35), Max: 37.8 (01-07-24 @ 21:41)  HR: 89 (01-08-24 @ 07:35) (86 - 97)  BP: 121/85 (01-08-24 @ 07:35) (119/88 - 144/94)  RR: 18 (01-08-24 @ 07:35) (16 - 20)  SpO2: 97% (01-08-24 @ 07:35) (97% - 100%)  Wt(kg): --  Height (cm): 177.8 (01-07-24 @ 21:41)  Weight (kg): 97.1 (01-07-24 @ 21:41)  BMI (kg/m2): 30.7 (01-07-24 @ 21:41)  BSA (m2): 2.15 (01-07-24 @ 21:41)    Physical Exam:  	Gen: NAD  	HEENT: Anicteric  	Pulm: CTA B/L  	CV: S1S2+  	Abd: Soft, +BS                Transplant site: RLQ non tender, well healed surgical scar.  	Ext: No LE edema B/L  	Neuro: Awake  	Skin: Warm and dry              Dialysis access: LUE AVF with palpable thrill     LABS/STUDIES  --------------------------------------------------------------------------------              13.9   2.64  >-----------<  136      [01-07-24 @ 23:40]              44.7     131  |  97  |  16  ----------------------------<  90      [01-08-24 @ 09:38]  3.5   |  21  |  2.01        Ca     9.0     [01-08-24 @ 09:38]      Mg     2.0     [01-08-24 @ 09:38]      Phos  2.1     [01-08-24 @ 09:38]    TPro  7.1  /  Alb  4.1  /  TBili  0.5  /  DBili  x   /  AST  38  /  ALT  47  /  AlkPhos  118  [01-07-24 @ 23:40]    PT/INR: PT 12.1 , INR 1.10       [01-07-24 @ 23:40]  PTT: 36.3       [01-07-24 @ 23:40]    Creatinine Trend:  SCr 2.01 [01-08 @ 09:38]  SCr 2.08 [01-07 @ 23:40]    HBsAb 2170.8      [06-22-22 @ 04:57]  HBsAg Nonreact      [06-22-22 @ 04:57]  HBcAb Nonreact      [06-22-22 @ 04:57]  HCV 0.14, Nonreact      [06-22-22 @ 04:57]  HIV Nonreact      [06-22-22 @ 04:57]    Tacrolimus (), Serum: 5.7 ng/mL (01-07 @ 23:41) [facility] DIVISION OF KIDNEY DISEASES AND HYPERTENSION -- [phone]  -- INITIAL CONSULT NOTE  --------------------------------------------------------------------------------  HPI: Pt. is a 38 y.o. M w/ PMHx of HTN (since age 17), and ESRD (on home HD since 2015) via LUE AVF, hyperthyroidism s/p DDRT on 6/2022 presenting to [facility] for fevers at home.     Transplant Hx: cPRA 0%, CMV-/ EBV+. Pt. is POD #1 from DDRT (1a, 1v, 1u- no stent)- Simulect induction, CIT 17 hours. Donor: Age:  36 yo, KDPI 14%, COD:  Drug Intoxication, Terminal Cr:  0.79. CMV- positive EBV-positive. HepBcAb- negative. Hepatitis C-JAKE- negative. Hepatitis C ab- negative    Pt. seen and examined in the ED. He states he is feeling better since coming to the hospital. He admits to fevers on/off since this past wednesday. Had occasional shortness of breath but denied overt cough. He has had nausea but no vomiting. He denied burning with urination or fevers. SCr of 2 in the ED, he was unsure of his baseline but per o/p review it appears his baseline SCr is around 2.     PAST HISTORY  --------------------------------------------------------------------------------  PAST MEDICAL & SURGICAL HISTORY:  ESRD on hemodialysis now s/p DDRT 6/2022  HTN (hypertension)  HTN (hypertension)  Bell's palsy  Hyperthyroidism  AV fistula  L forearm  Elective surgical procedure  RACW permacath for HD, removed 5 years ago    FAMILY HISTORY:  Family history of hypertension (Father, Mother)  Father and mother    PAST SOCIAL HISTORY: Denied illicit drugs     ALLERGIES & MEDICATIONS  --------------------------------------------------------------------------------  Allergies  topical cleanser for dialysis access.   Exsept (Rash)  No Known Drug Allergies    Intolerances    Standing Inpatient Medications  cefepime   IVPB 2000 milliGRAM(s) IV Intermittent every 12 hours  heparin   Injectable 5000 Unit(s) SubCutaneous every 8 hours  NIFEdipine XL 90 milliGRAM(s) Oral daily  pantoprazole    Tablet 40 milliGRAM(s) Oral before breakfast  predniSONE   Tablet 5 milliGRAM(s) Oral daily  sodium phosphate 15 milliMole(s)/250 mL IVPB 15 milliMole(s) IV Intermittent once  tacrolimus ER Tablet (ENVARSUS XR) 4 milliGRAM(s) Oral daily  trimethoprim   80 mG/sulfamethoxazole 400 mG 1 Tablet(s) Oral daily    PRN Inpatient Medications  acetaminophen     Tablet .. 650 milliGRAM(s) Oral every 6 hours PRN    REVIEW OF SYSTEMS  --------------------------------------------------------------------------------  Gen: +Fevers   Head/Eyes/Ears: No HA  Respiratory: No dyspnea, cough  CV: No chest pain  GI: No abdominal pain, diarrhea  : No dysuria, hematuria  MSK: No  edema  Skin: No rashes  Heme: No easy bruising or bleeding    All other systems were reviewed and are negative, except as noted.    VITALS/PHYSICAL EXAM  --------------------------------------------------------------------------------  T(C): 37 (01-08-24 @ 07:35), Max: 37.8 (01-07-24 @ 21:41)  HR: 89 (01-08-24 @ 07:35) (86 - 97)  BP: 121/85 (01-08-24 @ 07:35) (119/88 - 144/94)  RR: 18 (01-08-24 @ 07:35) (16 - 20)  SpO2: 97% (01-08-24 @ 07:35) (97% - 100%)  Wt(kg): --  Height (cm): 177.8 (01-07-24 @ 21:41)  Weight (kg): 97.1 (01-07-24 @ 21:41)  BMI (kg/m2): 30.7 (01-07-24 @ 21:41)  BSA (m2): 2.15 (01-07-24 @ 21:41)    Physical Exam:  	Gen: NAD  	HEENT: Anicteric  	Pulm: CTA B/L  	CV: S1S2+  	Abd: Soft, +BS                Transplant site: RLQ non tender, well healed surgical scar.  	Ext: No LE edema B/L  	Neuro: Awake  	Skin: Warm and dry              Dialysis access: LUE AVF with palpable thrill     LABS/STUDIES  --------------------------------------------------------------------------------              13.9   2.64  >-----------<  136      [01-07-24 @ 23:40]              44.7     131  |  97  |  16  ----------------------------<  90      [01-08-24 @ 09:38]  3.5   |  21  |  2.01        Ca     9.0     [01-08-24 @ 09:38]      Mg     2.0     [01-08-24 @ 09:38]      Phos  2.1     [01-08-24 @ 09:38]    TPro  7.1  /  Alb  4.1  /  TBili  0.5  /  DBili  x   /  AST  38  /  ALT  47  /  AlkPhos  118  [01-07-24 @ 23:40]    PT/INR: PT 12.1 , INR 1.10       [01-07-24 @ 23:40]  PTT: 36.3       [01-07-24 @ 23:40]    Creatinine Trend:  SCr 2.01 [01-08 @ 09:38]  SCr 2.08 [01-07 @ 23:40]    HBsAb 2170.8      [06-22-22 @ 04:57]  HBsAg Nonreact      [06-22-22 @ 04:57]  HBcAb Nonreact      [06-22-22 @ 04:57]  HCV 0.14, Nonreact      [06-22-22 @ 04:57]  HIV Nonreact      [06-22-22 @ 04:57]    Tacrolimus (), Serum: 5.7 ng/mL (01-07 @ 23:41)

## 2024-01-08 NOTE — H&P ADULT - PROBLEM SELECTOR PLAN 1
WBC: 2.64, HR 97 in immunocompromised patient, unclear etiology poss dental? s/p clindamycin, CXR neg, RVP neg, is not neutropenic (mildly leukopenia)   - start cefepime 2 g BID for now pending ID recs   - f/u blood cx   - f/u urine cx  - Dental consulted by ED f/u rec  - Transplant ID consulted WBC: 2.64, HR 97 in immunocompromised patient, unclear etiology poss dental? s/p zosyn x1, CXR neg, RVP neg, is not neutropenic (mildly leukopenia)   - start cefepime 2 g BID for now pending ID recs   - f/u blood cx   - f/u urine cx  - Dental consulted by ED f/u rec  - Transplant ID consulted

## 2024-01-08 NOTE — CONSULT NOTE ADULT - SUBJECTIVE AND OBJECTIVE BOX
Patient is a 38y old male who presents with a chief complaint of fever and tooth ache. Dental consulted to rule out odontogenic source of fever.    HPI:  38 y.o male with PMHx of ESRD s/p 7/2022 presenting intermittent high fevers since 5 days Tmax 101.6. Patient was seen at urgent care Friday not given antibiotics? fever subsided without antipyretics, subsequently fever reoccurred yesterday prompting patient to come to the hospital. His only other complaint besides the fever is tooth pain. Patient states he’s had generalized tooth pain in all quads for over a year. Patient describes the pain as a 5/10 pain that comes and goes with no change to stimuli. Patient states that the pain has not gotten worse over the past year and that he just feels that sometimes he has pain in his teeth. Patient has had dental work done outpatient for a root canal two month ago on his LL and states that since then his pain hasn’t worsened.  He denies any tooth/ gum drainage or dental swelling. He denies any URI symptoms, D/N/V, sick contacts, dysuria, and pain and recent travel.    In the ED s/p zosyn and dental consulted      PAST MEDICAL & SURGICAL HISTORY:  ESRD on hemodialysis  at home 5 x week      HTN (hypertension)      ESRD (end stage renal disease) on dialysis      HTN (hypertension)      Bell's palsy      Hyperthyroidism      AV fistula  L forearm      Elective surgical procedure  KATARINA perry for HD, removed 5 years ago      MEDICATIONS  (STANDING):  cefepime   IVPB 2000 milliGRAM(s) IV Intermittent every 12 hours  heparin   Injectable 5000 Unit(s) SubCutaneous every 8 hours  NIFEdipine XL 90 milliGRAM(s) Oral daily  pantoprazole    Tablet 40 milliGRAM(s) Oral before breakfast  predniSONE   Tablet 5 milliGRAM(s) Oral daily  tacrolimus ER Tablet (ENVARSUS XR) 4 milliGRAM(s) Oral daily  trimethoprim   80 mG/sulfamethoxazole 400 mG 1 Tablet(s) Oral daily    MEDICATIONS  (PRN):  acetaminophen     Tablet .. 650 milliGRAM(s) Oral every 6 hours PRN Temp greater or equal to 38C (100.4F), Mild Pain (1 - 3)      Allergies    topical cleanser for dialysis access.   Exsept (Rash)  No Known Drug Allergies    Intolerances      FAMILY HISTORY:  Family history of hypertension (Father, Mother)  Father and mother    EOE:  TMJ (  - ) clicks                    (  -  ) pops                    (  -  ) crepitus             Mandible FROM             Facial bones and MOM grossly intact             ( -  ) trismus             ( -  ) LAD             ( -  ) swelling             ( -  ) asymmetry             ( -  ) palpation             ( -  ) SOB             ( -  ) dysphagia             ( -  ) LOC    IOE:  permanent dentition: grossly intact; #5 fractured           hard/soft palate:  No pathology noted           tongue/FOM No pathology noted           labial/buccal mucosa No pathology noted           (  - ) percussion           (  - ) palpation           (  - ) swelling    Dentition present:  permanent dentition: grossly intact; #5 fractured  Mobility: none present at this time      Radiographs: Panoramic and PA images taken and interpreted show #5 PARL      ASSESSMENT :No signs of acute odontogenic infection based on clinical exam. No evidence of swelling, abscess, or fistula. Odontogenic infection unlikely source of fevers.    PROCEDURE:  Limited clinical and radiographic exam completed with patient's verbal consent. Discussed findings with patient and all questions answered.    RECOMMENDATIONS:   1) Rule out other sources of fever.   2) Dental F/U with outpatient dentist for comprehensive dental care or [facility] dental clinic ([phone]).    Radha Meraz[place] DDS [id number] Patient is a 38y old male who presents with a chief complaint of fever and tooth ache. Dental consulted to rule out odontogenic source of fever.    HPI:  38 y.o male with PMHx of ESRD s/p 7/2022 presenting intermittent high fevers since 5 days Tmax 101.6. Patient was seen at urgent care Friday not given antibiotics? fever subsided without antipyretics, subsequently fever reoccurred yesterday prompting patient to come to the hospital. His only other complaint besides the fever is tooth pain. Patient states he’s had generalized tooth pain in all quads for over a year. Patient describes the pain as a 5/10 pain that comes and goes with no change to stimuli. Patient states that the pain has not gotten worse over the past year and that he just feels that sometimes he has pain in his teeth. Patient has had dental work done outpatient for a root canal two month ago on his LL and states that since then his pain hasn’t worsened.  He denies any tooth/ gum drainage or dental swelling. He denies any URI symptoms, D/N/V, sick contacts, dysuria, and pain and recent travel.    In the ED s/p zosyn and dental consulted      PAST MEDICAL & SURGICAL HISTORY:  ESRD on hemodialysis  at home 5 x week      HTN (hypertension)      ESRD (end stage renal disease) on dialysis      HTN (hypertension)      Bell's palsy      Hyperthyroidism      AV fistula  L forearm      Elective surgical procedure  KATARINA perry for HD, removed 5 years ago      MEDICATIONS  (STANDING):  cefepime   IVPB 2000 milliGRAM(s) IV Intermittent every 12 hours  heparin   Injectable 5000 Unit(s) SubCutaneous every 8 hours  NIFEdipine XL 90 milliGRAM(s) Oral daily  pantoprazole    Tablet 40 milliGRAM(s) Oral before breakfast  predniSONE   Tablet 5 milliGRAM(s) Oral daily  tacrolimus ER Tablet (ENVARSUS XR) 4 milliGRAM(s) Oral daily  trimethoprim   80 mG/sulfamethoxazole 400 mG 1 Tablet(s) Oral daily    MEDICATIONS  (PRN):  acetaminophen     Tablet .. 650 milliGRAM(s) Oral every 6 hours PRN Temp greater or equal to 38C (100.4F), Mild Pain (1 - 3)      Allergies    topical cleanser for dialysis access.   Exsept (Rash)  No Known Drug Allergies    Intolerances      FAMILY HISTORY:  Family history of hypertension (Father, Mother)  Father and mother    EOE:  TMJ (  - ) clicks                    (  -  ) pops                    (  -  ) crepitus             Mandible FROM             Facial bones and MOM grossly intact             ( -  ) trismus             ( -  ) LAD             ( -  ) swelling             ( -  ) asymmetry             ( -  ) palpation             ( -  ) SOB             ( -  ) dysphagia             ( -  ) LOC    IOE:  permanent dentition: grossly intact; #5 fractured           hard/soft palate:  No pathology noted           tongue/FOM No pathology noted           labial/buccal mucosa No pathology noted           (  - ) percussion           (  - ) palpation           (  - ) swelling    Dentition present:  permanent dentition: grossly intact; #5 fractured  Mobility: none present at this time      Radiographs: Panoramic and PA images taken and interpreted show #5 PARL      ASSESSMENT :No signs of acute odontogenic infection based on clinical exam. No evidence of swelling, abscess, or fistula. Odontogenic infection unlikely source of fevers.    PROCEDURE:  Limited clinical and radiographic exam completed with patient's verbal consent. Discussed findings with patient and all questions answered.    RECOMMENDATIONS:   1) Rule out other sources of fever.   2) Dental F/U with outpatient dentist for comprehensive dental care or [facility] dental clinic ([phone]).    Radha Meraz, DDS [id number] Patient is a 38y old male who presents with a chief complaint of fever and tooth ache. Dental consulted to rule out odontogenic source of fever.    HPI:  38 y.o male with PMHx of ESRD s/p 7/2022 presenting intermittent high fevers since 5 days Tmax 101.6. Patient was seen at urgent care Friday not given antibiotics? fever subsided without antipyretics, subsequently fever reoccurred yesterday prompting patient to come to the hospital. His only other complaint besides the fever is tooth pain. Patient states he’s had generalized tooth pain in all quads for over a year. Patient describes the pain as a 5/10 pain that comes and goes with no change to stimuli. Patient states that the pain has not gotten worse over the past year and that he just feels that sometimes he has pain in his teeth. Patient has had dental work done outpatient for a root canal two month ago on his LL and states that since then his pain hasn’t worsened.  He denies any tooth/ gum drainage or dental swelling. He denies any URI symptoms, D/N/V, sick contacts, dysuria, and pain and recent travel.    In the ED s/p zosyn and dental consulted      PAST MEDICAL & SURGICAL HISTORY:  ESRD on hemodialysis  at home 5 x week      HTN (hypertension)      ESRD (end stage renal disease) on dialysis      HTN (hypertension)      Bell's palsy      Hyperthyroidism      AV fistula  L forearm      Elective surgical procedure  KATARINA perry for HD, removed 5 years ago      MEDICATIONS  (STANDING):  cefepime   IVPB 2000 milliGRAM(s) IV Intermittent every 12 hours  heparin   Injectable 5000 Unit(s) SubCutaneous every 8 hours  NIFEdipine XL 90 milliGRAM(s) Oral daily  pantoprazole    Tablet 40 milliGRAM(s) Oral before breakfast  predniSONE   Tablet 5 milliGRAM(s) Oral daily  tacrolimus ER Tablet (ENVARSUS XR) 4 milliGRAM(s) Oral daily  trimethoprim   80 mG/sulfamethoxazole 400 mG 1 Tablet(s) Oral daily    MEDICATIONS  (PRN):  acetaminophen     Tablet .. 650 milliGRAM(s) Oral every 6 hours PRN Temp greater or equal to 38C (100.4F), Mild Pain (1 - 3)      Allergies    topical cleanser for dialysis access.   Exsept (Rash)  No Known Drug Allergies    Intolerances      FAMILY HISTORY:  Family history of hypertension (Father, Mother)  Father and mother    EOE:  TMJ (  - ) clicks                    (  -  ) pops                    (  -  ) crepitus             Mandible FROM             Facial bones and MOM grossly intact             ( -  ) trismus             ( -  ) LAD             ( -  ) swelling             ( -  ) asymmetry             ( -  ) palpation             ( -  ) SOB             ( -  ) dysphagia             ( -  ) LOC    IOE:  permanent dentition: grossly intact; #5 fractured           hard/soft palate:  No pathology noted           tongue/FOM No pathology noted           labial/buccal mucosa No pathology noted           (  - ) percussion           (  - ) palpation           (  - ) swelling    Dentition present:  permanent dentition: grossly intact; #5 fractured  Mobility: none present at this time      Radiographs: Panoramic and PA images taken and interpreted show #5 PARL      ASSESSMENT :No signs of acute odontogenic infection based on clinical exam. No evidence of swelling, abscess, or fistula. Odontogenic infection unlikely source of fevers.    PROCEDURE:  Limited clinical and radiographic exam completed with patient's verbal consent. Discussed findings with patient and all questions answered.    RECOMMENDATIONS:   1) Rule out other sources of fever.   2) Dental F/U with outpatient dentist for comprehensive dental care or [facility] dental clinic ([phone]).    Radha Meraz, DDS [id number] Patient is a 38y old male who presents with a chief complaint of fever and tooth ache. Dental consulted to rule out odontogenic source of fever.    HPI:  38 y.o male with PMHx of ESRD s/p 7/2022 presenting intermittent high fevers since 5 days Tmax 101.6. Patient was seen at urgent care Friday not given antibiotics? fever subsided without antipyretics, subsequently fever reoccurred yesterday prompting patient to come to the hospital. His only other complaint besides the fever is tooth pain. Patient states he’s had generalized tooth pain in all quads for over a year. Patient describes the pain as a 5/10 pain that comes and goes with no change to stimuli. Patient states that the pain has not gotten worse over the past year and that he just feels that sometimes he has pain in his teeth. Patient has had dental work done outpatient for a root canal two month ago on his LL and states that since then his pain hasn’t worsened.  He denies any tooth/ gum drainage or dental swelling. He denies any URI symptoms, D/N/V, sick contacts, dysuria, and pain and recent travel.    In the ED s/p zosyn and dental consulted      PAST MEDICAL & SURGICAL HISTORY:  ESRD on hemodialysis  at home 5 x week      HTN (hypertension)      ESRD (end stage renal disease) on dialysis      HTN (hypertension)      Bell's palsy      Hyperthyroidism      AV fistula  L forearm      Elective surgical procedure  KATARINA perry for HD, removed 5 years ago      MEDICATIONS  (STANDING):  cefepime   IVPB 2000 milliGRAM(s) IV Intermittent every 12 hours  heparin   Injectable 5000 Unit(s) SubCutaneous every 8 hours  NIFEdipine XL 90 milliGRAM(s) Oral daily  pantoprazole    Tablet 40 milliGRAM(s) Oral before breakfast  predniSONE   Tablet 5 milliGRAM(s) Oral daily  tacrolimus ER Tablet (ENVARSUS XR) 4 milliGRAM(s) Oral daily  trimethoprim   80 mG/sulfamethoxazole 400 mG 1 Tablet(s) Oral daily    MEDICATIONS  (PRN):  acetaminophen     Tablet .. 650 milliGRAM(s) Oral every 6 hours PRN Temp greater or equal to 38C (100.4F), Mild Pain (1 - 3)      Allergies    topical cleanser for dialysis access.   Exsept (Rash)  No Known Drug Allergies    Intolerances      FAMILY HISTORY:  Family history of hypertension (Father, Mother)  Father and mother    EOE:  TMJ (  - ) clicks                    (  -  ) pops                    (  -  ) crepitus             Mandible FROM             Facial bones and MOM grossly intact             ( -  ) trismus             ( -  ) LAD             ( -  ) swelling             ( -  ) asymmetry             ( -  ) palpation             ( -  ) SOB             ( -  ) dysphagia             ( -  ) LOC    IOE:  permanent dentition: grossly intact; #5 fractured           hard/soft palate:  No pathology noted           tongue/FOM No pathology noted           labial/buccal mucosa No pathology noted           (  - ) percussion           (  - ) palpation           (  - ) swelling    Dentition present:  permanent dentition: grossly intact; #5 fractured  Mobility: none present at this time      Radiographs: Panoramic and PA images taken and interpreted show #5 PARL      ASSESSMENT :No signs of acute odontogenic infection based on clinical exam. No evidence of swelling, abscess, or fistula. Odontogenic infection unlikely source of fevers.    PROCEDURE:  Limited clinical and radiographic exam completed with patient's verbal consent. Discussed findings with patient and all questions answered.    RECOMMENDATIONS:   1) Rule out other sources of fever.   2) Dental F/U with outpatient dentist for comprehensive dental care or [facility] dental clinic ([phone]).    Radha Meraz, DDS [id number]

## 2024-01-08 NOTE — H&P ADULT - PROBLEM SELECTOR PLAN 3
pre HIE review Scr 1.68 (12/23)  - currently Scr 2.08 poss 2/2 infection   - K 3.4 not repleted? f/u repeat BMP, Mg and P if still hypokalemia will replete    - check Urine lytes, UA reviewed   - transplant nephrology consulted   - will fluid challenge  - monitor BMP in AM

## 2024-01-08 NOTE — ED PROVIDER NOTE - ATTENDING CONTRIBUTION TO CARE
I, Jeffrey Mahajan, performed a history and physical exam of the patient and discussed their management with the resident and/or advanced care provider. I reviewed the resident and/or advanced care provider's note and agree with the documented findings and plan of care. I was present and available for all procedures.    Patient is a 38-year-old male with history of end-stage renal disease and hypertension status post DDRT  22/2022 presenting to ED with intermittent fever since Wednesday.  Tmax 101.6.   No URI symptoms.   Some decreased urination,  no pain on urination.  No diarrhea.  Had recent dental work about a month and a half ago due to poor dentition.  No facial swelling.  No travel, sick contacts, bug bites.    Well appearing and in NAD, head normal appearing atraumatic, trachea midline, no respiratory distress, lungs cta bilaterally, rrr no murmurs, soft NT ND abdomen, no visible extremity deformities, Alert and oriented, non focal neuro exam, skin warm and dry, normal affect and mood, no leg swelling, calf ttp or jvd, poor dentition w mutliple cracked teeth No gumline abscess or tenderness palpation    Patient presenting with sepsis immunosuppressed otherwise no focal exam findings will evaluate with screening labs x-ray urinalysis dental evaluation broad-spectrum antibiotics admission as well as transplant surgery evaluation discussed patient agreed with plan unlikely ACS PE pneumothorax dissection AAA

## 2024-01-08 NOTE — ED PROVIDER NOTE - CLINICAL SUMMARY MEDICAL DECISION MAKING FREE TEXT BOX
Patient is a 38-year-old male with history of end-stage renal disease and hypertension status post DDRT  22/2022 presenting to ED with intermittent fever since Wednesday.  Tmax 101.6.   No URI symptoms.   Some decreased urination,  no pain on urination.  No diarrhea.  Had recent dental work about a month and a half ago due to poor dentition.  No facial swelling.  No travel, sick contacts, bug bites.   Temp 100 orally.  Vitals otherwise stable.  Exam with no acute distress, clear to auscultation, no murmurs, no abdominal tenderness palpation.  No rashes.  Concern for sepsis in the setting of patient being immunocompromised.  Will get sepsis workup.  Will consult dental and renal transplant.  Likely admission. Patient is a 38-year-old male with history of end-stage renal disease and hypertension status post DDRT  22/2022 presenting to ED with intermittent fever since Wednesday.  Tmax 101.6.   No URI symptoms.   Some decreased urination,  no pain on urination.  No diarrhea.  Had recent dental work about a month and a half ago due to poor dentition.  No facial swelling.  No travel, sick contacts, bug bites.   Temp 100 orally.  Vitals otherwise stable.  Exam with no acute distress, clear to auscultation, no murmurs, no abdominal tenderness palpation.  No rashes.  Concern for sepsis in the setting of patient being immunocompromised.  Will get sepsis workup.  Will consult dental and renal transplant.  Likely admission.    pettet attending- see attending attestation for my mdm

## 2024-01-08 NOTE — ED PROVIDER NOTE - NS ED ROS FT
CONSTITUTIONAL: see hpi   EYES: no visual changes, no eye pain  EARS: no ear drainage, no ear pain, no change in hearing  NOSE: no nasal congestion  MOUTH/THROAT: no sore throat  CV: No chest pain, no palpitations  RESP: No SOB, no cough  GI: see hpi   : see hpi   MSK: no back pain, no extremity pain  SKIN: no rashes  NEURO: no headache, no focal weakness, no decreased sensation/parasthesias   PSYCHIATRIC: no known mental health issues

## 2024-01-09 LAB
ANION GAP SERPL CALC-SCNC: 14 MMOL/L — SIGNIFICANT CHANGE UP (ref 5–17)
ANION GAP SERPL CALC-SCNC: 14 MMOL/L — SIGNIFICANT CHANGE UP (ref 5–17)
ANISOCYTOSIS BLD QL: SLIGHT — SIGNIFICANT CHANGE UP
ANISOCYTOSIS BLD QL: SLIGHT — SIGNIFICANT CHANGE UP
BASOPHILS # BLD AUTO: 0 K/UL — SIGNIFICANT CHANGE UP (ref 0–0.2)
BASOPHILS # BLD AUTO: 0 K/UL — SIGNIFICANT CHANGE UP (ref 0–0.2)
BASOPHILS NFR BLD AUTO: 0 % — SIGNIFICANT CHANGE UP (ref 0–2)
BASOPHILS NFR BLD AUTO: 0 % — SIGNIFICANT CHANGE UP (ref 0–2)
BLASTS # FLD: 3.5 % — HIGH (ref 0–0)
BLASTS # FLD: 3.5 % — HIGH (ref 0–0)
BUN SERPL-MCNC: 20 MG/DL — SIGNIFICANT CHANGE UP (ref 7–23)
BUN SERPL-MCNC: 20 MG/DL — SIGNIFICANT CHANGE UP (ref 7–23)
BURR CELLS BLD QL SMEAR: PRESENT — SIGNIFICANT CHANGE UP
BURR CELLS BLD QL SMEAR: PRESENT — SIGNIFICANT CHANGE UP
CALCIUM SERPL-MCNC: 9.3 MG/DL — SIGNIFICANT CHANGE UP (ref 8.4–10.5)
CALCIUM SERPL-MCNC: 9.3 MG/DL — SIGNIFICANT CHANGE UP (ref 8.4–10.5)
CHLORIDE SERPL-SCNC: 96 MMOL/L — SIGNIFICANT CHANGE UP (ref 96–108)
CHLORIDE SERPL-SCNC: 96 MMOL/L — SIGNIFICANT CHANGE UP (ref 96–108)
CO2 SERPL-SCNC: 21 MMOL/L — LOW (ref 22–31)
CO2 SERPL-SCNC: 21 MMOL/L — LOW (ref 22–31)
CREAT SERPL-MCNC: 2.16 MG/DL — HIGH (ref 0.5–1.3)
CREAT SERPL-MCNC: 2.16 MG/DL — HIGH (ref 0.5–1.3)
CULTURE RESULTS: SIGNIFICANT CHANGE UP
CULTURE RESULTS: SIGNIFICANT CHANGE UP
DACRYOCYTES BLD QL SMEAR: SLIGHT — SIGNIFICANT CHANGE UP
DACRYOCYTES BLD QL SMEAR: SLIGHT — SIGNIFICANT CHANGE UP
EGFR: 39 ML/MIN/1.73M2 — LOW
EGFR: 39 ML/MIN/1.73M2 — LOW
ELLIPTOCYTES BLD QL SMEAR: SLIGHT — SIGNIFICANT CHANGE UP
ELLIPTOCYTES BLD QL SMEAR: SLIGHT — SIGNIFICANT CHANGE UP
EOSINOPHIL # BLD AUTO: 0.06 K/UL — SIGNIFICANT CHANGE UP (ref 0–0.5)
EOSINOPHIL # BLD AUTO: 0.06 K/UL — SIGNIFICANT CHANGE UP (ref 0–0.5)
EOSINOPHIL NFR BLD AUTO: 2.6 % — SIGNIFICANT CHANGE UP (ref 0–6)
EOSINOPHIL NFR BLD AUTO: 2.6 % — SIGNIFICANT CHANGE UP (ref 0–6)
GIANT PLATELETS BLD QL SMEAR: PRESENT — SIGNIFICANT CHANGE UP
GIANT PLATELETS BLD QL SMEAR: PRESENT — SIGNIFICANT CHANGE UP
GLUCOSE SERPL-MCNC: 95 MG/DL — SIGNIFICANT CHANGE UP (ref 70–99)
GLUCOSE SERPL-MCNC: 95 MG/DL — SIGNIFICANT CHANGE UP (ref 70–99)
HCT VFR BLD CALC: 43.1 % — SIGNIFICANT CHANGE UP (ref 39–50)
HCT VFR BLD CALC: 43.1 % — SIGNIFICANT CHANGE UP (ref 39–50)
HGB BLD-MCNC: 13.2 G/DL — SIGNIFICANT CHANGE UP (ref 13–17)
HGB BLD-MCNC: 13.2 G/DL — SIGNIFICANT CHANGE UP (ref 13–17)
LYMPHOCYTES # BLD AUTO: 0.36 K/UL — LOW (ref 1–3.3)
LYMPHOCYTES # BLD AUTO: 0.36 K/UL — LOW (ref 1–3.3)
LYMPHOCYTES # BLD AUTO: 15.5 % — SIGNIFICANT CHANGE UP (ref 13–44)
LYMPHOCYTES # BLD AUTO: 15.5 % — SIGNIFICANT CHANGE UP (ref 13–44)
MAGNESIUM SERPL-MCNC: 2 MG/DL — SIGNIFICANT CHANGE UP (ref 1.6–2.6)
MAGNESIUM SERPL-MCNC: 2 MG/DL — SIGNIFICANT CHANGE UP (ref 1.6–2.6)
MANUAL SMEAR VERIFICATION: SIGNIFICANT CHANGE UP
MANUAL SMEAR VERIFICATION: SIGNIFICANT CHANGE UP
MCHC RBC-ENTMCNC: 22.5 PG — LOW (ref 27–34)
MCHC RBC-ENTMCNC: 22.5 PG — LOW (ref 27–34)
MCHC RBC-ENTMCNC: 30.6 GM/DL — LOW (ref 32–36)
MCHC RBC-ENTMCNC: 30.6 GM/DL — LOW (ref 32–36)
MCV RBC AUTO: 73.4 FL — LOW (ref 80–100)
MCV RBC AUTO: 73.4 FL — LOW (ref 80–100)
MONOCYTES # BLD AUTO: 0.47 K/UL — SIGNIFICANT CHANGE UP (ref 0–0.9)
MONOCYTES # BLD AUTO: 0.47 K/UL — SIGNIFICANT CHANGE UP (ref 0–0.9)
MONOCYTES NFR BLD AUTO: 19.8 % — HIGH (ref 2–14)
MONOCYTES NFR BLD AUTO: 19.8 % — HIGH (ref 2–14)
NEUTROPHILS # BLD AUTO: 1.38 K/UL — LOW (ref 1.8–7.4)
NEUTROPHILS # BLD AUTO: 1.38 K/UL — LOW (ref 1.8–7.4)
NEUTROPHILS NFR BLD AUTO: 54.3 % — SIGNIFICANT CHANGE UP (ref 43–77)
NEUTROPHILS NFR BLD AUTO: 54.3 % — SIGNIFICANT CHANGE UP (ref 43–77)
NEUTS BAND # BLD: 4.3 % — SIGNIFICANT CHANGE UP (ref 0–8)
NEUTS BAND # BLD: 4.3 % — SIGNIFICANT CHANGE UP (ref 0–8)
PHOSPHATE SERPL-MCNC: 2.4 MG/DL — LOW (ref 2.5–4.5)
PHOSPHATE SERPL-MCNC: 2.4 MG/DL — LOW (ref 2.5–4.5)
PLAT MORPH BLD: ABNORMAL
PLAT MORPH BLD: ABNORMAL
PLATELET # BLD AUTO: 164 K/UL — SIGNIFICANT CHANGE UP (ref 150–400)
PLATELET # BLD AUTO: 164 K/UL — SIGNIFICANT CHANGE UP (ref 150–400)
POIKILOCYTOSIS BLD QL AUTO: SIGNIFICANT CHANGE UP
POIKILOCYTOSIS BLD QL AUTO: SIGNIFICANT CHANGE UP
POTASSIUM SERPL-MCNC: 3.7 MMOL/L — SIGNIFICANT CHANGE UP (ref 3.5–5.3)
POTASSIUM SERPL-MCNC: 3.7 MMOL/L — SIGNIFICANT CHANGE UP (ref 3.5–5.3)
POTASSIUM SERPL-SCNC: 3.7 MMOL/L — SIGNIFICANT CHANGE UP (ref 3.5–5.3)
POTASSIUM SERPL-SCNC: 3.7 MMOL/L — SIGNIFICANT CHANGE UP (ref 3.5–5.3)
RBC # BLD: 5.87 M/UL — HIGH (ref 4.2–5.8)
RBC # BLD: 5.87 M/UL — HIGH (ref 4.2–5.8)
RBC # FLD: 14.3 % — SIGNIFICANT CHANGE UP (ref 10.3–14.5)
RBC # FLD: 14.3 % — SIGNIFICANT CHANGE UP (ref 10.3–14.5)
RBC BLD AUTO: ABNORMAL
RBC BLD AUTO: ABNORMAL
SODIUM SERPL-SCNC: 131 MMOL/L — LOW (ref 135–145)
SODIUM SERPL-SCNC: 131 MMOL/L — LOW (ref 135–145)
SPECIMEN SOURCE: SIGNIFICANT CHANGE UP
SPECIMEN SOURCE: SIGNIFICANT CHANGE UP
WBC # BLD: 2.35 K/UL — LOW (ref 3.8–10.5)
WBC # BLD: 2.35 K/UL — LOW (ref 3.8–10.5)
WBC # FLD AUTO: 2.35 K/UL — LOW (ref 3.8–10.5)
WBC # FLD AUTO: 2.35 K/UL — LOW (ref 3.8–10.5)

## 2024-01-09 PROCEDURE — 99233 SBSQ HOSP IP/OBS HIGH 50: CPT

## 2024-01-09 PROCEDURE — 99232 SBSQ HOSP IP/OBS MODERATE 35: CPT | Mod: GC

## 2024-01-09 PROCEDURE — 99232 SBSQ HOSP IP/OBS MODERATE 35: CPT

## 2024-01-09 RX ORDER — SODIUM,POTASSIUM PHOSPHATES 278-250MG
1 POWDER IN PACKET (EA) ORAL ONCE
Refills: 0 | Status: COMPLETED | OUTPATIENT
Start: 2024-01-09 | End: 2024-01-09

## 2024-01-09 RX ORDER — SENNA PLUS 8.6 MG/1
2 TABLET ORAL AT BEDTIME
Refills: 0 | Status: DISCONTINUED | OUTPATIENT
Start: 2024-01-09 | End: 2024-01-13

## 2024-01-09 RX ORDER — POLYETHYLENE GLYCOL 3350 17 G/17G
17 POWDER, FOR SOLUTION ORAL DAILY
Refills: 0 | Status: DISCONTINUED | OUTPATIENT
Start: 2024-01-09 | End: 2024-01-13

## 2024-01-09 RX ORDER — SODIUM CHLORIDE 9 MG/ML
1000 INJECTION INTRAMUSCULAR; INTRAVENOUS; SUBCUTANEOUS
Refills: 0 | Status: DISCONTINUED | OUTPATIENT
Start: 2024-01-09 | End: 2024-01-13

## 2024-01-09 RX ADMIN — CEFEPIME 100 MILLIGRAM(S): 1 INJECTION, POWDER, FOR SOLUTION INTRAMUSCULAR; INTRAVENOUS at 06:53

## 2024-01-09 RX ADMIN — Medication 5 MILLIGRAM(S): at 22:02

## 2024-01-09 RX ADMIN — HEPARIN SODIUM 5000 UNIT(S): 5000 INJECTION INTRAVENOUS; SUBCUTANEOUS at 22:04

## 2024-01-09 RX ADMIN — Medication 90 MILLIGRAM(S): at 06:39

## 2024-01-09 RX ADMIN — PANTOPRAZOLE SODIUM 40 MILLIGRAM(S): 20 TABLET, DELAYED RELEASE ORAL at 06:39

## 2024-01-09 RX ADMIN — Medication 1 PACKET(S): at 13:20

## 2024-01-09 RX ADMIN — HEPARIN SODIUM 5000 UNIT(S): 5000 INJECTION INTRAVENOUS; SUBCUTANEOUS at 06:39

## 2024-01-09 RX ADMIN — Medication 5 MILLIGRAM(S): at 06:39

## 2024-01-09 RX ADMIN — HEPARIN SODIUM 5000 UNIT(S): 5000 INJECTION INTRAVENOUS; SUBCUTANEOUS at 13:20

## 2024-01-09 RX ADMIN — SODIUM CHLORIDE 75 MILLILITER(S): 9 INJECTION INTRAMUSCULAR; INTRAVENOUS; SUBCUTANEOUS at 13:21

## 2024-01-09 RX ADMIN — SENNA PLUS 2 TABLET(S): 8.6 TABLET ORAL at 22:02

## 2024-01-09 RX ADMIN — Medication 1 TABLET(S): at 13:20

## 2024-01-09 RX ADMIN — CEFEPIME 100 MILLIGRAM(S): 1 INJECTION, POWDER, FOR SOLUTION INTRAMUSCULAR; INTRAVENOUS at 17:47

## 2024-01-09 RX ADMIN — TACROLIMUS 4 MILLIGRAM(S): 5 CAPSULE ORAL at 06:39

## 2024-01-09 NOTE — PROGRESS NOTE ADULT - SUBJECTIVE AND OBJECTIVE BOX
Patient is a 38y old  Male who presents with a chief complaint of fever in immunocompromised patient (09 Jan 2024 07:42)      SUBJECTIVE / OVERNIGHT EVENTS:    feels tired and overall crummy. food is not good and has not eaten much and last BM two days ago. no fevers, chills, diarrhea, abdominal pain, n/v.    ROS:  14 point ROS negative in detail except stated as above    MEDICATIONS  (STANDING):  cefepime   IVPB 2000 milliGRAM(s) IV Intermittent every 12 hours  heparin   Injectable 5000 Unit(s) SubCutaneous every 8 hours  NIFEdipine XL 90 milliGRAM(s) Oral daily  pantoprazole    Tablet 40 milliGRAM(s) Oral before breakfast  potassium phosphate / sodium phosphate Powder (PHOS-NaK) 1 Packet(s) Oral once  predniSONE   Tablet 5 milliGRAM(s) Oral daily  sodium chloride 0.9%. 1000 milliLiter(s) (75 mL/Hr) IV Continuous <Continuous>  tacrolimus ER Tablet (ENVARSUS XR) 4 milliGRAM(s) Oral daily  trimethoprim   80 mG/sulfamethoxazole 400 mG 1 Tablet(s) Oral daily    MEDICATIONS  (PRN):  acetaminophen     Tablet .. 650 milliGRAM(s) Oral every 6 hours PRN Temp greater or equal to 38C (100.4F), Mild Pain (1 - 3)      CAPILLARY BLOOD GLUCOSE        I&O's Summary      PHYSICAL EXAM:  Vital Signs Last 24 Hrs  T(C): 37.1 (09 Jan 2024 11:50), Max: 37.9 (09 Jan 2024 03:27)  T(F): 98.7 (09 Jan 2024 11:50), Max: 100.2 (09 Jan 2024 03:27)  HR: 83 (09 Jan 2024 11:50) (77 - 99)  BP: 123/80 (09 Jan 2024 11:50) (114/75 - 128/82)  BP(mean): --  RR: 18 (09 Jan 2024 11:50) (18 - 18)  SpO2: 100% (09 Jan 2024 11:50) (97% - 100%)    Parameters below as of 09 Jan 2024 11:50  Patient On (Oxygen Delivery Method): room air    Vital Signs Last 24 Hrs  T(C): 37.1 (09 Jan 2024 11:50), Max: 37.9 (09 Jan 2024 03:27)  T(F): 98.7 (09 Jan 2024 11:50), Max: 100.2 (09 Jan 2024 03:27)  HR: 83 (09 Jan 2024 11:50) (77 - 99)  BP: 123/80 (09 Jan 2024 11:50) (114/75 - 128/82)  BP(mean): --  RR: 18 (09 Jan 2024 11:50) (18 - 18)  SpO2: 100% (09 Jan 2024 11:50) (97% - 100%)    Parameters below as of 09 Jan 2024 11:50  Patient On (Oxygen Delivery Method): room air        CONSTITUTIONAL: NAD, well-developed, well-groomed  EYES: PERRL conjunctiva and sclera clear  ENMT: Moist oral mucosa, no pharyngeal injection or exudates; normal dentition  NECK: Supple, no palpable masses; no thyromegaly  RESPIRATORY: Normal respiratory effort; lungs are clear to auscultation bilaterally  CARDIOVASCULAR: Regular rate and rhythm, normal S1 and S2, no murmur/rub/gallop; No lower extremity edema; Peripheral pulses are 2+ bilaterally  ABDOMEN: Nontender to palpation, normoactive bowel sounds, no rebound/guarding; No hepatosplenomegaly  MUSCULOSKELETAL: no clubbing or cyanosis of digits; no joint swelling or tenderness to palpation  PSYCH: A+O to person, place, and time; affect appropriate  NEUROLOGY: CN 2-12 are intact and symmetric; no gross sensory deficits   SKIN: No rashes; no palpable lesions      LABS:                        13.2   2.35  )-----------( 164      ( 09 Jan 2024 07:09 )             43.1     01-09    131<L>  |  96  |  20  ----------------------------<  95  3.7   |  21<L>  |  2.16<H>    Ca    9.3      09 Jan 2024 07:08  Phos  2.4     01-09  Mg     2.0     01-09    TPro  7.1  /  Alb  4.1  /  TBili  0.5  /  DBili  x   /  AST  38  /  ALT  47<H>  /  AlkPhos  118  01-07    PT/INR - ( 07 Jan 2024 23:40 )   PT: 12.1 sec;   INR: 1.10 ratio         PTT - ( 07 Jan 2024 23:40 )  PTT:36.3 sec      Urinalysis Basic - ( 09 Jan 2024 07:08 )    Color: x / Appearance: x / SG: x / pH: x  Gluc: 95 mg/dL / Ketone: x  / Bili: x / Urobili: x   Blood: x / Protein: x / Nitrite: x   Leuk Esterase: x / RBC: x / WBC x   Sq Epi: x / Non Sq Epi: x / Bacteria: x        RADIOLOGY & ADDITIONAL TESTS:    Imaging Personally Reviewed:    Consultant(s) Notes Reviewed:      Care Discussed with Consultants/Other Providers:  serena Velez

## 2024-01-09 NOTE — PROGRESS NOTE ADULT - SUBJECTIVE AND OBJECTIVE BOX
Interval History:  Reports overall improvement in symptoms today  Low grade temp of 100.2, no leukocytosis    REVIEW OF SYSTEMS  [  ] ROS unobtainable because:    [ x ] All other systems negative except as noted below    Constitutional:  [ ] fever [ ] chills  [ ] weight loss  [x ] weakness  Skin:  [ ] rash [ ] phlebitis	  Eyes: [ ] icterus [ ] pain  [ ] discharge	  ENMT: [ ] sore throat  [ ] thrush [ ] ulcers [ ] exudates  Respiratory: [ ] dyspnea [ ] hemoptysis [ ] cough [ ] sputum	  Cardiovascular:  [ ] chest pain [ ] palpitations [ ] edema	  Gastrointestinal:  [ ] nausea [ ] vomiting [ ] diarrhea [ ] constipation [ ] pain	  Genitourinary:  [ ] dysuria [ ] frequency [ ] hematuria [ ] discharge [ ] flank pain  [ ] incontinence  Musculoskeletal:  [ ] myalgias [ ] arthralgias [ ] arthritis  [ ] back pain  Neurological:  [ ] headache [ ] seizures  [ ] confusion/altered mental status    Allergies  topical cleanser for dialysis access.   Exsept (Rash)  No Known Drug Allergies        ANTIMICROBIALS:  cefepime   IVPB 2000 every 12 hours  trimethoprim   80 mG / sulfamethoxazole 400 mG 1 daily      OTHER MEDS:  MEDICATIONS  (STANDING):  acetaminophen     Tablet .. 650 every 6 hours PRN  heparin   Injectable 5000 every 8 hours  NIFEdipine XL 90 daily  pantoprazole    Tablet 40 before breakfast  predniSONE   Tablet 5 daily  tacrolimus ER Tablet (ENVARSUS XR) 4 daily      Vital Signs Last 24 Hrs  T(C): 37.1 (09 Jan 2024 11:50), Max: 37.9 (09 Jan 2024 03:27)  T(F): 98.7 (09 Jan 2024 11:50), Max: 100.2 (09 Jan 2024 03:27)  HR: 83 (09 Jan 2024 11:50) (77 - 99)  BP: 123/80 (09 Jan 2024 11:50) (114/75 - 128/82)  BP(mean): --  RR: 18 (09 Jan 2024 11:50) (18 - 18)  SpO2: 100% (09 Jan 2024 11:50) (97% - 100%)    Parameters below as of 09 Jan 2024 11:50  Patient On (Oxygen Delivery Method): room air        PHYSICAL EXAMINATION:  General: Alert and Awake, NAD  HEENT: PERRL, EOMI  Neck: Supple  Cardiac: RRR, No M/R/G  Resp: CTAB, No Wh/Rh/Ra  Abdomen: NBS, NT/ND, No HSM, No rigidity or guarding  MSK: No LE edema. No Calf tenderness  : No Oliveira  Skin: No rashes or lesions. Skin is warm and dry to the touch.   Neuro: Alert and Awake. CN 2-12 Grossly intact. Moves all four extremities spontaneously.  Psych: Calm, Pleasant, Cooperative                          13.2   2.35  )-----------( 164      ( 09 Jan 2024 07:09 )             43.1       01-09    131<L>  |  96  |  20  ----------------------------<  95  3.7   |  21<L>  |  2.16<H>    Ca    9.3      09 Jan 2024 07:08  Phos  2.4     01-09  Mg     2.0     01-09    TPro  7.1  /  Alb  4.1  /  TBili  0.5  /  DBili  x   /  AST  38  /  ALT  47<H>  /  AlkPhos  118  01-07      Urinalysis Basic - ( 09 Jan 2024 07:08 )    Color: x / Appearance: x / SG: x / pH: x  Gluc: 95 mg/dL / Ketone: x  / Bili: x / Urobili: x   Blood: x / Protein: x / Nitrite: x   Leuk Esterase: x / RBC: x / WBC x   Sq Epi: x / Non Sq Epi: x / Bacteria: x        MICROBIOLOGY:  v  Clean Catch Clean Catch (Midstream)  01-08-24   <10,000 CFU/mL Normal Urogenital Marci  --  --      .Blood Blood-Peripheral  01-07-24   No growth at 24 hours  --  --      .Blood Blood-Peripheral  01-07-24   No growth at 24 hours  --  --          Rapid RVP Result: NotDetec (01-07 @ 23:42)        RADIOLOGY:    <The imaging below has been reviewed and visualized by me independently. Findings as detailed in report below>    < from: US Trans Kidney w/ Doppler, Right (01.08.24 @ 15:35) >  IMPRESSION:  Status post renal transplant in the right lower quadrant. No   hydronephrosis. Mildly increased cortical echogenicity.    The transplant vasculature is patent as detailed above. Mildly  increased   velocity at the renal transplant artery anastomosis, improved compared to   prior examination.    Mild circumferential wall thickening of the urinary bladder. Suggest   clinical and laboratory correlation to exclude infection/cystitis.          --- End of Report ---      < end of copied text >

## 2024-01-09 NOTE — PROGRESS NOTE ADULT - SUBJECTIVE AND OBJECTIVE BOX
[facility] DIVISION OF KIDNEY DISEASES AND HYPERTENSION   FOLLOW UP NOTE  --------------------------------------------------------------------------------  Chief Complaint: DDRT on immunosuppression    24 hour events/subjective: Pt. was seen and examined today. He is feeling "a little better". Noted fever overnight but he was asymptomatic.     PAST HISTORY  --------------------------------------------------------------------------------  No significant changes to PMH, PSH, FHx, SHx, unless otherwise noted    ALLERGIES & MEDICATIONS  --------------------------------------------------------------------------------  Allergies  topical cleanser for dialysis access.   Exsept (Rash)  No Known Drug Allergies    Intolerances    Standing Inpatient Medications  cefepime   IVPB 2000 milliGRAM(s) IV Intermittent every 12 hours  heparin   Injectable 5000 Unit(s) SubCutaneous every 8 hours  NIFEdipine XL 90 milliGRAM(s) Oral daily  pantoprazole    Tablet 40 milliGRAM(s) Oral before breakfast  predniSONE   Tablet 5 milliGRAM(s) Oral daily  tacrolimus ER Tablet (ENVARSUS XR) 4 milliGRAM(s) Oral daily  trimethoprim   80 mG/sulfamethoxazole 400 mG 1 Tablet(s) Oral daily    PRN Inpatient Medications  acetaminophen     Tablet .. 650 milliGRAM(s) Oral every 6 hours PRN    REVIEW OF SYSTEMS  --------------------------------------------------------------------------------  All other systems were reviewed and are negative, except as noted.    VITALS/PHYSICAL EXAM  --------------------------------------------------------------------------------  T(C): 37.1 (01-09-24 @ 05:26), Max: 37.9 (01-09-24 @ 03:27)  HR: 80 (01-09-24 @ 05:26) (77 - 99)  BP: 114/75 (01-09-24 @ 05:26) (114/75 - 128/82)  RR: 18 (01-09-24 @ 05:26) (18 - 18)  SpO2: 100% (01-09-24 @ 05:26) (97% - 100%)  Wt(kg): --  Height (cm): 177.8 (01-07-24 @ 21:41)  Weight (kg): 97.1 (01-07-24 @ 21:41)  BMI (kg/m2): 30.7 (01-07-24 @ 21:41)  BSA (m2): 2.15 (01-07-24 @ 21:41)    Physical Exam:  	Gen: NAD  	HEENT: Anicteric  	Pulm: CTA B/L  	CV: S1S2+  	Abd: Soft, +BS                Transplant site: RLQ non tender, well healed surgical scar.  	Ext: No LE edema B/L  	Neuro: Awake  	Skin: Warm and dry              Dialysis access: LUE AVF with palpable thrill     LABS/STUDIES  --------------------------------------------------------------------------------              13.9   2.64  >-----------<  136      [01-07-24 @ 23:40]              44.7     131  |  97  |  16  ----------------------------<  90      [01-08-24 @ 09:38]  3.5   |  21  |  2.01        Ca     9.0     [01-08-24 @ 09:38]      Mg     2.0     [01-08-24 @ 09:38]      Phos  2.1     [01-08-24 @ 09:38]    TPro  7.1  /  Alb  4.1  /  TBili  0.5  /  DBili  x   /  AST  38  /  ALT  47  /  AlkPhos  118  [01-07-24 @ 23:40]    PT/INR: PT 12.1 , INR 1.10       [01-07-24 @ 23:40]  PTT: 36.3       [01-07-24 @ 23:40]    Creatinine Trend:  SCr 2.01 [01-08 @ 09:38]  SCr 2.08 [01-07 @ 23:40]    Tacrolimus (), Serum: 5.7 ng/mL (01-07 @ 23:41) Health system DIVISION OF KIDNEY DISEASES AND HYPERTENSION   FOLLOW UP NOTE  --------------------------------------------------------------------------------  Chief Complaint: DDRT on immunosuppression    24 hour events/subjective: Pt. was seen and examined today. He is feeling "a little better". Noted fever overnight but he was asymptomatic.     PAST HISTORY  --------------------------------------------------------------------------------  No significant changes to PMH, PSH, FHx, SHx, unless otherwise noted    ALLERGIES & MEDICATIONS  --------------------------------------------------------------------------------  Allergies  topical cleanser for dialysis access.   Exsept (Rash)  No Known Drug Allergies    Intolerances    Standing Inpatient Medications  cefepime   IVPB 2000 milliGRAM(s) IV Intermittent every 12 hours  heparin   Injectable 5000 Unit(s) SubCutaneous every 8 hours  NIFEdipine XL 90 milliGRAM(s) Oral daily  pantoprazole    Tablet 40 milliGRAM(s) Oral before breakfast  predniSONE   Tablet 5 milliGRAM(s) Oral daily  tacrolimus ER Tablet (ENVARSUS XR) 4 milliGRAM(s) Oral daily  trimethoprim   80 mG/sulfamethoxazole 400 mG 1 Tablet(s) Oral daily    PRN Inpatient Medications  acetaminophen     Tablet .. 650 milliGRAM(s) Oral every 6 hours PRN    REVIEW OF SYSTEMS  --------------------------------------------------------------------------------  All other systems were reviewed and are negative, except as noted.    VITALS/PHYSICAL EXAM  --------------------------------------------------------------------------------  T(C): 37.1 (01-09-24 @ 05:26), Max: 37.9 (01-09-24 @ 03:27)  HR: 80 (01-09-24 @ 05:26) (77 - 99)  BP: 114/75 (01-09-24 @ 05:26) (114/75 - 128/82)  RR: 18 (01-09-24 @ 05:26) (18 - 18)  SpO2: 100% (01-09-24 @ 05:26) (97% - 100%)  Wt(kg): --  Height (cm): 177.8 (01-07-24 @ 21:41)  Weight (kg): 97.1 (01-07-24 @ 21:41)  BMI (kg/m2): 30.7 (01-07-24 @ 21:41)  BSA (m2): 2.15 (01-07-24 @ 21:41)    Physical Exam:  	Gen: NAD  	HEENT: Anicteric  	Pulm: CTA B/L  	CV: S1S2+  	Abd: Soft, +BS                Transplant site: RLQ non tender, well healed surgical scar.  	Ext: No LE edema B/L  	Neuro: Awake  	Skin: Warm and dry              Dialysis access: LUE AVF with palpable thrill     LABS/STUDIES  --------------------------------------------------------------------------------              13.9   2.64  >-----------<  136      [01-07-24 @ 23:40]              44.7     131  |  97  |  16  ----------------------------<  90      [01-08-24 @ 09:38]  3.5   |  21  |  2.01        Ca     9.0     [01-08-24 @ 09:38]      Mg     2.0     [01-08-24 @ 09:38]      Phos  2.1     [01-08-24 @ 09:38]    TPro  7.1  /  Alb  4.1  /  TBili  0.5  /  DBili  x   /  AST  38  /  ALT  47  /  AlkPhos  118  [01-07-24 @ 23:40]    PT/INR: PT 12.1 , INR 1.10       [01-07-24 @ 23:40]  PTT: 36.3       [01-07-24 @ 23:40]    Creatinine Trend:  SCr 2.01 [01-08 @ 09:38]  SCr 2.08 [01-07 @ 23:40]    Tacrolimus (), Serum: 5.7 ng/mL (01-07 @ 23:41)

## 2024-01-09 NOTE — PROGRESS NOTE ADULT - NS ATTEND AMEND GEN_ALL_CORE FT
38 y.o male with PMHx of HTN, ESRD s/p 7/2022 presenting with intermittent fevers  Agree with empiric Cefepime   UCx negative, BCx with no growth so far  Dental with low concerns for odontogenic infection  Favor checking CT C/A/P noncontrast to evaluate for infectious foci  Viral illness also possible in light of leukopenia.     I will continue to follow. Please feel free to contact me with any further questions.    Eb Prather M.D.  [facility] Division of Infectious Disease  8AM-5PM Monday - Friday: Available on [employer] Teams  After Hours and Holidays (or if no response on [employer] Teams): Please contact the Infectious Diseases Office at [phone]    The above assessment and plan were discussed with medicine NP 38 y.o male with PMHx of HTN, ESRD s/p 7/2022 presenting with intermittent fevers  Agree with empiric Cefepime   UCx negative, BCx with no growth so far  Dental with low concerns for odontogenic infection  Favor checking CT C/A/P noncontrast to evaluate for infectious foci  Viral illness also possible in light of leukopenia.     I will continue to follow. Please feel free to contact me with any further questions.    Eb Prather M.D.  [facility] Division of Infectious Disease  8AM-5PM Monday - Friday: Available on [employer] Teams  After Hours and Holidays (or if no response on [employer] Teams): Please contact the Infectious Diseases Office at [phone]    The above assessment and plan were discussed with medicine NP

## 2024-01-09 NOTE — PROGRESS NOTE ADULT - ASSESSMENT
38 y.o male with PMHx of HTN, ESRD s/p 7/2022 presenting intermittent high fevers since 5 days  Tmax 101.6. Patient was seen at urgent care Friday not given antibiotics? fever subsided somniferously without antipyretics, subsequently fever reoccurred yesterday prompting patient to come to the hospital. His only other complaint besides the fever is tooth pain. States he has dental work done for a broken tooth recently, he denies any tooth/ gun drainage. He denies any URI symptoms, sick contacts, dysuria, & recent travel.   In the ED s/p zosyn and dental consulted.     On assessment patient is endorsing weakness, mild pelvic/bladder pain and right inguinal, nausea with dry heaves, chronic lower back pain, DURAN.    #DDRT Recipient  - Continue Bactrim     #Fevers  #Neutropenia  -Afebrile - Low grade today of 100.2  -WBC 2s  -RVP + COVID19 - negative  -UA - 0wbc, negative bacteria  - CXR - Clear lungs  -S/P Zosyn x1  -Urine cultures - Normal Urogenital Marci     RECOMMENDATIONS:   Curve temperatures  Continue to monitor cbc  Seen by dental - "ASSESSMENT :No signs of acute odontogenic infection based on clinical exam. No evidence of swelling, abscess, or fistula. Odontogenic infection unlikely source of fevers".  Follow up blood cultures - prelim NGTD  Would send CMV PCR  Continue cefepime and follow culture data       38 y.o male with PMHx of HTN, ESRD s/p 7/2022 presenting intermittent high fevers since 5 days  Tmax 101.6. Patient was seen at urgent care Friday not given antibiotics? fever subsided somniferously without antipyretics, subsequently fever reoccurred yesterday prompting patient to come to the hospital. His only other complaint besides the fever is tooth pain. States he has dental work done for a broken tooth recently, he denies any tooth/ gun drainage. He denies any URI symptoms, sick contacts, dysuria, & recent travel.   In the ED s/p zosyn and dental consulted.     On assessment patient is endorsing weakness, mild pelvic/bladder pain and right inguinal, nausea with dry heaves, chronic lower back pain, DURAN.    #DDRT Recipient  - Continue Bactrim     #Fevers  #Neutropenia  -Afebrile - Low grade today of 100.2  -WBC 2s  -RVP + COVID19 - negative  -UA - 0wbc, negative bacteria  - CXR - Clear lungs  -S/P Zosyn x1  -Urine cultures - Normal Urogenital Marci     RECOMMENDATIONS:   Would obtain CT Chest, A& P  Curve temperatures  Continue to monitor cbc  Seen by dental - "ASSESSMENT :No signs of acute odontogenic infection based on clinical exam. No evidence of swelling, abscess, or fistula. Odontogenic infection unlikely source of fevers".  Follow up blood cultures - prelim NGTD  Would send CMV PCR  Continue cefepime and follow culture data       38 y.o male with PMHx of HTN, ESRD s/p 7/2022 presenting intermittent high fevers since 5 days  Tmax 101.6. Patient was seen at urgent care Friday not given antibiotics? fever subsided somniferously without antipyretics, subsequently fever reoccurred yesterday prompting patient to come to the hospital. His only other complaint besides the fever is tooth pain. States he has dental work done for a broken tooth recently, he denies any tooth/ gun drainage. He denies any URI symptoms, sick contacts, dysuria, & recent travel.   In the ED s/p zosyn and dental consulted.     On assessment patient is endorsing weakness, mild pelvic/bladder pain and right inguinal, nausea with dry heaves, chronic lower back pain, DURAN.    #DDRT Recipient  - Continue Bactrim     #Fevers  #Neutropenia  -Afebrile - Low grade today of 100.2  -WBC 2s  -RVP + COVID19 - negative  -UA - 0wbc, negative bacteria  - CXR - Clear lungs  -S/P Zosyn x1  -Urine cultures - Normal Urogenital Marci     RECOMMENDATIONS:   Would obtain CT Chest, A& P  Curve temperatures  Continue to monitor cbc  Seen by dental - "ASSESSMENT :No signs of acute odontogenic infection based on clinical exam. No evidence of swelling, abscess, or fistula. Odontogenic infection unlikely source of fevers".  Follow up blood cultures - prelim NGTD  Would send CMV PCR, Adenovirus PCR (re-ordered)  Continue cefepime and follow culture data

## 2024-01-09 NOTE — PROGRESS NOTE ADULT - ASSESSMENT
38 y.o. M w/ PMHx of HTN (since age 17), and ESRD (on home HD since 2015) via LUE AVF, hyperthyroidism s/p DDRT on 6/2022 presenting to [facility] for fevers at home.     Transplant Hx: cPRA 0%, CMV-/ EBV+. Pt. is POD #1 from DDRT (1a, 1v, 1u- no stent)- Simulect induction, CIT 17 hours. Donor: Age:  34 yo, KDPI 14%, COD:  Drug Intoxication, Terminal Cr:  0.79. CMV- positive EBV-positive. HepBcAb- negative. Hepatitis C-JAKE- negative. Hepatitis C ab- negative    1. S/P DDRT 6/2022 ([facility], Dr. Garcia, Transplant Neph Dr. MAXIME Colindres), currently admitted for fevers. SCr of 2 on admission (baseline is around 2). Monitor labs (pending from today) and urine output. Transplant US reviewed. Avoid any potential nephrotoxins. Dose medications as per eGFR.    2. Immunosuppression- Currently on immunosuppression (Envarsus 4mg daily,  mg BID and prednisone 5 mg PO OD) for kidney transplant. He was told to recently increase his Envarsus to 5mg daily for low tacro level but he could not afford the 1mg pill, thus has remained on 4mg daily. Tacro level of 4.7 (within goal). Please check serum tacrolimus level (trough) 30 mins prior to am dose. Hold home MMF for now given infectious process. Continue home prednisone.     3. Fevers- Etiology remains somewhat unclear. CXR and UA without significant findings from infectious standpoint. Placed on IV Zosyn empirically. Check BK VL, CMV and Adenovirus. Transplant ID note reviewed.  38 y.o. M w/ PMHx of HTN (since age 17), and ESRD (on home HD since 2015) via LUE AVF, hyperthyroidism s/p DDRT on 6/2022 presenting to [facility] for fevers at home.     Transplant Hx: cPRA 0%, CMV-/ EBV+. Pt. is POD #1 from DDRT (1a, 1v, 1u- no stent)- Simulect induction, CIT 17 hours. Donor: Age:  34 yo, KDPI 14%, COD:  Drug Intoxication, Terminal Cr:  0.79. CMV- positive EBV-positive. HepBcAb- negative. Hepatitis C-JAKE- negative. Hepatitis C ab- negative    1. S/P DDRT 6/2022 ([facility], Dr. Garcia, Transplant Neph Dr. MAXIME Colindres), currently admitted for fevers. SCr of 2 on admission (baseline is around 2). Monitor labs (pending from today) and urine output. Transplant US reviewed. Avoid any potential nephrotoxins. Dose medications as per eGFR.    2. Immunosuppression- Currently on immunosuppression (Envarsus 4mg daily,  mg BID and prednisone 5 mg PO OD) for kidney transplant. He was told to recently increase his Envarsus to 5mg daily for low tacro level but he could not afford the 1mg pill, thus has remained on 4mg daily. Tacro level of 4.7 (within goal). Please check serum tacrolimus level (trough) 30 mins prior to am dose. Hold home MMF for now given infectious process. Continue home prednisone.     3. Fevers- Etiology remains somewhat unclear. CXR and UA without significant findings from infectious standpoint. Placed on IV Zosyn empirically. Check BK VL, CMV and Adenovirus. Transplant ID note reviewed.

## 2024-01-09 NOTE — PROGRESS NOTE ADULT - ATTENDING COMMENTS
Allograft function with mild  MIREILLE, baseline  Cr 1.7-1.9  Fever w/u  negative so far. Txp ID on board  f/u BK VL, CMV and Adenovirus  Hold cellcept for now. continue Envarsus ( goal level 4-6) and prednisone.

## 2024-01-10 LAB
ANION GAP SERPL CALC-SCNC: 12 MMOL/L — SIGNIFICANT CHANGE UP (ref 5–17)
ANION GAP SERPL CALC-SCNC: 12 MMOL/L — SIGNIFICANT CHANGE UP (ref 5–17)
BUN SERPL-MCNC: 18 MG/DL — SIGNIFICANT CHANGE UP (ref 7–23)
BUN SERPL-MCNC: 18 MG/DL — SIGNIFICANT CHANGE UP (ref 7–23)
CALCIUM SERPL-MCNC: 8.9 MG/DL — SIGNIFICANT CHANGE UP (ref 8.4–10.5)
CALCIUM SERPL-MCNC: 8.9 MG/DL — SIGNIFICANT CHANGE UP (ref 8.4–10.5)
CHLORIDE SERPL-SCNC: 100 MMOL/L — SIGNIFICANT CHANGE UP (ref 96–108)
CHLORIDE SERPL-SCNC: 100 MMOL/L — SIGNIFICANT CHANGE UP (ref 96–108)
CMV DNA CSF QL NAA+PROBE: 9840 IU/ML — HIGH
CMV DNA CSF QL NAA+PROBE: 9840 IU/ML — HIGH
CMV DNA SPEC NAA+PROBE-LOG#: 3.99 LOG10IU/ML — HIGH
CMV DNA SPEC NAA+PROBE-LOG#: 3.99 LOG10IU/ML — HIGH
CO2 SERPL-SCNC: 21 MMOL/L — LOW (ref 22–31)
CO2 SERPL-SCNC: 21 MMOL/L — LOW (ref 22–31)
CREAT ?TM UR-MCNC: 114 MG/DL — SIGNIFICANT CHANGE UP
CREAT ?TM UR-MCNC: 114 MG/DL — SIGNIFICANT CHANGE UP
CREAT SERPL-MCNC: 1.88 MG/DL — HIGH (ref 0.5–1.3)
CREAT SERPL-MCNC: 1.88 MG/DL — HIGH (ref 0.5–1.3)
EGFR: 46 ML/MIN/1.73M2 — LOW
EGFR: 46 ML/MIN/1.73M2 — LOW
GLUCOSE SERPL-MCNC: 76 MG/DL — SIGNIFICANT CHANGE UP (ref 70–99)
GLUCOSE SERPL-MCNC: 76 MG/DL — SIGNIFICANT CHANGE UP (ref 70–99)
HCT VFR BLD CALC: 36.3 % — LOW (ref 39–50)
HCT VFR BLD CALC: 36.3 % — LOW (ref 39–50)
HGB BLD-MCNC: 11.5 G/DL — LOW (ref 13–17)
HGB BLD-MCNC: 11.5 G/DL — LOW (ref 13–17)
MCHC RBC-ENTMCNC: 23 PG — LOW (ref 27–34)
MCHC RBC-ENTMCNC: 23 PG — LOW (ref 27–34)
MCHC RBC-ENTMCNC: 31.7 GM/DL — LOW (ref 32–36)
MCHC RBC-ENTMCNC: 31.7 GM/DL — LOW (ref 32–36)
MCV RBC AUTO: 72.7 FL — LOW (ref 80–100)
MCV RBC AUTO: 72.7 FL — LOW (ref 80–100)
NRBC # BLD: 0 /100 WBCS — SIGNIFICANT CHANGE UP (ref 0–0)
NRBC # BLD: 0 /100 WBCS — SIGNIFICANT CHANGE UP (ref 0–0)
PLATELET # BLD AUTO: 149 K/UL — LOW (ref 150–400)
PLATELET # BLD AUTO: 149 K/UL — LOW (ref 150–400)
POTASSIUM SERPL-MCNC: 3.9 MMOL/L — SIGNIFICANT CHANGE UP (ref 3.5–5.3)
POTASSIUM SERPL-MCNC: 3.9 MMOL/L — SIGNIFICANT CHANGE UP (ref 3.5–5.3)
POTASSIUM SERPL-SCNC: 3.9 MMOL/L — SIGNIFICANT CHANGE UP (ref 3.5–5.3)
POTASSIUM SERPL-SCNC: 3.9 MMOL/L — SIGNIFICANT CHANGE UP (ref 3.5–5.3)
RBC # BLD: 4.99 M/UL — SIGNIFICANT CHANGE UP (ref 4.2–5.8)
RBC # BLD: 4.99 M/UL — SIGNIFICANT CHANGE UP (ref 4.2–5.8)
RBC # FLD: 14.5 % — SIGNIFICANT CHANGE UP (ref 10.3–14.5)
RBC # FLD: 14.5 % — SIGNIFICANT CHANGE UP (ref 10.3–14.5)
SODIUM SERPL-SCNC: 133 MMOL/L — LOW (ref 135–145)
SODIUM SERPL-SCNC: 133 MMOL/L — LOW (ref 135–145)
SODIUM UR-SCNC: 68 MMOL/L — SIGNIFICANT CHANGE UP
SODIUM UR-SCNC: 68 MMOL/L — SIGNIFICANT CHANGE UP
WBC # BLD: 2.39 K/UL — LOW (ref 3.8–10.5)
WBC # BLD: 2.39 K/UL — LOW (ref 3.8–10.5)
WBC # FLD AUTO: 2.39 K/UL — LOW (ref 3.8–10.5)
WBC # FLD AUTO: 2.39 K/UL — LOW (ref 3.8–10.5)

## 2024-01-10 PROCEDURE — 74176 CT ABD & PELVIS W/O CONTRAST: CPT | Mod: 26

## 2024-01-10 PROCEDURE — 71250 CT THORAX DX C-: CPT | Mod: 26

## 2024-01-10 PROCEDURE — 99233 SBSQ HOSP IP/OBS HIGH 50: CPT

## 2024-01-10 RX ORDER — VALGANCICLOVIR 450 MG/1
900 TABLET, FILM COATED ORAL EVERY 12 HOURS
Refills: 0 | Status: DISCONTINUED | OUTPATIENT
Start: 2024-01-10 | End: 2024-01-13

## 2024-01-10 RX ORDER — CIPROFLOXACIN HCL 0.3 %
1 DROPS OPHTHALMIC (EYE)
Refills: 0 | Status: DISCONTINUED | OUTPATIENT
Start: 2024-01-10 | End: 2024-01-13

## 2024-01-10 RX ADMIN — VALGANCICLOVIR 900 MILLIGRAM(S): 450 TABLET, FILM COATED ORAL at 17:03

## 2024-01-10 RX ADMIN — Medication 1 DROP(S): at 23:47

## 2024-01-10 RX ADMIN — HEPARIN SODIUM 5000 UNIT(S): 5000 INJECTION INTRAVENOUS; SUBCUTANEOUS at 05:41

## 2024-01-10 RX ADMIN — PANTOPRAZOLE SODIUM 40 MILLIGRAM(S): 20 TABLET, DELAYED RELEASE ORAL at 05:41

## 2024-01-10 RX ADMIN — Medication 1 DROP(S): at 21:09

## 2024-01-10 RX ADMIN — Medication 5 MILLIGRAM(S): at 21:09

## 2024-01-10 RX ADMIN — SENNA PLUS 2 TABLET(S): 8.6 TABLET ORAL at 21:09

## 2024-01-10 RX ADMIN — Medication 650 MILLIGRAM(S): at 17:36

## 2024-01-10 RX ADMIN — HEPARIN SODIUM 5000 UNIT(S): 5000 INJECTION INTRAVENOUS; SUBCUTANEOUS at 14:36

## 2024-01-10 RX ADMIN — Medication 90 MILLIGRAM(S): at 05:41

## 2024-01-10 RX ADMIN — Medication 5 MILLIGRAM(S): at 05:41

## 2024-01-10 RX ADMIN — CEFEPIME 100 MILLIGRAM(S): 1 INJECTION, POWDER, FOR SOLUTION INTRAMUSCULAR; INTRAVENOUS at 05:40

## 2024-01-10 RX ADMIN — POLYETHYLENE GLYCOL 3350 17 GRAM(S): 17 POWDER, FOR SOLUTION ORAL at 11:36

## 2024-01-10 RX ADMIN — Medication 1 TABLET(S): at 11:36

## 2024-01-10 RX ADMIN — TACROLIMUS 4 MILLIGRAM(S): 5 CAPSULE ORAL at 05:41

## 2024-01-10 RX ADMIN — Medication 650 MILLIGRAM(S): at 17:06

## 2024-01-10 RX ADMIN — Medication 1 DROP(S): at 14:36

## 2024-01-10 NOTE — CONSULT NOTE ADULT - SUBJECTIVE AND OBJECTIVE BOX
Interventional Radiology  Evaluate for Procedure: RLQ fluid aspiration    HPI:  38 y.o male with PMHx of HTN, ESRD s/p 2022 presenting intermittent high fevers since 5 days  Tmax 101.6. Patient was seen at urgent care Friday not given antibiotics? fever subsided somniferously without antipyretics, subsequently fever reoccurred yesterday prompting patient to come to the hospital. His only other complaint besides the fever is tooth pain. States he has dental work done for a broken tooth recently, he denies any tooth/ gun drainage. He denies any URI symptoms, sick contacts, dysuria, & recent travel.   In the ED s/p zosyn and dental consulted. On assessment patient is endorsing weakness, mild pelvic/bladder pain and right inguinal, nausea with dry heaves, chronic lower back pain, DURAN. 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 Superimposed infection cannot be ruled out. Chart review shows the same hematoma present on CT A/P from 2022; IR consulted for aspiration.    Allergies: topical cleanser for dialysis access.   Exsept (Rash)  No Known Drug Allergies    Medications (Abx/Cardiac/Anticoagulation/Blood Products)  cefepime   IVPB: 100 mL/Hr IV Intermittent (01-10 @ 05:40)  heparin   Injectable: 5000 Unit(s) SubCutaneous (01-10 @ 14:36)  NIFEdipine XL: 90 milliGRAM(s) Oral (01-10 @ 05:41)  trimethoprim   80 mG/sulfamethoxazole 400 m Tablet(s) Oral (01-10 @ 11:36)    Data:  T(C): 36.6  HR: 81  BP: 131/83  RR: 18  SpO2: 100%    -WBC 2.39 / HgB 11.5 / Hct 36.3 / Plt 149  -Na 133 / Cl 100 / BUN 18 / Glucose 76  -K 3.9 / CO2 21 / Cr 1.88  -ALT -- / Alk Phos -- / T.Bili --  -INR 1.10 / PTT 36.3    Radiology: CT and US reviewed    Assessment/Plan: 38 y.o male with PMHx of HTN, ESRD s/p 2022 presenting intermittent high fevers since 5 days  Tmax 101.6. Patient was seen at urgent care Friday not given antibiotics? fever subsided somniferously without antipyretics, subsequently fever reoccurred yesterday prompting patient to come to the hospital. His only other complaint besides the fever is tooth pain. States he has dental work done for a broken tooth recently, he denies any tooth/ gun drainage. He denies any URI symptoms, sick contacts, dysuria, & recent travel.   In the ED s/p zosyn and dental consulted. On assessment patient is endorsing weakness, mild pelvic/bladder pain and right inguinal, nausea with dry heaves, chronic lower back pain, DURAN. 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 Superimposed infection cannot be ruled out. Chart review shows the same hematoma present on CT A/P from 2022; IR consulted for aspiration.    - case reviewed and approved for Thursday,   - please place IR procedure order under Dr. Vinson  - STAT labs in AM (cbc,coags, bmp, T&S)  - hold AC x24hrs  - does not need to be NPO  - d/w primary team

## 2024-01-10 NOTE — PROGRESS NOTE ADULT - ASSESSMENT
38 y.o male with PMHx of HTN, ESRD s/p 6/2022 presenting intermittent high fevers since 5 days  Tmax 101.6. Patient was seen at urgent care Friday not given antibiotics? fever subsided somniferously without antipyretics, subsequently fever reoccurred yesterday prompting patient to come to the hospital. His only other complaint besides the fever is tooth pain. States he has dental work done for a broken tooth recently, he denies any tooth/ gun drainage. He denies any URI symptoms, sick contacts, dysuria, & recent travel.   In the ED s/p zosyn and dental consulted. On assessment patient is endorsing weakness, mild pelvic/bladder pain and right inguinal, nausea with dry heaves, chronic lower back pain, DURAN.    #DDRT Recipient  - Continue Bactrim     #Fevers  #Neutropenia  #CMV Viremia  -Afebrile -   -WBC 2s  -RVP + COVID19 - negative  -UA - 0wbc, negative bacteria  - CXR - Clear lungs  -S/P Zosyn x1  -Urine cultures - Normal Urogenital Marci     RECOMMENDATIONS:   CT Chest, A& P - Small loculated fluid measuring 3.2 x 1.8 cm in the right lower quadrant transverse abdominis muscle and trace amount of hyperdensity around the transplant kidney, suggestive of small residual hematoma. Superimposed infection cannot be ruled out.  Chart review shows the same hematoma present on CT A/P from 06/23/2022   In favor of IR consult for possible aspiration and further testing  Start Valcyte 900 mg oral bid  Curve temperatures  Continue to monitor cbc  Seen by dental - "ASSESSMENT :No signs of acute odontogenic infection based on clinical exam. No evidence of swelling, abscess, or fistula. Odontogenic infection unlikely source of fevers".  Follow up blood cultures - prelim NGTD  Follow up on Adenovirus, BK, & repeat CMV PCR  Would stop cefepime               Cytomegalovirus By PCR: 9840 IU/mL 38 y.o male with PMHx of HTN, ESRD s/p 6/2022 presenting intermittent high fevers since 5 days  Tmax 101.6. Patient was seen at urgent care Friday not given antibiotics? fever subsided somniferously without antipyretics, subsequently fever reoccurred yesterday prompting patient to come to the hospital. His only other complaint besides the fever is tooth pain. States he has dental work done for a broken tooth recently, he denies any tooth/ gun drainage. He denies any URI symptoms, sick contacts, dysuria, & recent travel.   In the ED s/p zosyn and dental consulted. On assessment patient is endorsing weakness, mild pelvic/bladder pain and right inguinal, nausea with dry heaves, chronic lower back pain, DURAN.    #DDRT Recipient  - Continue Bactrim     #Fevers  #Neutropenia  #CMV Viremia  -Afebrile -   -WBC 2s  -RVP + COVID19 - negative  -UA - 0wbc, negative bacteria  - CXR - Clear lungs  -S/P Zosyn x1  -Urine cultures - Normal Urogenital Marci     RECOMMENDATIONS:   CT Chest, A& P - Small loculated fluid measuring 3.2 x 1.8 cm in the right lower quadrant transverse abdominis muscle and trace amount of hyperdensity around the transplant kidney, suggestive of small residual hematoma. Superimposed infection cannot be ruled out.  Chart review shows the same hematoma present on CT A/P from 06/23/2022   In favor of IR consult for possible aspiration to exclude superinfection  Start Valcyte 900 mg oral bid  Curve temperatures  Continue to monitor cbc  Seen by dental - "ASSESSMENT :No signs of acute odontogenic infection based on clinical exam. No evidence of swelling, abscess, or fistula. Odontogenic infection unlikely source of fevers".  Follow up blood cultures - prelim NGTD  Follow up on Adenovirus, BK PCR's  Would stop cefepime

## 2024-01-10 NOTE — PROGRESS NOTE ADULT - NS ATTEND AMEND GEN_ALL_CORE FT
38 y.o male with PMHx of HTN, ESRD s/p 7/2022 presenting with intermittent fevers  Agree with empiric Cefepime   UCx negative, BCx with no growth so far  Dental with low concerns for odontogenic infection    Suspect CMV viremia as etiology of presentation  Will start Valcyte 900 BID  Will need to follow weekly CMV PCR's following discharge    CT A/P with abdominal wall hematoma  It appears it has persisted since 2022 and is now loculated  Favor IR evaluation for aspiration to exclude superinfection    I will continue to follow. Please feel free to contact me with any further questions.    Eb Prather M.D.  [facility] Division of Infectious Disease  8AM-5PM Monday - Friday: Available on [employer] Teams  After Hours and Holidays (or if no response on Microsoft Teams): Please contact the Infectious Diseases Office at [phone]    The above assessment and plan were discussed with medicine NP 38 y.o male with PMHx of HTN, ESRD s/p 7/2022 presenting with intermittent fevers  Agree with empiric Cefepime   UCx negative, BCx with no growth so far  Dental with low concerns for odontogenic infection    Suspect CMV viremia as etiology of presentation  Will start Valcyte 900 BID  Will need to follow weekly CMV PCR's following discharge    CT A/P with abdominal wall hematoma  It appears it has persisted since 2022 and is now loculated  Favor IR evaluation for aspiration to exclude superinfection    I will continue to follow. Please feel free to contact me with any further questions.    Eb Prather M.D.  [facility] Division of Infectious Disease  8AM-5PM Monday - Friday: Available on Microsoft Teams  After Hours and Holidays (or if no response on Microsoft Teams): Please contact the Infectious Diseases Office at [phone]    The above assessment and plan were discussed with medicine NP

## 2024-01-10 NOTE — PROGRESS NOTE ADULT - SUBJECTIVE AND OBJECTIVE BOX
Interval History:  Afebrile, neutropenic  CT Chest, A/P completed  Cytomegalovirus By PCR: 9840 IU/mL        REVIEW OF SYSTEMS  [  ] ROS unobtainable because:    [ X ] All other systems negative except as noted below    Constitutional:  [ ] fever [ ] chills  [ ] weight loss  [X ] weakness  Skin:  [ ] rash [ ] phlebitis	  Eyes: [ ] icterus [ ] pain  [ ] discharge	  ENMT: [ ] sore throat  [ ] thrush [ ] ulcers [ ] exudates  Respiratory: [ ] dyspnea [ ] hemoptysis [ ] cough [ ] sputum	  Cardiovascular:  [ ] chest pain [ ] palpitations [ ] edema	  Gastrointestinal:  [ ] nausea [ ] vomiting [ ] diarrhea [ ] constipation [ ] pain	  Genitourinary:  [ ] dysuria [ ] frequency [ ] hematuria [ ] discharge [ ] flank pain  [ ] incontinence  Musculoskeletal:  [ ] myalgias [ ] arthralgias [ ] arthritis  [ ] back pain  Neurological:  [ ] headache [ ] seizures  [ ] confusion/altered mental status    Allergies  topical cleanser for dialysis access.   Exsept (Rash)  No Known Drug Allergies        ANTIMICROBIALS:  trimethoprim   80 mG/sulfamethoxazole 400 mG 1 daily  valGANciclovir 900 every 12 hours      OTHER MEDS:  MEDICATIONS  (STANDING):  acetaminophen     Tablet .. 650 every 6 hours PRN  bisacodyl 5 at bedtime  heparin   Injectable 5000 every 8 hours  NIFEdipine XL 90 daily  pantoprazole    Tablet 40 before breakfast  polyethylene glycol 3350 17 daily  predniSONE   Tablet 5 daily  senna 2 at bedtime  tacrolimus ER Tablet (ENVARSUS XR) 4 daily      Vital Signs Last 24 Hrs  T(C): 36.6 (10 Altaf 2024 11:35), Max: 36.8 (09 Jan 2024 20:21)  T(F): 97.9 (10 Altaf 2024 11:35), Max: 98.2 (09 Jan 2024 20:21)  HR: 81 (10 Altaf 2024 11:35) (70 - 87)  BP: 131/83 (10 Altaf 2024 11:35) (118/68 - 131/83)  BP(mean): --  RR: 18 (10 Altaf 2024 11:35) (18 - 18)  SpO2: 100% (10 Altaf 2024 11:35) (99% - 100%)    Parameters below as of 10 Altaf 2024 11:35  Patient On (Oxygen Delivery Method): room air        PHYSICAL EXAMINATION:  General: Alert and Awake, NAD  HEENT: PERRL, EOMI  Neck: Supple  Cardiac: RRR, No M/R/G  Resp: CTAB, No Wh/Rh/Ra  Abdomen: NBS, NT/ND, No HSM, No rigidity or guarding  MSK: No LE edema. No Calf tenderness  : No Oliveira  Skin: No rashes or lesions. Skin is warm and dry to the touch.   Neuro: Alert and Awake. CN 2-12 Grossly intact. Moves all four extremities spontaneously.  Psych: Calm, Pleasant, Cooperative                          11.5   2.39  )-----------( 149      ( 10 Altaf 2024 07:53 )             36.3       01-10    133<L>  |  100  |  18  ----------------------------<  76  3.9   |  21<L>  |  1.88<H>    Ca    8.9      10 Altaf 2024 07:28  Phos  2.4     01-09  Mg     2.0     01-09        Urinalysis Basic - ( 10 Altaf 2024 07:28 )    Color: x / Appearance: x / SG: x / pH: x  Gluc: 76 mg/dL / Ketone: x  / Bili: x / Urobili: x   Blood: x / Protein: x / Nitrite: x   Leuk Esterase: x / RBC: x / WBC x   Sq Epi: x / Non Sq Epi: x / Bacteria: x        MICROBIOLOGY:  v  Clean Catch Clean Catch (Midstream)  01-08-24   <10,000 CFU/mL Normal Urogenital Marci  --  --      .Blood Blood-Peripheral  01-07-24   No growth at 48 Hours  --  --      .Blood Blood-Peripheral  01-07-24   No growth at 48 Hours  --  --          CMVPCR Log: 3.99 Zzb18TN/mL (01-09 @ 14:21)  Rapid RVP Result: NotDetec (01-07 @ 23:42)        RADIOLOGY:    <The imaging below has been reviewed and visualized by me independently. Findings as detailed in report below>      < from: CT Abdomen and Pelvis w/ Oral Cont (01.10.24 @ 13:24) >  IMPRESSION:  Status post right lower quadrant renal transplant.  Small loculated fluid measuring 3.2 x 1.8 cm in the right lower quadrant   transverse abdominis muscle and trace amount of hyperdensity around the   transplant kidney, suggestive of small residual hematoma. Superimposed   infection cannot be ruled out.      --- End of Report ---      < end of copied text >   Interval History:  Afebrile, neutropenic  CT Chest, A/P completed  Cytomegalovirus By PCR: 9840 IU/mL        REVIEW OF SYSTEMS  [  ] ROS unobtainable because:    [ X ] All other systems negative except as noted below    Constitutional:  [ ] fever [ ] chills  [ ] weight loss  [X ] weakness  Skin:  [ ] rash [ ] phlebitis	  Eyes: [ ] icterus [ ] pain  [ ] discharge	  ENMT: [ ] sore throat  [ ] thrush [ ] ulcers [ ] exudates  Respiratory: [ ] dyspnea [ ] hemoptysis [ ] cough [ ] sputum	  Cardiovascular:  [ ] chest pain [ ] palpitations [ ] edema	  Gastrointestinal:  [ ] nausea [ ] vomiting [ ] diarrhea [ ] constipation [ ] pain	  Genitourinary:  [ ] dysuria [ ] frequency [ ] hematuria [ ] discharge [ ] flank pain  [ ] incontinence  Musculoskeletal:  [ ] myalgias [ ] arthralgias [ ] arthritis  [ ] back pain  Neurological:  [ ] headache [ ] seizures  [ ] confusion/altered mental status    Allergies  topical cleanser for dialysis access.   Exsept (Rash)  No Known Drug Allergies        ANTIMICROBIALS:  trimethoprim   80 mG/sulfamethoxazole 400 mG 1 daily  valGANciclovir 900 every 12 hours      OTHER MEDS:  MEDICATIONS  (STANDING):  acetaminophen     Tablet .. 650 every 6 hours PRN  bisacodyl 5 at bedtime  heparin   Injectable 5000 every 8 hours  NIFEdipine XL 90 daily  pantoprazole    Tablet 40 before breakfast  polyethylene glycol 3350 17 daily  predniSONE   Tablet 5 daily  senna 2 at bedtime  tacrolimus ER Tablet (ENVARSUS XR) 4 daily      Vital Signs Last 24 Hrs  T(C): 36.6 (10 Altaf 2024 11:35), Max: 36.8 (09 Jan 2024 20:21)  T(F): 97.9 (10 Altaf 2024 11:35), Max: 98.2 (09 Jan 2024 20:21)  HR: 81 (10 Altaf 2024 11:35) (70 - 87)  BP: 131/83 (10 Altaf 2024 11:35) (118/68 - 131/83)  BP(mean): --  RR: 18 (10 Altaf 2024 11:35) (18 - 18)  SpO2: 100% (10 Altaf 2024 11:35) (99% - 100%)    Parameters below as of 10 Altaf 2024 11:35  Patient On (Oxygen Delivery Method): room air        PHYSICAL EXAMINATION:  General: Alert and Awake, NAD  HEENT: PERRL, EOMI  Neck: Supple  Cardiac: RRR, No M/R/G  Resp: CTAB, No Wh/Rh/Ra  Abdomen: NBS, NT/ND, No HSM, No rigidity or guarding  MSK: No LE edema. No Calf tenderness  : No Oliveira  Skin: No rashes or lesions. Skin is warm and dry to the touch.   Neuro: Alert and Awake. CN 2-12 Grossly intact. Moves all four extremities spontaneously.  Psych: Calm, Pleasant, Cooperative                          11.5   2.39  )-----------( 149      ( 10 Altaf 2024 07:53 )             36.3       01-10    133<L>  |  100  |  18  ----------------------------<  76  3.9   |  21<L>  |  1.88<H>    Ca    8.9      10 Altaf 2024 07:28  Phos  2.4     01-09  Mg     2.0     01-09        Urinalysis Basic - ( 10 Altaf 2024 07:28 )    Color: x / Appearance: x / SG: x / pH: x  Gluc: 76 mg/dL / Ketone: x  / Bili: x / Urobili: x   Blood: x / Protein: x / Nitrite: x   Leuk Esterase: x / RBC: x / WBC x   Sq Epi: x / Non Sq Epi: x / Bacteria: x        MICROBIOLOGY:  v  Clean Catch Clean Catch (Midstream)  01-08-24   <10,000 CFU/mL Normal Urogenital Marci  --  --      .Blood Blood-Peripheral  01-07-24   No growth at 48 Hours  --  --      .Blood Blood-Peripheral  01-07-24   No growth at 48 Hours  --  --          CMVPCR Log: 3.99 Vjy61DH/mL (01-09 @ 14:21)  Rapid RVP Result: NotDetec (01-07 @ 23:42)        RADIOLOGY:    <The imaging below has been reviewed and visualized by me independently. Findings as detailed in report below>      < from: CT Abdomen and Pelvis w/ Oral Cont (01.10.24 @ 13:24) >  IMPRESSION:  Status post right lower quadrant renal transplant.  Small loculated fluid measuring 3.2 x 1.8 cm in the right lower quadrant   transverse abdominis muscle and trace amount of hyperdensity around the   transplant kidney, suggestive of small residual hematoma. Superimposed   infection cannot be ruled out.      --- End of Report ---      < end of copied text >

## 2024-01-10 NOTE — PROGRESS NOTE ADULT - SUBJECTIVE AND OBJECTIVE BOX
Patient is a 38y old  Male who presents with a chief complaint of fever in immunocompromised patient (09 Jan 2024 12:07)      SUBJECTIVE / OVERNIGHT EVENTS:    fells a lot better. fatigue has resolved. denies, fevers, chills, diarrhea, rash. noticed some white discharge in his eyes    ROS:  14 point ROS negative in detail except stated as above    MEDICATIONS  (STANDING):  bisacodyl 5 milliGRAM(s) Oral at bedtime  cefepime   IVPB 2000 milliGRAM(s) IV Intermittent every 12 hours  ciprofloxacin  0.3% Ophthalmic Solution 1 Drop(s) Both EYES four times a day  heparin   Injectable 5000 Unit(s) SubCutaneous every 8 hours  NIFEdipine XL 90 milliGRAM(s) Oral daily  pantoprazole    Tablet 40 milliGRAM(s) Oral before breakfast  polyethylene glycol 3350 17 Gram(s) Oral daily  predniSONE   Tablet 5 milliGRAM(s) Oral daily  senna 2 Tablet(s) Oral at bedtime  sodium chloride 0.9%. 1000 milliLiter(s) (75 mL/Hr) IV Continuous <Continuous>  tacrolimus ER Tablet (ENVARSUS XR) 4 milliGRAM(s) Oral daily  trimethoprim   80 mG/sulfamethoxazole 400 mG 1 Tablet(s) Oral daily    MEDICATIONS  (PRN):  acetaminophen     Tablet .. 650 milliGRAM(s) Oral every 6 hours PRN Temp greater or equal to 38C (100.4F), Mild Pain (1 - 3)      CAPILLARY BLOOD GLUCOSE        I&O's Summary    09 Jan 2024 07:01  -  10 Altaf 2024 07:00  --------------------------------------------------------  IN: 240 mL / OUT: 0 mL / NET: 240 mL        PHYSICAL EXAM:  Vital Signs Last 24 Hrs  T(C): 36.6 (10 Altaf 2024 11:35), Max: 36.8 (09 Jan 2024 20:21)  T(F): 97.9 (10 Altaf 2024 11:35), Max: 98.2 (09 Jan 2024 20:21)  HR: 81 (10 Altaf 2024 11:35) (70 - 87)  BP: 131/83 (10 Altaf 2024 11:35) (118/68 - 131/83)  BP(mean): --  RR: 18 (10 Altaf 2024 11:35) (18 - 18)  SpO2: 100% (10 Altaf 2024 11:35) (99% - 100%)    Parameters below as of 10 Altaf 2024 11:35  Patient On (Oxygen Delivery Method): room air    CONSTITUTIONAL: NAD, well-developed, well-groomed  EYES: PERRL purulent discharge from eyes b  ENMT: Moist oral mucosa, no pharyngeal injection or exudates; normal dentition  NECK: Supple, no palpable masses; no thyromegaly  RESPIRATORY: Normal respiratory effort; lungs are clear to auscultation bilaterally  CARDIOVASCULAR: Regular rate and rhythm, normal S1 and S2, no murmur/rub/gallop; No lower extremity edema; Peripheral pulses are 2+ bilaterally  ABDOMEN: Nontender to palpation, normoactive bowel sounds, no rebound/guarding; No hepatosplenomegaly  MUSCULOSKELETAL: no clubbing or cyanosis of digits; no joint swelling or tenderness to palpation  PSYCH: A+O to person, place, and time; affect appropriate  NEUROLOGY: CN 2-12 are intact and symmetric; no gross sensory deficits   SKIN: No rashes; no palpable lesions      LABS:                        11.5   2.39  )-----------( 149      ( 10 Altaf 2024 07:53 )             36.3     01-10    133<L>  |  100  |  18  ----------------------------<  76  3.9   |  21<L>  |  1.88<H>    Ca    8.9      10 Altaf 2024 07:28  Phos  2.4     01-09  Mg     2.0     01-09            Urinalysis Basic - ( 10 Altaf 2024 07:28 )    Color: x / Appearance: x / SG: x / pH: x  Gluc: 76 mg/dL / Ketone: x  / Bili: x / Urobili: x   Blood: x / Protein: x / Nitrite: x   Leuk Esterase: x / RBC: x / WBC x   Sq Epi: x / Non Sq Epi: x / Bacteria: x        RADIOLOGY & ADDITIONAL TESTS:    Imaging Personally Reviewed:    Consultant(s) Notes Reviewed:      Care Discussed with Consultants/Other Providers:  serena Chong

## 2024-01-11 DIAGNOSIS — H10.9 UNSPECIFIED CONJUNCTIVITIS: ICD-10-CM

## 2024-01-11 DIAGNOSIS — R18.8 OTHER ASCITES: ICD-10-CM

## 2024-01-11 DIAGNOSIS — B25.9 CYTOMEGALOVIRAL DISEASE, UNSPECIFIED: ICD-10-CM

## 2024-01-11 LAB
ANION GAP SERPL CALC-SCNC: 12 MMOL/L — SIGNIFICANT CHANGE UP (ref 5–17)
ANION GAP SERPL CALC-SCNC: 12 MMOL/L — SIGNIFICANT CHANGE UP (ref 5–17)
APTT BLD: 33.7 SEC — SIGNIFICANT CHANGE UP (ref 24.5–35.6)
APTT BLD: 33.7 SEC — SIGNIFICANT CHANGE UP (ref 24.5–35.6)
BLD GP AB SCN SERPL QL: NEGATIVE — SIGNIFICANT CHANGE UP
BLD GP AB SCN SERPL QL: NEGATIVE — SIGNIFICANT CHANGE UP
BUN SERPL-MCNC: 17 MG/DL — SIGNIFICANT CHANGE UP (ref 7–23)
BUN SERPL-MCNC: 17 MG/DL — SIGNIFICANT CHANGE UP (ref 7–23)
CALCIUM SERPL-MCNC: 9 MG/DL — SIGNIFICANT CHANGE UP (ref 8.4–10.5)
CALCIUM SERPL-MCNC: 9 MG/DL — SIGNIFICANT CHANGE UP (ref 8.4–10.5)
CHLORIDE SERPL-SCNC: 102 MMOL/L — SIGNIFICANT CHANGE UP (ref 96–108)
CHLORIDE SERPL-SCNC: 102 MMOL/L — SIGNIFICANT CHANGE UP (ref 96–108)
CMV DNA CSF QL NAA+PROBE: 6480 IU/ML — HIGH
CMV DNA CSF QL NAA+PROBE: 6480 IU/ML — HIGH
CMV DNA SPEC NAA+PROBE-LOG#: 3.81 LOG10IU/ML — HIGH
CMV DNA SPEC NAA+PROBE-LOG#: 3.81 LOG10IU/ML — HIGH
CO2 SERPL-SCNC: 22 MMOL/L — SIGNIFICANT CHANGE UP (ref 22–31)
CO2 SERPL-SCNC: 22 MMOL/L — SIGNIFICANT CHANGE UP (ref 22–31)
CREAT SERPL-MCNC: 1.67 MG/DL — HIGH (ref 0.5–1.3)
CREAT SERPL-MCNC: 1.67 MG/DL — HIGH (ref 0.5–1.3)
EGFR: 53 ML/MIN/1.73M2 — LOW
EGFR: 53 ML/MIN/1.73M2 — LOW
GLUCOSE SERPL-MCNC: 85 MG/DL — SIGNIFICANT CHANGE UP (ref 70–99)
GLUCOSE SERPL-MCNC: 85 MG/DL — SIGNIFICANT CHANGE UP (ref 70–99)
GRAM STN FLD: SIGNIFICANT CHANGE UP
GRAM STN FLD: SIGNIFICANT CHANGE UP
HCT VFR BLD CALC: 39.1 % — SIGNIFICANT CHANGE UP (ref 39–50)
HCT VFR BLD CALC: 39.1 % — SIGNIFICANT CHANGE UP (ref 39–50)
HGB BLD-MCNC: 12.1 G/DL — LOW (ref 13–17)
HGB BLD-MCNC: 12.1 G/DL — LOW (ref 13–17)
INR BLD: 0.95 RATIO — SIGNIFICANT CHANGE UP (ref 0.85–1.18)
INR BLD: 0.95 RATIO — SIGNIFICANT CHANGE UP (ref 0.85–1.18)
MCHC RBC-ENTMCNC: 22.5 PG — LOW (ref 27–34)
MCHC RBC-ENTMCNC: 22.5 PG — LOW (ref 27–34)
MCHC RBC-ENTMCNC: 30.9 GM/DL — LOW (ref 32–36)
MCHC RBC-ENTMCNC: 30.9 GM/DL — LOW (ref 32–36)
MCV RBC AUTO: 72.7 FL — LOW (ref 80–100)
MCV RBC AUTO: 72.7 FL — LOW (ref 80–100)
NRBC # BLD: 0 /100 WBCS — SIGNIFICANT CHANGE UP (ref 0–0)
NRBC # BLD: 0 /100 WBCS — SIGNIFICANT CHANGE UP (ref 0–0)
PHOSPHATE SERPL-MCNC: 2.1 MG/DL — LOW (ref 2.5–4.5)
PHOSPHATE SERPL-MCNC: 2.1 MG/DL — LOW (ref 2.5–4.5)
PLATELET # BLD AUTO: 190 K/UL — SIGNIFICANT CHANGE UP (ref 150–400)
PLATELET # BLD AUTO: 190 K/UL — SIGNIFICANT CHANGE UP (ref 150–400)
POTASSIUM SERPL-MCNC: 3.8 MMOL/L — SIGNIFICANT CHANGE UP (ref 3.5–5.3)
POTASSIUM SERPL-MCNC: 3.8 MMOL/L — SIGNIFICANT CHANGE UP (ref 3.5–5.3)
POTASSIUM SERPL-SCNC: 3.8 MMOL/L — SIGNIFICANT CHANGE UP (ref 3.5–5.3)
POTASSIUM SERPL-SCNC: 3.8 MMOL/L — SIGNIFICANT CHANGE UP (ref 3.5–5.3)
PROTHROM AB SERPL-ACNC: 10.5 SEC — SIGNIFICANT CHANGE UP (ref 9.5–13)
PROTHROM AB SERPL-ACNC: 10.5 SEC — SIGNIFICANT CHANGE UP (ref 9.5–13)
RBC # BLD: 5.38 M/UL — SIGNIFICANT CHANGE UP (ref 4.2–5.8)
RBC # BLD: 5.38 M/UL — SIGNIFICANT CHANGE UP (ref 4.2–5.8)
RBC # FLD: 14.2 % — SIGNIFICANT CHANGE UP (ref 10.3–14.5)
RBC # FLD: 14.2 % — SIGNIFICANT CHANGE UP (ref 10.3–14.5)
RH IG SCN BLD-IMP: POSITIVE — SIGNIFICANT CHANGE UP
RH IG SCN BLD-IMP: POSITIVE — SIGNIFICANT CHANGE UP
SODIUM SERPL-SCNC: 136 MMOL/L — SIGNIFICANT CHANGE UP (ref 135–145)
SODIUM SERPL-SCNC: 136 MMOL/L — SIGNIFICANT CHANGE UP (ref 135–145)
SPECIMEN SOURCE: SIGNIFICANT CHANGE UP
SPECIMEN SOURCE: SIGNIFICANT CHANGE UP
TACROLIMUS SERPL-MCNC: 7.4 NG/ML — SIGNIFICANT CHANGE UP
TACROLIMUS SERPL-MCNC: 7.4 NG/ML — SIGNIFICANT CHANGE UP
WBC # BLD: 2.77 K/UL — LOW (ref 3.8–10.5)
WBC # BLD: 2.77 K/UL — LOW (ref 3.8–10.5)
WBC # FLD AUTO: 2.77 K/UL — LOW (ref 3.8–10.5)
WBC # FLD AUTO: 2.77 K/UL — LOW (ref 3.8–10.5)

## 2024-01-11 PROCEDURE — 99233 SBSQ HOSP IP/OBS HIGH 50: CPT

## 2024-01-11 PROCEDURE — 76942 ECHO GUIDE FOR BIOPSY: CPT | Mod: 26

## 2024-01-11 PROCEDURE — 99232 SBSQ HOSP IP/OBS MODERATE 35: CPT

## 2024-01-11 PROCEDURE — 10160 PNXR ASPIR ABSC HMTMA BULLA: CPT

## 2024-01-11 RX ORDER — SODIUM,POTASSIUM PHOSPHATES 278-250MG
1 POWDER IN PACKET (EA) ORAL ONCE
Refills: 0 | Status: COMPLETED | OUTPATIENT
Start: 2024-01-11 | End: 2024-01-11

## 2024-01-11 RX ADMIN — PANTOPRAZOLE SODIUM 40 MILLIGRAM(S): 20 TABLET, DELAYED RELEASE ORAL at 05:56

## 2024-01-11 RX ADMIN — Medication 5 MILLIGRAM(S): at 21:03

## 2024-01-11 RX ADMIN — Medication 650 MILLIGRAM(S): at 21:02

## 2024-01-11 RX ADMIN — Medication 1 DROP(S): at 13:36

## 2024-01-11 RX ADMIN — Medication 1 TABLET(S): at 13:36

## 2024-01-11 RX ADMIN — Medication 650 MILLIGRAM(S): at 22:00

## 2024-01-11 RX ADMIN — Medication 1 PACKET(S): at 09:30

## 2024-01-11 RX ADMIN — VALGANCICLOVIR 900 MILLIGRAM(S): 450 TABLET, FILM COATED ORAL at 05:56

## 2024-01-11 RX ADMIN — Medication 1 DROP(S): at 05:56

## 2024-01-11 RX ADMIN — Medication 5 MILLIGRAM(S): at 05:56

## 2024-01-11 RX ADMIN — TACROLIMUS 4 MILLIGRAM(S): 5 CAPSULE ORAL at 06:06

## 2024-01-11 RX ADMIN — VALGANCICLOVIR 900 MILLIGRAM(S): 450 TABLET, FILM COATED ORAL at 17:33

## 2024-01-11 RX ADMIN — Medication 1 DROP(S): at 17:33

## 2024-01-11 RX ADMIN — SENNA PLUS 2 TABLET(S): 8.6 TABLET ORAL at 21:02

## 2024-01-11 RX ADMIN — Medication 90 MILLIGRAM(S): at 05:56

## 2024-01-11 NOTE — PROVIDER CONTACT NOTE (CRITICAL VALUE NOTIFICATION) - ASSESSMENT
Patient denies discomfort
Pt is AxO4, room air, independent with activity, voids independently as well.

## 2024-01-11 NOTE — PRE PROCEDURE NOTE - PRE PROCEDURE EVALUATION
Interventional Radiology    HPI:  38 y.o male with PMHx of HTN, ESRD s/p right kidney transplant 2022 presenting intermittent high fevers since 5 days  Tmax 101.6. Patient was seen at urgent care Friday not given antibiotics? fever subsided somniferously without antipyretics, subsequently fever reoccurred yesterday prompting patient to come to the hospital. His only other complaint besides the fever is tooth pain. States he has dental work done for a broken tooth recently, he denies any tooth/ gun drainage. He denies any URI symptoms, sick contacts, dysuria, & recent travel.   In the ED s/p zosyn and dental consulted. On assessment patient is endorsing weakness, mild pelvic/bladder pain and right inguinal, nausea with dry heaves, chronic lower back pain, DURAN. 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 Superimposed infection cannot be ruled out. Chart review shows the same hematoma present on CT A/P from 2022. Presents to IR for aspiration of RLQ abdominal collection.      Allergies: topical cleanser for dialysis access.   Exsept (Rash)  No Known Drug Allergies    Medications (Abx/Cardiac/Anticoagulation/Blood Products)  cefepime   IVPB: 100 mL/Hr IV Intermittent (01-10 @ 05:40)  heparin   Injectable: 5000 Unit(s) SubCutaneous (01-10 @ 14:36)  NIFEdipine XL: 90 milliGRAM(s) Oral ( @ 05:56)  trimethoprim   80 mG/sulfamethoxazole 400 m Tablet(s) Oral (01-10 @ 11:36)  valGANciclovir: 900 milliGRAM(s) Oral ( @ 05:56)    Data:    T(C): 36.7  HR: 74  BP: 116/79  RR: 18  SpO2: 98%    Exam  General: No acute distress  Chest: Non labored breathing  Abdomen: Non-distended  Extremities: No swelling, warm    -WBC 2.77 / HgB 12.1 / Hct 39.1 / Plt 190  -Na 136 / Cl 102 / BUN 17 / Glucose 85  -K 3.8 / CO2 22 / Cr 1.67  -ALT -- / Alk Phos -- / T.Bili --  -INR0.95    Imaging:   CT Abdomen and Pelvis w/ Oral Cont (01.10.24 @ 13:24) >IMPRESSION: Status post right lower quadrant renal transplant. Small loculated fluid measuring 3.2 x 1.8 cm in the right lower quadrant transverse abdominis muscle and trace amount of hyperdensity around the transplant kidney, suggestive of small residual hematoma. Superimposed infection cannot be ruled out.        Plan: 38y Male presents for aspiration of RLQ abdominal collection    -Risks/Benefits/alternatives explained with the patient and/or healthcare proxy and witnessed informed consent obtained.

## 2024-01-11 NOTE — PROCEDURE NOTE - NSTIMEOUT_GEN_A_CORE
Patient's first and last name, , procedure, and correct site confirmed prior to the start of procedure.
Attending Only

## 2024-01-11 NOTE — PRE-OP CHECKLIST - NS PREOP CHK MONITOR ANESTHESIA CONSENT
"Oral Chemotherapy Monitoring Program    Subjective/Objective:  Jonna Banks is a 75 year old female with Waldenstrom macroglobulinemia contacted by phone for an initial visit for oral chemotherapy education. I spoke with her daughter Chelsi. Jonna will be starting therapy with ibrutinib 420 mg once daily on 11/30/22. Chelsi is aware that they should wait until then to start the medication. She will also  the prochlorperazine prior to starting therapy.     ORAL CHEMOTHERAPY 11/18/2022 11/18/2022   Assessment Type Left Voicemail;New Teach New Teach;Initial Work up   Diagnosis Code Other Other   Other Waldenstrom Macroglobulinemia Waldenstrom Macroglobulinemia   Providers Dr. Lorena Carrillo   Clinic Name/Location [facility]   Drug Name Imbruvica (ibrutinib) Imbruvica (ibrutinib)   Dose 420 mg 420 mg   Current Schedule Daily Daily   Cycle Details Continuous Continuous   Start Date of Last Cycle - 11/30/2022   Any new drug interactions? No No   Is the dose as ordered appropriate for the patient? Yes Yes       Last PHQ-2 Score on record:   PHQ-2 ( [facility]) 4/1/2022 7/19/2021   Q1: Little interest or pleasure in doing things 0 0   Q2: Feeling down, depressed or hopeless 0 0   PHQ-2 Score 0 0   PHQ-2 Total Score (12-17 Years)- Positive if 3 or more points; Administer PHQ-A if positive - 0   Q1: Little interest or pleasure in doing things - Not at all   Q2: Feeling down, depressed or hopeless - Not at all   PHQ-2 Score - 0       Vitals:  BP:   BP Readings from Last 1 Encounters:   11/16/22 122/65     Wt Readings from Last 1 Encounters:   11/16/22 56.9 kg (125 lb 6.4 oz)     Estimated body surface area is 1.53 meters squared as calculated from the following:    Height as of 11/16/22: 1.473 m (4' 9.99\").    Weight as of 11/16/22: 56.9 kg (125 lb 6.4 oz).    Labs:  _  Result Component Current Result Ref Range   Sodium 138 (10/31/2022) 136 - 145 mmol/L     _  Result Component Current Result Ref " Range   Potassium 4.6 (10/31/2022) 3.4 - 5.3 mmol/L     _  Result Component Current Result Ref Range   Calcium 9.9 (10/31/2022) 8.8 - 10.2 mg/dL     No results found for Mag within last 30 days.     No results found for Phos within last 30 days.     No results found for ALBUMIN within last 30 days.     _  Result Component Current Result Ref Range   Urea Nitrogen 28.3 (H) (10/31/2022) 8.0 - 23.0 mg/dL     _  Result Component Current Result Ref Range   Creatinine 1.45 (H) (10/31/2022) 0.51 - 0.95 mg/dL     No results found for AST within last 30 days.     No results found for ALT within last 30 days.     No results found for BILITOTAL within last 30 days.     No results found for WBC within last 30 days.     _  Result Component Current Result Ref Range   Hemoglobin 11.3 (L) (10/31/2022) 11.7 - 15.7 g/dL     _  Result Component Current Result Ref Range   Platelet Count 272 (10/31/2022) 150 - 450 10e3/uL     No results found for ANC within last 30 days.     No results found for ANC within last 30 days.      Medication Reconciliation:  There were no significant drug interactions identified upon review of medication list with chemotherapy agents.    Assessment:  Patient is appropriate to start therapy.    Plan:  Basic chemotherapy teaching was reviewed with the patient's family including indication, start date of therapy, dose, administration, adverse effects, missed doses, food and drug interactions, monitoring, side effect management, office contact information, and safe handling. Written materials were provided and all questions answered.    Follow-Up:  11/30 - labs + appt - first day of treatment    We will call Chelsi about 1 week after her mother starts treatment for an initial follow-up.     Paul Molina, PharmD  Oral Chemotherapy Pharmacist  [phone]         NA

## 2024-01-11 NOTE — PROGRESS NOTE ADULT - SUBJECTIVE AND OBJECTIVE BOX
Patient is a 38y old  Male who presents with a chief complaint of fever in immunocompromised patient (10 Altaf 2024 16:54)      SUBJECTIVE / OVERNIGHT EVENTS:    feels ok no complaints. denies any eye pain, blurry vision, still has same discharge  ROS:  14 point ROS negative in detail except stated as above    MEDICATIONS  (STANDING):  bisacodyl 5 milliGRAM(s) Oral at bedtime  ciprofloxacin  0.3% Ophthalmic Solution 1 Drop(s) Both EYES four times a day  NIFEdipine XL 90 milliGRAM(s) Oral daily  pantoprazole    Tablet 40 milliGRAM(s) Oral before breakfast  polyethylene glycol 3350 17 Gram(s) Oral daily  predniSONE   Tablet 5 milliGRAM(s) Oral daily  senna 2 Tablet(s) Oral at bedtime  sodium chloride 0.9%. 1000 milliLiter(s) (75 mL/Hr) IV Continuous <Continuous>  tacrolimus ER Tablet (ENVARSUS XR) 4 milliGRAM(s) Oral daily  trimethoprim   80 mG/sulfamethoxazole 400 mG 1 Tablet(s) Oral daily  valGANciclovir 900 milliGRAM(s) Oral every 12 hours    MEDICATIONS  (PRN):  acetaminophen     Tablet .. 650 milliGRAM(s) Oral every 6 hours PRN Temp greater or equal to 38C (100.4F), Mild Pain (1 - 3)      CAPILLARY BLOOD GLUCOSE        I&O's Summary    10 Altaf 2024 07:01  -  11 Jan 2024 07:00  --------------------------------------------------------  IN: 360 mL / OUT: 0 mL / NET: 360 mL        PHYSICAL EXAM:  Vital Signs Last 24 Hrs  T(C): 36.8 (11 Jan 2024 09:38), Max: 36.8 (10 Altaf 2024 19:16)  T(F): 98.2 (11 Jan 2024 09:38), Max: 98.2 (10 Altaf 2024 19:16)  HR: 75 (11 Jan 2024 09:38) (74 - 80)  BP: 125/76 (11 Jan 2024 09:38) (116/79 - 125/76)  BP(mean): --  RR: 18 (11 Jan 2024 09:38) (18 - 18)  SpO2: 100% (11 Jan 2024 09:38) (97% - 100%)    Parameters below as of 11 Jan 2024 05:04  Patient On (Oxygen Delivery Method): room air    CONSTITUTIONAL: NAD, well-developed, well-groomed  EYES: PERRL purulent discharge from eyes   ENMT: Moist oral mucosa, no pharyngeal injection or exudates; normal dentition  NECK: Supple, no palpable masses; no thyromegaly  RESPIRATORY: Normal respiratory effort; lungs are clear to auscultation bilaterally  CARDIOVASCULAR: Regular rate and rhythm, normal S1 and S2, no murmur/rub/gallop; No lower extremity edema; Peripheral pulses are 2+ bilaterally  ABDOMEN: Nontender to palpation, normoactive bowel sounds, no rebound/guarding; No hepatosplenomegaly  MUSCULOSKELETAL: no clubbing or cyanosis of digits; no joint swelling or tenderness to palpation  PSYCH: A+O to person, place, and time; affect appropriate  NEUROLOGY: CN 2-12 are intact and symmetric; no gross sensory deficits   SKIN: No rashes; no palpable lesions      LABS:                        12.1   2.77  )-----------( 190      ( 11 Jan 2024 07:38 )             39.1     01-11    136  |  102  |  17  ----------------------------<  85  3.8   |  22  |  1.67<H>    Ca    9.0      11 Jan 2024 07:05  Phos  2.1     01-11      PT/INR - ( 11 Jan 2024 07:09 )   PT: 10.5 sec;   INR: 0.95 ratio         PTT - ( 11 Jan 2024 07:09 )  PTT:33.7 sec      Urinalysis Basic - ( 11 Jan 2024 07:05 )    Color: x / Appearance: x / SG: x / pH: x  Gluc: 85 mg/dL / Ketone: x  / Bili: x / Urobili: x   Blood: x / Protein: x / Nitrite: x   Leuk Esterase: x / RBC: x / WBC x   Sq Epi: x / Non Sq Epi: x / Bacteria: x        RADIOLOGY & ADDITIONAL TESTS:    Imaging Personally Reviewed:    < from: CT Abdomen and Pelvis w/ Oral Cont (01.10.24 @ 13:24) >  IMPRESSION:  Status post right lower quadrant renal transplant.  Small loculated fluid measuring 3.2 x 1.8 cm in the right lower quadrant   transverse abdominis muscle and trace amount of hyperdensity around the   transplant kidney, suggestive of small residual hematoma. Superimposed   infection cannot be ruled out.    < end of copied text >      Consultant(s) Notes Reviewed:      Care Discussed with Consultants/Other Providers:  serena domínguez

## 2024-01-11 NOTE — PROGRESS NOTE ADULT - NS_MD_PANP_GEN_ALL_CORE
Spoke with patient. Patient would like to be contacted after 4/1 for insurance reasons.
Attending and PA/NP shared services statement (NON-critical care):

## 2024-01-11 NOTE — PROGRESS NOTE ADULT - SUBJECTIVE AND OBJECTIVE BOX
Interval History:  Afebrile, still neutropenic  S/P IR drainage of hematoma  Denies pain    REVIEW OF SYSTEMS  [  ] ROS unobtainable because:    [x  ] All other systems negative except as noted below    Constitutional:  [ ] fever [ ] chills  [ ] weight loss  [ ] weakness  Skin:  [ ] rash [ ] phlebitis	  Eyes: [ ] icterus [ ] pain  [ x] discharge	  ENMT: [ ] sore throat  [ ] thrush [ ] ulcers [ ] exudates  Respiratory: [ ] dyspnea [ ] hemoptysis [ ] cough [ ] sputum	  Cardiovascular:  [ ] chest pain [ ] palpitations [ ] edema	  Gastrointestinal:  [ ] nausea [ ] vomiting [ ] diarrhea [ ] constipation [ ] pain	  Genitourinary:  [ ] dysuria [ ] frequency [ ] hematuria [ ] discharge [ ] flank pain  [ ] incontinence  Musculoskeletal:  [ ] myalgias [ ] arthralgias [ ] arthritis  [ ] back pain  Neurological:  [ ] headache [ ] seizures  [ ] confusion/altered mental status    Allergies  topical cleanser for dialysis access.   Exsept (Rash)  No Known Drug Allergies        ANTIMICROBIALS:  trimethoprim   80 mG/sulfamethoxazole 400 mG 1 daily  valGANciclovir 900 every 12 hours      OTHER MEDS:  MEDICATIONS  (STANDING):  acetaminophen     Tablet .. 650 every 6 hours PRN  bisacodyl 5 at bedtime  NIFEdipine XL 90 daily  pantoprazole    Tablet 40 before breakfast  polyethylene glycol 3350 17 daily  predniSONE   Tablet 5 daily  senna 2 at bedtime  tacrolimus ER Tablet (ENVARSUS XR) 4 daily      Vital Signs Last 24 Hrs  T(C): 36.8 (11 Jan 2024 09:38), Max: 36.8 (10 Altaf 2024 19:16)  T(F): 98.2 (11 Jan 2024 09:38), Max: 98.2 (10 Altaf 2024 19:16)  HR: 75 (11 Jan 2024 09:38) (74 - 80)  BP: 125/76 (11 Jan 2024 09:38) (116/79 - 125/76)  BP(mean): --  RR: 18 (11 Jan 2024 09:38) (18 - 18)  SpO2: 100% (11 Jan 2024 09:38) (97% - 100%)    Parameters below as of 11 Jan 2024 05:04  Patient On (Oxygen Delivery Method): room air        PHYSICAL EXAMINATION:  General: Alert and Awake, NAD  HEENT: PERRL, left eye with whitish discharge, without erythema  Neck: Supple  Cardiac: RRR, No M/R/G  Resp: CTAB, No Wh/Rh/Ra  Abdomen: NBS, NT/ND, No HSM, No rigidity or guarding  MSK: No LE edema. No Calf tenderness  : No Oliveira  Skin: No rashes or lesions. Skin is warm and dry to the touch.   Neuro: Alert and Awake. CN 2-12 Grossly intact. Moves all four extremities spontaneously.  Psych: Calm, Pleasant, Cooperative                          12.1   2.77  )-----------( 190      ( 11 Jan 2024 07:38 )             39.1       01-11    136  |  102  |  17  ----------------------------<  85  3.8   |  22  |  1.67<H>    Ca    9.0      11 Jan 2024 07:05  Phos  2.1     01-11        Urinalysis Basic - ( 11 Jan 2024 07:05 )    Color: x / Appearance: x / SG: x / pH: x  Gluc: 85 mg/dL / Ketone: x  / Bili: x / Urobili: x   Blood: x / Protein: x / Nitrite: x   Leuk Esterase: x / RBC: x / WBC x   Sq Epi: x / Non Sq Epi: x / Bacteria: x        MICROBIOLOGY:  v  Clean Catch Clean Catch (Midstream)  01-08-24   <10,000 CFU/mL Normal Urogenital Marci  --  --      .Blood Blood-Peripheral  01-07-24   No growth at 72 Hours  --  --      .Blood Blood-Peripheral  01-07-24   No growth at 72 Hours  --  --          CMVPCR Log: 3.81 Uns21KQ/mL (01-10 @ 14:54)  CMVPCR Log: 3.99 Cxt17IK/mL (01-09 @ 14:21)  Rapid RVP Result: NotDetec (01-07 @ 23:42)        RADIOLOGY:    <The imaging below has been reviewed and visualized by me independently. Findings as detailed in report below>    < from: CT Chest No Cont (01.10.24 @ 13:24) >  IMPRESSION:  Status post right lower quadrant renal transplant.  Small loculated fluid measuring 3.2 x 1.8 cm in the right lower quadrant   transverse abdominis muscle and trace amount of hyperdensity around the   transplant kidney, suggestive of small residual hematoma. Superimposed   infection cannot be ruled out.      --- End of Report ---            < end of copied text >   Interval History:  Afebrile, still neutropenic  S/P IR drainage of hematoma  Denies pain    REVIEW OF SYSTEMS  [  ] ROS unobtainable because:    [x  ] All other systems negative except as noted below    Constitutional:  [ ] fever [ ] chills  [ ] weight loss  [ ] weakness  Skin:  [ ] rash [ ] phlebitis	  Eyes: [ ] icterus [ ] pain  [ x] discharge	  ENMT: [ ] sore throat  [ ] thrush [ ] ulcers [ ] exudates  Respiratory: [ ] dyspnea [ ] hemoptysis [ ] cough [ ] sputum	  Cardiovascular:  [ ] chest pain [ ] palpitations [ ] edema	  Gastrointestinal:  [ ] nausea [ ] vomiting [ ] diarrhea [ ] constipation [ ] pain	  Genitourinary:  [ ] dysuria [ ] frequency [ ] hematuria [ ] discharge [ ] flank pain  [ ] incontinence  Musculoskeletal:  [ ] myalgias [ ] arthralgias [ ] arthritis  [ ] back pain  Neurological:  [ ] headache [ ] seizures  [ ] confusion/altered mental status    Allergies  topical cleanser for dialysis access.   Exsept (Rash)  No Known Drug Allergies        ANTIMICROBIALS:  trimethoprim   80 mG/sulfamethoxazole 400 mG 1 daily  valGANciclovir 900 every 12 hours      OTHER MEDS:  MEDICATIONS  (STANDING):  acetaminophen     Tablet .. 650 every 6 hours PRN  bisacodyl 5 at bedtime  NIFEdipine XL 90 daily  pantoprazole    Tablet 40 before breakfast  polyethylene glycol 3350 17 daily  predniSONE   Tablet 5 daily  senna 2 at bedtime  tacrolimus ER Tablet (ENVARSUS XR) 4 daily      Vital Signs Last 24 Hrs  T(C): 36.8 (11 Jan 2024 09:38), Max: 36.8 (10 Altaf 2024 19:16)  T(F): 98.2 (11 Jan 2024 09:38), Max: 98.2 (10 Altaf 2024 19:16)  HR: 75 (11 Jan 2024 09:38) (74 - 80)  BP: 125/76 (11 Jan 2024 09:38) (116/79 - 125/76)  BP(mean): --  RR: 18 (11 Jan 2024 09:38) (18 - 18)  SpO2: 100% (11 Jan 2024 09:38) (97% - 100%)    Parameters below as of 11 Jan 2024 05:04  Patient On (Oxygen Delivery Method): room air        PHYSICAL EXAMINATION:  General: Alert and Awake, NAD  HEENT: PERRL, left eye with whitish discharge, without erythema  Neck: Supple  Cardiac: RRR, No M/R/G  Resp: CTAB, No Wh/Rh/Ra  Abdomen: NBS, NT/ND, No HSM, No rigidity or guarding  MSK: No LE edema. No Calf tenderness  : No Oliveira  Skin: No rashes or lesions. Skin is warm and dry to the touch.   Neuro: Alert and Awake. CN 2-12 Grossly intact. Moves all four extremities spontaneously.  Psych: Calm, Pleasant, Cooperative                          12.1   2.77  )-----------( 190      ( 11 Jan 2024 07:38 )             39.1       01-11    136  |  102  |  17  ----------------------------<  85  3.8   |  22  |  1.67<H>    Ca    9.0      11 Jan 2024 07:05  Phos  2.1     01-11        Urinalysis Basic - ( 11 Jan 2024 07:05 )    Color: x / Appearance: x / SG: x / pH: x  Gluc: 85 mg/dL / Ketone: x  / Bili: x / Urobili: x   Blood: x / Protein: x / Nitrite: x   Leuk Esterase: x / RBC: x / WBC x   Sq Epi: x / Non Sq Epi: x / Bacteria: x        MICROBIOLOGY:  v  Clean Catch Clean Catch (Midstream)  01-08-24   <10,000 CFU/mL Normal Urogenital Marci  --  --      .Blood Blood-Peripheral  01-07-24   No growth at 72 Hours  --  --      .Blood Blood-Peripheral  01-07-24   No growth at 72 Hours  --  --          CMVPCR Log: 3.81 Awo22HE/mL (01-10 @ 14:54)  CMVPCR Log: 3.99 Mlv13FC/mL (01-09 @ 14:21)  Rapid RVP Result: NotDetec (01-07 @ 23:42)        RADIOLOGY:    <The imaging below has been reviewed and visualized by me independently. Findings as detailed in report below>    < from: CT Chest No Cont (01.10.24 @ 13:24) >  IMPRESSION:  Status post right lower quadrant renal transplant.  Small loculated fluid measuring 3.2 x 1.8 cm in the right lower quadrant   transverse abdominis muscle and trace amount of hyperdensity around the   transplant kidney, suggestive of small residual hematoma. Superimposed   infection cannot be ruled out.      --- End of Report ---            < end of copied text >

## 2024-01-11 NOTE — PROGRESS NOTE ADULT - ASSESSMENT
38 y.o male with PMHx of HTN, ESRD s/p 2022 presenting intermittent high fevers since 5 days  Tmax 101.6. Patient was seen at urgent care Friday not given antibiotics? fever subsided somniferously without antipyretics, subsequently fever reoccurred yesterday prompting patient to come to the hospital. His only other complaint besides the fever is tooth pain. States he has dental work done for a broken tooth recently, he denies any tooth/ gun drainage. He denies any URI symptoms, sick contacts, dysuria, & recent travel.   In the ED s/p zosyn and dental consulted. On assessment patient is endorsing weakness, mild pelvic/bladder pain and right inguinal, nausea with dry heaves, chronic lower back pain, DURAN.    #DDRT Recipient  - Continue Bactrim     #Fevers  #Neutropenia  #CMV Viremia  -Afebrile -   -WBC 2s  -RVP + COVID19 - negative  -UA - 0wbc, negative bacteria  - CXR - Clear lungs  -S/P Zosyn x1  -Urine cultures - Normal Urogenital Marci       #Conjunctivitis  -Reports chronic discharge >2months with associated dryness  -Saw ophthalmologists outpatient with reported chronic changes was given artificial tears and advised to return in 6 months.  -In favor of ophthalmology consult for further evaluation  -Continue Ciprofloxacin 0.3% opthalmic solution      RECOMMENDATIONS:   CMV improvin () --> 6480 (01/10)   S/P IR consult aspiration with fluid sent for gram stain and cultures - will follow     CT Chest, A& P - Small loculated fluid measuring 3.2 x 1.8 cm in the right lower quadrant transverse abdominis muscle and trace amount of hyperdensity around the transplant kidney, suggestive of small residual hematoma. Superimposed infection cannot be ruled out.  Chart review shows the same hematoma present on CT A/P from 2022   Continue Valcyte 900 mg oral bid  Curve temperatures  Continue to monitor cbc  Seen by dental - "ASSESSMENT :No signs of acute odontogenic infection based on clinical exam. No evidence of swelling, abscess, or fistula. Odontogenic infection unlikely source of fevers".  Follow up blood cultures - prelim NGTD  Follow up on Adenovirus, BK PCR's  S/P cefepime      38 y.o male with PMHx of HTN, ESRD s/p 6/2022 presenting intermittent high fevers since 5 days  Tmax 101.6. Patient was seen at urgent care Friday not given antibiotics? fever subsided somniferously without antipyretics, subsequently fever reoccurred yesterday prompting patient to come to the hospital. His only other complaint besides the fever is tooth pain. States he has dental work done for a broken tooth recently, he denies any tooth/ gun drainage. He denies any URI symptoms, sick contacts, dysuria, & recent travel.   In the ED s/p zosyn and dental consulted. On assessment patient is endorsing weakness, mild pelvic/bladder pain and right inguinal, nausea with dry heaves, chronic lower back pain, DURAN.    #DDRT Recipient  - Continue Bactrim     #Fevers  #Neutropenia  #CMV Viremia  -Afebrile -   -WBC 2s  -RVP + COVID19 - negative  -UA - 0wbc, negative bacteria  - CXR - Clear lungs  -S/P Zosyn x1  -Urine cultures - Normal Urogenital Marci       #Conjunctivitis  -Reports chronic discharge >2months with associated dryness  -Saw ophthalmologists outpatient with reported chronic changes was given artificial tears and advised to return in 6 months.  -Consider Ophtho evaluation  -Continue Ciprofloxacin 0.3% opthalmic solution      RECOMMENDATIONS:   S/P IR consult aspiration with fluid sent for gram stain and cultures - will follow     CT Chest, A& P - Small loculated fluid measuring 3.2 x 1.8 cm in the right lower quadrant transverse abdominis muscle and trace amount of hyperdensity around the transplant kidney, suggestive of small residual hematoma. Superimposed infection cannot be ruled out.  Chart review shows the same hematoma present on CT A/P from 06/23/2022   Continue Valcyte 900 mg oral bid  Curve temperatures  Continue to monitor cbc  Seen by dental - "ASSESSMENT :No signs of acute odontogenic infection based on clinical exam. No evidence of swelling, abscess, or fistula. Odontogenic infection unlikely source of fevers".  Follow up blood cultures - prelim NGTD  Follow up on Adenovirus, BK PCR's  S/P cefepime

## 2024-01-11 NOTE — PROGRESS NOTE ADULT - NS ATTEND AMEND GEN_ALL_CORE FT
38 y.o male with PMHx of HTN, ESRD s/p 7/2022 presenting with intermittent fevers  Agree with empiric Cefepime   UCx negative, BCx with no growth so far  Dental with low concerns for odontogenic infection    Suspect CMV viremia as etiology of presentation  Continue Valcyte 900 BID  Will need to follow weekly CMV PCR's following discharge    CT A/P with abdominal wall hematoma  s/p IR aspiration of fluid collection  Would follow prelim aspiration cultures    Drainage from left eye  Noting prolonged course of eye drainage  Doubt CMV related  Can continue Cipro Ophtho drops  Consider Ophtho  evaluation    I will continue to follow. Please feel free to contact me with any further questions.    Eb Prather M.D.  [facility] Division of Infectious Disease  8AM-5PM Monday - Friday: Available on [employer] Teams  After Hours and Holidays (or if no response on [employer] Teams): Please contact the Infectious Diseases Office at [phone]    The above assessment and plan were discussed with Dr Peraza 38 y.o male with PMHx of HTN, ESRD s/p 7/2022 presenting with intermittent fevers  Agree with empiric Cefepime   UCx negative, BCx with no growth so far  Dental with low concerns for odontogenic infection    Suspect CMV viremia as etiology of presentation  Continue Valcyte 900 BID  Will need to follow weekly CMV PCR's following discharge    CT A/P with abdominal wall hematoma  s/p IR aspiration of fluid collection  Would follow prelim aspiration cultures    Drainage from left eye  Noting prolonged course of eye drainage  Doubt CMV related  Can continue Cipro Ophtho drops  Consider Ophtho  evaluation    I will continue to follow. Please feel free to contact me with any further questions.    Eb Prather M.D.  [facility] Division of Infectious Disease  8AM-5PM Monday - Friday: Available on [employer] Teams  After Hours and Holidays (or if no response on [employer] Teams): Please contact the Infectious Diseases Office at [phone]    The above assessment and plan were discussed with Dr Peraza

## 2024-01-11 NOTE — PROVIDER CONTACT NOTE (OTHER) - BACKGROUND
Pt is a 38 y.o M, admitted for fever due to unspecified condition. PMHx of renal transplant, ESRD, HTN, CMV disease.

## 2024-01-12 LAB
ANION GAP SERPL CALC-SCNC: 11 MMOL/L — SIGNIFICANT CHANGE UP (ref 5–17)
ANION GAP SERPL CALC-SCNC: 11 MMOL/L — SIGNIFICANT CHANGE UP (ref 5–17)
BUN SERPL-MCNC: 16 MG/DL — SIGNIFICANT CHANGE UP (ref 7–23)
BUN SERPL-MCNC: 16 MG/DL — SIGNIFICANT CHANGE UP (ref 7–23)
CALCIUM SERPL-MCNC: 9.1 MG/DL — SIGNIFICANT CHANGE UP (ref 8.4–10.5)
CALCIUM SERPL-MCNC: 9.1 MG/DL — SIGNIFICANT CHANGE UP (ref 8.4–10.5)
CHLORIDE SERPL-SCNC: 100 MMOL/L — SIGNIFICANT CHANGE UP (ref 96–108)
CHLORIDE SERPL-SCNC: 100 MMOL/L — SIGNIFICANT CHANGE UP (ref 96–108)
CO2 SERPL-SCNC: 21 MMOL/L — LOW (ref 22–31)
CO2 SERPL-SCNC: 21 MMOL/L — LOW (ref 22–31)
CREAT SERPL-MCNC: 1.71 MG/DL — HIGH (ref 0.5–1.3)
CREAT SERPL-MCNC: 1.71 MG/DL — HIGH (ref 0.5–1.3)
EGFR: 51 ML/MIN/1.73M2 — LOW
EGFR: 51 ML/MIN/1.73M2 — LOW
GLUCOSE SERPL-MCNC: 178 MG/DL — HIGH (ref 70–99)
GLUCOSE SERPL-MCNC: 178 MG/DL — HIGH (ref 70–99)
HADV DNA FLD NAA+PROBE-LOG#: SIGNIFICANT CHANGE UP COPIES/ML
HCT VFR BLD CALC: 43.3 % — SIGNIFICANT CHANGE UP (ref 39–50)
HCT VFR BLD CALC: 43.3 % — SIGNIFICANT CHANGE UP (ref 39–50)
HGB BLD-MCNC: 13.1 G/DL — SIGNIFICANT CHANGE UP (ref 13–17)
HGB BLD-MCNC: 13.1 G/DL — SIGNIFICANT CHANGE UP (ref 13–17)
MCHC RBC-ENTMCNC: 22.3 PG — LOW (ref 27–34)
MCHC RBC-ENTMCNC: 22.3 PG — LOW (ref 27–34)
MCHC RBC-ENTMCNC: 30.3 GM/DL — LOW (ref 32–36)
MCHC RBC-ENTMCNC: 30.3 GM/DL — LOW (ref 32–36)
MCV RBC AUTO: 73.8 FL — LOW (ref 80–100)
MCV RBC AUTO: 73.8 FL — LOW (ref 80–100)
NRBC # BLD: 0 /100 WBCS — SIGNIFICANT CHANGE UP (ref 0–0)
NRBC # BLD: 0 /100 WBCS — SIGNIFICANT CHANGE UP (ref 0–0)
PHOSPHATE SERPL-MCNC: 1.6 MG/DL — LOW (ref 2.5–4.5)
PHOSPHATE SERPL-MCNC: 1.6 MG/DL — LOW (ref 2.5–4.5)
PLATELET # BLD AUTO: 213 K/UL — SIGNIFICANT CHANGE UP (ref 150–400)
PLATELET # BLD AUTO: 213 K/UL — SIGNIFICANT CHANGE UP (ref 150–400)
POTASSIUM SERPL-MCNC: 3.8 MMOL/L — SIGNIFICANT CHANGE UP (ref 3.5–5.3)
POTASSIUM SERPL-MCNC: 3.8 MMOL/L — SIGNIFICANT CHANGE UP (ref 3.5–5.3)
POTASSIUM SERPL-SCNC: 3.8 MMOL/L — SIGNIFICANT CHANGE UP (ref 3.5–5.3)
POTASSIUM SERPL-SCNC: 3.8 MMOL/L — SIGNIFICANT CHANGE UP (ref 3.5–5.3)
RBC # BLD: 5.87 M/UL — HIGH (ref 4.2–5.8)
RBC # BLD: 5.87 M/UL — HIGH (ref 4.2–5.8)
RBC # FLD: 14.5 % — SIGNIFICANT CHANGE UP (ref 10.3–14.5)
RBC # FLD: 14.5 % — SIGNIFICANT CHANGE UP (ref 10.3–14.5)
SODIUM SERPL-SCNC: 132 MMOL/L — LOW (ref 135–145)
SODIUM SERPL-SCNC: 132 MMOL/L — LOW (ref 135–145)
TACROLIMUS SERPL-MCNC: 8.9 NG/ML — SIGNIFICANT CHANGE UP
TACROLIMUS SERPL-MCNC: 8.9 NG/ML — SIGNIFICANT CHANGE UP
WBC # BLD: 3.53 K/UL — LOW (ref 3.8–10.5)
WBC # BLD: 3.53 K/UL — LOW (ref 3.8–10.5)
WBC # FLD AUTO: 3.53 K/UL — LOW (ref 3.8–10.5)
WBC # FLD AUTO: 3.53 K/UL — LOW (ref 3.8–10.5)

## 2024-01-12 PROCEDURE — 99232 SBSQ HOSP IP/OBS MODERATE 35: CPT

## 2024-01-12 PROCEDURE — 99233 SBSQ HOSP IP/OBS HIGH 50: CPT

## 2024-01-12 RX ORDER — VALGANCICLOVIR 450 MG/1
2 TABLET, FILM COATED ORAL
Qty: 120 | Refills: 0
Start: 2024-01-12 | End: 2024-02-10

## 2024-01-12 RX ADMIN — Medication 1 TABLET(S): at 11:17

## 2024-01-12 RX ADMIN — Medication 1 DROP(S): at 11:17

## 2024-01-12 RX ADMIN — Medication 1 DROP(S): at 23:58

## 2024-01-12 RX ADMIN — Medication 90 MILLIGRAM(S): at 06:08

## 2024-01-12 RX ADMIN — VALGANCICLOVIR 900 MILLIGRAM(S): 450 TABLET, FILM COATED ORAL at 06:07

## 2024-01-12 RX ADMIN — Medication 1 DROP(S): at 18:20

## 2024-01-12 RX ADMIN — TACROLIMUS 4 MILLIGRAM(S): 5 CAPSULE ORAL at 06:06

## 2024-01-12 RX ADMIN — VALGANCICLOVIR 900 MILLIGRAM(S): 450 TABLET, FILM COATED ORAL at 18:19

## 2024-01-12 RX ADMIN — Medication 5 MILLIGRAM(S): at 22:24

## 2024-01-12 RX ADMIN — PANTOPRAZOLE SODIUM 40 MILLIGRAM(S): 20 TABLET, DELAYED RELEASE ORAL at 06:08

## 2024-01-12 RX ADMIN — SENNA PLUS 2 TABLET(S): 8.6 TABLET ORAL at 22:24

## 2024-01-12 RX ADMIN — Medication 1 DROP(S): at 06:08

## 2024-01-12 RX ADMIN — Medication 650 MILLIGRAM(S): at 23:20

## 2024-01-12 RX ADMIN — Medication 85 MILLIMOLE(S): at 16:24

## 2024-01-12 RX ADMIN — Medication 650 MILLIGRAM(S): at 22:24

## 2024-01-12 RX ADMIN — Medication 5 MILLIGRAM(S): at 06:08

## 2024-01-12 RX ADMIN — Medication 1 DROP(S): at 00:17

## 2024-01-12 NOTE — PROGRESS NOTE ADULT - SUBJECTIVE AND OBJECTIVE BOX
Follow Up:      Interval History:    REVIEW OF SYSTEMS  [  ] ROS unobtainable because:    [  ] All other systems negative except as noted below    Constitutional:  [ ] fever [ ] chills  [ ] weight loss  [ ] weakness  Skin:  [ ] rash [ ] phlebitis	  Eyes: [ ] icterus [ ] pain  [ ] discharge	  ENMT: [ ] sore throat  [ ] thrush [ ] ulcers [ ] exudates  Respiratory: [ ] dyspnea [ ] hemoptysis [ ] cough [ ] sputum	  Cardiovascular:  [ ] chest pain [ ] palpitations [ ] edema	  Gastrointestinal:  [ ] nausea [ ] vomiting [ ] diarrhea [ ] constipation [ ] pain	  Genitourinary:  [ ] dysuria [ ] frequency [ ] hematuria [ ] discharge [ ] flank pain  [ ] incontinence  Musculoskeletal:  [ ] myalgias [ ] arthralgias [ ] arthritis  [ ] back pain  Neurological:  [ ] headache [ ] seizures  [ ] confusion/altered mental status    Allergies  topical cleanser for dialysis access.   Exsept (Rash)  No Known Drug Allergies        ANTIMICROBIALS:  trimethoprim   80 mG/sulfamethoxazole 400 mG 1 daily  valGANciclovir 900 every 12 hours      OTHER MEDS:  MEDICATIONS  (STANDING):  acetaminophen     Tablet .. 650 every 6 hours PRN  bisacodyl 5 at bedtime  NIFEdipine XL 90 daily  pantoprazole    Tablet 40 before breakfast  polyethylene glycol 3350 17 daily  predniSONE   Tablet 5 daily  senna 2 at bedtime  tacrolimus ER Tablet (ENVARSUS XR) 4 daily      Vital Signs Last 24 Hrs  T(C): 36.6 (12 Jan 2024 04:35), Max: 38.1 (11 Jan 2024 21:01)  T(F): 97.9 (12 Jan 2024 04:35), Max: 100.5 (11 Jan 2024 21:01)  HR: 80 (12 Jan 2024 06:07) (68 - 80)  BP: 120/81 (12 Jan 2024 06:07) (117/74 - 120/81)  BP(mean): --  RR: 18 (12 Jan 2024 04:35) (18 - 18)  SpO2: 100% (12 Jan 2024 04:35) (100% - 100%)    Parameters below as of 12 Jan 2024 04:35  Patient On (Oxygen Delivery Method): room air        PHYSICAL EXAMINATION:  General: Alert and Awake, NAD  HEENT: PERRL, EOMI  Neck: Supple  Cardiac: RRR, No M/R/G  Resp: CTAB, No Wh/Rh/Ra  Abdomen: NBS, NT/ND, No HSM, No rigidity or guarding  MSK: No LE edema. No Calf tenderness  : No underwood  Skin: No rashes or lesions. Skin is warm and dry to the touch.   Neuro: Alert and Awake. CN 2-12 Grossly intact. Moves all four extremities spontaneously.  Psych: Calm, Pleasant, Cooperative                          13.1   3.53  )-----------( 213      ( 12 Jan 2024 10:12 )             43.3       01-12    132<L>  |  100  |  16  ----------------------------<  178<H>  3.8   |  21<L>  |  1.71<H>    Ca    9.1      12 Jan 2024 10:12  Phos  1.6     01-12        Urinalysis Basic - ( 12 Jan 2024 10:12 )    Color: x / Appearance: x / SG: x / pH: x  Gluc: 178 mg/dL / Ketone: x  / Bili: x / Urobili: x   Blood: x / Protein: x / Nitrite: x   Leuk Esterase: x / RBC: x / WBC x   Sq Epi: x / Non Sq Epi: x / Bacteria: x        MICROBIOLOGY:  v  Abdominal Fl Abdominal Fluid  01-11-24 --  --    No polymorphonuclear cells seen per low power field  No organisms seen per oil power field      Clean Catch Clean Catch (Midstream)  01-08-24   <10,000 CFU/mL Normal Urogenital Marci  --  --      .Blood Blood-Peripheral  01-07-24   No growth at 4 days  --  --      .Blood Blood-Peripheral  01-07-24   No growth at 4 days  --  --          CMVPCR Log: 3.81 Njp30GW/mL (01-10 @ 14:54)  CMVPCR Log: 3.99 Fwx63HK/mL (01-09 @ 14:21)  Rapid RVP Result: NotDetec (01-07 @ 23:42)        RADIOLOGY:    <The imaging below has been reviewed and visualized by me independently. Findings as detailed in report below> Follow Up:      Interval History:    REVIEW OF SYSTEMS  [  ] ROS unobtainable because:    [  ] All other systems negative except as noted below    Constitutional:  [ ] fever [ ] chills  [ ] weight loss  [ ] weakness  Skin:  [ ] rash [ ] phlebitis	  Eyes: [ ] icterus [ ] pain  [ ] discharge	  ENMT: [ ] sore throat  [ ] thrush [ ] ulcers [ ] exudates  Respiratory: [ ] dyspnea [ ] hemoptysis [ ] cough [ ] sputum	  Cardiovascular:  [ ] chest pain [ ] palpitations [ ] edema	  Gastrointestinal:  [ ] nausea [ ] vomiting [ ] diarrhea [ ] constipation [ ] pain	  Genitourinary:  [ ] dysuria [ ] frequency [ ] hematuria [ ] discharge [ ] flank pain  [ ] incontinence  Musculoskeletal:  [ ] myalgias [ ] arthralgias [ ] arthritis  [ ] back pain  Neurological:  [ ] headache [ ] seizures  [ ] confusion/altered mental status    Allergies  topical cleanser for dialysis access.   Exsept (Rash)  No Known Drug Allergies        ANTIMICROBIALS:  trimethoprim   80 mG/sulfamethoxazole 400 mG 1 daily  valGANciclovir 900 every 12 hours      OTHER MEDS:  MEDICATIONS  (STANDING):  acetaminophen     Tablet .. 650 every 6 hours PRN  bisacodyl 5 at bedtime  NIFEdipine XL 90 daily  pantoprazole    Tablet 40 before breakfast  polyethylene glycol 3350 17 daily  predniSONE   Tablet 5 daily  senna 2 at bedtime  tacrolimus ER Tablet (ENVARSUS XR) 4 daily      Vital Signs Last 24 Hrs  T(C): 36.6 (12 Jan 2024 04:35), Max: 38.1 (11 Jan 2024 21:01)  T(F): 97.9 (12 Jan 2024 04:35), Max: 100.5 (11 Jan 2024 21:01)  HR: 80 (12 Jan 2024 06:07) (68 - 80)  BP: 120/81 (12 Jan 2024 06:07) (117/74 - 120/81)  BP(mean): --  RR: 18 (12 Jan 2024 04:35) (18 - 18)  SpO2: 100% (12 Jan 2024 04:35) (100% - 100%)    Parameters below as of 12 Jan 2024 04:35  Patient On (Oxygen Delivery Method): room air        PHYSICAL EXAMINATION:  General: Alert and Awake, NAD  HEENT: PERRL, EOMI  Neck: Supple  Cardiac: RRR, No M/R/G  Resp: CTAB, No Wh/Rh/Ra  Abdomen: NBS, NT/ND, No HSM, No rigidity or guarding  MSK: No LE edema. No Calf tenderness  : No underwood  Skin: No rashes or lesions. Skin is warm and dry to the touch.   Neuro: Alert and Awake. CN 2-12 Grossly intact. Moves all four extremities spontaneously.  Psych: Calm, Pleasant, Cooperative                          13.1   3.53  )-----------( 213      ( 12 Jan 2024 10:12 )             43.3       01-12    132<L>  |  100  |  16  ----------------------------<  178<H>  3.8   |  21<L>  |  1.71<H>    Ca    9.1      12 Jan 2024 10:12  Phos  1.6     01-12        Urinalysis Basic - ( 12 Jan 2024 10:12 )    Color: x / Appearance: x / SG: x / pH: x  Gluc: 178 mg/dL / Ketone: x  / Bili: x / Urobili: x   Blood: x / Protein: x / Nitrite: x   Leuk Esterase: x / RBC: x / WBC x   Sq Epi: x / Non Sq Epi: x / Bacteria: x        MICROBIOLOGY:  v  Abdominal Fl Abdominal Fluid  01-11-24 --  --    No polymorphonuclear cells seen per low power field  No organisms seen per oil power field      Clean Catch Clean Catch (Midstream)  01-08-24   <10,000 CFU/mL Normal Urogenital Marci  --  --      .Blood Blood-Peripheral  01-07-24   No growth at 4 days  --  --      .Blood Blood-Peripheral  01-07-24   No growth at 4 days  --  --          CMVPCR Log: 3.81 Qrc26LW/mL (01-10 @ 14:54)  CMVPCR Log: 3.99 Iat59GV/mL (01-09 @ 14:21)  Rapid RVP Result: NotDetec (01-07 @ 23:42)        RADIOLOGY:    <The imaging below has been reviewed and visualized by me independently. Findings as detailed in report below> Follow Up:  CMV    Interval History: afebrile. no acute events.     REVIEW OF SYSTEMS  [  ] ROS unobtainable because:    [  x] All other systems negative except as noted below    Constitutional:  [ ] fever [ ] chills  [ ] weight loss  [ ] weakness  Skin:  [ ] rash [ ] phlebitis	  Eyes: [ ] icterus [ ] pain  [ ] discharge	  ENMT: [ ] sore throat  [ ] thrush [ ] ulcers [ ] exudates  Respiratory: [ ] dyspnea [ ] hemoptysis [ ] cough [ ] sputum	  Cardiovascular:  [ ] chest pain [ ] palpitations [ ] edema	  Gastrointestinal:  [ ] nausea [ ] vomiting [ ] diarrhea [ ] constipation [ ] pain	  Genitourinary:  [ ] dysuria [ ] frequency [ ] hematuria [ ] discharge [ ] flank pain  [ ] incontinence  Musculoskeletal:  [ ] myalgias [ ] arthralgias [ ] arthritis  [ ] back pain  Neurological:  [ ] headache [ ] seizures  [ ] confusion/altered mental status    Allergies  topical cleanser for dialysis access.   Exsept (Rash)  No Known Drug Allergies        ANTIMICROBIALS:  trimethoprim   80 mG/sulfamethoxazole 400 mG 1 daily  valGANciclovir 900 every 12 hours      OTHER MEDS:  MEDICATIONS  (STANDING):  acetaminophen     Tablet .. 650 every 6 hours PRN  bisacodyl 5 at bedtime  NIFEdipine XL 90 daily  pantoprazole    Tablet 40 before breakfast  polyethylene glycol 3350 17 daily  predniSONE   Tablet 5 daily  senna 2 at bedtime  tacrolimus ER Tablet (ENVARSUS XR) 4 daily      Vital Signs Last 24 Hrs  T(C): 36.6 (12 Jan 2024 04:35), Max: 38.1 (11 Jan 2024 21:01)  T(F): 97.9 (12 Jan 2024 04:35), Max: 100.5 (11 Jan 2024 21:01)  HR: 80 (12 Jan 2024 06:07) (68 - 80)  BP: 120/81 (12 Jan 2024 06:07) (117/74 - 120/81)  BP(mean): --  RR: 18 (12 Jan 2024 04:35) (18 - 18)  SpO2: 100% (12 Jan 2024 04:35) (100% - 100%)    Parameters below as of 12 Jan 2024 04:35  Patient On (Oxygen Delivery Method): room air    PHYSICAL EXAMINATION:  General: Alert and Awake, NAD  HEENT: Normocephalic / Atraumatic  Resp: No accessory muscles of respiration utilized  Abdomen: Not distended.  MSK: No LE edema.   : No underwood  Skin: No rashes or lesions.    Neuro: Alert and Awake. CN 2-12 Grossly intact. Moves all four extremities spontaneously.  Psych: Calm, Pleasant, Cooperative                          13.1   3.53  )-----------( 213      ( 12 Jan 2024 10:12 )             43.3       01-12    132<L>  |  100  |  16  ----------------------------<  178<H>  3.8   |  21<L>  |  1.71<H>    Ca    9.1      12 Jan 2024 10:12  Phos  1.6     01-12        Urinalysis Basic - ( 12 Jan 2024 10:12 )    Color: x / Appearance: x / SG: x / pH: x  Gluc: 178 mg/dL / Ketone: x  / Bili: x / Urobili: x   Blood: x / Protein: x / Nitrite: x   Leuk Esterase: x / RBC: x / WBC x   Sq Epi: x / Non Sq Epi: x / Bacteria: x        MICROBIOLOGY:  v  Abdominal Fl Abdominal Fluid  01-11-24 --  --    No polymorphonuclear cells seen per low power field  No organisms seen per oil power field      Clean Catch Clean Catch (Midstream)  01-08-24   <10,000 CFU/mL Normal Urogenital Marci  --  --      .Blood Blood-Peripheral  01-07-24   No growth at 4 days  --  --      .Blood Blood-Peripheral  01-07-24   No growth at 4 days  --  --          CMVPCR Log: 3.81 Vzv96XX/mL (01-10 @ 14:54)  CMVPCR Log: 3.99 Kne31BD/mL (01-09 @ 14:21)  Rapid RVP Result: NotDetec (01-07 @ 23:42)        RADIOLOGY:    <The imaging below has been reviewed and visualized by me independently. Findings as detailed in report below>    < from: CT Chest No Cont (01.10.24 @ 13:24) >  IMPRESSION:  Status post right lower quadrant renal transplant.  Small loculated fluid measuring 3.2 x 1.8 cm in the right lower quadrant   transverse abdominis muscle and trace amount of hyperdensity around the   transplant kidney, suggestive of small residual hematoma. Superimposed   infection cannot be ruled out.    < end of copied text >   Follow Up:  CMV    Interval History: afebrile. no acute events.     REVIEW OF SYSTEMS  [  ] ROS unobtainable because:    [  x] All other systems negative except as noted below    Constitutional:  [ ] fever [ ] chills  [ ] weight loss  [ ] weakness  Skin:  [ ] rash [ ] phlebitis	  Eyes: [ ] icterus [ ] pain  [ ] discharge	  ENMT: [ ] sore throat  [ ] thrush [ ] ulcers [ ] exudates  Respiratory: [ ] dyspnea [ ] hemoptysis [ ] cough [ ] sputum	  Cardiovascular:  [ ] chest pain [ ] palpitations [ ] edema	  Gastrointestinal:  [ ] nausea [ ] vomiting [ ] diarrhea [ ] constipation [ ] pain	  Genitourinary:  [ ] dysuria [ ] frequency [ ] hematuria [ ] discharge [ ] flank pain  [ ] incontinence  Musculoskeletal:  [ ] myalgias [ ] arthralgias [ ] arthritis  [ ] back pain  Neurological:  [ ] headache [ ] seizures  [ ] confusion/altered mental status    Allergies  topical cleanser for dialysis access.   Exsept (Rash)  No Known Drug Allergies        ANTIMICROBIALS:  trimethoprim   80 mG/sulfamethoxazole 400 mG 1 daily  valGANciclovir 900 every 12 hours      OTHER MEDS:  MEDICATIONS  (STANDING):  acetaminophen     Tablet .. 650 every 6 hours PRN  bisacodyl 5 at bedtime  NIFEdipine XL 90 daily  pantoprazole    Tablet 40 before breakfast  polyethylene glycol 3350 17 daily  predniSONE   Tablet 5 daily  senna 2 at bedtime  tacrolimus ER Tablet (ENVARSUS XR) 4 daily      Vital Signs Last 24 Hrs  T(C): 36.6 (12 Jan 2024 04:35), Max: 38.1 (11 Jan 2024 21:01)  T(F): 97.9 (12 Jan 2024 04:35), Max: 100.5 (11 Jan 2024 21:01)  HR: 80 (12 Jan 2024 06:07) (68 - 80)  BP: 120/81 (12 Jan 2024 06:07) (117/74 - 120/81)  BP(mean): --  RR: 18 (12 Jan 2024 04:35) (18 - 18)  SpO2: 100% (12 Jan 2024 04:35) (100% - 100%)    Parameters below as of 12 Jan 2024 04:35  Patient On (Oxygen Delivery Method): room air    PHYSICAL EXAMINATION:  General: Alert and Awake, NAD  HEENT: Normocephalic / Atraumatic  Resp: No accessory muscles of respiration utilized  Abdomen: Not distended.  MSK: No LE edema.   : No underwood  Skin: No rashes or lesions.    Neuro: Alert and Awake. CN 2-12 Grossly intact. Moves all four extremities spontaneously.  Psych: Calm, Pleasant, Cooperative                          13.1   3.53  )-----------( 213      ( 12 Jan 2024 10:12 )             43.3       01-12    132<L>  |  100  |  16  ----------------------------<  178<H>  3.8   |  21<L>  |  1.71<H>    Ca    9.1      12 Jan 2024 10:12  Phos  1.6     01-12        Urinalysis Basic - ( 12 Jan 2024 10:12 )    Color: x / Appearance: x / SG: x / pH: x  Gluc: 178 mg/dL / Ketone: x  / Bili: x / Urobili: x   Blood: x / Protein: x / Nitrite: x   Leuk Esterase: x / RBC: x / WBC x   Sq Epi: x / Non Sq Epi: x / Bacteria: x        MICROBIOLOGY:  v  Abdominal Fl Abdominal Fluid  01-11-24 --  --    No polymorphonuclear cells seen per low power field  No organisms seen per oil power field      Clean Catch Clean Catch (Midstream)  01-08-24   <10,000 CFU/mL Normal Urogenital Marci  --  --      .Blood Blood-Peripheral  01-07-24   No growth at 4 days  --  --      .Blood Blood-Peripheral  01-07-24   No growth at 4 days  --  --          CMVPCR Log: 3.81 Tca85EQ/mL (01-10 @ 14:54)  CMVPCR Log: 3.99 Dza74IG/mL (01-09 @ 14:21)  Rapid RVP Result: NotDetec (01-07 @ 23:42)        RADIOLOGY:    <The imaging below has been reviewed and visualized by me independently. Findings as detailed in report below>    < from: CT Chest No Cont (01.10.24 @ 13:24) >  IMPRESSION:  Status post right lower quadrant renal transplant.  Small loculated fluid measuring 3.2 x 1.8 cm in the right lower quadrant   transverse abdominis muscle and trace amount of hyperdensity around the   transplant kidney, suggestive of small residual hematoma. Superimposed   infection cannot be ruled out.    < end of copied text >

## 2024-01-12 NOTE — PROGRESS NOTE ADULT - SUBJECTIVE AND OBJECTIVE BOX
Interventional Radiology Follow-Up Note    This is a 38y Male s/p RLQ Abdominal fluid aspiration on 1/11 in Interventional Radiology with JOAQUIN Ochoa.     S: Patient seen and examined @ bedside. No complaints offered.     Medication:  heparin   Injectable: (01-10)  NIFEdipine XL: (01-12)  trimethoprim   80 mG/sulfamethoxazole 400 mG: (01-12)  valGANciclovir: (01-12)    Vitals:   T(F): 98.2, Max: 100.5 (21:01)  HR: 81  BP: 128/74  RR: 19  SpO2: 100%    Physical Exam:  General: Nontoxic, in NAD, A&O x3.  Abdomen: soft, NTND, no peritoneal signs.  Skin: RLQ dressing c/d/i, soft, nontender, no hematoma     LABS:  WBC 3.53 / Hgb 13.1 / Hct 43.3 / Plt 213  Na 132 / K 3.8 / CO2 21 / Cl 100 / BUN 16 / Cr 1.71 / Glucose 178    Assessment/Plan:  38y Male admitted with Fever due to unspecified condition    Pt most recently s/p RLQ fluid aspiration.     -continue global management per primary team  -trend vs/labs  -site stable, soft, nontender, no hematoma   -f/u culture result  -no further intervention by IR  -will sign off, please reconsult as necessary    Please call IR at extension [phone] with any questions, concerns, or issues regarding above.     Interventional Radiology Follow-Up Note    This is a 38y Male s/p RLQ Abdominal fluid aspiration on 1/11 in Interventional Radiology with JOAQUIN Ochoa.     S: Patient seen and examined @ bedside. No complaints offered.     Medication:  heparin   Injectable: (01-10)  NIFEdipine XL: (01-12)  trimethoprim   80 mG/sulfamethoxazole 400 mG: (01-12)  valGANciclovir: (01-12)    Vitals:   T(F): 98.2, Max: 100.5 (21:01)  HR: 81  BP: 128/74  RR: 19  SpO2: 100%    Physical Exam:  General: Nontoxic, in NAD, A&O x3.  Abdomen: soft, NTND, no peritoneal signs.  Skin: RLQ dressing c/d/i, soft, nontender, no hematoma     LABS:  WBC 3.53 / Hgb 13.1 / Hct 43.3 / Plt 213  Na 132 / K 3.8 / CO2 21 / Cl 100 / BUN 16 / Cr 1.71 / Glucose 178    Assessment/Plan:  38y Male admitted with Fever due to unspecified condition    Pt most recently s/p RLQ fluid aspiration.     -continue global management per primary team  -trend vs/labs  -site stable, soft, nontender, no hematoma   -f/u culture result  -no further intervention by IR  -will sign off, please reconsult as necessary    Please call IR at extension [phone] with any questions, concerns, or issues regarding above.

## 2024-01-12 NOTE — PROGRESS NOTE ADULT - PROBLEM SELECTOR PLAN 4
c/w prednisone 5mg daily  c/w Tacrolimus 4mg daily  hold Cellcept pending infectious work up   tx renal recs appreciated
- c/w home Nifedipine 90 mg with hold parameters
- c/w home Nifedipine 90 mg with hold parameters
c/w prednisone 5mg daily  c/w Tacrolimus 4mg daily  hold Cellcept pending infectious work up   tx renal recs appreciated

## 2024-01-12 NOTE — PROGRESS NOTE ADULT - PROBLEM SELECTOR PLAN 3
pre HIE review Scr 1.68 (12/23)- improved to 1.88 today  - U/A neg  - transplant nephrology following  -resolved s/p IVF  likely pre renal from dehydration
pre HIE review Scr 1.68 (12/23)- now 2.16lstart IVF, tx kidney renal doppler WNL; d/w Dr. Odom  - U/A neg  - transplant nephrology following
cipro drops to eyes started  given CMV viremia will ask optho for eval to r/o keratitis
cipro drops to eyes started  given CMV viremia will ask optho for eval to r/o keratitis

## 2024-01-12 NOTE — PROGRESS NOTE ADULT - SUBJECTIVE AND OBJECTIVE BOX
[facility] DIVISION OF KIDNEY DISEASES AND HYPERTENSION -- FOLLOW UP NOTE  --------------------------------------------------------------------------------  Chief Complaint:    24 hour events/subjective:  Patient was seen and examined at bedside  feels much better       PAST HISTORY  --------------------------------------------------------------------------------  No significant changes to PMH, PSH, FHx, SHx, unless otherwise noted    ALLERGIES & MEDICATIONS  --------------------------------------------------------------------------------  Allergies    topical cleanser for dialysis access.   Exsept (Rash)  No Known Drug Allergies    Intolerances      Standing Inpatient Medications  bisacodyl 5 milliGRAM(s) Oral at bedtime  ciprofloxacin  0.3% Ophthalmic Solution 1 Drop(s) Both EYES four times a day  NIFEdipine XL 90 milliGRAM(s) Oral daily  pantoprazole    Tablet 40 milliGRAM(s) Oral before breakfast  polyethylene glycol 3350 17 Gram(s) Oral daily  predniSONE   Tablet 5 milliGRAM(s) Oral daily  senna 2 Tablet(s) Oral at bedtime  sodium chloride 0.9%. 1000 milliLiter(s) IV Continuous <Continuous>  sodium phosphate 30 milliMole(s)/500 mL IVPB 30 milliMole(s) IV Intermittent once  tacrolimus ER Tablet (ENVARSUS XR) 4 milliGRAM(s) Oral daily  trimethoprim   80 mG/sulfamethoxazole 400 mG 1 Tablet(s) Oral daily  valGANciclovir 900 milliGRAM(s) Oral every 12 hours    PRN Inpatient Medications  acetaminophen     Tablet .. 650 milliGRAM(s) Oral every 6 hours PRN      REVIEW OF SYSTEMS  --------------------------------------------------------------------------------  Gen: No fatigue, fevers/chills, weakness  Skin: No rashes  Head/Eyes/Ears/Mouth: No headache;No sore throat  Respiratory: No dyspnea, cough,   CV: No chest pain, PND, orthopnea  GI: No abdominal pain, diarrhea, constipation, nausea, vomiting  Transplant: No pain  : No increased frequency, dysuria, hematuria, nocturia  MSK: No joint pain/swelling; no back pain; no edema  Neuro: No dizziness/lightheadedness, weakness, seizures, numbness, tingling  Psych: No significant nervousness, anxiety, stress, depression    All other systems were reviewed and are negative, except as noted.    VITALS/PHYSICAL EXAM  --------------------------------------------------------------------------------  T(C): 36.8 (01-12-24 @ 11:22), Max: 38.1 (01-11-24 @ 21:01)  HR: 81 (01-12-24 @ 11:22) (68 - 81)  BP: 128/74 (01-12-24 @ 11:22) (117/74 - 128/74)  RR: 19 (01-12-24 @ 11:22) (18 - 19)  SpO2: 100% (01-12-24 @ 11:22) (100% - 100%)  Wt(kg): --        01-11-24 @ 07:01  -  01-12-24 @ 07:00  --------------------------------------------------------  IN: 360 mL / OUT: 0 mL / NET: 360 mL      Physical Exam:  	Gen: NAD  	HEENT: PERRL, supple neck, clear oropharynx  	Pulm: CTA B/L  	CV: RRR, S1S2; no rub  	Back: No spinal or CVA tenderness; no sacral edema  	Abd: +BS, soft, nontender/nondistended                      Transplant: No tenderness, swelling  	: No suprapubic tenderness  	UE: Warm, FROM; no edema; no asterixis  	LE: Warm, FROM; no edema  	Neuro: No focal deficits  	Psych: Normal affect and mood  	Skin: Warm, without rashes      LABS/STUDIES  --------------------------------------------------------------------------------              13.1   3.53  >-----------<  213      [01-12-24 @ 10:12]              43.3     132  |  100  |  16  ----------------------------<  178      [01-12-24 @ 10:12]  3.8   |  21  |  1.71        Ca     9.1     [01-12-24 @ 10:12]      Phos  1.6     [01-12-24 @ 10:12]      PT/INR: PT 10.5 , INR 0.95       [01-11-24 @ 07:09]  PTT: 33.7       [01-11-24 @ 07:09]      Creatinine Trend:  SCr 1.71 [01-12 @ 10:12]  SCr 1.67 [01-11 @ 07:05]  SCr 1.88 [01-10 @ 07:28]  SCr 2.16 [01-09 @ 07:08]  SCr 2.01 [01-08 @ 09:38]    Tacrolimus (), Serum: 8.9 ng/mL (01-12 @ 07:09)  Tacrolimus (), Serum: 7.4 ng/mL (01-11 @ 07:38)  Tacrolimus (), Serum: 5.7 ng/mL (01-07 @ 23:41)          Urinalysis - [01-12-24 @ 10:12]      Color  / Appearance  / SG  / pH       Gluc 178 / Ketone   / Bili  / Urobili        Blood  / Protein  / Leuk Est  / Nitrite       RBC  / WBC  / Hyaline  / Gran  / Sq Epi  / Non Sq Epi  / Bacteria     Urine Creatinine 114      [01-10-24 @ 19:19]  Urine Sodium 68      [01-10-24 @ 19:19]           [facility] DIVISION OF KIDNEY DISEASES AND HYPERTENSION -- FOLLOW UP NOTE  --------------------------------------------------------------------------------  Chief Complaint:    24 hour events/subjective:  Patient was seen and examined at bedside  feels much better       PAST HISTORY  --------------------------------------------------------------------------------  No significant changes to PMH, PSH, FHx, SHx, unless otherwise noted    ALLERGIES & MEDICATIONS  --------------------------------------------------------------------------------  Allergies    topical cleanser for dialysis access.   Exsept (Rash)  No Known Drug Allergies    Intolerances      Standing Inpatient Medications  bisacodyl 5 milliGRAM(s) Oral at bedtime  ciprofloxacin  0.3% Ophthalmic Solution 1 Drop(s) Both EYES four times a day  NIFEdipine XL 90 milliGRAM(s) Oral daily  pantoprazole    Tablet 40 milliGRAM(s) Oral before breakfast  polyethylene glycol 3350 17 Gram(s) Oral daily  predniSONE   Tablet 5 milliGRAM(s) Oral daily  senna 2 Tablet(s) Oral at bedtime  sodium chloride 0.9%. 1000 milliLiter(s) IV Continuous <Continuous>  sodium phosphate 30 milliMole(s)/500 mL IVPB 30 milliMole(s) IV Intermittent once  tacrolimus ER Tablet (ENVARSUS XR) 4 milliGRAM(s) Oral daily  trimethoprim   80 mG/sulfamethoxazole 400 mG 1 Tablet(s) Oral daily  valGANciclovir 900 milliGRAM(s) Oral every 12 hours    PRN Inpatient Medications  acetaminophen     Tablet .. 650 milliGRAM(s) Oral every 6 hours PRN      REVIEW OF SYSTEMS  --------------------------------------------------------------------------------  Gen: No fatigue, fevers/chills, weakness  Skin: No rashes  Head/Eyes/Ears/Mouth: No headache;No sore throat  Respiratory: No dyspnea, cough,   CV: No chest pain, PND, orthopnea  GI: No abdominal pain, diarrhea, constipation, nausea, vomiting  Transplant: No pain  : No increased frequency, dysuria, hematuria, nocturia  MSK: No joint pain/swelling; no back pain; no edema  Neuro: No dizziness/lightheadedness, weakness, seizures, numbness, tingling  Psych: No significant nervousness, anxiety, stress, depression    All other systems were reviewed and are negative, except as noted.    VITALS/PHYSICAL EXAM  --------------------------------------------------------------------------------  T(C): 36.8 (01-12-24 @ 11:22), Max: 38.1 (01-11-24 @ 21:01)  HR: 81 (01-12-24 @ 11:22) (68 - 81)  BP: 128/74 (01-12-24 @ 11:22) (117/74 - 128/74)  RR: 19 (01-12-24 @ 11:22) (18 - 19)  SpO2: 100% (01-12-24 @ 11:22) (100% - 100%)  Wt(kg): --        01-11-24 @ 07:01  -  01-12-24 @ 07:00  --------------------------------------------------------  IN: 360 mL / OUT: 0 mL / NET: 360 mL      Physical Exam:  	Gen: NAD  	HEENT: PERRL, supple neck, clear oropharynx  	Pulm: CTA B/L  	CV: RRR, S1S2; no rub  	Back: No spinal or CVA tenderness; no sacral edema  	Abd: +BS, soft, nontender/nondistended                      Transplant: No tenderness, swelling  	: No suprapubic tenderness  	UE: Warm, FROM; no edema; no asterixis  	LE: Warm, FROM; no edema  	Neuro: No focal deficits  	Psych: Normal affect and mood  	Skin: Warm, without rashes      LABS/STUDIES  --------------------------------------------------------------------------------              13.1   3.53  >-----------<  213      [01-12-24 @ 10:12]              43.3     132  |  100  |  16  ----------------------------<  178      [01-12-24 @ 10:12]  3.8   |  21  |  1.71        Ca     9.1     [01-12-24 @ 10:12]      Phos  1.6     [01-12-24 @ 10:12]      PT/INR: PT 10.5 , INR 0.95       [01-11-24 @ 07:09]  PTT: 33.7       [01-11-24 @ 07:09]      Creatinine Trend:  SCr 1.71 [01-12 @ 10:12]  SCr 1.67 [01-11 @ 07:05]  SCr 1.88 [01-10 @ 07:28]  SCr 2.16 [01-09 @ 07:08]  SCr 2.01 [01-08 @ 09:38]    Tacrolimus (), Serum: 8.9 ng/mL (01-12 @ 07:09)  Tacrolimus (), Serum: 7.4 ng/mL (01-11 @ 07:38)  Tacrolimus (), Serum: 5.7 ng/mL (01-07 @ 23:41)          Urinalysis - [01-12-24 @ 10:12]      Color  / Appearance  / SG  / pH       Gluc 178 / Ketone   / Bili  / Urobili        Blood  / Protein  / Leuk Est  / Nitrite       RBC  / WBC  / Hyaline  / Gran  / Sq Epi  / Non Sq Epi  / Bacteria     Urine Creatinine 114      [01-10-24 @ 19:19]  Urine Sodium 68      [01-10-24 @ 19:19]

## 2024-01-12 NOTE — PROGRESS NOTE ADULT - PROBLEM SELECTOR PLAN 1
WBC: 2.64, HR 97 in immunocompromised patient, unclear etiology poss dental? s/p zosyn x1, CXR neg, RVP neg, is not neutropenic (mildly leukopenia)   - c/w cefepime 2 g BID for now pending ID recs   - BCX; UCX NGTD  - Dental consulted by ED f/u rec do not think it is dental infection  - Transplant ID consulted  - check adenovirus pcr; BK TYREE virus dnr; CMV pcr
WBC: 2.64, HR 97 in immunocompromised patient, unclear etiology poss dental? s/p zosyn x1, CXR neg, RVP neg, is not neutropenic (mildly leukopenia)   - c/w cefepime 2 g BID for now pending ID recs   - BCX; UCX NGTD  - Dental consulted by ED f/u rec do not think it is dental infection  - Transplant ID consulted appreciate recs  - ; BK TYREE virus dnr; CMV pcr pending  -CT chest/abd/pelvis pending  - b/l conjunctivitis Cipro eye drops started
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 Prather
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 Prather

## 2024-01-12 NOTE — CONSULT NOTE ADULT - SUBJECTIVE AND OBJECTIVE BOX
[facility] DEPARTMENT OF OPHTHALMOLOGY - INITIAL ADULT CONSULT  -----------------------------------------------------------------------------------------------------------------  Juan Turner MD  PGY 3  Contact on Teams  -----------------------------------------------------------------------------------------------------------------    HPI:   38 y.o male with PMHx of HTN, ESRD s/p 7/2022 presenting intermittent high fevers since 5 days  Tmax 101.6. Patient was seen at urgent care Friday not given antibiotics? fever subsided somniferously without antipyretics, subsequently fever reoccurred yesterday prompting patient to come to the hospital. His only other complaint besides the fever is tooth pain. States he has dental work done for a broken tooth recently, he denies any tooth/ gun drainage. He denies any URI symptoms, D/N/V, sick contacts, dysuria, abd pain and recent travel.     In the ED s/p zosyn and dental consulted  (08 Jan 2024 09:48)    Interval History: Ophtho consulted for conjunctivitis. Patient declined changes in vision  but reports chronic drooping of left lid and supraduction defect in left eye.    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 1000 milliLiter(s) (75 mL/Hr) IV Continuous <Continuous>  tacrolimus ER Tablet (ENVARSUS XR) 4 milliGRAM(s) Oral daily  trimethoprim   80 mG/sulfamethoxazole 400 mG 1 Tablet(s) Oral daily  valGANciclovir 900 milliGRAM(s) Oral every 12 hours    MEDICATIONS  (PRN):  acetaminophen     Tablet .. 650 milliGRAM(s) Oral every 6 hours PRN Temp greater or equal to 38C (100.4F), Mild Pain (1 - 3)    Allergies & Intolerances:   topical cleanser for dialysis access.   Exsept (Rash)  No Known Drug Allergies    Review of Systems:  Constitutional: No fever, chills  Eyes: No blurry vision, flashes, floaters, FBS, erythema, discharge, double vision, OU  Neuro: No tremors  Cardiovascular: No chest pain, palpitations  Respiratory: No SOB, no cough  GI: No nausea, vomiting, abdominal pain  : No dysuria  Skin: no rash  Psych: no depression  Endocrine: no polyuria, polydipsia  Heme/lymph: no swelling    VITALS: T(C): 36.8 (01-12-24 @ 11:22)  T(F): 98.2 (01-12-24 @ 11:22), Max: 100.5 (01-11-24 @ 21:01)  HR: 81 (01-12-24 @ 11:22) (68 - 81)  BP: 128/74 (01-12-24 @ 11:22) (117/74 - 128/74)  RR:  (18 - 19)  SpO2:  (100% - 100%)  Wt(kg): --  General: AAO x 3, appropriate mood and affect    Ophthalmology Exam:  Visual acuity (cc): 20/20 OD 20/20OS  Pupils: PERRL OU, no APD  Ttono: 16 OU  Extraocular movements (EOMs): Full OD; -2-3 Supraduction defect OS;  no pain, no diplopia  Confrontational Visual Field (CVF): Full OU    Pen Light Exam (PLE)  External: Flat OU  Lids/Lashes/Lacrimal Ducts: Flat OD ptosis OS (Chronic since birth)  Sclera/Conjunctiva: W+Q OU; NO injection OU  Cornea: Cl OU  Anterior Chamber: D+F OU    Iris: Flat OU  Lens: Cl OU    Fundus Exam: dilated with 1% tropicamide and 2.5% phenylephrine  Approval obtained from primary team for dilation  Patient aware that pupils can remained dilated for at least 4-6 hours  Exam performed with 20D lens    Vitreous: wnl OU  Disc, cup/disc: sharp and pink, 0.35 OU  Macula: wnl OU  Vessels: wnl OU  Periphery: wnl OU       [facility] DEPARTMENT OF OPHTHALMOLOGY - INITIAL ADULT CONSULT  -----------------------------------------------------------------------------------------------------------------  Juan Turner MD  PGY 3  Contact on Teams  -----------------------------------------------------------------------------------------------------------------    HPI:   38 y.o male with PMHx of HTN, ESRD s/p 7/2022 presenting intermittent high fevers since 5 days  Tmax 101.6. Patient was seen at urgent care Friday not given antibiotics? fever subsided somniferously without antipyretics, subsequently fever reoccurred yesterday prompting patient to come to the hospital. His only other complaint besides the fever is tooth pain. States he has dental work done for a broken tooth recently, he denies any tooth/ gun drainage. He denies any URI symptoms, D/N/V, sick contacts, dysuria, abd pain and recent travel.     In the ED s/p zosyn and dental consulted  (08 Jan 2024 09:48)    Interval History: Ophtho consulted for conjunctivitis. Patient declined changes in vision  but reports chronic drooping of left lid and supraduction defect in left eye.    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 1000 milliLiter(s) (75 mL/Hr) IV Continuous <Continuous>  tacrolimus ER Tablet (ENVARSUS XR) 4 milliGRAM(s) Oral daily  trimethoprim   80 mG/sulfamethoxazole 400 mG 1 Tablet(s) Oral daily  valGANciclovir 900 milliGRAM(s) Oral every 12 hours    MEDICATIONS  (PRN):  acetaminophen     Tablet .. 650 milliGRAM(s) Oral every 6 hours PRN Temp greater or equal to 38C (100.4F), Mild Pain (1 - 3)    Allergies & Intolerances:   topical cleanser for dialysis access.   Exsept (Rash)  No Known Drug Allergies    Review of Systems:  Constitutional: No fever, chills  Eyes: No blurry vision, flashes, floaters, FBS, erythema, discharge, double vision, OU  Neuro: No tremors  Cardiovascular: No chest pain, palpitations  Respiratory: No SOB, no cough  GI: No nausea, vomiting, abdominal pain  : No dysuria  Skin: no rash  Psych: no depression  Endocrine: no polyuria, polydipsia  Heme/lymph: no swelling    VITALS: T(C): 36.8 (01-12-24 @ 11:22)  T(F): 98.2 (01-12-24 @ 11:22), Max: 100.5 (01-11-24 @ 21:01)  HR: 81 (01-12-24 @ 11:22) (68 - 81)  BP: 128/74 (01-12-24 @ 11:22) (117/74 - 128/74)  RR:  (18 - 19)  SpO2:  (100% - 100%)  Wt(kg): --  General: AAO x 3, appropriate mood and affect    Ophthalmology Exam:  Visual acuity (cc): 20/20 OD 20/20OS  Pupils: PERRL OU, no APD  Ttono: 16 OU  Extraocular movements (EOMs): Full OD; -2-3 Supraduction defect OS;  no pain, no diplopia  Confrontational Visual Field (CVF): Full OU    Pen Light Exam (PLE)  External: Flat OU  Lids/Lashes/Lacrimal Ducts: Flat OD ptosis OS (Chronic since birth)  Sclera/Conjunctiva: W+Q OU; NO injection OU  Cornea: Cl OU  Anterior Chamber: D+F OU    Iris: Flat OU  Lens: Cl OU    Fundus Exam: dilated with 1% tropicamide and 2.5% phenylephrine  Approval obtained from primary team for dilation  Patient aware that pupils can remained dilated for at least 4-6 hours  Exam performed with 20D lens    Vitreous: wnl OU  Disc, cup/disc: sharp and pink, 0.35 OU  Macula: wnl OU  Vessels: wnl OU  Periphery: wnl OU

## 2024-01-12 NOTE — CONSULT NOTE ADULT - CONSULT REASON
DDRT on immunosuppression
Fevers in a Renal Transplant Recipient
RLQ fluid aspiration
Conjunctivitis
Dental consulted to rule out odontogenic source of fever

## 2024-01-12 NOTE — PROGRESS NOTE ADULT - ASSESSMENT
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 s/p DDRT in 6/2022 - Allograft function with MIREILLE on admission , now resolved back to baseline Cr 1.6-1.7  2. Immunosuppression- decrease Envarsus to 3 mg ( goal 4-6) , Hold Cellcept ( CMV viremia) and continue prednisone 5 mg po daily   3. Fever, CMV Viremia- On Valcyte 900 mg po bid. ID is following. f/u repeat CMV VL  CT A/ P - Small loculated fluid measuring 3.2 x 1.8 cm in the right lower quadrant transverse abdominis muscle and trace amount of hyperdensity around the transplant kidney, suggestive of small residual hematoma. Superimposed infection cannot be ruled out. same hematoma present on CT A/P from 06/23/2022 .   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 s/p DDRT in 6/2022 - Allograft function with MIREILLE on admission , now resolved back to baseline Cr 1.6-1.7  2. Immunosuppression- decrease Envarsus to 3 mg ( goal 4-6) , Hold Cellcept ( CMV viremia) and continue prednisone 5 mg po daily   3. Fever, CMV Viremia- On Valcyte 900 mg po bid. ID is following. f/u repeat CMV VL  CT A/ P - Small loculated fluid measuring 3.2 x 1.8 cm in the right lower quadrant transverse abdominis muscle and trace amount of hyperdensity around the transplant kidney, suggestive of small residual hematoma. Superimposed infection cannot be ruled out. same hematoma present on CT A/P from 06/23/2022 .   S/P aspiration by IR  with fluid sent for gram stain and cultures - will follow

## 2024-01-12 NOTE — CONSULT NOTE ADULT - ASSESSMENT
Assessment and Recommendations:  38 y.o male with PMHx of HTN, ESRD s/p 7/2022 presenting with intermittent fevers with concern for CMV viremia consulted for conjunctivitis. Patient found to have normal eye exam with no concern for conjunctivitis at this time. 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    #Conjunctivitis    - Eyes not injected;    - VA 20/20 OU; No lid swelling, no hypopyon, normal Dilated fundus exam.   - No signs of vitritis    - Can stop ciprofloxacin drops   - Can start preservative free artificial tears 4x per day in both eyes.     Please reconsult if patient develops any acute eye changes       Seen and discussed with Dr. Mohamud    Outpatient Follow-up: Patient should follow-up with his/her ophthalmologist or with [facility] Department of Ophthalmology within 1 week of after discharge at:    [address]  [place], [place] [id number] [phone]    Juan Turner MD, PGY-3  Also available on Microsoft Teams     Assessment and Recommendations:  38 y.o male with PMHx of HTN, ESRD s/p 7/2022 presenting with intermittent fevers with concern for CMV viremia consulted for conjunctivitis. Patient found to have normal eye exam with no concern for conjunctivitis at this time. 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    #Conjunctivitis    - Eyes not injected;    - VA 20/20 OU; No lid swelling, no hypopyon, normal Dilated fundus exam.   - No signs of vitritis    - Can stop ciprofloxacin drops   - Can start preservative free artificial tears 4x per day in both eyes.     Please reconsult if patient develops any acute eye changes       Seen and discussed with Dr. Mohamud    Outpatient Follow-up: Patient should follow-up with his/her ophthalmologist or with [facility] Department of Ophthalmology within 1 week of after discharge at:    [address]  [place], [place] [id number] [phone]    Juan Turner MD, PGY-3  Also available on [employer] Teams

## 2024-01-12 NOTE — CONSULT NOTE ADULT - REASON FOR ADMISSION
fever in immunocompromised patient

## 2024-01-12 NOTE — PROGRESS NOTE ADULT - PROVIDER SPECIALTY LIST ADULT
Transplant ID
Intervent Radiology
Transplant ID
Transplant Nephrology
Transplant Nephrology
Hospitalist

## 2024-01-12 NOTE — PROGRESS NOTE ADULT - SUBJECTIVE AND OBJECTIVE BOX
Patient is a 38y old  Male who presents with a chief complaint of fever in immunocompromised patient (12 Jan 2024 11:36)      SUBJECTIVE / OVERNIGHT EVENTS:    feels well. no complaints. discharge from eyes more or less the same    ROS:  14 point ROS negative in detail except stated as above    MEDICATIONS  (STANDING):  bisacodyl 5 milliGRAM(s) Oral at bedtime  ciprofloxacin  0.3% Ophthalmic Solution 1 Drop(s) Both EYES four times a day  NIFEdipine XL 90 milliGRAM(s) Oral daily  pantoprazole    Tablet 40 milliGRAM(s) Oral before breakfast  polyethylene glycol 3350 17 Gram(s) Oral daily  predniSONE   Tablet 5 milliGRAM(s) Oral daily  senna 2 Tablet(s) Oral at bedtime  sodium chloride 0.9%. 1000 milliLiter(s) (75 mL/Hr) IV Continuous <Continuous>  sodium phosphate 30 milliMole(s)/500 mL IVPB 30 milliMole(s) IV Intermittent once  tacrolimus ER Tablet (ENVARSUS XR) 4 milliGRAM(s) Oral daily  trimethoprim   80 mG/sulfamethoxazole 400 mG 1 Tablet(s) Oral daily  valGANciclovir 900 milliGRAM(s) Oral every 12 hours    MEDICATIONS  (PRN):  acetaminophen     Tablet .. 650 milliGRAM(s) Oral every 6 hours PRN Temp greater or equal to 38C (100.4F), Mild Pain (1 - 3)      CAPILLARY BLOOD GLUCOSE        I&O's Summary    11 Jan 2024 07:01  -  12 Jan 2024 07:00  --------------------------------------------------------  IN: 360 mL / OUT: 0 mL / NET: 360 mL        PHYSICAL EXAM:  Vital Signs Last 24 Hrs  T(C): 36.8 (12 Jan 2024 11:22), Max: 38.1 (11 Jan 2024 21:01)  T(F): 98.2 (12 Jan 2024 11:22), Max: 100.5 (11 Jan 2024 21:01)  HR: 81 (12 Jan 2024 11:22) (68 - 81)  BP: 128/74 (12 Jan 2024 11:22) (117/74 - 128/74)  BP(mean): --  RR: 19 (12 Jan 2024 11:22) (18 - 19)  SpO2: 100% (12 Jan 2024 11:22) (100% - 100%)    Parameters below as of 12 Jan 2024 11:22  Patient On (Oxygen Delivery Method): room air    CONSTITUTIONAL: NAD, well-developed, well-groomed  EYES: PERRL purulent discharge from eyes   ENMT: Moist oral mucosa, no pharyngeal injection or exudates; normal dentition  NECK: Supple, no palpable masses; no thyromegaly  RESPIRATORY: Normal respiratory effort; lungs are clear to auscultation bilaterally  CARDIOVASCULAR: Regular rate and rhythm, normal S1 and S2, no murmur/rub/gallop; No lower extremity edema; Peripheral pulses are 2+ bilaterally  ABDOMEN: Nontender to palpation, normoactive bowel sounds, no rebound/guarding; No hepatosplenomegaly  MUSCULOSKELETAL: no clubbing or cyanosis of digits; no joint swelling or tenderness to palpation  PSYCH: A+O to person, place, and time; affect appropriate  NEUROLOGY: CN 2-12 are intact and symmetric; no gross sensory deficits   SKIN: No rashes; no palpable lesions      LABS:                        13.1   3.53  )-----------( 213      ( 12 Jan 2024 10:12 )             43.3     01-12    132<L>  |  100  |  16  ----------------------------<  178<H>  3.8   |  21<L>  |  1.71<H>    Ca    9.1      12 Jan 2024 10:12  Phos  1.6     01-12      PT/INR - ( 11 Jan 2024 07:09 )   PT: 10.5 sec;   INR: 0.95 ratio         PTT - ( 11 Jan 2024 07:09 )  PTT:33.7 sec      Urinalysis Basic - ( 12 Jan 2024 10:12 )    Color: x / Appearance: x / SG: x / pH: x  Gluc: 178 mg/dL / Ketone: x  / Bili: x / Urobili: x   Blood: x / Protein: x / Nitrite: x   Leuk Esterase: x / RBC: x / WBC x   Sq Epi: x / Non Sq Epi: x / Bacteria: x        RADIOLOGY & ADDITIONAL TESTS:    Imaging Personally Reviewed:    Consultant(s) Notes Reviewed:      Care Discussed with Consultants/Other Providers:  serena Chong

## 2024-01-12 NOTE — PROGRESS NOTE ADULT - ASSESSMENT
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  Office contact number is [phone]    CT A/P with abdominal wall hematoma  s/p IR aspiration of fluid collection  Would follow prelim aspiration cultures    Drainage from left eye  Noting prolonged course of eye drainage  Doubt CMV related  Can continue Cipro Ophtho drops  Consider Ophtho  evaluation    No ID contraindication to discharge if no Ophtho concerns and if no growth on aspiration cultures by tomorrow    I will continue to follow. Please feel free to contact me with any further questions.    Eb Prather M.D.  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  Office contact number is [phone]    CT A/P with abdominal wall hematoma  s/p IR aspiration of fluid collection  Would follow prelim aspiration cultures    Drainage from left eye  Noting prolonged course of eye drainage  Doubt CMV related  Can continue Cipro Ophtho drops  Consider Ophtho  evaluation    No ID contraindication to discharge if no Ophtho concerns and if no growth on aspiration cultures by tomorrow    I will continue to follow. Please feel free to contact me with any further questions.    Eb Prather M.D.  [facility] Division of Infectious Disease  8AM-5PM Monday - Friday: Available on [employer] Teams  After Hours and Holidays (or if no response on Microsoft Teams): Please contact the Infectious Diseases Office at [phone]    The above assessment and plan were discussed with Dr Peraza

## 2024-01-12 NOTE — PROGRESS NOTE ADULT - REASON FOR ADMISSION
fever in immunocompromised patient

## 2024-01-12 NOTE — PROGRESS NOTE ADULT - PROBLEM SELECTOR PLAN 5
pre HIE review Scr 1.68 (12/23)- improved to 1.88 today  - U/A neg  - transplant nephrology following  -resolved s/p IVF  likely pre renal from dehydration
pre HIE review Scr 1.68 (12/23)- improved to 1.88 today  - U/A neg  - transplant nephrology following  -resolved s/p IVF  likely pre renal from dehydration

## 2024-01-12 NOTE — PROGRESS NOTE ADULT - PROBLEM SELECTOR PROBLEM 1
SIRS (systemic inflammatory response syndrome)
CMV disease
SIRS (systemic inflammatory response syndrome)
CMV disease

## 2024-01-12 NOTE — PROGRESS NOTE ADULT - PROBLEM SELECTOR PLAN 2
c/w prednisone 5mg daily  c/w Tacrolimus 4mg daily  hold Cellcept pending infectious work up   tx renal recs appreciated
c/w prednisone 5mg daily  c/w Tacrolimus 4mg daily  hold Cellcept pending infectious work up   ED consulted transplant nephrology
CT abd 1/11-->Small loculated fluid measuring 3.2 x 1.8 cm in the right lower quadrant transverse abdominis muscle and trace amount of hyperdensity around the transplant kidney, suggestive of small residual hematoma.  same hematoma present on CT A/P from 06/23/2022  - s/p IR aspiration 1/11  - f/u cultures
CT abd 1/11-->Small loculated fluid measuring 3.2 x 1.8 cm in the right lower quadrant transverse abdominis muscle and trace amount of hyperdensity around the transplant kidney, suggestive of small residual hematoma.  same hematoma present on CT A/P from 06/23/2022  - s/p IR aspiration 1/11- thus far no growth on cultures

## 2024-01-12 NOTE — PROGRESS NOTE ADULT - PROBLEM SELECTOR PROBLEM 2
History of renal transplant
Abdominal fluid collection
History of renal transplant
Abdominal fluid collection

## 2024-01-12 NOTE — PROGRESS NOTE ADULT - NSPROGADDITIONALINFOA_GEN_ALL_CORE
d/w mom and aunt Jes plan above
d/w mom at bedside
anticipate DC tomorrow after optho clearance monitoring cultures
d/w mom plan of care at bedside

## 2024-01-13 ENCOUNTER — TRANSCRIPTION ENCOUNTER (OUTPATIENT)
Age: 39
End: 2024-01-13

## 2024-01-13 VITALS
OXYGEN SATURATION: 100 % | RESPIRATION RATE: 18 BRPM | DIASTOLIC BLOOD PRESSURE: 83 MMHG | HEART RATE: 96 BPM | TEMPERATURE: 98 F | SYSTOLIC BLOOD PRESSURE: 139 MMHG

## 2024-01-13 LAB
ANION GAP SERPL CALC-SCNC: 11 MMOL/L — SIGNIFICANT CHANGE UP (ref 5–17)
ANION GAP SERPL CALC-SCNC: 11 MMOL/L — SIGNIFICANT CHANGE UP (ref 5–17)
BKV DNA SPEC QL NAA+PROBE: SIGNIFICANT CHANGE UP
BUN SERPL-MCNC: 17 MG/DL — SIGNIFICANT CHANGE UP (ref 7–23)
BUN SERPL-MCNC: 17 MG/DL — SIGNIFICANT CHANGE UP (ref 7–23)
CALCIUM SERPL-MCNC: 8.8 MG/DL — SIGNIFICANT CHANGE UP (ref 8.4–10.5)
CALCIUM SERPL-MCNC: 8.8 MG/DL — SIGNIFICANT CHANGE UP (ref 8.4–10.5)
CHLORIDE SERPL-SCNC: 102 MMOL/L — SIGNIFICANT CHANGE UP (ref 96–108)
CHLORIDE SERPL-SCNC: 102 MMOL/L — SIGNIFICANT CHANGE UP (ref 96–108)
CO2 SERPL-SCNC: 21 MMOL/L — LOW (ref 22–31)
CO2 SERPL-SCNC: 21 MMOL/L — LOW (ref 22–31)
CREAT SERPL-MCNC: 1.72 MG/DL — HIGH (ref 0.5–1.3)
CREAT SERPL-MCNC: 1.72 MG/DL — HIGH (ref 0.5–1.3)
CULTURE RESULTS: SIGNIFICANT CHANGE UP
EGFR: 51 ML/MIN/1.73M2 — LOW
EGFR: 51 ML/MIN/1.73M2 — LOW
GLUCOSE SERPL-MCNC: 80 MG/DL — SIGNIFICANT CHANGE UP (ref 70–99)
GLUCOSE SERPL-MCNC: 80 MG/DL — SIGNIFICANT CHANGE UP (ref 70–99)
HCT VFR BLD CALC: 38.4 % — LOW (ref 39–50)
HCT VFR BLD CALC: 38.4 % — LOW (ref 39–50)
HGB BLD-MCNC: 11.9 G/DL — LOW (ref 13–17)
HGB BLD-MCNC: 11.9 G/DL — LOW (ref 13–17)
JCPYV DNA SPEC QL NAA+PROBE: SIGNIFICANT CHANGE UP
MCHC RBC-ENTMCNC: 22.5 PG — LOW (ref 27–34)
MCHC RBC-ENTMCNC: 22.5 PG — LOW (ref 27–34)
MCHC RBC-ENTMCNC: 31 GM/DL — LOW (ref 32–36)
MCHC RBC-ENTMCNC: 31 GM/DL — LOW (ref 32–36)
MCV RBC AUTO: 72.6 FL — LOW (ref 80–100)
MCV RBC AUTO: 72.6 FL — LOW (ref 80–100)
NRBC # BLD: 0 /100 WBCS — SIGNIFICANT CHANGE UP (ref 0–0)
NRBC # BLD: 0 /100 WBCS — SIGNIFICANT CHANGE UP (ref 0–0)
PHOSPHATE SERPL-MCNC: 3 MG/DL — SIGNIFICANT CHANGE UP (ref 2.5–4.5)
PHOSPHATE SERPL-MCNC: 3 MG/DL — SIGNIFICANT CHANGE UP (ref 2.5–4.5)
PLATELET # BLD AUTO: 198 K/UL — SIGNIFICANT CHANGE UP (ref 150–400)
PLATELET # BLD AUTO: 198 K/UL — SIGNIFICANT CHANGE UP (ref 150–400)
POTASSIUM SERPL-MCNC: 3.8 MMOL/L — SIGNIFICANT CHANGE UP (ref 3.5–5.3)
POTASSIUM SERPL-MCNC: 3.8 MMOL/L — SIGNIFICANT CHANGE UP (ref 3.5–5.3)
POTASSIUM SERPL-SCNC: 3.8 MMOL/L — SIGNIFICANT CHANGE UP (ref 3.5–5.3)
POTASSIUM SERPL-SCNC: 3.8 MMOL/L — SIGNIFICANT CHANGE UP (ref 3.5–5.3)
RBC # BLD: 5.29 M/UL — SIGNIFICANT CHANGE UP (ref 4.2–5.8)
RBC # BLD: 5.29 M/UL — SIGNIFICANT CHANGE UP (ref 4.2–5.8)
RBC # FLD: 14.6 % — HIGH (ref 10.3–14.5)
RBC # FLD: 14.6 % — HIGH (ref 10.3–14.5)
SODIUM SERPL-SCNC: 134 MMOL/L — LOW (ref 135–145)
SODIUM SERPL-SCNC: 134 MMOL/L — LOW (ref 135–145)
SPECIMEN SOURCE: SIGNIFICANT CHANGE UP
TACROLIMUS SERPL-MCNC: 9.4 NG/ML — SIGNIFICANT CHANGE UP
TACROLIMUS SERPL-MCNC: 9.4 NG/ML — SIGNIFICANT CHANGE UP
WBC # BLD: 4.06 K/UL — SIGNIFICANT CHANGE UP (ref 3.8–10.5)
WBC # BLD: 4.06 K/UL — SIGNIFICANT CHANGE UP (ref 3.8–10.5)
WBC # FLD AUTO: 4.06 K/UL — SIGNIFICANT CHANGE UP (ref 3.8–10.5)
WBC # FLD AUTO: 4.06 K/UL — SIGNIFICANT CHANGE UP (ref 3.8–10.5)

## 2024-01-13 PROCEDURE — 74176 CT ABD & PELVIS W/O CONTRAST: CPT

## 2024-01-13 PROCEDURE — 85014 HEMATOCRIT: CPT

## 2024-01-13 PROCEDURE — 81001 URINALYSIS AUTO W/SCOPE: CPT

## 2024-01-13 PROCEDURE — C1894: CPT

## 2024-01-13 PROCEDURE — 82803 BLOOD GASES ANY COMBINATION: CPT

## 2024-01-13 PROCEDURE — 87075 CULTR BACTERIA EXCEPT BLOOD: CPT

## 2024-01-13 PROCEDURE — 87205 SMEAR GRAM STAIN: CPT

## 2024-01-13 PROCEDURE — 85610 PROTHROMBIN TIME: CPT

## 2024-01-13 PROCEDURE — 86900 BLOOD TYPING SEROLOGIC ABO: CPT

## 2024-01-13 PROCEDURE — 10160 PNXR ASPIR ABSC HMTMA BULLA: CPT

## 2024-01-13 PROCEDURE — 85027 COMPLETE CBC AUTOMATED: CPT

## 2024-01-13 PROCEDURE — 71046 X-RAY EXAM CHEST 2 VIEWS: CPT

## 2024-01-13 PROCEDURE — 76942 ECHO GUIDE FOR BIOPSY: CPT

## 2024-01-13 PROCEDURE — 85018 HEMOGLOBIN: CPT

## 2024-01-13 PROCEDURE — 99285 EMERGENCY DEPT VISIT HI MDM: CPT

## 2024-01-13 PROCEDURE — 85025 COMPLETE CBC W/AUTO DIFF WBC: CPT

## 2024-01-13 PROCEDURE — 82947 ASSAY GLUCOSE BLOOD QUANT: CPT

## 2024-01-13 PROCEDURE — 96375 TX/PRO/DX INJ NEW DRUG ADDON: CPT

## 2024-01-13 PROCEDURE — 80053 COMPREHEN METABOLIC PANEL: CPT

## 2024-01-13 PROCEDURE — 87798 DETECT AGENT NOS DNA AMP: CPT

## 2024-01-13 PROCEDURE — 87799 DETECT AGENT NOS DNA QUANT: CPT

## 2024-01-13 PROCEDURE — 80197 ASSAY OF TACROLIMUS: CPT

## 2024-01-13 PROCEDURE — 84300 ASSAY OF URINE SODIUM: CPT

## 2024-01-13 PROCEDURE — 83735 ASSAY OF MAGNESIUM: CPT

## 2024-01-13 PROCEDURE — 83690 ASSAY OF LIPASE: CPT

## 2024-01-13 PROCEDURE — 84100 ASSAY OF PHOSPHORUS: CPT

## 2024-01-13 PROCEDURE — 85730 THROMBOPLASTIN TIME PARTIAL: CPT

## 2024-01-13 PROCEDURE — 96374 THER/PROPH/DIAG INJ IV PUSH: CPT

## 2024-01-13 PROCEDURE — 0225U NFCT DS DNA&RNA 21 SARSCOV2: CPT

## 2024-01-13 PROCEDURE — 86850 RBC ANTIBODY SCREEN: CPT

## 2024-01-13 PROCEDURE — 80048 BASIC METABOLIC PNL TOTAL CA: CPT

## 2024-01-13 PROCEDURE — 84132 ASSAY OF SERUM POTASSIUM: CPT

## 2024-01-13 PROCEDURE — 87040 BLOOD CULTURE FOR BACTERIA: CPT

## 2024-01-13 PROCEDURE — 86901 BLOOD TYPING SEROLOGIC RH(D): CPT

## 2024-01-13 PROCEDURE — 84295 ASSAY OF SERUM SODIUM: CPT

## 2024-01-13 PROCEDURE — 99239 HOSP IP/OBS DSCHRG MGMT >30: CPT

## 2024-01-13 PROCEDURE — 71250 CT THORAX DX C-: CPT

## 2024-01-13 PROCEDURE — 83605 ASSAY OF LACTIC ACID: CPT

## 2024-01-13 PROCEDURE — 36415 COLL VENOUS BLD VENIPUNCTURE: CPT

## 2024-01-13 PROCEDURE — 76776 US EXAM K TRANSPL W/DOPPLER: CPT

## 2024-01-13 PROCEDURE — 82435 ASSAY OF BLOOD CHLORIDE: CPT

## 2024-01-13 PROCEDURE — 82330 ASSAY OF CALCIUM: CPT

## 2024-01-13 PROCEDURE — 82570 ASSAY OF URINE CREATININE: CPT

## 2024-01-13 PROCEDURE — 87086 URINE CULTURE/COLONY COUNT: CPT

## 2024-01-13 PROCEDURE — 87070 CULTURE OTHR SPECIMN AEROBIC: CPT

## 2024-01-13 RX ORDER — PANTOPRAZOLE SODIUM 20 MG/1
1 TABLET, DELAYED RELEASE ORAL
Qty: 0 | Refills: 0 | DISCHARGE

## 2024-01-13 RX ORDER — ACETAMINOPHEN 500 MG
1 TABLET ORAL
Qty: 0 | Refills: 0 | DISCHARGE

## 2024-01-13 RX ADMIN — VALGANCICLOVIR 900 MILLIGRAM(S): 450 TABLET, FILM COATED ORAL at 06:52

## 2024-01-13 RX ADMIN — Medication 5 MILLIGRAM(S): at 06:52

## 2024-01-13 RX ADMIN — TACROLIMUS 4 MILLIGRAM(S): 5 CAPSULE ORAL at 06:52

## 2024-01-13 RX ADMIN — PANTOPRAZOLE SODIUM 40 MILLIGRAM(S): 20 TABLET, DELAYED RELEASE ORAL at 06:52

## 2024-01-13 RX ADMIN — Medication 1 DROP(S): at 06:52

## 2024-01-13 RX ADMIN — Medication 1 DROP(S): at 12:09

## 2024-01-13 RX ADMIN — Medication 1 TABLET(S): at 12:09

## 2024-01-13 RX ADMIN — Medication 90 MILLIGRAM(S): at 06:52

## 2024-01-13 NOTE — DISCHARGE NOTE PROVIDER - NSFOLLOWUPCLINICS_GEN_ALL_ED_FT
[facility]  Ophthalmology  [address] [place] [address] [address]  [place] [address]  Phone: [phone]  Fax:   Follow Up Time: 1 week     [facility]  Ophthalmology  [address] [place] [address] [address]  [place] [address]  Phone: [phone]  Fax:   Follow Up Time: 1 week

## 2024-01-13 NOTE — DISCHARGE NOTE PROVIDER - NSDCMRMEDTOKEN_GEN_ALL_CORE_FT
Pt right thumb red, swollen, warm to touch.  No drainage noted.     Harry Velazco RN  09/15/19 1821       Harry Velazco RN  09/15/19 1925     CellCept 500 mg oral tablet: 2 tab(s) orally 2 times a day  colace: 1 cap as needed  Envarsus XR 4 mg oral tablet, extended release: 1 tab(s) orally once a day Note: Pharmacy has no recent record  NIFEdipine (Eqv-Adalat CC) 90 mg oral tablet, extended release: 1 tab(s) orally once a day  ocular lubricant ophthalmic solution: 1 drop(s) to each affected eye 4 times a day  pantoprazole 40 mg oral delayed release tablet: 1 tab(s) orally as needed  predniSONE 5 mg oral tablet: 1 tab(s) orally once a day  refresh: as needed  sulfamethoxazole-trimethoprim 400 mg-80 mg oral tablet: 1 tab(s) orally once a day  Tylenol 325 mg oral tablet: 1 tab(s) orally as needed  valGANciclovir 450 mg oral tablet: 2 tab(s) orally every 12 hours   CellCept 500 mg oral tablet: 2 tab(s) orally 2 times a day  colace: 1 cap as needed  Envarsus XR 4 mg oral tablet, extended release: 1 tab(s) orally once a day Note: Pharmacy has no recent record  NIFEdipine (Eqv-Adalat CC) 90 mg oral tablet, extended release: 1 tab(s) orally once a day  ocular lubricant ophthalmic solution: 1 drop(s) to each affected eye 4 times a day  pantoprazole 40 mg oral delayed release tablet: 1 tab(s) orally once a day as needed  predniSONE 5 mg oral tablet: 1 tab(s) orally once a day  refresh: as needed  sulfamethoxazole-trimethoprim 400 mg-80 mg oral tablet: 1 tab(s) orally once a day  Tylenol 325 mg oral tablet: 1 tab(s) orally every 6 hours as needed  valGANciclovir 450 mg oral tablet: 2 tab(s) orally every 12 hours

## 2024-01-13 NOTE — DISCHARGE NOTE NURSING/CASE MANAGEMENT/SOCIAL WORK - NSDCPEFALRISK_GEN_ALL_CORE
For information on Fall & Injury Prevention, visit: [email or web address] OR  [email or web address] OR  https://www.cdc.gov/steadi/patient.html For information on Fall & Injury Prevention, visit: [email or web address] OR  [email or web address] OR  https://www.cdc.gov/steadi/patient.html

## 2024-01-13 NOTE — DISCHARGE NOTE PROVIDER - NSDCCPCAREPLAN_GEN_ALL_CORE_FT
PRINCIPAL DISCHARGE DIAGNOSIS  Diagnosis: Fever  Assessment and Plan of Treatment: Treated for fever secondary to sepsis due to CMV viremia 9840; Continue on Valcyte (1/10 - TBD).  Will repeat CMV level outpatient. Follow up with PCP.      SECONDARY DISCHARGE DIAGNOSES  Diagnosis: CMV disease  Assessment and Plan of Treatment: Treated for fever secondary to sepsis due to CMV viremia 9840; Continue on Valcyte (1/10 - TBD).  Will repeat CMV level outpatient. Follow up with PCP.    Diagnosis: Conjunctivitis  Assessment and Plan of Treatment: Treated with ciprofloxacin drops to eyes, now to continue on artificial tears. Seen by Opthalmology - follow up outpatient.      Diagnosis: Abdominal fluid collection  Assessment and Plan of Treatment: Abdominal fluid found on CT Scan of abdomen on  1/11/24. Small loculated fluid measuring 3.2 x 1.8 cm in the right lower quadrant transverse abdominis muscle and trace amount of hyperdensity around the transplant kidney, suggestive of small residual hematoma. The  same hematoma present on CT A/P from 06/23/2022.  Interventional Radiology performed aspiration 1/11/24 - thus far no growth on cultures.

## 2024-01-13 NOTE — DISCHARGE NOTE PROVIDER - HOSPITAL COURSE
Discharge Summary     Admit Date: 01-08-24  Discharge Date: 01-13-24    Admission diagnoses:   ***  Fever due to unspecified condition        Discharge diagnoses:   ***  CMV disease  Abdominal fluid collection  Conjunctivitis      Hospital Course:   For full details, please see H&P, progress notes, consult notes and ancillary notes. Briefly, BOUBACAR WRIGHT is a 38y Male with a history of HTN, ESRD s/p 7/2022 presenting intermittent high fevers since 5 days  Tmax 101.6. Patient was seen at urgent care Friday not given antibiotics? fever subsided somniferously without antipyretics, subsequently fever reoccurred yesterday prompting patient to come to the hospital. His only other complaint besides the fever is tooth pain. States he has dental work done for a broken tooth recently, he denies any tooth/ gun drainage. He denies any URI symptoms, D/N/V, sick contacts, dysuria, abd pain and recent travel.     In the ED s/p zosyn and dental consulted.     The patient's hospital course will be summarized in a problem based format.    # CMV disease:  Treated for fever secondary to sepsis due to CMV viremia 9840; Continue on Valcyte (1/10 - TBD).  Will repeat CMV level outpatient. Follow up with Dr. Prather.    # Abdominal fluid collection:   Abdominal fluid found on CT Scan of abdomen on  1/11/24. Small loculated fluid measuring 3.2 x 1.8 cm in the right lower quadrant transverse abdominis muscle and trace amount of hyperdensity around the transplant kidney, suggestive of small residual hematoma. The  same hematoma present on CT A/P from 06/23/2022.  Interventional Radiology performed aspiration 1/11/24 - thus far no growth on cultures.    # Conjunctivitis:  Treated with ciprofloxacin drops to eyes, now to continue on artificial tears. Seen by Opthalmology - follow up outpatient.        On day of discharge, patient is clinically stable with no new exam findings or acute symptoms compared to prior. The patient was seen by the attending physician on the date of discharge and deemed stable and acceptable for discharge. The patient's chronic medical conditions were treated accordingly per the patient's home medication regimen. The patient's medication reconciliation (with changes made to chronic medications), follow up appointments, discharge orders, instructions, and significant lab and diagnostic studies are as noted.     Discharge follow up action items:     1. Follow up with PCP in 1-2 weeks. Follow up with Ophthalmology.  2. Follow up labs, path, & imaging: repeat CMV level outpatient.  3. Medication changes ***continue with Valcyte  4. On hold medications ***none.     Patient's ordered code status: ***[ x] Full code [ ] DNR [ ] Hospice    Patient disposition: ***Home

## 2024-01-13 NOTE — DISCHARGE NOTE NURSING/CASE MANAGEMENT/SOCIAL WORK - PATIENT PORTAL LINK FT
You can access the FollowMyHealth Patient Portal offered by [facility] by registering at the following website: [email or web address]. By joining [employer]’s FollowMyHealth portal, you will also be able to view your health information using other applications (apps) compatible with our system. You can access the FollowMyHealth Patient Portal offered by [facility] by registering at the following website: [email or web address]. By joining [employer]’s FollowMyHealth portal, you will also be able to view your health information using other applications (apps) compatible with our system.

## 2024-01-13 NOTE — DISCHARGE NOTE PROVIDER - NSDCFUADDAPPT_GEN_ALL_CORE_FT
APPTS ARE READY TO BE MADE: [x] YES    Best Family or Patient Contact (if needed):    Additional Information about above appointments (if needed):    1: Ophthalmology  2:   3:     Other comments or requests:

## 2024-01-13 NOTE — DISCHARGE NOTE PROVIDER - NSDCFUSCHEDAPPT_GEN_ALL_CORE_FT
Saira Colindres  [facility]  [facility] [address] [place] [address]  Scheduled Appointment: 02/06/2024     Saira Colindres  [facility]  [facility] [address] [place] [address]  Scheduled Appointment: 02/06/2024

## 2024-01-18 NOTE — CHART NOTE - NSCHARTNOTEFT_GEN_A_CORE
Patient was outreached but did not answer. A voicemail was left for the patient to return our call.
Patient was outreached but did not answer. A voicemail was left for the patient to return our call.
feels a lot better. amenable to going home today. d/w Dr Prather opt f/u 1 week for bloodwork to assess CMV viremia. so far no growth on cultures from IR aspriation of right sided abdominal fluid collection. appreciate greatly opthal recs- plan for dc home today dc time 60 min.  d/w JOAQUIN Mota

## 2024-01-31 ENCOUNTER — APPOINTMENT (OUTPATIENT)
Dept: INFECTIOUS DISEASE | Facility: CLINIC | Age: 39
End: 2024-01-31
Payer: COMMERCIAL

## 2024-01-31 VITALS
TEMPERATURE: 98 F | DIASTOLIC BLOOD PRESSURE: 96 MMHG | HEIGHT: 71 IN | WEIGHT: 185 LBS | OXYGEN SATURATION: 100 % | HEART RATE: 100 BPM | SYSTOLIC BLOOD PRESSURE: 142 MMHG | BODY MASS INDEX: 25.9 KG/M2

## 2024-01-31 PROCEDURE — 99214 OFFICE O/P EST MOD 30 MIN: CPT

## 2024-02-01 NOTE — HISTORY OF PRESENT ILLNESS
[FreeTextEntry1] : 38-year-old male with PMHx of HTN, ESRD s/p DDRT in 6/2022 who presents to ID office for follow up. Admitted to [facility] 1/8/24 - 1/12/24 with fevers. Found to have CMV viremia. Started on Valcyte. Was also found to have CT A/P with abdominal wall hematoma. s/p IR aspiration of fluid collection. Aspiration cultures with no growth.  Patient notes compliance with Valcyte since his discharge. Denies chills or fevers. Denies N/V/D or abdominal pain.

## 2024-02-01 NOTE — ASSESSMENT
[FreeTextEntry1] : 38-year-old male with PMHx of HTN, ESRD s/p DDRT in 6/2022 who presents to ID office for follow up.   Admitted to [facility] 1/8/24 - 1/12/24 with fevers.  Found to have CMV viremia.  Started on Valcyte.   Was also found to have CT A/P with abdominal wall hematoma.  s/p IR aspiration of fluid collection.  Aspiration cultures with no growth.  Continue Valcyte 900 BID Check CBC, CMP, CMV PCR today Follow up to be guided by CMV PCR.

## 2024-02-01 NOTE — PHYSICAL EXAM
[General Appearance - Alert] : alert [General Appearance - In No Acute Distress] : in no acute distress [PERRL With Normal Accommodation] : pupils were equal in size, round, reactive to light [Sclera] : the sclera and conjunctiva were normal [Extraocular Movements] : extraocular movements were intact [Outer Ear] : the ears and nose were normal in appearance [Oropharynx] : the oropharynx was normal with no thrush [Neck Appearance] : the appearance of the neck was normal [Neck Cervical Mass (___cm)] : no neck mass was observed [Jugular Venous Distention Increased] : there was no jugular-venous distention [Thyroid Diffuse Enlargement] : the thyroid was not enlarged [Auscultation Breath Sounds / Voice Sounds] : lungs were clear to auscultation bilaterally [Heart Rate And Rhythm] : heart rate was normal and rhythm regular [Heart Sounds] : normal S1 and S2 [Heart Sounds Gallop] : no gallops [Murmurs] : no murmurs [Heart Sounds Pericardial Friction Rub] : no pericardial rub [Bowel Sounds] : normal bowel sounds [Edema] : there was no peripheral edema [Abdomen Soft] : soft [Abdomen Tenderness] : non-tender [] : no hepato-splenomegaly [Abdomen Mass (___ Cm)] : no abdominal mass palpated [No Palpable Adenopathy] : no palpable adenopathy [Musculoskeletal - Swelling] : no joint swelling [Nail Clubbing] : no clubbing  or cyanosis of the fingernails [Motor Tone] : muscle strength and tone were normal [Skin Lesions] : no skin lesions [Affect] : the affect was normal

## 2024-02-06 LAB
ALBUMIN SERPL ELPH-MCNC: 4.8 G/DL
ALP BLD-CCNC: 125 U/L
ALT SERPL-CCNC: 19 U/L
ANION GAP SERPL CALC-SCNC: 13 MMOL/L
AST SERPL-CCNC: 21 U/L
BILIRUB SERPL-MCNC: 0.4 MG/DL
BUN SERPL-MCNC: 13 MG/DL
CALCIUM SERPL-MCNC: 9.7 MG/DL
CHLORIDE SERPL-SCNC: 102 MMOL/L
CMV DNA SPEC QL NAA+PROBE: 816 IU/ML
CMVPCR LOG: 2.91 LOG10IU/ML
CO2 SERPL-SCNC: 23 MMOL/L
CREAT SERPL-MCNC: 1.46 MG/DL
EGFR: 62 ML/MIN/1.73M2
GLUCOSE SERPL-MCNC: 106 MG/DL
HCT VFR BLD CALC: 44.2 %
HGB BLD-MCNC: 12.8 G/DL
MCHC RBC-ENTMCNC: 22.3 PG
MCHC RBC-ENTMCNC: 29 GM/DL
MCV RBC AUTO: 77 FL
PLATELET # BLD AUTO: NORMAL K/UL
POTASSIUM SERPL-SCNC: 4.1 MMOL/L
PROT SERPL-MCNC: 7.6 G/DL
RBC # BLD: 5.74 M/UL
RBC # FLD: 17 %
SODIUM SERPL-SCNC: 138 MMOL/L
WBC # FLD AUTO: 4.98 K/UL

## 2024-02-14 ENCOUNTER — APPOINTMENT (OUTPATIENT)
Dept: NEPHROLOGY | Facility: CLINIC | Age: 39
End: 2024-02-14
Payer: MEDICARE

## 2024-02-14 VITALS
HEIGHT: 71 IN | TEMPERATURE: 97.7 F | SYSTOLIC BLOOD PRESSURE: 147 MMHG | HEART RATE: 91 BPM | OXYGEN SATURATION: 99 % | BODY MASS INDEX: 26.32 KG/M2 | WEIGHT: 188 LBS | DIASTOLIC BLOOD PRESSURE: 90 MMHG

## 2024-02-14 DIAGNOSIS — N18.9 CHRONIC KIDNEY DISEASE, UNSPECIFIED: ICD-10-CM

## 2024-02-14 DIAGNOSIS — Z79.899 OTHER LONG TERM (CURRENT) DRUG THERAPY: ICD-10-CM

## 2024-02-14 DIAGNOSIS — Z94.0 OTHER LONG TERM (CURRENT) DRUG THERAPY: ICD-10-CM

## 2024-02-14 DIAGNOSIS — D63.1 CHRONIC KIDNEY DISEASE, UNSPECIFIED: ICD-10-CM

## 2024-02-14 DIAGNOSIS — I10 ESSENTIAL (PRIMARY) HYPERTENSION: ICD-10-CM

## 2024-02-14 PROCEDURE — 99214 OFFICE O/P EST MOD 30 MIN: CPT

## 2024-02-14 RX ORDER — PANTOPRAZOLE SODIUM 40 MG/1
40 TABLET, DELAYED RELEASE ORAL DAILY
Qty: 30 | Refills: 3 | Status: DISCONTINUED | COMMUNITY
Start: 2022-07-20 | End: 2024-02-14

## 2024-02-14 RX ORDER — FAMOTIDINE 20 MG/1
20 TABLET, FILM COATED ORAL
Qty: 90 | Refills: 3 | Status: DISCONTINUED | COMMUNITY
Start: 2023-06-20 | End: 2024-02-14

## 2024-02-14 RX ORDER — SULFAMETHOXAZOLE AND TRIMETHOPRIM 400; 80 MG/1; MG/1
400-80 TABLET ORAL DAILY
Qty: 30 | Refills: 11 | Status: DISCONTINUED | COMMUNITY
Start: 2022-06-23 | End: 2024-02-14

## 2024-02-14 RX ORDER — NIFEDIPINE 90 MG/1
90 TABLET, EXTENDED RELEASE ORAL DAILY
Qty: 90 | Refills: 3 | Status: ACTIVE | COMMUNITY
Start: 2022-12-26 | End: 1900-01-01

## 2024-02-14 RX ORDER — DOCUSATE SODIUM 100 MG/1
100 CAPSULE ORAL TWICE DAILY
Qty: 60 | Refills: 1 | Status: DISCONTINUED | COMMUNITY
Start: 2022-07-20 | End: 2024-02-14

## 2024-02-14 NOTE — PLAN
[FreeTextEntry1] : 37 year old male with HTN , ESRD was on home HD since 2015 admitted s/p DDRT on 6/2/22 (1a,1v,1u- no stent) with Simulect induction on 6/22/22. Donor: 37 year old, KDPI 14%, COD: Drug Intoxication, Terminal Cr: 0.79, CIT 17 hrs. CMV +/-  1. s/p DDRT on 6/22/22- Allograft function is good with baseline Cr ~ 1.7 .  2. IS meds- Simulect induction. on Envarsus for goal 4-6, Cellcept 1 gm po bid and Prednisone 5 mg po daily .  3. HTN- controlled on  Nifedipine 30 mg po bid 4. CMV Viremia- on vlacyte 900 mg po bid. f/u CMV VL today . Has a f/u with ID     f/u with primary nephrologist, Dr Betito Dodson

## 2024-02-14 NOTE — HISTORY OF PRESENT ILLNESS
[FreeTextEntry1] : 37 year old male with HTN, Hyperthyroidism and ESRD on HD since 2015 s/p R DDRT on 6/22/22 (1A/1V/1U no stent) presents for a follow up visit.   Donor: 34 yo, KDPI 14%, COD: Drug Intoxication, Terminal Cr: 0.79, CIT 17hrs  CMV +/-  He was admitted to [facility] 1/8/24 - 1/12/24 with fevers. Found to have CMV viremia. Started on Valcyte. Was also found to have CT A/P with abdominal wall hematoma. s/p IR aspiration of fluid collection. Aspiration cultures with no growth.  Denies any fever, chills, dysuria, LE edema, cough, sob, chest pain , nausea, vomiting , diarrhea, constipation or any other active complaints.

## 2024-02-15 LAB
ALBUMIN SERPL ELPH-MCNC: 4.8 G/DL
ALP BLD-CCNC: 114 U/L
ALT SERPL-CCNC: 22 U/L
ANION GAP SERPL CALC-SCNC: 13 MMOL/L
APPEARANCE: CLEAR
AST SERPL-CCNC: 22 U/L
BACTERIA: NEGATIVE /HPF
BASOPHILS # BLD AUTO: 0.06 K/UL
BASOPHILS NFR BLD AUTO: 1.2 %
BILIRUB SERPL-MCNC: 0.4 MG/DL
BILIRUBIN URINE: NEGATIVE
BLOOD URINE: NEGATIVE
BUN SERPL-MCNC: 17 MG/DL
CALCIUM SERPL-MCNC: 10.1 MG/DL
CAST: 0 /LPF
CHLORIDE SERPL-SCNC: 103 MMOL/L
CMV DNA SPEC QL NAA+PROBE: 57 IU/ML
CMVPCR LOG: 1.76 LOG10IU/ML
CO2 SERPL-SCNC: 22 MMOL/L
COLOR: YELLOW
CREAT SERPL-MCNC: 1.6 MG/DL
CREAT SPEC-SCNC: 173 MG/DL
CREAT/PROT UR: 0.2 RATIO
EGFR: 56 ML/MIN/1.73M2
EOSINOPHIL # BLD AUTO: 0.06 K/UL
EOSINOPHIL NFR BLD AUTO: 1.2 %
EPITHELIAL CELLS: 0 /HPF
GLUCOSE QUALITATIVE U: NEGATIVE MG/DL
GLUCOSE SERPL-MCNC: 104 MG/DL
HCT VFR BLD CALC: 43 %
HGB BLD-MCNC: 12.7 G/DL
IMM GRANULOCYTES NFR BLD AUTO: 0.8 %
KETONES URINE: NEGATIVE MG/DL
LEUKOCYTE ESTERASE URINE: NEGATIVE
LYMPHOCYTES # BLD AUTO: 0.92 K/UL
LYMPHOCYTES NFR BLD AUTO: 18.4 %
MAGNESIUM SERPL-MCNC: 1.9 MG/DL
MAN DIFF?: NORMAL
MCHC RBC-ENTMCNC: 22.6 PG
MCHC RBC-ENTMCNC: 29.5 GM/DL
MCV RBC AUTO: 76.6 FL
MICROSCOPIC-UA: NORMAL
MONOCYTES # BLD AUTO: 0.29 K/UL
MONOCYTES NFR BLD AUTO: 5.8 %
NEUTROPHILS # BLD AUTO: 3.62 K/UL
NEUTROPHILS NFR BLD AUTO: 72.6 %
NITRITE URINE: NEGATIVE
PH URINE: 6
PHOSPHATE SERPL-MCNC: 2.5 MG/DL
PLATELET # BLD AUTO: 266 K/UL
POTASSIUM SERPL-SCNC: 3.8 MMOL/L
PROT SERPL-MCNC: 7.4 G/DL
PROT UR-MCNC: 38 MG/DL
PROTEIN URINE: 30 MG/DL
RBC # BLD: 5.61 M/UL
RBC # FLD: 16.4 %
RED BLOOD CELLS URINE: 1 /HPF
SODIUM SERPL-SCNC: 139 MMOL/L
SPECIFIC GRAVITY URINE: 1.02
TACROLIMUS SERPL-MCNC: 5.4 NG/ML
URATE SERPL-MCNC: 5 MG/DL
UROBILINOGEN URINE: 0.2 MG/DL
WBC # FLD AUTO: 4.99 K/UL
WHITE BLOOD CELLS URINE: 1 /HPF

## 2024-02-20 ENCOUNTER — APPOINTMENT (OUTPATIENT)
Dept: INFECTIOUS DISEASE | Facility: CLINIC | Age: 39
End: 2024-02-20
Payer: MEDICARE

## 2024-02-20 LAB — BKV DNA SPEC QL NAA+PROBE: NOT DETECTED IU/ML

## 2024-02-20 PROCEDURE — 99212 OFFICE O/P EST SF 10 MIN: CPT

## 2024-02-20 NOTE — ASSESSMENT
[FreeTextEntry1] : 38-year-old male with PMHx of HTN, ESRD s/p DDRT in 6/2022 who presents to ID office for follow up.   Admitted to [facility] 1/8/24 - 1/12/24 with fevers.  Found to have CMV viremia.  Started on Valcyte.   Was also found to have CT A/P with abdominal wall hematoma.  s/p IR aspiration of fluid collection.  Aspiration cultures with no growth.  Continue Valcyte 900 BID Check CBC, CMP, CMV PCR next week Approaching undetectable viral load Doubt need for secondary PPx as this viremia occurred outside of context of treatment of rejection and patient without history of preceding CMV viremia.

## 2024-02-20 NOTE — REASON FOR VISIT
[Home] : at home, [unfilled] , at the time of the visit. [Medical Office: ([facility])___] : at the medical office located in  [Patient] : the patient

## 2024-02-20 NOTE — HISTORY OF PRESENT ILLNESS
[FreeTextEntry1] : 38-year-old male with PMHx of HTN, ESRD s/p DDRT in 6/2022 who presents to ID office for follow up. Admitted to [facility] 1/8/24 - 1/12/24 with fevers. Found to have CMV viremia. Started on Valcyte. Was also found to have CT A/P with abdominal wall hematoma. s/p IR aspiration of fluid collection. Aspiration cultures with no growth.  Patient notes compliance with Valcyte since his discharge. Denies chills or fevers. Denies N/V/D or abdominal pain.

## 2024-02-20 NOTE — PHYSICAL EXAM
[General Appearance - Alert] : alert [General Appearance - In No Acute Distress] : in no acute distress [Sclera] : the sclera and conjunctiva were normal [Outer Ear] : the ears and nose were normal in appearance [Neck Appearance] : the appearance of the neck was normal [] : no respiratory distress [Skin Color & Pigmentation] : normal skin color and pigmentation [Affect] : the affect was normal

## 2024-02-26 LAB
ALBUMIN SERPL ELPH-MCNC: 4.8 G/DL
ALP BLD-CCNC: 114 U/L
ALT SERPL-CCNC: 23 U/L
ANION GAP SERPL CALC-SCNC: 12 MMOL/L
AST SERPL-CCNC: 22 U/L
BILIRUB SERPL-MCNC: 0.4 MG/DL
BUN SERPL-MCNC: 17 MG/DL
CALCIUM SERPL-MCNC: 10.1 MG/DL
CHLORIDE SERPL-SCNC: 104 MMOL/L
CMV DNA SPEC QL NAA+PROBE: 76 IU/ML
CMVPCR LOG: 1.88 LOG10IU/ML
CO2 SERPL-SCNC: 23 MMOL/L
CREAT SERPL-MCNC: 1.6 MG/DL
EGFR: 56 ML/MIN/1.73M2
GLUCOSE SERPL-MCNC: 104 MG/DL
HCT VFR BLD CALC: 41.8 %
HGB BLD-MCNC: 12.7 G/DL
MCHC RBC-ENTMCNC: 23.1 PG
MCHC RBC-ENTMCNC: 30.4 GM/DL
MCV RBC AUTO: 76 FL
PLATELET # BLD AUTO: 242 K/UL
POTASSIUM SERPL-SCNC: 3.7 MMOL/L
PROT SERPL-MCNC: 7.2 G/DL
RBC # BLD: 5.5 M/UL
RBC # FLD: 17.3 %
SODIUM SERPL-SCNC: 140 MMOL/L
WBC # FLD AUTO: 5.17 K/UL

## 2024-02-29 ENCOUNTER — APPOINTMENT (OUTPATIENT)
Dept: UROLOGY | Facility: CLINIC | Age: 39
End: 2024-02-29

## 2024-03-11 ENCOUNTER — NON-APPOINTMENT (OUTPATIENT)
Age: 39
End: 2024-03-11

## 2024-03-12 LAB
ALBUMIN SERPL ELPH-MCNC: 4.9 G/DL
ALP BLD-CCNC: 102 U/L
ALT SERPL-CCNC: 25 U/L
ANION GAP SERPL CALC-SCNC: 9 MMOL/L
AST SERPL-CCNC: 22 U/L
BASOPHILS # BLD AUTO: 0.07 K/UL
BASOPHILS NFR BLD AUTO: 1.3 %
BILIRUB SERPL-MCNC: 0.6 MG/DL
BUN SERPL-MCNC: 15 MG/DL
CALCIUM SERPL-MCNC: 10 MG/DL
CHLORIDE SERPL-SCNC: 103 MMOL/L
CMV DNA SPEC QL NAA+PROBE: NOT DETECTED IU/ML
CMVPCR LOG: NOT DETECTED LOG10IU/ML
CO2 SERPL-SCNC: 28 MMOL/L
CREAT SERPL-MCNC: 1.4 MG/DL
EGFR: 65 ML/MIN/1.73M2
EOSINOPHIL # BLD AUTO: 0.1 K/UL
EOSINOPHIL NFR BLD AUTO: 1.8 %
GLUCOSE SERPL-MCNC: 100 MG/DL
HCT VFR BLD CALC: 43.6 %
HGB BLD-MCNC: 13 G/DL
IMM GRANULOCYTES NFR BLD AUTO: 0.7 %
LYMPHOCYTES # BLD AUTO: 1.19 K/UL
LYMPHOCYTES NFR BLD AUTO: 21.5 %
MAN DIFF?: NORMAL
MCHC RBC-ENTMCNC: 23.6 PG
MCHC RBC-ENTMCNC: 29.8 GM/DL
MCV RBC AUTO: 79.1 FL
MONOCYTES # BLD AUTO: 0.32 K/UL
MONOCYTES NFR BLD AUTO: 5.8 %
NEUTROPHILS # BLD AUTO: 3.82 K/UL
NEUTROPHILS NFR BLD AUTO: 68.9 %
PLATELET # BLD AUTO: 241 K/UL
POTASSIUM SERPL-SCNC: 3.9 MMOL/L
PROT SERPL-MCNC: 7 G/DL
RBC # BLD: 5.51 M/UL
RBC # FLD: 17.2 %
SODIUM SERPL-SCNC: 140 MMOL/L
WBC # FLD AUTO: 5.54 K/UL

## 2024-03-18 ENCOUNTER — APPOINTMENT (OUTPATIENT)
Dept: TRANSPLANT | Facility: CLINIC | Age: 39
End: 2024-03-18

## 2024-03-27 ENCOUNTER — APPOINTMENT (OUTPATIENT)
Dept: TRANSPLANT | Facility: CLINIC | Age: 39
End: 2024-03-27

## 2024-03-27 LAB
ALBUMIN SERPL ELPH-MCNC: 4.5 G/DL
ALP BLD-CCNC: 108 U/L
ALT SERPL-CCNC: 53 U/L
ANION GAP SERPL CALC-SCNC: 13 MMOL/L
AST SERPL-CCNC: 33 U/L
BILIRUB SERPL-MCNC: 0.2 MG/DL
BUN SERPL-MCNC: 15 MG/DL
CALCIUM SERPL-MCNC: 9.5 MG/DL
CHLORIDE SERPL-SCNC: 103 MMOL/L
CO2 SERPL-SCNC: 25 MMOL/L
CREAT SERPL-MCNC: 1.46 MG/DL
EGFR: 62 ML/MIN/1.73M2
GLUCOSE SERPL-MCNC: 97 MG/DL
HCT VFR BLD CALC: 43.4 %
HGB BLD-MCNC: 13.2 G/DL
MCHC RBC-ENTMCNC: 24.6 PG
MCHC RBC-ENTMCNC: 30.4 GM/DL
MCV RBC AUTO: 81 FL
PLATELET # BLD AUTO: 177 K/UL
POTASSIUM SERPL-SCNC: 3.5 MMOL/L
PROT SERPL-MCNC: 6.7 G/DL
RBC # BLD: 5.36 M/UL
RBC # FLD: 15.9 %
SODIUM SERPL-SCNC: 141 MMOL/L
WBC # FLD AUTO: 3.12 K/UL

## 2024-03-29 LAB
CMV DNA SPEC QL NAA+PROBE: 1340 IU/ML
CMVPCR LOG: 3.13 LOG10IU/ML

## 2024-04-05 ENCOUNTER — APPOINTMENT (OUTPATIENT)
Dept: TRANSPLANT | Facility: CLINIC | Age: 39
End: 2024-04-05

## 2024-04-11 ENCOUNTER — NON-APPOINTMENT (OUTPATIENT)
Age: 39
End: 2024-04-11

## 2024-04-11 ENCOUNTER — APPOINTMENT (OUTPATIENT)
Dept: TRANSPLANT | Facility: CLINIC | Age: 39
End: 2024-04-11

## 2024-04-11 LAB
ALBUMIN SERPL ELPH-MCNC: 4.5 G/DL
ALP BLD-CCNC: 111 U/L
ALT SERPL-CCNC: 20 U/L
ANION GAP SERPL CALC-SCNC: 11 MMOL/L
APPEARANCE: CLEAR
AST SERPL-CCNC: 20 U/L
BACTERIA: NEGATIVE /HPF
BILIRUB SERPL-MCNC: 0.3 MG/DL
BILIRUBIN URINE: NEGATIVE
BLOOD URINE: NEGATIVE
BUN SERPL-MCNC: 18 MG/DL
CALCIUM SERPL-MCNC: 9.2 MG/DL
CAST: 0 /LPF
CHLORIDE SERPL-SCNC: 104 MMOL/L
CO2 SERPL-SCNC: 25 MMOL/L
COLOR: YELLOW
CREAT SERPL-MCNC: 1.52 MG/DL
EGFR: 59 ML/MIN/1.73M2
EPITHELIAL CELLS: 0 /HPF
GLUCOSE QUALITATIVE U: NEGATIVE MG/DL
GLUCOSE SERPL-MCNC: 93 MG/DL
HCT VFR BLD CALC: 44.5 %
HGB BLD-MCNC: 13.3 G/DL
KETONES URINE: NEGATIVE MG/DL
LEUKOCYTE ESTERASE URINE: NEGATIVE
MCHC RBC-ENTMCNC: 23.6 PG
MCHC RBC-ENTMCNC: 29.9 GM/DL
MCV RBC AUTO: 79 FL
MICROSCOPIC-UA: NORMAL
NITRITE URINE: NEGATIVE
PH URINE: 6.5
PLATELET # BLD AUTO: 227 K/UL
POTASSIUM SERPL-SCNC: 3.7 MMOL/L
PROT SERPL-MCNC: 6.6 G/DL
PROTEIN URINE: 30 MG/DL
RBC # BLD: 5.63 M/UL
RBC # FLD: 15.2 %
RED BLOOD CELLS URINE: 0 /HPF
SODIUM SERPL-SCNC: 139 MMOL/L
SPECIFIC GRAVITY URINE: 1.02
UROBILINOGEN URINE: 0.2 MG/DL
WBC # FLD AUTO: 4.62 K/UL
WHITE BLOOD CELLS URINE: 0 /HPF

## 2024-04-16 LAB
BACTERIA UR CULT: NORMAL
CMV DNA SPEC QL NAA+PROBE: 1310 IU/ML
CMVPCR LOG: 3.12 LOG10IU/ML

## 2024-04-22 NOTE — ED ADULT TRIAGE NOTE - CHIEF COMPLAINT QUOTE
Pt with co rectal bleeding x few weeks . Pt with HX of anemia last dialysis was Tuesday. Yes - the patient is able to be screened

## 2024-04-23 ENCOUNTER — APPOINTMENT (OUTPATIENT)
Dept: TRANSPLANT | Facility: CLINIC | Age: 39
End: 2024-04-23

## 2024-05-03 ENCOUNTER — APPOINTMENT (OUTPATIENT)
Dept: TRANSPLANT | Facility: CLINIC | Age: 39
End: 2024-05-03

## 2024-05-06 LAB
ALBUMIN SERPL ELPH-MCNC: 4.5 G/DL
ALP BLD-CCNC: 108 U/L
ALT SERPL-CCNC: 24 U/L
ANION GAP SERPL CALC-SCNC: 16 MMOL/L
AST SERPL-CCNC: 22 U/L
BILIRUB SERPL-MCNC: 0.3 MG/DL
BUN SERPL-MCNC: 18 MG/DL
CALCIUM SERPL-MCNC: 9.7 MG/DL
CHLORIDE SERPL-SCNC: 103 MMOL/L
CMV DNA SPEC QL NAA+PROBE: NOT DETECTED IU/ML
CMVPCR LOG: NOT DETECTED LOG10IU/ML
CO2 SERPL-SCNC: 22 MMOL/L
CREAT SERPL-MCNC: 1.6 MG/DL
EGFR: 55 ML/MIN/1.73M2
GLUCOSE SERPL-MCNC: 86 MG/DL
HCT VFR BLD CALC: 47.3 %
HGB BLD-MCNC: 14 G/DL
MCHC RBC-ENTMCNC: 24.1 PG
MCHC RBC-ENTMCNC: 29.6 GM/DL
MCV RBC AUTO: 81.4 FL
PLATELET # BLD AUTO: 347 K/UL
POTASSIUM SERPL-SCNC: 4 MMOL/L
PROT SERPL-MCNC: 6.9 G/DL
RBC # BLD: 5.81 M/UL
RBC # FLD: 14 %
SODIUM SERPL-SCNC: 141 MMOL/L
WBC # FLD AUTO: 3.91 K/UL

## 2024-05-20 RX ORDER — VALGANCICLOVIR HYDROCHLORIDE 450 MG/1
450 TABLET ORAL DAILY
Qty: 60 | Refills: 5 | Status: ACTIVE | COMMUNITY
Start: 2024-02-12 | End: 1900-01-01

## 2024-06-21 DIAGNOSIS — B25.9 CYTOMEGALOVIRAL DISEASE, UNSPECIFIED: ICD-10-CM

## 2024-06-21 LAB
ALBUMIN SERPL ELPH-MCNC: 4.7 G/DL
ALP BLD-CCNC: 112 U/L
ALT SERPL-CCNC: 28 U/L
ANION GAP SERPL CALC-SCNC: 14 MMOL/L
AST SERPL-CCNC: 31 U/L
BILIRUB SERPL-MCNC: 0.5 MG/DL
BUN SERPL-MCNC: 18 MG/DL
CALCIUM SERPL-MCNC: 9.9 MG/DL
CHLORIDE SERPL-SCNC: 99 MMOL/L
CMV DNA SPEC QL NAA+PROBE: NOT DETECTED IU/ML
CMVPCR LOG: NOT DETECTED LOG10IU/ML
CO2 SERPL-SCNC: 24 MMOL/L
CREAT SERPL-MCNC: 1.53 MG/DL
EGFR: 58 ML/MIN/1.73M2
GLUCOSE SERPL-MCNC: 104 MG/DL
HCT VFR BLD CALC: 48.4 %
HGB BLD-MCNC: 15 G/DL
MCHC RBC-ENTMCNC: 24.4 PG
MCHC RBC-ENTMCNC: 31 GM/DL
MCV RBC AUTO: 78.7 FL
PLATELET # BLD AUTO: 210 K/UL
POTASSIUM SERPL-SCNC: 4 MMOL/L
PROT SERPL-MCNC: 7.4 G/DL
RBC # BLD: 6.15 M/UL
RBC # FLD: 15.1 %
SODIUM SERPL-SCNC: 137 MMOL/L
WBC # FLD AUTO: 6.35 K/UL

## 2024-06-28 DIAGNOSIS — Z94.0 KIDNEY TRANSPLANT STATUS: ICD-10-CM

## 2024-07-02 ENCOUNTER — NON-APPOINTMENT (OUTPATIENT)
Age: 39
End: 2024-07-02

## 2024-07-03 ENCOUNTER — NON-APPOINTMENT (OUTPATIENT)
Age: 39
End: 2024-07-03

## 2024-07-05 NOTE — PROGRESS NOTE ADULT - REASON FOR ADMISSION
Sepsis Note   Nancy Calderon 61 y.o. female MRN: [id number]  Unit/Bed#: ICU 11 Encounter: [id number]       Initial Sepsis Screening       Row Name 07/05/24 1239                Is the patient's history suggestive of a new or worsening infection? No  -SP                  User Key  (r) = Recorded By, (t) = Taken By, (c) = Cosigned By      Initials Name Provider Type    SP Reshma Song MD Resident                        Body mass index is 29.63 kg/m².  Wt Readings from Last 1 Encounters:   07/05/24 78.3 kg (172 lb 9.9 oz)     IBW (Ideal Body Weight): 54.7 kg    Ideal body weight: 54.7 kg (120 lb 9.5 oz)  Adjusted ideal body weight: 64.1 kg (141 lb 6.4 oz)     Potential DDRT

## 2024-07-08 ENCOUNTER — APPOINTMENT (OUTPATIENT)
Dept: UROLOGY | Facility: CLINIC | Age: 39
End: 2024-07-08
Payer: MEDICARE

## 2024-07-08 VITALS
HEART RATE: 80 BPM | OXYGEN SATURATION: 98 % | TEMPERATURE: 97.3 F | SYSTOLIC BLOOD PRESSURE: 148 MMHG | BODY MASS INDEX: 30.8 KG/M2 | DIASTOLIC BLOOD PRESSURE: 97 MMHG | HEIGHT: 71 IN | WEIGHT: 220 LBS

## 2024-07-08 DIAGNOSIS — R10.2 PELVIC AND PERINEAL PAIN: ICD-10-CM

## 2024-07-08 DIAGNOSIS — Z94.0 KIDNEY TRANSPLANT STATUS: ICD-10-CM

## 2024-07-08 DIAGNOSIS — N20.0 CALCULUS OF KIDNEY: ICD-10-CM

## 2024-07-08 PROCEDURE — G2211 COMPLEX E/M VISIT ADD ON: CPT

## 2024-07-08 PROCEDURE — 99204 OFFICE O/P NEW MOD 45 MIN: CPT

## 2024-07-15 LAB
APPEARANCE: CLEAR
BACTERIA UR CULT: NORMAL
BACTERIA: NEGATIVE /HPF
BILIRUBIN URINE: NEGATIVE
CALCIUM OXALATE CRYSTALS: PRESENT
CAST: 0 /LPF
COLOR: YELLOW
EPITHELIAL CELLS: 0 /HPF
GLUCOSE QUALITATIVE U: NEGATIVE MG/DL
KETONES URINE: NEGATIVE MG/DL
LEUKOCYTE ESTERASE URINE: NEGATIVE
MICROSCOPIC-UA: NORMAL
NITRITE URINE: NEGATIVE
PH URINE: 6
PROTEIN URINE: 30 MG/DL
RED BLOOD CELLS URINE: 3 /HPF
REVIEW: NORMAL
SPECIFIC GRAVITY URINE: 1.02
WHITE BLOOD CELLS URINE: 1 /HPF

## 2024-07-22 NOTE — DISCHARGE NOTE PROVIDER - NSDCADMDATE_GEN_ALL_CORE_FT
History Of Present Illness  Guillermo is a 63 year old male presenting with Anemia.  Feeling  Better. No new Complaaints      Past Medical History  Past Medical History:   Diagnosis Date    Anemia due to chronic kidney disease 11/15/2021    Anemia in stage 3b chronic kidney disease  (CMD)     Anxiety     Charcot arthropathy of midfoot 2022    left foot    Essential (primary) hypertension     no longer on medications    Folate deficiency     Gastroesophageal reflux disease     per patient hematologist put him on it.    Hemodialysis patient (CMD)     Iron deficiency anemia     since gastric bypass surgery    Lack of energy     Sleep apnea     uses BiPap        Surgical History  Past Surgical History:   Procedure Laterality Date    Av fistula placement Left     Colonoscopy diagnostic  2017    Esophagogastroduodenoscopy transoral flex w/bx single or mult  2017    Gastric bypass  2003    Hemorrhoid surgery      ,     Hernia repair      umbilical        Social History  Social History     Tobacco Use    Smoking status: Former     Current packs/day: 0.00     Types: Cigarettes     Quit date:      Years since quittin.5     Passive exposure: Never    Smokeless tobacco: Never   Vaping Use    Vaping status: never used   Substance Use Topics    Alcohol use: Yes     Comment: rarely    Drug use: Never       Family History    Family History   Problem Relation Age of Onset    Natural Causes Mother     Stroke Father         Allergies  ALLERGIES:  Patient has no known allergies.    Medications  Medications Prior to Admission   Medication Sig Dispense Refill    cyclobenzaprine (FLEXERIL) 10 MG tablet Take 10 mg by mouth 3 times daily as needed for Muscle spasms.      AMIODarone (PACERONE) 200 MG tablet Take 1 tablet by mouth daily. 30 tablet 0    apixaBAN (ELIQUIS) 2.5 MG Tab Take 1 tablet by mouth every 12 hours. 60 tablet 0    digoxin 62.5 MCG Tab Take 62.5 mcg by mouth every other day. 30 tablet 0     midodrine (PROAMATINE) 5 MG tablet Take 1 tablet by mouth every 8 hours as needed (for SBP < 90). 30 tablet 0    [] Vancomycin HCl (VANCOMYCIN - PHARMACIST MONITORED) Misc 3 each as directed for 7 days. Vancomycin 750 mg post HD, last dose . (Patient taking differently: 3 each as directed. Vancomycin 750 mg post HD, Mon/Wed/Fri) 3 each 0    [] cefTAZidime (FORTAZ) 2 g injection Inject 2 g into the vein 3 days a week for 7 days. Ceftazidime 2gm post HD, last dose . (Patient taking differently: Inject 2,000 mg into the vein 3 days a week. Ceftazidime 2gm post HD, Mon/Wed/Fri) 6 g 0    folic acid (FOLATE) 1 MG tablet TAKE ONE TABLET BY MOUTH ONE TIME DAILY 90 tablet 0    rOPINIRole (REQUIP) 0.5 MG tablet Take 1 mg by mouth nightly.      semaglutide,0.25 or 0.5 mg/DOSE, (OZEMPIC) (0.68 mg/mL) injection Inject 0.5 mg into the skin 1 day a week. Wed      cinacalcet (SENSIPAR) 90 MG tablet Take 1 tablet by mouth 1 day a week. Monday      traMADol (ULTRAM) 50 MG tablet Take 2 tablets by mouth every 6 hours as needed for Pain.      escitalopram (LEXAPRO) 10 MG tablet Take 10 mg by mouth daily.      Cholecalciferol (Vitamin D) 125 MCG (5000 UT) Cap Take 1 capsule by mouth daily.       Current Facility-Administered Medications   Medication    vancomycin (VANCOCIN) 500 mg in sodium chloride 0.9 % 100 mL IVPB    epoetin lucrecia-epbx (RETACRIT) 18957 UNIT/ML injection 10,000 Units    ALPRAZolam (XANAX) tablet 0.25 mg    ipratropium-albuterol (DUONEB) 0.5-2.5 (3) MG/3ML nebulizer solution 3 mL    HYDROmorphone (DILAUDID) injection 0.5 mg    VANCOMYCIN - PHARMACIST MONITORED Misc    cefTAZidime (FORTAZ) injection 2,000 mg    acetaminophen (TYLENOL) tablet 650 mg    pantoprazole (PROTONIX) EC tablet 40 mg    ondansetron (ZOFRAN) injection 4 mg    sodium chloride 0.9% infusion    collagenase (SANTYL) ointment    AMIODarone (PACERONE) tablet 200 mg    [Held by provider] apixaBAN (ELIQUIS) tablet 2.5 mg     cyclobenzaprine (FLEXERIL) tablet 5 mg    digoxin (LANOXIN) tablet 62.5 mcg    escitalopram (LEXAPRO) tablet 10 mg    midodrine (PROAMATINE) tablet 5 mg    rOPINIRole (REQUIP) tablet 1 mg    guaiFENesin-DM (ROBITUSSIN DM) 100-10 MG/5ML syrup 10 mL    sodium chloride (NORMAL SALINE) 0.9 % bolus 100-200 mL    sodium chloride 0.9% infusion         Last Recorded Vitals  Blood pressure 102/62, pulse 67, temperature 97.3 °F (36.3 °C), temperature source Oral, resp. rate 18, height 5' 10\" (1.778 m), weight 132.1 kg (291 lb 3.6 oz), SpO2 100%.     Physical Exam  Skin:  Warm and dry without rash. Chronic skin changes   Head:  Normocephalic-atraumatic.   Neck:  Trachea is midline. No adenopathy.  Normal thyroid without mass or tenderness..   Eyes:  Normal conjunctivae and sclerae.  Pupils equal, round, reactive to light.  Extraocular movements intact.  ENT:  Mucous membranes are moist.  Normal tympanic membranes and external auditory canals bilaterally.  No pharyngeal erythema or exudate.  No facial tenderness.  Normal nasal mucosa.  Cardiovascular:  Symmetrical pulses.  Regular, rate and rhythm without murmur.  Respiratory:  Normal respiratory effort.  Bilateral rhonchi.  Gastrointestinal:  Soft and nontender. Morbidly Obese.  Normal bowel sounds.  No hepatomegaly or splenomegaly.   Musculoskeletal:  No deformity or edema.  No tenderness to palpation. Left Leg Wound  Back:   Normal alignment.  No costovertebral angle tenderness or midline bony tenderness.  Neurologic:   Oriented times 4.  Cranial nerves 2-12 are intact.  No focal deficits or lateralizing signs.  Psychiatric:   Cooperative.  Appropriate mood and affect.    Imaging    [facility] LOWER EXTREMITY ARTERIES DUPLEX BILATERAL   Final Result   1.   No evidence of hemodynamically significant stenosis or occlusion.            Electronically Signed by: NIR ARANGO M.D.    Signed on: 7/19/2024 4:52 PM    Workstation ID: [id number]      [facility] LOWER EXTREMITY  VENOUS DUPLEX LEFT   Final Result   1.   Negative study without deep vein thrombosis.      Electronically Signed by: CHIDI RODRIGUEZ M.D.    Signed on: 7/19/2024 11:25 AM    Workstation ID: [id number]      XR CHEST PA OR AP 1 VIEW   Final Result   FINDINGS AND IMPRESSION:   Portable AP upright chest image obtained. Central and bibasilar   interstitial opacities are noted may be edema related. Additional   moderately dense airspace opacity left base is increased from previous.   Query pneumonia. Follow-up advised.      Cardiac silhouette is prominent.   Moderate vascular congestion demonstrated.      FOR PHYSICIAN USE ONLY: Please note that this report was generated using   voice recognition software and may contain errors.  If you require   clarification or feel that there is an error in this report, please contact   me.         Electronically Signed by: TRISH MCCOY M.D.    Signed on: 7/17/2024 10:18 PM    Workstation ID: [id number]         Encounter Date: 07/01/24   Electrocardiogram 12-Lead   Result Value    Ventricular Rate EKG/Min (BPM) 127    Atrial Rate (BPM) 127    QRS-Interval (MSEC) 88    QT-Interval (MSEC) 294    QTc 427    R Axis (Degrees) 60    T Axis (Degrees) 46    REPORT TEXT      Sinus tachycardia  Low voltage QRS  Cannot rule out  Anterior infarct  (cited on or before  03-JUL-2024)  Abnormal ECG  When compared with ECG of  03-JUL-2024 07:29,  Sinus rhythm  has replaced  Atrial fibrillation  Confirmed by MIRIAM MARINELLI MD ([id number]) on 7/4/2024 3:01:59 AM          Labs     Recent Labs   Lab 07/22/24  0601 07/21/24 0441 07/20/24 0412   SODIUM 131* 133* 131*   POTASSIUM 4.7 4.3 3.7   CHLORIDE 98 99 97   CO2 23 26 28   BUN 55* 40* 22*   CREATININE 4.93* 3.89* 2.48*   GLUCOSE 98 86 125*   CALCIUM 8.7 9.0 9.0      Recent Labs   Lab 07/22/24  0601 07/21/24  0441 07/20/24  0412 07/19/24  0616 07/18/24  0554 07/17/24 2007   SODIUM 131* 133* 131* 131* 134* 132*   CHLORIDE 98 99 97 98 100 99   CO2  23 26 28 27 27 30   BUN 55* 40* 22* 27* 32* 26*   CREATININE 4.93* 3.89* 2.48* 2.91* 3.46* 2.98*   CALCIUM 8.7 9.0 9.0 8.8 7.8* 7.9*   ALBUMIN  --   --   --  2.0* 1.9* 2.0*   BILIRUBIN  --   --   --  0.7 0.6 0.6   ALKPT  --   --   --  928* 794* 882*   GPT  --   --   --  40 36 41   AST  --   --   --  31 24 33   GLUCOSE 98 86 125* 79 77 136*      Recent Labs   Lab 07/22/24  0601   WBC 9.7   RBC 2.74*   HGB 7.2*   HCT 23.8*           Assessment and Plan  Anemia       Chronic Kidney Disease       Refractory Anemia         Hematology Evaluation       For EGD and Colonoscopy in a.m.  2. Right Leg Wound         ID Re Evaluation  3. ESRD/Dialysis   4. Paroxysmal A-Fib  5. Morbid Obesity  6. Monitor      Hematology Evaluation      GI Evaluation      Medication Adjustment      GI prophylaxis      DVT Prophylaxis      Pain Control      F/U labs and radiographic studies  7. Discussed with Patient       DVT Prophylaxis  Eliquis    Code Status    Code Status: Full Resuscitation    Primary Care Physician  Antonio Arthur MD Lawrence Amato, MD   07/22/24    08-Jan-2024 03:15

## 2024-07-26 ENCOUNTER — APPOINTMENT (OUTPATIENT)
Dept: CT IMAGING | Facility: CLINIC | Age: 39
End: 2024-07-26

## 2024-07-30 ENCOUNTER — NON-APPOINTMENT (OUTPATIENT)
Age: 39
End: 2024-07-30

## 2024-08-02 ENCOUNTER — APPOINTMENT (OUTPATIENT)
Dept: CT IMAGING | Facility: CLINIC | Age: 39
End: 2024-08-02
Payer: MEDICARE

## 2024-08-02 PROCEDURE — 74176 CT ABD & PELVIS W/O CONTRAST: CPT

## 2024-08-20 NOTE — ED ADULT NURSE NOTE - NSFALLRSKASSESASSIST_ED_ALL_ED
Provider, please specify the Approach For the    Peritoneal Dialysis Catheter Removal         Open procedure       no

## 2024-08-26 ENCOUNTER — NON-APPOINTMENT (OUTPATIENT)
Age: 39
End: 2024-08-26

## 2024-08-26 DIAGNOSIS — B25.9 CYTOMEGALOVIRAL DISEASE, UNSPECIFIED: ICD-10-CM

## 2024-08-27 ENCOUNTER — APPOINTMENT (OUTPATIENT)
Dept: OTOLARYNGOLOGY | Facility: CLINIC | Age: 39
End: 2024-08-27
Payer: MEDICARE

## 2024-08-27 VITALS — WEIGHT: 112 LBS | HEIGHT: 71 IN | BODY MASS INDEX: 15.68 KG/M2

## 2024-08-27 DIAGNOSIS — R09.81 NASAL CONGESTION: ICD-10-CM

## 2024-08-27 DIAGNOSIS — J31.0 CHRONIC RHINITIS: ICD-10-CM

## 2024-08-27 PROCEDURE — 99204 OFFICE O/P NEW MOD 45 MIN: CPT | Mod: 25

## 2024-08-27 PROCEDURE — 31231 NASAL ENDOSCOPY DX: CPT

## 2024-08-27 RX ORDER — TIZANIDINE 4 MG/1
TABLET ORAL
Refills: 0 | Status: ACTIVE | COMMUNITY

## 2024-08-27 RX ORDER — FLUTICASONE PROPIONATE 50 UG/1
50 SPRAY, METERED NASAL
Qty: 1 | Refills: 3 | Status: ACTIVE | COMMUNITY
Start: 2024-08-27 | End: 1900-01-01

## 2024-08-27 NOTE — HISTORY OF PRESENT ILLNESS
[de-identified] : 39 year old male presents for evaluation of left facial pain and nasal congestion PMHx: Occipital Neuralgia, HTN, Anxiety, Kidney Disease, [place] Palsy (2018) PSHx: Renal Transplant (2022), Fistula placement and closing.  Referred by Neurologist Dr. Page. Reports left nasal obstruction and decreased sense of smell. Frequently with clear rhinorrhea. Intermittent left sided headaches which occur daily for the last few weeks. Symptoms began almost 1 year ago, worsening recently. Still with mild left sided facial weakness from Bell's Palsy in 2018.  Has tried saline spray in the past. Denies previously treated sinus infections and epistaxis. MR Brain and IAC w/o Cont (R) 4/09/24-- reviewed personally--IMPRESSION: 1.  Patchy foci of T2/FLAIR hyperintensity in the supratentorial white matter tracts which may represent microvascular ischemic disease given the provided history of hypertension. 2.  Otherwise unremarkable MRI of the brain and internal auditory canals.

## 2024-08-27 NOTE — CONSULT LETTER
[Dear  ___] : Dear  [unfilled], [( Thank you for referring [unfilled] for consultation for _____ )] : Thank you for referring [unfilled] for consultation for [unfilled] [Please see my note below.] : Please see my note below. [Sincerely,] : Sincerely, [FreeTextEntry3] : Jesus Kulkarni MD Sinus & Endoscopic Skull Base Surgery Department of Otolaryngology- Head & Neck Surgery [facility]  Phone: [phone] Fax: [phone]

## 2024-08-27 NOTE — REASON FOR VISIT
[Initial Consultation] : an initial consultation for [FreeTextEntry2] : left facial pain and nasal congestion

## 2024-08-27 NOTE — PHYSICAL EXAM
[Nasal Endoscopy Performed] : nasal endoscopy was performed, see procedure section for findings [Midline] : trachea located in midline position [Normal] : no rashes [de-identified] : L ptotic lid

## 2024-09-09 ENCOUNTER — NON-APPOINTMENT (OUTPATIENT)
Age: 39
End: 2024-09-09

## 2024-09-09 ENCOUNTER — APPOINTMENT (OUTPATIENT)
Dept: CARDIOLOGY | Facility: CLINIC | Age: 39
End: 2024-09-09

## 2024-09-09 VITALS
WEIGHT: 198 LBS | OXYGEN SATURATION: 99 % | DIASTOLIC BLOOD PRESSURE: 80 MMHG | BODY MASS INDEX: 27.62 KG/M2 | SYSTOLIC BLOOD PRESSURE: 117 MMHG | HEART RATE: 95 BPM

## 2024-09-09 DIAGNOSIS — I10 ESSENTIAL (PRIMARY) HYPERTENSION: ICD-10-CM

## 2024-09-09 PROCEDURE — G2211 COMPLEX E/M VISIT ADD ON: CPT

## 2024-09-09 PROCEDURE — 93000 ELECTROCARDIOGRAM COMPLETE: CPT

## 2024-09-09 PROCEDURE — 99205 OFFICE O/P NEW HI 60 MIN: CPT

## 2024-09-26 DIAGNOSIS — B25.9 CYTOMEGALOVIRAL DISEASE, UNSPECIFIED: ICD-10-CM

## 2024-10-15 ENCOUNTER — APPOINTMENT (OUTPATIENT)
Dept: NEPHROLOGY | Facility: CLINIC | Age: 39
End: 2024-10-15
Payer: MEDICARE

## 2024-10-15 VITALS
BODY MASS INDEX: 28.56 KG/M2 | WEIGHT: 204 LBS | DIASTOLIC BLOOD PRESSURE: 100 MMHG | TEMPERATURE: 97.5 F | RESPIRATION RATE: 14 BRPM | OXYGEN SATURATION: 98 % | HEART RATE: 88 BPM | HEIGHT: 71 IN | SYSTOLIC BLOOD PRESSURE: 157 MMHG

## 2024-10-15 DIAGNOSIS — Z94.0 OTHER LONG TERM (CURRENT) DRUG THERAPY: ICD-10-CM

## 2024-10-15 DIAGNOSIS — D63.1 CHRONIC KIDNEY DISEASE, UNSPECIFIED: ICD-10-CM

## 2024-10-15 DIAGNOSIS — I10 ESSENTIAL (PRIMARY) HYPERTENSION: ICD-10-CM

## 2024-10-15 DIAGNOSIS — N18.9 CHRONIC KIDNEY DISEASE, UNSPECIFIED: ICD-10-CM

## 2024-10-15 DIAGNOSIS — Z79.899 OTHER LONG TERM (CURRENT) DRUG THERAPY: ICD-10-CM

## 2024-10-15 PROCEDURE — 99214 OFFICE O/P EST MOD 30 MIN: CPT

## 2024-10-15 RX ORDER — PAROXETINE HYDROCHLORIDE 10 MG/1
10 TABLET, FILM COATED ORAL DAILY
Qty: 30 | Refills: 0 | Status: ACTIVE | COMMUNITY
Start: 2024-10-15

## 2024-10-22 DIAGNOSIS — Z94.0 KIDNEY TRANSPLANT STATUS: ICD-10-CM

## 2024-10-23 ENCOUNTER — APPOINTMENT (OUTPATIENT)
Dept: OTOLARYNGOLOGY | Facility: CLINIC | Age: 39
End: 2024-10-23

## 2024-10-23 DIAGNOSIS — B25.9 CYTOMEGALOVIRAL DISEASE, UNSPECIFIED: ICD-10-CM

## 2024-10-23 RX ORDER — VALGANCICLOVIR HYDROCHLORIDE 450 MG/1
450 TABLET ORAL
Qty: 120 | Refills: 1 | Status: ACTIVE | COMMUNITY
Start: 2024-10-21 | End: 1900-01-01

## 2024-10-28 ENCOUNTER — APPOINTMENT (OUTPATIENT)
Dept: TRANSPLANT | Facility: CLINIC | Age: 39
End: 2024-10-28

## 2024-11-01 LAB — TACROLIMUS SERPL-MCNC: 5.5 NG/ML

## 2024-11-02 LAB
ALBUMIN SERPL ELPH-MCNC: 4.2 G/DL
ANION GAP SERPL CALC-SCNC: 13 MMOL/L
BUN SERPL-MCNC: 21 MG/DL
CALCIUM SERPL-MCNC: 9.4 MG/DL
CHLORIDE SERPL-SCNC: 103 MMOL/L
CO2 SERPL-SCNC: 24 MMOL/L
CREAT SERPL-MCNC: 1.97 MG/DL
EGFR: 43 ML/MIN/1.73M2
GLUCOSE SERPL-MCNC: 83 MG/DL
PHOSPHATE SERPL-MCNC: 2.8 MG/DL
POTASSIUM SERPL-SCNC: 3.7 MMOL/L
SODIUM SERPL-SCNC: 141 MMOL/L

## 2024-11-19 ENCOUNTER — APPOINTMENT (OUTPATIENT)
Dept: CV DIAGNOSITCS | Facility: HOSPITAL | Age: 39
End: 2024-11-19

## 2024-11-26 ENCOUNTER — APPOINTMENT (OUTPATIENT)
Dept: CARDIOLOGY | Facility: CLINIC | Age: 39
End: 2024-11-26
Payer: MEDICARE

## 2024-11-26 PROCEDURE — 93306 TTE W/DOPPLER COMPLETE: CPT

## 2024-12-23 ENCOUNTER — APPOINTMENT (OUTPATIENT)
Dept: TRANSPLANT | Facility: CLINIC | Age: 39
End: 2024-12-23

## 2024-12-30 ENCOUNTER — APPOINTMENT (OUTPATIENT)
Dept: CARDIOLOGY | Facility: CLINIC | Age: 39
End: 2024-12-30

## 2024-12-30 VITALS
SYSTOLIC BLOOD PRESSURE: 142 MMHG | HEART RATE: 84 BPM | DIASTOLIC BLOOD PRESSURE: 96 MMHG | BODY MASS INDEX: 26.6 KG/M2 | WEIGHT: 190 LBS | OXYGEN SATURATION: 97 % | HEIGHT: 71 IN

## 2024-12-30 PROCEDURE — 93000 ELECTROCARDIOGRAM COMPLETE: CPT

## 2024-12-30 PROCEDURE — G2211 COMPLEX E/M VISIT ADD ON: CPT

## 2024-12-30 PROCEDURE — 99214 OFFICE O/P EST MOD 30 MIN: CPT

## 2025-01-06 ENCOUNTER — NON-APPOINTMENT (OUTPATIENT)
Age: 40
End: 2025-01-06

## 2025-01-16 DIAGNOSIS — Z94.0 KIDNEY TRANSPLANT STATUS: ICD-10-CM

## 2025-01-21 ENCOUNTER — APPOINTMENT (OUTPATIENT)
Dept: TRANSPLANT | Facility: CLINIC | Age: 40
End: 2025-01-21

## 2025-01-24 ENCOUNTER — LABORATORY RESULT (OUTPATIENT)
Age: 40
End: 2025-01-24

## 2025-01-24 LAB
ALBUMIN SERPL ELPH-MCNC: 4.5 G/DL
ALP BLD-CCNC: 124 U/L
ALT SERPL-CCNC: 43 U/L
ANION GAP SERPL CALC-SCNC: 14 MMOL/L
AST SERPL-CCNC: 33 U/L
BILIRUB SERPL-MCNC: 0.4 MG/DL
BUN SERPL-MCNC: 17 MG/DL
CALCIUM SERPL-MCNC: 10.3 MG/DL
CHLORIDE SERPL-SCNC: 102 MMOL/L
CO2 SERPL-SCNC: 29 MMOL/L
CREAT SERPL-MCNC: 1.91 MG/DL
CREAT SPEC-SCNC: 158 MG/DL
CREAT/PROT UR: 0.5 RATIO
EGFR: 45 ML/MIN/1.73M2
GLUCOSE SERPL-MCNC: 117 MG/DL
MAGNESIUM SERPL-MCNC: 1.9 MG/DL
PHOSPHATE SERPL-MCNC: 3.8 MG/DL
POTASSIUM SERPL-SCNC: 3.6 MMOL/L
PROT SERPL-MCNC: 8.2 G/DL
PROT UR-MCNC: 73 MG/DL
SODIUM SERPL-SCNC: 146 MMOL/L

## 2025-01-25 LAB
APPEARANCE: CLEAR
BASOPHILS # BLD AUTO: 0.09 K/UL
BASOPHILS NFR BLD AUTO: 0.9 %
BILIRUBIN URINE: NEGATIVE
BLOOD URINE: NEGATIVE
COLOR: YELLOW
EOSINOPHIL # BLD AUTO: 0.14 K/UL
EOSINOPHIL NFR BLD AUTO: 1.3 %
GLUCOSE QUALITATIVE U: NEGATIVE MG/DL
HCT VFR BLD CALC: 51.9 %
HGB BLD-MCNC: 15.9 G/DL
IMM GRANULOCYTES NFR BLD AUTO: 1.1 %
KETONES URINE: NEGATIVE MG/DL
LEUKOCYTE ESTERASE URINE: NEGATIVE
LYMPHOCYTES # BLD AUTO: 3.24 K/UL
LYMPHOCYTES NFR BLD AUTO: 30.6 %
MAN DIFF?: NORMAL
MCHC RBC-ENTMCNC: 25.3 PG
MCHC RBC-ENTMCNC: 30.6 G/DL
MCV RBC AUTO: 82.6 FL
MONOCYTES # BLD AUTO: 1.58 K/UL
MONOCYTES NFR BLD AUTO: 14.9 %
NEUTROPHILS # BLD AUTO: 5.41 K/UL
NEUTROPHILS NFR BLD AUTO: 51.2 %
NITRITE URINE: NEGATIVE
PH URINE: 6.5
PLATELET # BLD AUTO: 266 K/UL
PROTEIN URINE: 100 MG/DL
RBC # BLD: 6.28 M/UL
RBC # FLD: 17.1 %
SPECIFIC GRAVITY URINE: 1.01
TACROLIMUS SERPL-MCNC: 7.2 NG/ML
UROBILINOGEN URINE: 0.2 MG/DL
WBC # FLD AUTO: 10.58 K/UL

## 2025-01-27 ENCOUNTER — NON-APPOINTMENT (OUTPATIENT)
Age: 40
End: 2025-01-27

## 2025-02-07 ENCOUNTER — NON-APPOINTMENT (OUTPATIENT)
Age: 40
End: 2025-02-07

## 2025-02-11 ENCOUNTER — NON-APPOINTMENT (OUTPATIENT)
Age: 40
End: 2025-02-11

## 2025-02-12 ENCOUNTER — NON-APPOINTMENT (OUTPATIENT)
Age: 40
End: 2025-02-12

## 2025-02-20 DIAGNOSIS — Z94.0 KIDNEY TRANSPLANT STATUS: ICD-10-CM

## 2025-02-24 ENCOUNTER — APPOINTMENT (OUTPATIENT)
Dept: TRANSPLANT | Facility: CLINIC | Age: 40
End: 2025-02-24

## 2025-02-26 ENCOUNTER — APPOINTMENT (OUTPATIENT)
Dept: INFECTIOUS DISEASE | Facility: CLINIC | Age: 40
End: 2025-02-26

## 2025-02-26 PROCEDURE — 98016 BRIEF COMUNICAJ TECH-BSD SVC: CPT

## 2025-03-13 ENCOUNTER — NON-APPOINTMENT (OUTPATIENT)
Age: 40
End: 2025-03-13

## 2025-06-23 ENCOUNTER — APPOINTMENT (OUTPATIENT)
Dept: NEPHROLOGY | Facility: CLINIC | Age: 40
End: 2025-06-23

## (undated) DEVICE — SOL IRR POUR NS 0.9% 500ML

## (undated) DEVICE — SUT SOFSILK 4-0 18" TIES

## (undated) DEVICE — GLV 7 PROTEXIS (WHITE)

## (undated) DEVICE — SUT ETHIBOND 5 30" LR

## (undated) DEVICE — SOL IRR POUR H2O 250ML

## (undated) DEVICE — SYR LUER LOK 10CC

## (undated) DEVICE — SYR ASEPTO

## (undated) DEVICE — SUT BOOT STANDARD (ASSORTED) 5 PAIR

## (undated) DEVICE — TUNNELER SHEATH ON-Q 17GA X 8"

## (undated) DEVICE — DRSG TELFA 3 X 8

## (undated) DEVICE — POSITIONER FOAM EGG CRATE ULNAR 2PCS (PINK)

## (undated) DEVICE — NDL COUNTER FOAM AND MAGNET 40-70

## (undated) DEVICE — LAP PAD 18 X 18"

## (undated) DEVICE — SUT PDS II PLUS 5-0 30" RB-1

## (undated) DEVICE — PACK MAJOR ABDOMINAL SUPINE

## (undated) DEVICE — DRAPE SLUSH / WARMER 44 X 66"

## (undated) DEVICE — SPECIMEN CONTAINER 100ML

## (undated) DEVICE — SUT SOFSILK 2-0 18" V-20 (POP-OFF)

## (undated) DEVICE — TUBING TUR 2 PRONG

## (undated) DEVICE — SUT PDS II PLUS 4-0 27" SH

## (undated) DEVICE — BLADE SCALPEL SAFETYLOCK #15

## (undated) DEVICE — FOLEY HOLDER STATLOCK 2 WAY ADULT

## (undated) DEVICE — SUT PDS II 5-0 27" RB-1

## (undated) DEVICE — TUBING TRUWAVE PRESSURE MALE/FEMALE 72"

## (undated) DEVICE — DRAPE INSTRUMENT POUCH 6.75" X 11"

## (undated) DEVICE — GLV 6.5 PROTEXIS (WHITE)

## (undated) DEVICE — CATH IV SAFE INSYTE 16G X 1.16" (GRAY)

## (undated) DEVICE — GOWN TRIMAX LG

## (undated) DEVICE — SUT NYLON 2-0 18" FS

## (undated) DEVICE — FOLEY TRAY 16FR 5CC LTX UMETER CLOSED

## (undated) DEVICE — BLADE SCALPEL SAFETYLOCK #10

## (undated) DEVICE — SUT PROLENE 6-0 4-30" C-1

## (undated) DEVICE — DRAPE TOWEL BLUE 17" X 24"

## (undated) DEVICE — GLV 7.5 PROTEXIS (WHITE)

## (undated) DEVICE — DRAPE MAYO STAND 30"

## (undated) DEVICE — DRSG TAPE MEDIPORE 3"

## (undated) DEVICE — PREP CHLORAPREP HI-LITE ORANGE 26ML

## (undated) DEVICE — VENODYNE/SCD SLEEVE CALF LARGE

## (undated) DEVICE — VESSEL LOOP MAXI-RED  0.120" X 16"

## (undated) DEVICE — DRSG CURITY GAUZE SPONGE 4 X 4" 12-PLY

## (undated) DEVICE — WARMING BLANKET UPPER ADULT

## (undated) DEVICE — DRAPE ISOLATION BAG 20X20"

## (undated) DEVICE — SUT PROLENE 7-0 30" C-1

## (undated) DEVICE — SUT PROLENE 6-0 30" C-1

## (undated) DEVICE — SUCTION YANKAUER NO CONTROL VENT

## (undated) DEVICE — VISITEC 4X4

## (undated) DEVICE — PUNCH VASC STD 7.75" HANDLE 4.8MM DISP

## (undated) DEVICE — BAG DECANTER DISP

## (undated) DEVICE — ELCTR BOVIE TIP BLADE INSULATED 6.5" EDGE

## (undated) DEVICE — MEDICATION LABELS W MARKER

## (undated) DEVICE — POSITIONER FOAM HEADREST (PINK)

## (undated) DEVICE — TUBING SUCTION 20FT

## (undated) DEVICE — DRAPE LIGHT HANDLE COVER (BLUE)

## (undated) DEVICE — GLV 8 PROTEXIS ORTHO (CREAM)

## (undated) DEVICE — GLV 8 PROTEXIS (WHITE)

## (undated) DEVICE — GLV 8.5 PROTEXIS (WHITE)

## (undated) DEVICE — ELCTR BOVIE TIP BLADE INSULATED 2.75" EDGE

## (undated) DEVICE — SUT SOFSILK 2-0 18" TIES

## (undated) DEVICE — GOWN XL EXTRA LONG

## (undated) DEVICE — SYR LUER LOK 50CC

## (undated) DEVICE — WARMING BLANKET LOWER ADULT

## (undated) DEVICE — SYR LUER LOK 20CC

## (undated) DEVICE — SUT SOFSILK 4-0 18" V-20